# Patient Record
Sex: FEMALE | Race: WHITE | NOT HISPANIC OR LATINO | Employment: OTHER | URBAN - METROPOLITAN AREA
[De-identification: names, ages, dates, MRNs, and addresses within clinical notes are randomized per-mention and may not be internally consistent; named-entity substitution may affect disease eponyms.]

---

## 2017-01-09 ENCOUNTER — LAB VISIT (OUTPATIENT)
Dept: LAB | Facility: HOSPITAL | Age: 37
End: 2017-01-09
Attending: INTERNAL MEDICINE
Payer: COMMERCIAL

## 2017-01-09 DIAGNOSIS — M54.2 NECK PAIN: ICD-10-CM

## 2017-01-09 DIAGNOSIS — G93.5 ARNOLD-CHIARI MALFORMATION, TYPE I: ICD-10-CM

## 2017-01-09 DIAGNOSIS — L40.50 PSORIATIC ARTHRITIS: ICD-10-CM

## 2017-01-09 DIAGNOSIS — M45.9 AS (ANKYLOSING SPONDYLITIS): ICD-10-CM

## 2017-01-09 DIAGNOSIS — R11.2 NON-INTRACTABLE VOMITING WITH NAUSEA: ICD-10-CM

## 2017-01-09 DIAGNOSIS — G43.919 INTRACTABLE MIGRAINE, UNSPECIFIED MIGRAINE TYPE: ICD-10-CM

## 2017-01-09 DIAGNOSIS — F41.9 ANXIETY: ICD-10-CM

## 2017-01-09 LAB
BASOPHILS # BLD AUTO: 0.09 K/UL
BASOPHILS NFR BLD: 0.8 %
DIFFERENTIAL METHOD: ABNORMAL
EOSINOPHIL # BLD AUTO: 0.2 K/UL
EOSINOPHIL NFR BLD: 1.6 %
ERYTHROCYTE [DISTWIDTH] IN BLOOD BY AUTOMATED COUNT: 13.8 %
ERYTHROCYTE [SEDIMENTATION RATE] IN BLOOD BY WESTERGREN METHOD: 10 MM/HR
HCT VFR BLD AUTO: 41.4 %
HGB BLD-MCNC: 13.6 G/DL
LYMPHOCYTES # BLD AUTO: 5 K/UL
LYMPHOCYTES NFR BLD: 44.2 %
MCH RBC QN AUTO: 30.2 PG
MCHC RBC AUTO-ENTMCNC: 32.9 %
MCV RBC AUTO: 92 FL
MONOCYTES # BLD AUTO: 0.9 K/UL
MONOCYTES NFR BLD: 8.3 %
NEUTROPHILS # BLD AUTO: 5.1 K/UL
NEUTROPHILS NFR BLD: 44.8 %
PLATELET # BLD AUTO: 423 K/UL
PMV BLD AUTO: 10.2 FL
RBC # BLD AUTO: 4.5 M/UL
WBC # BLD AUTO: 11.31 K/UL

## 2017-01-09 PROCEDURE — 80053 COMPREHEN METABOLIC PANEL: CPT

## 2017-01-09 PROCEDURE — 86235 NUCLEAR ANTIGEN ANTIBODY: CPT | Mod: 59

## 2017-01-09 PROCEDURE — 84481 FREE ASSAY (FT-3): CPT

## 2017-01-09 PROCEDURE — 85025 COMPLETE CBC W/AUTO DIFF WBC: CPT

## 2017-01-09 PROCEDURE — 86225 DNA ANTIBODY NATIVE: CPT

## 2017-01-09 PROCEDURE — 36415 COLL VENOUS BLD VENIPUNCTURE: CPT | Mod: PO

## 2017-01-09 PROCEDURE — 84439 ASSAY OF FREE THYROXINE: CPT

## 2017-01-09 PROCEDURE — 86038 ANTINUCLEAR ANTIBODIES: CPT

## 2017-01-09 PROCEDURE — 84443 ASSAY THYROID STIM HORMONE: CPT

## 2017-01-09 PROCEDURE — 86235 NUCLEAR ANTIGEN ANTIBODY: CPT

## 2017-01-09 PROCEDURE — 83516 IMMUNOASSAY NONANTIBODY: CPT

## 2017-01-09 PROCEDURE — 85651 RBC SED RATE NONAUTOMATED: CPT | Mod: PO

## 2017-01-10 LAB
ALBUMIN SERPL BCP-MCNC: 3.3 G/DL
ALP SERPL-CCNC: 69 U/L
ALT SERPL W/O P-5'-P-CCNC: 13 U/L
ANA SER QL IF: NORMAL
ANION GAP SERPL CALC-SCNC: 8 MMOL/L
ANTI SM ANTIBODY: 1.02 EU
ANTI SM/RNP ANTIBODY: 1.81 EU
ANTI-SM INTERPRETATION: NEGATIVE
ANTI-SM/RNP INTERPRETATION: NEGATIVE
ANTI-SSA ANTIBODY: 1.43 EU
ANTI-SSA INTERPRETATION: NEGATIVE
AST SERPL-CCNC: 12 U/L
BILIRUB SERPL-MCNC: 0.3 MG/DL
BUN SERPL-MCNC: 15 MG/DL
CALCIUM SERPL-MCNC: 9 MG/DL
CHLORIDE SERPL-SCNC: 104 MMOL/L
CO2 SERPL-SCNC: 25 MMOL/L
CREAT SERPL-MCNC: 0.7 MG/DL
DSDNA AB SER-ACNC: NORMAL [IU]/ML
EST. GFR  (AFRICAN AMERICAN): >60 ML/MIN/1.73 M^2
EST. GFR  (NON AFRICAN AMERICAN): >60 ML/MIN/1.73 M^2
GLUCOSE SERPL-MCNC: 84 MG/DL
POTASSIUM SERPL-SCNC: 4 MMOL/L
PROT SERPL-MCNC: 6.9 G/DL
SODIUM SERPL-SCNC: 137 MMOL/L
T3FREE SERPL-MCNC: 3 PG/ML
T4 FREE SERPL-MCNC: 0.89 NG/DL
TSH SERPL DL<=0.005 MIU/L-ACNC: 1.29 UIU/ML

## 2017-01-11 ENCOUNTER — OFFICE VISIT (OUTPATIENT)
Dept: RHEUMATOLOGY | Facility: CLINIC | Age: 37
End: 2017-01-11
Payer: COMMERCIAL

## 2017-01-11 VITALS
BODY MASS INDEX: 34.45 KG/M2 | DIASTOLIC BLOOD PRESSURE: 73 MMHG | HEART RATE: 73 BPM | SYSTOLIC BLOOD PRESSURE: 111 MMHG | WEIGHT: 182.31 LBS

## 2017-01-11 DIAGNOSIS — M45.9 ANKYLOSING SPONDYLITIS: Primary | ICD-10-CM

## 2017-01-11 DIAGNOSIS — L40.50 PSORIATIC ARTHRITIS: ICD-10-CM

## 2017-01-11 DIAGNOSIS — G43.919 INTRACTABLE MIGRAINE, UNSPECIFIED MIGRAINE TYPE: ICD-10-CM

## 2017-01-11 DIAGNOSIS — G93.5 ARNOLD-CHIARI MALFORMATION, TYPE I: ICD-10-CM

## 2017-01-11 DIAGNOSIS — F41.9 ANXIETY: ICD-10-CM

## 2017-01-11 DIAGNOSIS — M54.2 NECK PAIN: ICD-10-CM

## 2017-01-11 LAB — ENA SCL70 AB SER-ACNC: 2 UNITS

## 2017-01-11 PROCEDURE — 99214 OFFICE O/P EST MOD 30 MIN: CPT | Mod: S$GLB,,, | Performed by: INTERNAL MEDICINE

## 2017-01-11 PROCEDURE — 99999 PR PBB SHADOW E&M-EST. PATIENT-LVL III: CPT | Mod: PBBFAC,,, | Performed by: INTERNAL MEDICINE

## 2017-01-11 PROCEDURE — 1159F MED LIST DOCD IN RCRD: CPT | Mod: S$GLB,,, | Performed by: INTERNAL MEDICINE

## 2017-01-11 RX ORDER — CYCLOBENZAPRINE HCL 10 MG
10 TABLET ORAL 3 TIMES DAILY PRN
Qty: 90 TABLET | Refills: 3 | Status: SHIPPED | OUTPATIENT
Start: 2017-01-11 | End: 2017-02-10

## 2017-01-11 RX ORDER — CYCLOBENZAPRINE HYDROCHLORIDE 15 MG/1
15 CAPSULE, EXTENDED RELEASE ORAL DAILY
COMMUNITY
Start: 2017-01-05 | End: 2018-09-10 | Stop reason: ALTCHOICE

## 2017-01-11 ASSESSMENT — ROUTINE ASSESSMENT OF PATIENT INDEX DATA (RAPID3)
FATIGUE SCORE: 5
TOTAL RAPID3 SCORE: 5.33
PATIENT GLOBAL ASSESSMENT SCORE: 5
AM STIFFNESS SCORE: 1, YES
WHEN YOU AWAKENED IN THE MORNING OVER THE LAST WEEK, PLEASE INDICATE THE AMOUNT OF TIME IT TAKES UNTIL YOU ARE AS LIMBER AS YOU WILL BE FOR THE DAY: ONE HOUR AFTER WAKING
PAIN SCORE: 6
MDHAQ FUNCTION SCORE: 1.5
PSYCHOLOGICAL DISTRESS SCORE: 5.5

## 2017-01-11 NOTE — PROGRESS NOTES
Subjective:       Patient ID: Sparkle Jose is a 36 y.o. female.    Chief Complaint: Follow-up    HPI Comments: DX: ANKYLOSING SPONDYLITIS   On consyntx and her joints are stable, Pt has tried and failed or had an adverse reaction to methotrexate, azulfidine, Humira, simponi, cimzia and remicade. She has been on Cosentyx  For about six months and is doing very well, until it was stopped due to a change in insurance.  likely muscle spasm and simponi not working we will change to consyntx q month. She also has Chiari decompression and am concerned about an epidural injection.  Patient complains of arthralgias and myalgias for which has been present for a few years. Pain is located in multiple joints, both shoulder(s), both elbow(s), both wrist(s), both MCP(s): 1st, 2nd, 3rd, 4th and 5th, both PIP(s): 1st, 2nd, 3rd, 4th and 5th, both DIP(s): 1st and 2nd, both hip(s), both knee(s) and both MTP(s): 1st, 2nd, 3rd, 4th and 5th, is described as aching, pulsating, shooting and throbbing, and is constant, moderate .  Associated symptoms include: crepitation, decreased range of motion, edema, effusion, tenderness and warmth.            Affected locations include the left PIP.         Review of Systems   Constitutional: Positive for activity change, appetite change and unexpected weight change. Negative for chills and diaphoresis.   HENT: Negative for congestion, dental problem, ear discharge, ear pain, facial swelling, mouth sores, nosebleeds, postnasal drip, rhinorrhea, sinus pressure, sneezing, sore throat, tinnitus and voice change.    Eyes: Negative for photophobia, pain, discharge, redness and itching.   Respiratory: Negative for apnea, cough, chest tightness, shortness of breath and wheezing.    Cardiovascular: Positive for leg swelling. Negative for chest pain and palpitations.   Gastrointestinal: Negative for abdominal distention, abdominal pain, constipation, diarrhea, nausea and vomiting.   Endocrine: Negative  for cold intolerance, heat intolerance, polydipsia and polyuria.   Genitourinary: Negative for decreased urine volume, difficulty urinating, flank pain, frequency, hematuria and urgency.   Musculoskeletal: Positive for arthralgias, back pain, gait problem, neck pain and neck stiffness.   Skin: Positive for rash. Negative for pallor and wound.        Psoriasis rash on arms   Allergic/Immunologic: Negative for immunocompromised state.   Neurological: Positive for weakness and numbness. Negative for dizziness and tremors.   Hematological: Negative for adenopathy. Does not bruise/bleed easily.   Psychiatric/Behavioral: Positive for sleep disturbance. The patient is nervous/anxious.         Depressed         Objective:     Visit Vitals    /73 (BP Location: Left arm, Patient Position: Sitting, BP Method: Automatic)    Pulse 73    Wt 82.7 kg (182 lb 5.1 oz)    BMI 34.45 kg/m2        Physical Exam   Vitals reviewed.  Constitutional: She is oriented to person, place, and time. She appears distressed.   HENT:   Head: Normocephalic and atraumatic.   Mouth/Throat: Oropharynx is clear and moist.   Eyes: EOM are normal. Pupils are equal, round, and reactive to light.   Neck: Neck supple. No thyromegaly present.   Cardiovascular: Normal rate, regular rhythm and normal heart sounds.  Exam reveals no gallop and no friction rub.    No murmur heard.  Pulmonary/Chest: Breath sounds normal. She has no wheezes. She has no rales. She exhibits no tenderness.   Abdominal: There is no tenderness. There is no rebound and no guarding.       Right Side Rheumatological Exam     Examination finds the elbow, 1st MCP, 2nd MCP, 3rd MCP, 4th MCP and 5th MCP normal.    The patient is tender to palpation of the shoulder and knee    She has swelling of the knee    The patient has an enlarged wrist, 1st PIP, 2nd PIP, 3rd PIP, 4th PIP and 5th PIP    Shoulder Exam   Tenderness Location: acromion    Range of Motion   Active Abduction: abnormal    Adduction: abnormal  Sensation: normal    Knee Exam   Tenderness Location: medial joint line  Patellofemoral Crepitus: positive  Effusion: positive  Sensation: normal    Hip Exam   Tenderness Location: no tenderness  Sensation: normal    Elbow/Wrist Exam   Tenderness Location: no tenderness  Sensation: normal    Left Side Rheumatological Exam     Examination finds the elbow, knee, 1st MCP, 2nd MCP, 3rd MCP, 4th MCP and 5th MCP normal.    The patient is tender to palpation of the shoulder.    The patient has an enlarged wrist, 1st PIP, 2nd PIP, 3rd PIP, 4th PIP and 5th PIP.    Shoulder Exam   Tenderness Location: acromion    Range of Motion   Active Abduction: abnormal   Sensation: normal    Knee Exam   Tenderness Location: lateral joint line and medial joint line    Patellofemoral Crepitus: positive  Effusion: positive  Sensation: normal    Hip Exam   Tenderness Location: no tenderness  Sensation: normal    Elbow/Wrist Exam   Sensation: normal      Back/Neck Exam   General Inspection   Gait: normal       Tenderness Right paramedian tenderness of the Lower C-Spine, Lower L-Spine, Occ, Lower T-Spine and Upper C-Spine.Left paramedian tenderness of the Lower L-Spine, Lower C-Spine and Lower T-Spine.    Back Range of Motion   Lateral Bend Right: abnormal  Lateral Bend Left: abnormal  Rotation Right: abnormal  Rotation Left: abnormal      Lymphadenopathy:     She has no cervical adenopathy.   Neurological: She is alert and oriented to person, place, and time. She has normal sensation and normal strength. Gait normal.   Reflex Scores:       Patellar reflexes are 3+ on the right side and 3+ on the left side.  Skin: No rash noted. No erythema. No pallor.     Psychiatric: Mood and affect normal.   Musculoskeletal: She exhibits edema, tenderness and deformity.       Results for orders placed or performed in visit on 01/09/17   CAMRYN   Result Value Ref Range    CAMRYN Screen Negative <1:160 Negative <1:160   Anti Sm/RNP Antibody    Result Value Ref Range    Anti Sm/RNP Antibody 1.81 0.00 - 19.99 EU    Anti-Sm/RNP Interpretation Negative Negative   Anti-DNA antibody, double-stranded   Result Value Ref Range    ds DNA Ab Negative 1:10 Negative 1:10   Anti-scleroderma antibody   Result Value Ref Range    Scleroderma SCL- 2 <20 UNITS   Anti-Smith antibody   Result Value Ref Range    Anti Sm Antibody 1.02 0.00 - 19.99 EU    Anti-Sm Interpretation Negative Negative   Sedimentation rate, manual   Result Value Ref Range    Sed Rate 10 0 - 20 mm/Hr   Sjogrens syndrome-A extractable nuclear antibody   Result Value Ref Range    Anti-SSA Antibody 1.43 0.00 - 19.99 EU    Anti-SSA Interpretation Negative Negative   CBC auto differential   Result Value Ref Range    WBC 11.31 3.90 - 12.70 K/uL    RBC 4.50 4.00 - 5.40 M/uL    Hemoglobin 13.6 12.0 - 16.0 g/dL    Hematocrit 41.4 37.0 - 48.5 %    MCV 92 82 - 98 fL    MCH 30.2 27.0 - 31.0 pg    MCHC 32.9 32.0 - 36.0 %    RDW 13.8 11.5 - 14.5 %    Platelets 423 (H) 150 - 350 K/uL    MPV 10.2 9.2 - 12.9 fL    Gran # 5.1 1.8 - 7.7 K/uL    Lymph # 5.0 (H) 1.0 - 4.8 K/uL    Mono # 0.9 0.3 - 1.0 K/uL    Eos # 0.2 0.0 - 0.5 K/uL    Baso # 0.09 0.00 - 0.20 K/uL    Gran% 44.8 38.0 - 73.0 %    Lymph% 44.2 18.0 - 48.0 %    Mono% 8.3 4.0 - 15.0 %    Eosinophil% 1.6 0.0 - 8.0 %    Basophil% 0.8 0.0 - 1.9 %    Differential Method Automated    Comprehensive metabolic panel   Result Value Ref Range    Sodium 137 136 - 145 mmol/L    Potassium 4.0 3.5 - 5.1 mmol/L    Chloride 104 95 - 110 mmol/L    CO2 25 23 - 29 mmol/L    Glucose 84 70 - 110 mg/dL    BUN, Bld 15 6 - 20 mg/dL    Creatinine 0.7 0.5 - 1.4 mg/dL    Calcium 9.0 8.7 - 10.5 mg/dL    Total Protein 6.9 6.0 - 8.4 g/dL    Albumin 3.3 (L) 3.5 - 5.2 g/dL    Total Bilirubin 0.3 0.1 - 1.0 mg/dL    Alkaline Phosphatase 69 55 - 135 U/L    AST 12 10 - 40 U/L    ALT 13 10 - 44 U/L    Anion Gap 8 8 - 16 mmol/L    eGFR if African American >60.0 >60 mL/min/1.73 m^2    eGFR if non  African American >60.0 >60 mL/min/1.73 m^2   TSH   Result Value Ref Range    TSH 1.287 0.400 - 4.000 uIU/mL   T4, free   Result Value Ref Range    Free T4 0.89 0.71 - 1.51 ng/dL   T3, free   Result Value Ref Range    T3, Free 3.0 2.3 - 4.2 pg/mL         Heather  Neg ds dna neg  Assessment:     Encounter Diagnoses   Name Primary?    Ankylosing spondylitis Yes    Arnold-Chiari malformation, type I     Psoriatic arthritis     Anxiety     Neck pain          Plan:     Ankylosing spondylitis  -     Comprehensive metabolic panel; Future; Expected date: 1/17/17  -     CBC auto differential; Future; Expected date: 1/17/17  -     C-reactive protein; Future; Expected date: 1/17/17  -     Sedimentation rate, manual; Future; Expected date: 1/17/17    Arnold-Chiari malformation, type I  -     Discontinue: secukinumab (COSENTYX PEN, 2 PENS,) 150 mg/mL PnIj; INJECT 2 PENS SUBCUTANEOUSLY  Monthly MAINTENANCE DOSE  Dispense: 2 Syringe; Refill: 11  -     secukinumab (COSENTYX PEN, 2 PENS,) 150 mg/mL PnIj; INJECT 2 PENS SUBCUTANEOUSLY  Monthly MAINTENANCE DOSE  Dispense: 2 Syringe; Refill: 11  -     Comprehensive metabolic panel; Future; Expected date: 1/17/17  -     CBC auto differential; Future; Expected date: 1/17/17  -     C-reactive protein; Future; Expected date: 1/17/17  -     Sedimentation rate, manual; Future; Expected date: 1/17/17    Psoriatic arthritis  -     Discontinue: secukinumab (COSENTYX PEN, 2 PENS,) 150 mg/mL PnIj; INJECT 2 PENS SUBCUTANEOUSLY  Monthly MAINTENANCE DOSE  Dispense: 2 Syringe; Refill: 11  -     secukinumab (COSENTYX PEN, 2 PENS,) 150 mg/mL PnIj; INJECT 2 PENS SUBCUTANEOUSLY  Monthly MAINTENANCE DOSE  Dispense: 2 Syringe; Refill: 11  -     Comprehensive metabolic panel; Future; Expected date: 1/17/17  -     CBC auto differential; Future; Expected date: 1/17/17  -     C-reactive protein; Future; Expected date: 1/17/17  -     Sedimentation rate, manual; Future; Expected date: 1/17/17    Anxiety  -      Discontinue: secukinumab (COSENTYX PEN, 2 PENS,) 150 mg/mL PnIj; INJECT 2 PENS SUBCUTANEOUSLY  Monthly MAINTENANCE DOSE  Dispense: 2 Syringe; Refill: 11  -     secukinumab (COSENTYX PEN, 2 PENS,) 150 mg/mL PnIj; INJECT 2 PENS SUBCUTANEOUSLY  Monthly MAINTENANCE DOSE  Dispense: 2 Syringe; Refill: 11  -     Comprehensive metabolic panel; Future; Expected date: 1/17/17  -     CBC auto differential; Future; Expected date: 1/17/17  -     C-reactive protein; Future; Expected date: 1/17/17  -     Sedimentation rate, manual; Future; Expected date: 1/17/17    Neck pain  -     Discontinue: secukinumab (COSENTYX PEN, 2 PENS,) 150 mg/mL PnIj; INJECT 2 PENS SUBCUTANEOUSLY  Monthly MAINTENANCE DOSE  Dispense: 2 Syringe; Refill: 11  -     secukinumab (COSENTYX PEN, 2 PENS,) 150 mg/mL PnIj; INJECT 2 PENS SUBCUTANEOUSLY  Monthly MAINTENANCE DOSE  Dispense: 2 Syringe; Refill: 11  -     Comprehensive metabolic panel; Future; Expected date: 1/17/17  -     CBC auto differential; Future; Expected date: 1/17/17  -     C-reactive protein; Future; Expected date: 1/17/17  -     Sedimentation rate, manual; Future; Expected date: 1/17/17    Intractable migraine, unspecified migraine type  -     Ambulatory consult to Neurology  -     Comprehensive metabolic panel; Future; Expected date: 1/17/17  -     CBC auto differential; Future; Expected date: 1/17/17  -     C-reactive protein; Future; Expected date: 1/17/17  -     Sedimentation rate, manual; Future; Expected date: 1/17/17    Other orders  -     cyclobenzaprine (FLEXERIL) 10 MG tablet; Take 1 tablet (10 mg total) by mouth 3 (three) times daily as needed for Muscle spasms.  Dispense: 90 tablet; Refill: 3       On consyntx and her joints are stable, Pt has tried and failed or had an adverse reaction to methotrexate, azulfidine, Humira, simponi, cimzia and remicade. She has been on Cosentyx  For about six months and is doing very well, until it was stopped due to a change in insurance.

## 2017-01-12 LAB
ANTI-SSB ANTIBODY: 17.22 EU
ANTI-SSB INTERPRETATION: NEGATIVE

## 2017-01-13 LAB — HISTONE IGG SER IA-ACNC: 0.3 UNITS (ref 0–0.9)

## 2017-01-20 ENCOUNTER — PATIENT MESSAGE (OUTPATIENT)
Dept: RHEUMATOLOGY | Facility: CLINIC | Age: 37
End: 2017-01-20

## 2017-01-23 ENCOUNTER — TELEPHONE (OUTPATIENT)
Dept: RHEUMATOLOGY | Facility: CLINIC | Age: 37
End: 2017-01-23

## 2017-01-24 ENCOUNTER — TELEPHONE (OUTPATIENT)
Dept: RHEUMATOLOGY | Facility: CLINIC | Age: 37
End: 2017-01-24

## 2017-01-24 NOTE — TELEPHONE ENCOUNTER
----- Message from Bette Day sent at 1/23/2017  4:11 PM CST -----  Contact:  [phone]    Calling  About  A  Prior [employer]  For  cosentyx 150 mg   Please call   # [phone] // please call

## 2017-01-26 ENCOUNTER — PATIENT MESSAGE (OUTPATIENT)
Dept: RHEUMATOLOGY | Facility: CLINIC | Age: 37
End: 2017-01-26

## 2017-03-10 ENCOUNTER — OFFICE VISIT (OUTPATIENT)
Dept: NEUROLOGY | Facility: CLINIC | Age: 37
End: 2017-03-10
Payer: COMMERCIAL

## 2017-03-10 VITALS
DIASTOLIC BLOOD PRESSURE: 85 MMHG | WEIGHT: 182.31 LBS | HEART RATE: 112 BPM | HEIGHT: 61 IN | RESPIRATION RATE: 20 BRPM | BODY MASS INDEX: 34.42 KG/M2 | SYSTOLIC BLOOD PRESSURE: 135 MMHG

## 2017-03-10 DIAGNOSIS — F11.20 OPIOID DEPENDENCE IN CONTROLLED ENVIRONMENT: ICD-10-CM

## 2017-03-10 DIAGNOSIS — G89.4 CHRONIC PAIN SYNDROME: ICD-10-CM

## 2017-03-10 DIAGNOSIS — F41.9 ANXIETY: ICD-10-CM

## 2017-03-10 DIAGNOSIS — G43.719 CHRONIC MIGRAINE WITHOUT AURA, WITH INTRACTABLE MIGRAINE, SO STATED, WITHOUT MENTION OF STATUS MIGRAINOSUS: Primary | ICD-10-CM

## 2017-03-10 DIAGNOSIS — M45.9 ANKYLOSING SPONDYLITIS: ICD-10-CM

## 2017-03-10 DIAGNOSIS — L40.50 PSORIATIC ARTHRITIS: ICD-10-CM

## 2017-03-10 PROCEDURE — 99215 OFFICE O/P EST HI 40 MIN: CPT | Mod: S$GLB,,, | Performed by: PSYCHIATRY & NEUROLOGY

## 2017-03-10 PROCEDURE — 1160F RVW MEDS BY RX/DR IN RCRD: CPT | Mod: S$GLB,,, | Performed by: PSYCHIATRY & NEUROLOGY

## 2017-03-10 PROCEDURE — 99999 PR PBB SHADOW E&M-EST. PATIENT-LVL III: CPT | Mod: PBBFAC,,, | Performed by: PSYCHIATRY & NEUROLOGY

## 2017-03-10 RX ORDER — ELETRIPTAN HYDROBROMIDE 40 MG/1
40 TABLET, FILM COATED ORAL
Qty: 12 TABLET | Refills: 11 | Status: SHIPPED | OUTPATIENT
Start: 2017-03-10 | End: 2017-05-30

## 2017-03-10 NOTE — MR AVS SNAPSHOT
[facility]  [address] [facility] [address]  [facility] [address]  Phone: [phone]  Fax: [phone]                  Sparkle Jose   3/10/2017 10:00 AM   Office Visit    Description:  Female : 1980   Provider:  Becca Schaefer MD   Department:  [facility]           Reason for Visit     Headache                To Do List           Future Appointments        Provider Department Dept Phone    3/16/2017 8:35 AM Becca Schaefer MD [facility] Neurology [phone]    5/10/2017 8:00 AM Rafiq Shea MD [facility] Rheumatology [phone]    6/15/2017 1:00 PM Becca Schaefer MD [facility] Neurology [phone]      Goals (5 Years of Data)     None       These Medications        Disp Refills Start End    eletriptan (RELPAX) 40 MG tablet 12 tablet 11 3/10/2017 2017    Take 1 tablet (40 mg total) by mouth as needed. may repeat in 2 hours if necessary - Oral    Pharmacy: [facility] [id number] [place] [facility]  [address] Ph #: [phone]         [facility]     [facility] Care [facility] -  Assistance  Registered nurses in the [facility] provide clinical advisement, health education, appointment booking, and other advisory services.  Call for this free service at [phone].             Medications           Message regarding Medications     Verify the changes and/or additions to your medication regime listed below are the same as discussed with your clinician today.  If any of these changes or additions are incorrect, please notify your healthcare provider.        START taking these NEW medications        Refills    eletriptan (RELPAX) 40 MG tablet 11    Sig: Take 1 tablet (40 mg total) by mouth as needed. may repeat in 2 hours if necessary    Class: Normal    Route: Oral           Verify that the below list of medications is an accurate representation of the medications you are currently taking.  If none reported, the  list may be blank. If incorrect, please contact your healthcare provider. Carry this list with you in case of emergency.           Current Medications     alprazolam (XANAX) 0.25 MG tablet Take 1 tablet (0.25 mg total) by mouth 4 (four) times daily as needed for Anxiety.    AMRIX 15 mg 24 hr capsule Take 15 mg by mouth once daily.    buPROPion (WELLBUTRIN XL) 150 MG TB24 tablet Take 1 tablet (150 mg total) by mouth once daily. In am    clobetasol 0.05% (TEMOVATE) 0.05 % Oint daily as needed.     ergocalciferol (ERGOCALCIFEROL) 50,000 unit Cap Take 1 capsule (50,000 Units total) by mouth every 7 days.    fentaNYL (DURAGESIC) 25 mcg/hr Place 1 patch onto the skin every 72 hours.     ibuprofen (ADVIL,MOTRIN) 800 MG tablet Take 1 tablet (800 mg total) by mouth every 6 (six) hours as needed for Pain.    ketoconazole (NIZORAL) 2 % shampoo Wash body with medicated shampoo at least 2x/week - let soak at least 5 minutes prior to rinsing    modafinil (PROVIGIL) 200 MG Tab Take 200 mg by mouth once daily.    oxycodone-acetaminophen (PERCOCET)  mg per tablet Take 1 tablet by mouth every 8 (eight) hours as needed for Pain.    predniSONE (DELTASONE) 5 MG tablet take 1 to 4 tablets by mouth every morning as needed for swelling    promethazine (PHENERGAN) 25 MG tablet Take 1 tablet (25 mg total) by mouth every 6 (six) hours as needed.    ranitidine (ZANTAC) 150 MG capsule Take 1 capsule (150 mg total) by mouth 2 (two) times daily.    secukinumab (COSENTYX PEN, 2 PENS,) 150 mg/mL PnIj INJECT 2 PENS SUBCUTANEOUSLY  Monthly MAINTENANCE DOSE    sumatriptan (IMITREX) 50 MG tablet Take 1 tablet (50 mg total) by mouth once as needed for Migraine.    topiramate (TOPAMAX) 100 MG tablet Take 1 tablet (100 mg total) by mouth 2 (two) times daily.  1 tab PO bid    trazodone (DESYREL) 50 MG tablet Take 1 tablet (50 mg total) by mouth every evening.    eletriptan (RELPAX) 40 MG tablet Take 1 tablet (40 mg total) by mouth as needed. may  "repeat in 2 hours if necessary           Clinical Reference Information           Your Vitals Were     BP Pulse Resp Height Weight Last Period    135/85 112 20 5' 1" (1.549 m) 82.7 kg (182 lb 5.1 oz) 02/24/2017 (Approximate)    BMI                34.45 kg/m2          Blood Pressure          Most Recent Value    BP  135/85      Allergies as of 3/10/2017     No Known Allergies      Immunizations Administered on Date of Encounter - 3/10/2017     None      Smoking Cessation     If you would like to quit smoking:   You may be eligible for free services if you are a [place] resident and started smoking cigarettes before September 1, 1988.  Call the [employer]oking [employer]ess[employer] ([employer]) toll free at (799) 207-1518 or (628) 176-6905.   Call 1-800-QUIT-NOW if you do not meet the above criteria.            Language Assistance Services     ATTENTION: Language assistance services are available, free of charge. Please call [phone].      ATENCIÓN: Si habla español, tiene a celeste disposición servicios gratuitos de asistencia lingüística. Llame al [phone].     CHÚ Ý: N?u b?n nói Ti?ng Vi?t, có các d?ch v? h? tr? ngôn ng? mi?n phí dành cho b?n. G?i s? [phone].         [facility] complies with applicable Federal civil rights laws and does not discriminate on the basis of race, color, national origin, age, disability, or sex.        "

## 2017-03-10 NOTE — LETTER
March 10, 2017      Rafiq Shea MD  [address] [facility] [address] [place] [address]           [facility]  [address] [facility] [address] [place] [address]  Phone: [phone]  Fax: [phone]          Patient: Sparkle Jose   MR Number: [id number]   YOB: 1980   Date of Visit: 3/10/2017       Dear Dr. Rafiq Shea:    Thank you for referring Sparkle Jose to me for evaluation. Attached you will find relevant portions of my assessment and plan of care.    If you have questions, please do not hesitate to call me. I look forward to following Sparkle Jose along with you.    Sincerely,    Becca Schaefer MD    Enclosure  CC:  No Recipients    If you would like to receive this communication electronically, please contact [email or web address] or [phone] to request more information on [employer] access.    For providers and/or their staff who would like to refer a patient to [facility], please contact us through our one-stop-shop provider referral line, [facility], at [phone].    If you feel you have received this communication in error or would no longer like to receive these types of communications, please e-mail [email or web address]

## 2017-03-10 NOTE — PROGRESS NOTES
Headache questionnaire    1. When did your Headaches start?    2003       2. Where are your headaches located?   Starts at back of head then spreads to entire head      3. Your headache's characteristics:   Pressure, Throbbing, Pounding, Like a tight band      4. How long does the headache last?   Days      5. How often does the headache occur?   weekly, More than 15 days per month      6. Are your headaches preceded or accompanied by other symptoms? yes   If yes, please describe.  Nausea, clammy hands sweating      7. Does the headache awaken you at night? no   If so, how often?         8. Please jacinda the word that best describes your headache's intensity:    moderate      9. Using a scale of 1 through 10, with 0 = no pain and 10 = the worst pain:   What score is your headache now? 3   What score is your headache at its worst? 8   What score is your headache at its best? 3        10. Possible associated headache symptoms:  []  Sensitivity to light  [x] Dizziness  [] Nasal or sinus pressure/ pain   [x] Sensitivity to noise  [] Vertigo  [x] Problems with concentration  [] Sensitivity to smells  [] Ringing in ears  [x] Problems with memory    [x] Blurred vision  [x] Irritability  [x] Problems with task completion   [] Double vision  [] Anger  [x]  Problems with relaxation  [x] Loss of appetite  [x] Anxiety  [x] Neck tightness, Neck pain  [x] Nausea   [] Nasal congestion  [] Vomiting         11. Headache improving factors:  [x] Sleep  [] Heat  [] Darkness  [] Ice  [] Local pressure [] Menses (period)  [x] Massage   [x] Medications:        12. Headache worsening factors:   [x] Fatigue [] Sneezing  [] Changes in Weather  [] Light [] Bending Over [x] Stress  [] Noise [] Ovulation  [] Multiple Sclerosis Flare-Up  [] Smells  [x] Menses  [] Food   [] Coughing [] Alcohol      13. Number of caffeinated drinks per day: 1      14. Number of diet drinks per day:  0      15. Have you seen any other [facility]ists within the  last 3 years?  yes

## 2017-03-10 NOTE — PROGRESS NOTES
Subjective:       Patient ID: Sparkle Jose is a 36 y.o. female.    Chief Complaint: Headache    HPI  The patient is a pleasant 37 y/o female who presents with chief complaint of headache. She was previously seen by Dr. Toure who diagnosed her with cervicogenic headache, migraine and pseudodementia. The patient was referred by Dr. Shea as her headaches remain intractable. Her case is complicated in that, she has history of Arnold-Chiari Type 1 status post cervical decompression on 12/6/2005 which is stable (last MRI 5/15/15), she also suffers with Ankylosing Spondilitis and is under Pain Medicine care. She is opioid dependent on Fentanyl 25 mcg every 48 hours and prn use of oxycodone. She is on disability, stopped working on 2013.    She has been on multiple preventives like Topiramate, Gabapentin, associated with side effects, unable to tolerate.  Amitriptyline, Nortriptyline both ineffective. For acute treatment she takes Imitrex with inconsistent results. In the past Maxalt was ineffective.  Please see details of headache characteristics headache below.    Review of Systems      Past Medical History:   Diagnosis Date    Ankylosing spondylitis     Arthritis     Celiac disease     Depression     GERD (gastroesophageal reflux disease)     Migraine     Osteoarthritis     Psoriasis     Psoriatic arthritis mutilans      Past Surgical History:   Procedure Laterality Date    brain decompression   2006    BRAIN SURGERY       Family History   Problem Relation Age of Onset    Hypertension Mother     Depression Sister     Asthma Brother      Social History     Social History    Marital status:      Spouse name: N/A    Number of children: N/A    Years of education: N/A     Occupational History    Not on file.     Social History Main Topics    Smoking status: Current Every Day Smoker     Packs/day: 0.50     Types: Cigarettes    Smokeless tobacco: Never Used    Alcohol use No    Drug use: No     Sexual activity: Not on file     Other Topics Concern    Not on file     Social History Narrative     Review of patient's allergies indicates:  No Known Allergies    Current Outpatient Prescriptions:     alprazolam (XANAX) 0.25 MG tablet, Take 1 tablet (0.25 mg total) by mouth 4 (four) times daily as needed for Anxiety., Disp: 40 tablet, Rfl: 3    AMRIX 15 mg 24 hr capsule, Take 15 mg by mouth once daily., Disp: , Rfl:     buPROPion (WELLBUTRIN XL) 150 MG TB24 tablet, Take 1 tablet (150 mg total) by mouth once daily. In am, Disp: 90 tablet, Rfl: 3    clobetasol 0.05% (TEMOVATE) 0.05 % Oint, daily as needed. , Disp: , Rfl:     ergocalciferol (ERGOCALCIFEROL) 50,000 unit Cap, Take 1 capsule (50,000 Units total) by mouth every 7 days., Disp: 4 capsule, Rfl: 6    fentaNYL (DURAGESIC) 25 mcg/hr, Place 1 patch onto the skin every 72 hours. , Disp: , Rfl:     ibuprofen (ADVIL,MOTRIN) 800 MG tablet, Take 1 tablet (800 mg total) by mouth every 6 (six) hours as needed for Pain., Disp: 90 tablet, Rfl: 4    ketoconazole (NIZORAL) 2 % shampoo, Wash body with medicated shampoo at least 2x/week - let soak at least 5 minutes prior to rinsing, Disp: 120 mL, Rfl: 5    modafinil (PROVIGIL) 200 MG Tab, Take 200 mg by mouth once daily., Disp: , Rfl:     oxycodone-acetaminophen (PERCOCET)  mg per tablet, Take 1 tablet by mouth every 8 (eight) hours as needed for Pain., Disp: 75 tablet, Rfl: 0    predniSONE (DELTASONE) 5 MG tablet, take 1 to 4 tablets by mouth every morning as needed for swelling, Disp: 120 tablet, Rfl: 2    promethazine (PHENERGAN) 25 MG tablet, Take 1 tablet (25 mg total) by mouth every 6 (six) hours as needed., Disp: 30 tablet, Rfl: 3    ranitidine (ZANTAC) 150 MG capsule, Take 1 capsule (150 mg total) by mouth 2 (two) times daily., Disp: 60 capsule, Rfl: 6    secukinumab (COSENTYX PEN, 2 PENS,) 150 mg/mL PnIj, INJECT 2 PENS SUBCUTANEOUSLY  Monthly MAINTENANCE DOSE, Disp: 2 Syringe, Rfl: 11     sumatriptan (IMITREX) 50 MG tablet, Take 1 tablet (50 mg total) by mouth once as needed for Migraine., Disp: 9 tablet, Rfl: 11    topiramate (TOPAMAX) 100 MG tablet, Take 1 tablet (100 mg total) by mouth 2 (two) times daily.  1 tab PO bid, Disp: 180 tablet, Rfl: 3    trazodone (DESYREL) 50 MG tablet, Take 1 tablet (50 mg total) by mouth every evening., Disp: 90 tablet, Rfl: 3      Objective:      Vitals:    03/10/17 1018   BP: 135/85   Pulse: (!) 112   Resp: 20     Body mass index is 34.45 kg/(m^2).    Physical Exam    Constitutional:   She appears well-developed and well-nourished. She is well groomed    HENT:    Head: Atraumatic, oral and nasal mucosa intact  Eyes: Conjunctivae and EOM are normal. Pupils are equal, round, and reactive to light OU  Neck: Neck supple. No thyromegaly present  Cardiovascular: Tachycardic, normal heart sounds  No murmur heard  Pulmonary/Chest: Effort normal and breath sounds normal  Musculoskeletal: Decreased range of motion, cervical and lumbar spine. Spasm of muscle of right paraspinal and trapezius  Skin: Skin is warm and dry, no rash appreciated today  Psychiatric: Normal mood and affect     Neuro exam:    Mental status:  Awake, attentive, Alert, oriented to self, place, year and month  Language function is intact  Naming, repetition and spontaneous meaningful speech expression are intact  Speech fluency is good and speech is clear  Able to spell backwards    Cranial Nerves:  Smell was not formally evaluated  Cranial Nerves II - XII: intact  Pursuits were smooth, normal saccades, no nystagmus OU  Funduscopic exam - disc were flat and pink, no exudates or hemorrhages OU  Motor - facial movement was symmetrical and normal     Palate moved well and was symmetrical with normal palatal and oral sensation  Tongue movements were full    Coordination:     Rapid alternating movements and rapid finger tapping - normal bilaterally  Finger to nose - normal and symmetric bilaterally   Heel to  shin test - normal and symmetric bilaterally   Arm roll - smooth and symmetric   No intentional or positional tremor.     Motor:  Normal muscle bulk and symmetry. No fasciculations were noted    No pronator drift  Strength5/5 bilaterally     Reflexes:  Tendon reflexes were 2 + at biceps, triceps, brachioradialis, patellar, and Achilles bilaterally  On plantar stimulation toes were down going bilaterally  No clonus was noted     Sensory: Intact to light touch, pin prick in all extremities.   Gait: Romberg absent. Normal gait. Normal arm swing and turns. Normal postural reflexes.     Review of Data: MRI of the brain 5/15 No acute abnormalities, Stable posterior fossa decompression. No syrinx. MRI of C spine: No significant abnormality        Assessment and Plan     The patient has chronic intractable migraine ( G43.719) and suffers from headaches more than 15 days a month lasting more than 4 hours a day with no relief of symptoms despite trying multiple medications including but not limited to Topiramate, Gabapentin, Amitriptyline, Nortriptyline. The patient will be an ideal candidate for Botox as she meets criteria and has multiple co morbidities that limit our options. We are planning for 3 treatments 3 months apart and aiming for at least 50% improvement in the symptoms. If we see no improvement after 3 treatments, we will discontinue the injections.  For acute treatment will discontinue Imitrex for lack of efficacy and will switch to Relpax. Maxalt was ineffective on the past  Arnold-Chiari Type I status post successful decompression in 2005. Stable, last MRI in 5/15   Ankylosing Spondylitis under Dr. Shea's care  Chronic neck and back pain on chronic opioids by Pain Medicine (Fentanyl 25 mcg Q48 hours and prn Oxycodone)  Psoriatic arthritis, under Dr. Shea's care  Anxiety    Counseling:  More than 50% of the 45 minute encounter was spent face to face counseling the patient regarding current status and future  plan of care as well as side of the medications. All questions were answered to patient's satisfaction    Linda Schaefer M.D  Medical Director, Headache and Facial Pain  [facility]

## 2017-03-16 ENCOUNTER — PROCEDURE VISIT (OUTPATIENT)
Dept: NEUROLOGY | Facility: CLINIC | Age: 37
End: 2017-03-16
Payer: COMMERCIAL

## 2017-03-16 VITALS
HEIGHT: 61 IN | WEIGHT: 180.75 LBS | BODY MASS INDEX: 34.13 KG/M2 | HEART RATE: 70 BPM | DIASTOLIC BLOOD PRESSURE: 84 MMHG | TEMPERATURE: 98 F | RESPIRATION RATE: 16 BRPM | SYSTOLIC BLOOD PRESSURE: 126 MMHG

## 2017-03-16 DIAGNOSIS — G43.719 CHRONIC MIGRAINE WITHOUT AURA, WITH INTRACTABLE MIGRAINE, SO STATED, WITHOUT MENTION OF STATUS MIGRAINOSUS: Primary | ICD-10-CM

## 2017-03-16 PROCEDURE — 64615 CHEMODENERV MUSC MIGRAINE: CPT | Mod: S$GLB,,, | Performed by: PSYCHIATRY & NEUROLOGY

## 2017-03-16 NOTE — PROCEDURES
Procedures     BOTOX PROCEDURE    PROCEDURE PERFORMED: Botulinum toxin injection (06617)    CLINICAL INDICATION: G43.719 NDC: 5644-0269-52    A time out was conducted just before the start of the procedure to verify the correct patient and procedure, procedure location, and all relevant critical information.     This is the first Botox injections and I am aiming for at least 50%  improvement in the patient's symptoms. Frequency of treatment is every 3 months unless no response to the treatments, at which time we will discontinue the injections.     DESCRIPTION OF PROCEDURE: After obtaining informed consent and under aseptic technique, a total of 155 units of botulinum toxin type A were injected in the following muscles: Procerus 5 units,  5 units bilaterally, frontalis 20 units, temporalis 20 units bilaterally, occipitalis 15 units, upper cervical paraspinals 10 units bilaterally and trapezius 15 units bilaterally. The patient was given a total of 155 units in 31 sites.The patient tolerated the procedure well. There were no complications. The patient was given a prescription for repeat treatment in 3 months.     Unavoidable waste 45 units    Linda Schaefer M.D  Medical Director, Headache and Facial Pain  [facility]

## 2017-03-16 NOTE — MR AVS SNAPSHOT
[facility] Neurology  [address]  [facility] [place] [address]  Phone: [phone]  Fax: [phone]                  Sparkle Jose   3/16/2017 8:35 AM   Procedure visit    Description:  Female : 1980   Provider:  Becca Schaefer MD   Department:  [facility] - Neurology           Reason for Visit     Botulinum Toxin Injection                To Do List           Future Appointments        Provider Department Dept Phone    5/10/2017 8:00 AM Rafiq Shea MD [facility] Rheumatology [phone]    2017 9:15 AM Becca Schaefer MD [facility] Neurology [phone]    6/15/2017 1:00 PM Becca Schaefer MD [facility] Neurology [phone]      Goals (5 Years of Data)     None      [facility]     [facility] -  Assistance  Registered nurses in the [facility] provide clinical advisement, health education, appointment booking, and other advisory services.  Call for this free service at [phone].             Medications           Message regarding Medications     Verify the changes and/or additions to your medication regime listed below are the same as discussed with your clinician today.  If any of these changes or additions are incorrect, please notify your healthcare provider.             Verify that the below list of medications is an accurate representation of the medications you are currently taking.  If none reported, the list may be blank. If incorrect, please contact your healthcare provider. Carry this list with you in case of emergency.           Current Medications     AMRIX 15 mg 24 hr capsule Take 15 mg by mouth once daily.    buPROPion (WELLBUTRIN XL) 150 MG TB24 tablet Take 1 tablet (150 mg total) by mouth once daily. In am    clobetasol 0.05% (TEMOVATE) 0.05 % Oint daily as needed.     eletriptan (RELPAX) 40 MG tablet Take 1 tablet (40 mg total) by mouth as needed. may repeat in 2 hours if necessary    ergocalciferol  "(ERGOCALCIFEROL) 50,000 unit Cap Take 1 capsule (50,000 Units total) by mouth every 7 days.    fentaNYL (DURAGESIC) 25 mcg/hr Place 1 patch onto the skin every 72 hours.     ibuprofen (ADVIL,MOTRIN) 800 MG tablet Take 1 tablet (800 mg total) by mouth every 6 (six) hours as needed for Pain.    ketoconazole (NIZORAL) 2 % shampoo Wash body with medicated shampoo at least 2x/week - let soak at least 5 minutes prior to rinsing    modafinil (PROVIGIL) 200 MG Tab Take 200 mg by mouth once daily.    oxycodone-acetaminophen (PERCOCET)  mg per tablet Take 1 tablet by mouth every 8 (eight) hours as needed for Pain.    predniSONE (DELTASONE) 5 MG tablet take 1 to 4 tablets by mouth every morning as needed for swelling    promethazine (PHENERGAN) 25 MG tablet Take 1 tablet (25 mg total) by mouth every 6 (six) hours as needed.    ranitidine (ZANTAC) 150 MG capsule Take 1 capsule (150 mg total) by mouth 2 (two) times daily.    secukinumab (COSENTYX PEN, 2 PENS,) 150 mg/mL PnIj INJECT 2 PENS SUBCUTANEOUSLY  Monthly MAINTENANCE DOSE    topiramate (TOPAMAX) 100 MG tablet Take 1 tablet (100 mg total) by mouth 2 (two) times daily.  1 tab PO bid    trazodone (DESYREL) 50 MG tablet Take 1 tablet (50 mg total) by mouth every evening.    alprazolam (XANAX) 0.25 MG tablet Take 1 tablet (0.25 mg total) by mouth 4 (four) times daily as needed for Anxiety.    sumatriptan (IMITREX) 50 MG tablet Take 1 tablet (50 mg total) by mouth once as needed for Migraine.           Clinical Reference Information           Your Vitals Were     BP Pulse Temp Resp Height Weight    126/84 (BP Location: Left arm, Patient Position: Sitting, BP Method: Automatic) 70 98 °F (36.7 °C) (Oral) 16 5' 1" (1.549 m) 82 kg (180 lb 12.4 oz)    Last Period BMI             02/24/2017 (Approximate) 34.16 kg/m2         Blood Pressure          Most Recent Value    BP  126/84      Allergies as of 3/16/2017     No Known Allergies      Immunizations Administered on Date of " Encounter - 3/16/2017     None      Smoking Cessation     If you would like to quit smoking:   You may be eligible for free services if you are a [place] resident and started smoking cigarettes before September 1, 1988.  Call the [employer]oking [employer]ess[employer] ([employer]) toll free at (239) [phone] or ([phone] [phone].   Call 2-820-QUIT-NOW if you do not meet the above criteria.            Language Assistance Services     ATTENTION: Language assistance services are available, free of charge. Please call [phone].      ATENCIÓN: Si habla español, tiene a celeste disposición servicios gratuitos de asistencia lingüística. Llame al [phone].     CHÚ Ý: N?u b?n nói Ti?ng Vi?t, có các d?ch v? h? tr? ngôn ng? mi?n phí dành cho b?n. G?i s? [phone].         [facility] complies with applicable Federal civil rights laws and does not discriminate on the basis of race, color, national origin, age, disability, or sex.

## 2017-05-30 ENCOUNTER — OFFICE VISIT (OUTPATIENT)
Dept: RHEUMATOLOGY | Facility: CLINIC | Age: 37
End: 2017-05-30
Payer: COMMERCIAL

## 2017-05-30 ENCOUNTER — LAB VISIT (OUTPATIENT)
Dept: LAB | Facility: HOSPITAL | Age: 37
End: 2017-05-30
Attending: INTERNAL MEDICINE
Payer: COMMERCIAL

## 2017-05-30 ENCOUNTER — TELEPHONE (OUTPATIENT)
Dept: RHEUMATOLOGY | Facility: CLINIC | Age: 37
End: 2017-05-30

## 2017-05-30 VITALS
SYSTOLIC BLOOD PRESSURE: 118 MMHG | HEART RATE: 75 BPM | WEIGHT: 172.81 LBS | BODY MASS INDEX: 32.66 KG/M2 | DIASTOLIC BLOOD PRESSURE: 79 MMHG

## 2017-05-30 DIAGNOSIS — R60.0 EDEMA OF LOWER EXTREMITY, UNSPECIFIED LATERALITY: ICD-10-CM

## 2017-05-30 DIAGNOSIS — G43.919 INTRACTABLE MIGRAINE, UNSPECIFIED MIGRAINE TYPE: ICD-10-CM

## 2017-05-30 DIAGNOSIS — L40.50 PSORIATIC ARTHRITIS: ICD-10-CM

## 2017-05-30 DIAGNOSIS — M45.9 ANKYLOSING SPONDYLITIS: ICD-10-CM

## 2017-05-30 DIAGNOSIS — F41.9 ANXIETY: ICD-10-CM

## 2017-05-30 DIAGNOSIS — M54.2 NECK PAIN: ICD-10-CM

## 2017-05-30 DIAGNOSIS — M45.9 ANKYLOSING SPONDYLITIS, UNSPECIFIED SITE OF SPINE: ICD-10-CM

## 2017-05-30 DIAGNOSIS — I73.00 RAYNAUD'S DISEASE WITHOUT GANGRENE: ICD-10-CM

## 2017-05-30 DIAGNOSIS — L40.50 PSORIATIC ARTHRITIS: Primary | ICD-10-CM

## 2017-05-30 DIAGNOSIS — G93.5 ARNOLD-CHIARI MALFORMATION, TYPE I: ICD-10-CM

## 2017-05-30 LAB
ALBUMIN SERPL BCP-MCNC: 3.7 G/DL
ALP SERPL-CCNC: 77 U/L
ALT SERPL W/O P-5'-P-CCNC: 12 U/L
ANION GAP SERPL CALC-SCNC: 8 MMOL/L
AST SERPL-CCNC: 15 U/L
BILIRUB SERPL-MCNC: 0.2 MG/DL
BUN SERPL-MCNC: 9 MG/DL
CALCIUM SERPL-MCNC: 9.6 MG/DL
CHLORIDE SERPL-SCNC: 105 MMOL/L
CO2 SERPL-SCNC: 25 MMOL/L
CREAT SERPL-MCNC: 0.8 MG/DL
CRP SERPL-MCNC: 5.9 MG/L
ERYTHROCYTE [SEDIMENTATION RATE] IN BLOOD BY WESTERGREN METHOD: 4 MM/HR
EST. GFR  (AFRICAN AMERICAN): >60 ML/MIN/1.73 M^2
EST. GFR  (NON AFRICAN AMERICAN): >60 ML/MIN/1.73 M^2
GLUCOSE SERPL-MCNC: 97 MG/DL
POTASSIUM SERPL-SCNC: 4.3 MMOL/L
PROT SERPL-MCNC: 7.4 G/DL
SODIUM SERPL-SCNC: 138 MMOL/L

## 2017-05-30 PROCEDURE — 86140 C-REACTIVE PROTEIN: CPT

## 2017-05-30 PROCEDURE — 99214 OFFICE O/P EST MOD 30 MIN: CPT | Mod: S$GLB,,, | Performed by: INTERNAL MEDICINE

## 2017-05-30 PROCEDURE — 99999 PR PBB SHADOW E&M-EST. PATIENT-LVL III: CPT | Mod: PBBFAC,,, | Performed by: INTERNAL MEDICINE

## 2017-05-30 PROCEDURE — 85007 BL SMEAR W/DIFF WBC COUNT: CPT

## 2017-05-30 PROCEDURE — 80053 COMPREHEN METABOLIC PANEL: CPT

## 2017-05-30 PROCEDURE — 36415 COLL VENOUS BLD VENIPUNCTURE: CPT | Mod: PO

## 2017-05-30 PROCEDURE — 85027 COMPLETE CBC AUTOMATED: CPT

## 2017-05-30 PROCEDURE — 85651 RBC SED RATE NONAUTOMATED: CPT | Mod: PO

## 2017-05-30 RX ORDER — POTASSIUM CHLORIDE 750 MG/1
10 TABLET, EXTENDED RELEASE ORAL DAILY
Qty: 30 TABLET | Refills: 4 | Status: SHIPPED | OUTPATIENT
Start: 2017-05-30 | End: 2020-11-04

## 2017-05-30 RX ORDER — FUROSEMIDE 20 MG/1
20 TABLET ORAL DAILY
Qty: 30 TABLET | Refills: 4 | Status: SHIPPED | OUTPATIENT
Start: 2017-05-30 | End: 2022-06-15

## 2017-05-30 ASSESSMENT — ROUTINE ASSESSMENT OF PATIENT INDEX DATA (RAPID3)
TOTAL RAPID3 SCORE: 4.66
AM STIFFNESS SCORE: 1, YES
MDHAQ FUNCTION SCORE: 1.5
FATIGUE SCORE: 5
PSYCHOLOGICAL DISTRESS SCORE: 3.3
PAIN SCORE: 6
WHEN YOU AWAKENED IN THE MORNING OVER THE LAST WEEK, PLEASE INDICATE THE AMOUNT OF TIME IT TAKES UNTIL YOU ARE AS LIMBER AS YOU WILL BE FOR THE DAY: ONE HOUR AFTER WAKING
PATIENT GLOBAL ASSESSMENT SCORE: 3

## 2017-05-30 NOTE — PROGRESS NOTES
Subjective:       Patient ID: Sparkle Jose is a 36 y.o. female.    Chief Complaint: Follow-up    DX: ANKYLOSING SPONDYLITIS   On consyntx and her joints are stable, botox helped headaches now has non pitting and  Pitting edema. She also has Chiari decompression and am concerned about an epidural injection.  Patient complains of arthralgias and myalgias for which has been present for a few years. Pain is located in multiple joints, both shoulder(s), both elbow(s), both wrist(s), both MCP(s): 1st, 2nd, 3rd, 4th and 5th, both PIP(s): 1st, 2nd, 3rd, 4th and 5th, both DIP(s): 1st and 2nd, both hip(s), both knee(s) and both MTP(s): 1st, 2nd, 3rd, 4th and 5th, is described as aching, pulsating, shooting and throbbing, and is constant, moderate to severe pain .               Affected locations include the left PIP.         Review of Systems   Constitutional: Positive for activity change, appetite change and unexpected weight change. Negative for chills and diaphoresis.   HENT: Negative for congestion, dental problem, ear discharge, ear pain, facial swelling, mouth sores, nosebleeds, postnasal drip, rhinorrhea, sinus pressure, sneezing, sore throat, tinnitus and voice change.    Eyes: Negative for photophobia, pain, discharge, redness and itching.   Respiratory: Negative for apnea, cough, chest tightness, shortness of breath and wheezing.    Cardiovascular: Positive for leg swelling. Negative for chest pain and palpitations.   Gastrointestinal: Negative for abdominal distention, abdominal pain, constipation, diarrhea, nausea and vomiting.   Endocrine: Negative for cold intolerance, heat intolerance, polydipsia and polyuria.   Genitourinary: Negative for decreased urine volume, difficulty urinating, flank pain, frequency, hematuria and urgency.   Musculoskeletal: Positive for arthralgias, back pain, gait problem, neck pain and neck stiffness.   Skin: Positive for rash. Negative for pallor and wound.        Psoriasis  rash on arms   Allergic/Immunologic: Negative for immunocompromised state.   Neurological: Positive for weakness and numbness. Negative for dizziness and tremors.   Hematological: Negative for adenopathy. Does not bruise/bleed easily.   Psychiatric/Behavioral: Positive for sleep disturbance. The patient is nervous/anxious.         Depressed         Objective:     /79 (BP Location: Right arm, Patient Position: Sitting, BP Method: Automatic)   Pulse 75   Wt 78.4 kg (172 lb 13.5 oz)   BMI 32.66 kg/m²      Physical Exam   Vitals reviewed.  Constitutional: She is oriented to person, place, and time. She appears distressed.   HENT:   Head: Normocephalic and atraumatic.   Mouth/Throat: Oropharynx is clear and moist.   Eyes: EOM are normal. Pupils are equal, round, and reactive to light.   Neck: Neck supple. No thyromegaly present.   Cardiovascular: Normal rate, regular rhythm and normal heart sounds.  Exam reveals no gallop and no friction rub.    No murmur heard.  Pulmonary/Chest: Breath sounds normal. She has no wheezes. She has no rales. She exhibits no tenderness.   Abdominal: There is no tenderness. There is no rebound and no guarding.       Right Side Rheumatological Exam     Examination finds the elbow, 1st MCP, 2nd MCP, 3rd MCP, 4th MCP and 5th MCP normal.    The patient is tender to palpation of the shoulder and knee    She has swelling of the knee    The patient has an enlarged wrist, 1st PIP, 2nd PIP, 3rd PIP, 4th PIP and 5th PIP    Shoulder Exam   Tenderness Location: acromion    Range of Motion   Active Abduction: abnormal   Adduction: abnormal  Sensation: normal    Knee Exam   Tenderness Location: medial joint line  Patellofemoral Crepitus: positive  Effusion: positive  Sensation: normal    Hip Exam   Tenderness Location: no tenderness  Sensation: normal    Elbow/Wrist Exam   Tenderness Location: no tenderness  Sensation: normal    Left Side Rheumatological Exam     Examination finds the elbow, knee,  1st MCP, 2nd MCP, 3rd MCP, 4th MCP and 5th MCP normal.    The patient is tender to palpation of the shoulder.    The patient has an enlarged wrist, 1st PIP, 2nd PIP, 3rd PIP, 4th PIP and 5th PIP.    Shoulder Exam   Tenderness Location: acromion    Range of Motion   Active Abduction: abnormal   Sensation: normal    Knee Exam   Tenderness Location: lateral joint line and medial joint line    Patellofemoral Crepitus: positive  Effusion: positive  Sensation: normal    Hip Exam   Tenderness Location: no tenderness  Sensation: normal    Elbow/Wrist Exam   Sensation: normal      Back/Neck Exam   General Inspection   Gait: normal       Tenderness Right paramedian tenderness of the Lower C-Spine, Lower L-Spine, Occ, Lower T-Spine and Upper C-Spine.Left paramedian tenderness of the Lower L-Spine, Lower C-Spine and Lower T-Spine.    Back Range of Motion   Lateral Bend Right: abnormal  Lateral Bend Left: abnormal  Rotation Right: abnormal  Rotation Left: abnormal      Lymphadenopathy:     She has no cervical adenopathy.   Neurological: She is alert and oriented to person, place, and time. She has normal sensation and normal strength.   Reflex Scores:       Patellar reflexes are 3+ on the right side and 3+ on the left side.  Skin: Rash noted. No erythema. No pallor.     Psychiatric: Mood and affect normal.   Musculoskeletal: She exhibits edema, tenderness and deformity.       Results for orders placed or performed in visit on 01/09/17   CAMRYN   Result Value Ref Range    CAMRYN Screen Negative <1:160 Negative <1:160   Anti Sm/RNP Antibody   Result Value Ref Range    Anti Sm/RNP Antibody 1.81 0.00 - 19.99 EU    Anti-Sm/RNP Interpretation Negative Negative   Anti-DNA antibody, double-stranded   Result Value Ref Range    ds DNA Ab Negative 1:10 Negative 1:10   Anti-Histone Antibody   Result Value Ref Range    Anti-Histone Antibody 0.3 0.0 - 0.9 Units   Anti-scleroderma antibody   Result Value Ref Range    Scleroderma SCL- 2 <20 UNITS    Anti-Smith antibody   Result Value Ref Range    Anti Sm Antibody 1.02 0.00 - 19.99 EU    Anti-Sm Interpretation Negative Negative   Sedimentation rate, manual   Result Value Ref Range    Sed Rate 10 0 - 20 mm/Hr   Sjogrens syndrome-A extractable nuclear antibody   Result Value Ref Range    Anti-SSA Antibody 1.43 0.00 - 19.99 EU    Anti-SSA Interpretation Negative Negative   Sjogrens syndrome-B extractable nuclear antibody   Result Value Ref Range    Anti-SSB Antibody 17.22 0.00 - 19.99 EU    Anti-SSB Interpretation Negative Negative   CBC auto differential   Result Value Ref Range    WBC 11.31 3.90 - 12.70 K/uL    RBC 4.50 4.00 - 5.40 M/uL    Hemoglobin 13.6 12.0 - 16.0 g/dL    Hematocrit 41.4 37.0 - 48.5 %    MCV 92 82 - 98 fL    MCH 30.2 27.0 - 31.0 pg    MCHC 32.9 32.0 - 36.0 %    RDW 13.8 11.5 - 14.5 %    Platelets 423 (H) 150 - 350 K/uL    MPV 10.2 9.2 - 12.9 fL    Gran # 5.1 1.8 - 7.7 K/uL    Lymph # 5.0 (H) 1.0 - 4.8 K/uL    Mono # 0.9 0.3 - 1.0 K/uL    Eos # 0.2 0.0 - 0.5 K/uL    Baso # 0.09 0.00 - 0.20 K/uL    Gran% 44.8 38.0 - 73.0 %    Lymph% 44.2 18.0 - 48.0 %    Mono% 8.3 4.0 - 15.0 %    Eosinophil% 1.6 0.0 - 8.0 %    Basophil% 0.8 0.0 - 1.9 %    Differential Method Automated    Comprehensive metabolic panel   Result Value Ref Range    Sodium 137 136 - 145 mmol/L    Potassium 4.0 3.5 - 5.1 mmol/L    Chloride 104 95 - 110 mmol/L    CO2 25 23 - 29 mmol/L    Glucose 84 70 - 110 mg/dL    BUN, Bld 15 6 - 20 mg/dL    Creatinine 0.7 0.5 - 1.4 mg/dL    Calcium 9.0 8.7 - 10.5 mg/dL    Total Protein 6.9 6.0 - 8.4 g/dL    Albumin 3.3 (L) 3.5 - 5.2 g/dL    Total Bilirubin 0.3 0.1 - 1.0 mg/dL    Alkaline Phosphatase 69 55 - 135 U/L    AST 12 10 - 40 U/L    ALT 13 10 - 44 U/L    Anion Gap 8 8 - 16 mmol/L    eGFR if African American >60.0 >60 mL/min/1.73 m^2    eGFR if non African American >60.0 >60 mL/min/1.73 m^2   TSH   Result Value Ref Range    TSH 1.287 0.400 - 4.000 uIU/mL   T4, free   Result Value Ref Range     Free T4 0.89 0.71 - 1.51 ng/dL   T3, free   Result Value Ref Range    T3, Free 3.0 2.3 - 4.2 pg/mL       Assessment:     Encounter Diagnoses   Name Primary?    Psoriatic arthritis Yes    Ankylosing spondylitis, unspecified site of spine     Edema of lower extremity, unspecified laterality     Raynaud's disease without gangrene          Plan:     Psoriatic arthritis  -     Comprehensive metabolic panel; Future; Expected date: 05/30/2017  -     C-reactive protein; Future; Expected date: 05/30/2017  -     CBC auto differential; Future; Expected date: 05/30/2017  -     TSH; Future; Expected date: 05/30/2017  -     T4, free; Future; Expected date: 05/30/2017  -     T3, free; Future; Expected date: 05/30/2017  -     PTH, intact; Future; Expected date: 05/30/2017  -     Anti-Histone Antibody; Future; Expected date: 05/30/2017  -     CAMRYN; Future; Expected date: 05/30/2017    Ankylosing spondylitis, unspecified site of spine  -     Comprehensive metabolic panel; Future; Expected date: 05/30/2017  -     C-reactive protein; Future; Expected date: 05/30/2017  -     CBC auto differential; Future; Expected date: 05/30/2017  -     TSH; Future; Expected date: 05/30/2017  -     T4, free; Future; Expected date: 05/30/2017  -     T3, free; Future; Expected date: 05/30/2017  -     PTH, intact; Future; Expected date: 05/30/2017  -     Anti-Histone Antibody; Future; Expected date: 05/30/2017  -     CAMRYN; Future; Expected date: 05/30/2017    Edema of lower extremity, unspecified laterality  -     Ambulatory consult to Cardiovascular Surgery  -     Vascular Lab ()  Venous Legs Bilateral; Future  -     furosemide (LASIX) 20 MG tablet; Take 1 tablet (20 mg total) by mouth once daily.  Dispense: 30 tablet; Refill: 4  -     potassium chloride (KLOR-CON) 10 MEQ TbSR; Take 1 tablet (10 mEq total) by mouth once daily.  Dispense: 30 tablet; Refill: 4  -     Comprehensive metabolic panel; Future; Expected date: 05/30/2017  -      C-reactive protein; Future; Expected date: 05/30/2017  -     CBC auto differential; Future; Expected date: 05/30/2017  -     TSH; Future; Expected date: 05/30/2017  -     T4, free; Future; Expected date: 05/30/2017  -     T3, free; Future; Expected date: 05/30/2017  -     PTH, intact; Future; Expected date: 05/30/2017  -     Anti-Histone Antibody; Future; Expected date: 05/30/2017  -     CAMRYN; Future; Expected date: 05/30/2017    Raynaud's disease without gangrene  -     Ambulatory consult to Cardiovascular Surgery  -     Vascular Lab ()  Venous Legs Bilateral; Future  -     furosemide (LASIX) 20 MG tablet; Take 1 tablet (20 mg total) by mouth once daily.  Dispense: 30 tablet; Refill: 4  -     potassium chloride (KLOR-CON) 10 MEQ TbSR; Take 1 tablet (10 mEq total) by mouth once daily.  Dispense: 30 tablet; Refill: 4  -     Comprehensive metabolic panel; Future; Expected date: 05/30/2017  -     C-reactive protein; Future; Expected date: 05/30/2017  -     CBC auto differential; Future; Expected date: 05/30/2017  -     TSH; Future; Expected date: 05/30/2017  -     T4, free; Future; Expected date: 05/30/2017  -     T3, free; Future; Expected date: 05/30/2017  -     PTH, intact; Future; Expected date: 05/30/2017  -     Anti-Histone Antibody; Future; Expected date: 05/30/2017  -     CAMRYN; Future; Expected date: 05/30/2017       On consyntx and her joints are stable,

## 2017-05-31 LAB
ANISOCYTOSIS BLD QL SMEAR: SLIGHT
BASOPHILS # BLD AUTO: ABNORMAL K/UL
BASOPHILS NFR BLD: 1 %
BURR CELLS BLD QL SMEAR: ABNORMAL
DIFFERENTIAL METHOD: ABNORMAL
EOSINOPHIL # BLD AUTO: ABNORMAL K/UL
EOSINOPHIL NFR BLD: 1 %
ERYTHROCYTE [DISTWIDTH] IN BLOOD BY AUTOMATED COUNT: 14.5 %
HCT VFR BLD AUTO: 44.6 %
HGB BLD-MCNC: 14.7 G/DL
LYMPHOCYTES # BLD AUTO: ABNORMAL K/UL
LYMPHOCYTES NFR BLD: 32 %
MCH RBC QN AUTO: 30.4 PG
MCHC RBC AUTO-ENTMCNC: 33 %
MCV RBC AUTO: 92 FL
MONOCYTES # BLD AUTO: ABNORMAL K/UL
MONOCYTES NFR BLD: 7 %
NEUTROPHILS NFR BLD: 57 %
NEUTS BAND NFR BLD MANUAL: 2 %
PLATELET # BLD AUTO: 446 K/UL
PLATELET BLD QL SMEAR: ABNORMAL
PMV BLD AUTO: 9.8 FL
POIKILOCYTOSIS BLD QL SMEAR: ABNORMAL
RBC # BLD AUTO: 4.83 M/UL
WBC # BLD AUTO: 14.66 K/UL

## 2017-06-05 ENCOUNTER — PATIENT MESSAGE (OUTPATIENT)
Dept: RHEUMATOLOGY | Facility: CLINIC | Age: 37
End: 2017-06-05

## 2017-06-05 DIAGNOSIS — D72.829 LEUKOCYTOSIS, UNSPECIFIED TYPE: Primary | ICD-10-CM

## 2017-06-07 ENCOUNTER — TELEPHONE (OUTPATIENT)
Dept: RHEUMATOLOGY | Facility: CLINIC | Age: 37
End: 2017-06-07

## 2017-06-07 NOTE — TELEPHONE ENCOUNTER
Spoke with pt regarding lab results. Informed that Dr Shea advises a repeat of labs in two weeks. If labs remain elevated will refer to hematology. Pt verbalized understanding.

## 2017-06-07 NOTE — TELEPHONE ENCOUNTER
----- Message from Rafiq Shea MD sent at 6/7/2017 12:41 AM CDT -----  Wbc will go up for infection, and steroids, it does not appear that you have been on steroids, we will repeat these labs in two weeks if still elevated i will refer you to the hematology

## 2017-06-09 ENCOUNTER — PROCEDURE VISIT (OUTPATIENT)
Dept: NEUROLOGY | Facility: CLINIC | Age: 37
End: 2017-06-09
Payer: COMMERCIAL

## 2017-06-09 VITALS
DIASTOLIC BLOOD PRESSURE: 69 MMHG | SYSTOLIC BLOOD PRESSURE: 108 MMHG | BODY MASS INDEX: 33.33 KG/M2 | RESPIRATION RATE: 18 BRPM | HEART RATE: 65 BPM | HEIGHT: 60 IN | WEIGHT: 169.75 LBS

## 2017-06-09 DIAGNOSIS — G43.719 CHRONIC MIGRAINE WITHOUT AURA, WITH INTRACTABLE MIGRAINE, SO STATED, WITHOUT MENTION OF STATUS MIGRAINOSUS: Primary | ICD-10-CM

## 2017-06-09 PROCEDURE — 64615 CHEMODENERV MUSC MIGRAINE: CPT | Mod: S$GLB,,, | Performed by: PSYCHIATRY & NEUROLOGY

## 2017-06-09 NOTE — PROCEDURES
Procedures       BOTOX PROCEDURE    PROCEDURE PERFORMED: Botulinum toxin injection (72940)    CLINICAL INDICATION: G43.719 NDC: 1107-6427-27    A time out was conducted just before the start of the procedure to verify the correct patient and procedure, procedure location, and all relevant critical information.     This is the second Botox injections and I am aiming for at least 50%  improvement in the patient's symptoms. Frequency of treatment is every 3 months unless no response to the treatments, at which time we will discontinue the injections.     DESCRIPTION OF PROCEDURE: After obtaining informed consent and under aseptic technique, a total of 155 units of botulinum toxin type A were injected in the following muscles: Procerus 5 units,  5 units bilaterally, frontalis 20 units, temporalis 20 units bilaterally, occipitalis 15 units, upper cervical paraspinals 10 units bilaterally and trapezius 15 units bilaterally. The patient was given a total of 155 units in 31 sites.The patient tolerated the procedure well. There were no complications. The patient was given a prescription for repeat treatment in 3 months.     Unavoidable waste 45 units    Linda Schaefer M.D  Medical Director, Headache and Facial Pain  [facility]

## 2017-06-15 ENCOUNTER — OFFICE VISIT (OUTPATIENT)
Dept: NEUROLOGY | Facility: CLINIC | Age: 37
End: 2017-06-15
Payer: COMMERCIAL

## 2017-06-15 ENCOUNTER — LAB VISIT (OUTPATIENT)
Dept: LAB | Facility: HOSPITAL | Age: 37
End: 2017-06-15
Attending: INTERNAL MEDICINE
Payer: COMMERCIAL

## 2017-06-15 VITALS
HEART RATE: 72 BPM | BODY MASS INDEX: 33.84 KG/M2 | WEIGHT: 172.38 LBS | RESPIRATION RATE: 19 BRPM | DIASTOLIC BLOOD PRESSURE: 84 MMHG | HEIGHT: 60 IN | SYSTOLIC BLOOD PRESSURE: 134 MMHG

## 2017-06-15 DIAGNOSIS — G43.719 CHRONIC MIGRAINE WITHOUT AURA, WITH INTRACTABLE MIGRAINE, SO STATED, WITHOUT MENTION OF STATUS MIGRAINOSUS: Primary | ICD-10-CM

## 2017-06-15 DIAGNOSIS — R41.3 MEMORY LOSS: ICD-10-CM

## 2017-06-15 DIAGNOSIS — F11.20 OPIOID DEPENDENCE IN CONTROLLED ENVIRONMENT: ICD-10-CM

## 2017-06-15 DIAGNOSIS — G89.4 CHRONIC PAIN SYNDROME: ICD-10-CM

## 2017-06-15 DIAGNOSIS — L40.50 PSORIATIC ARTHRITIS: ICD-10-CM

## 2017-06-15 DIAGNOSIS — D72.829 LEUKOCYTOSIS, UNSPECIFIED TYPE: ICD-10-CM

## 2017-06-15 DIAGNOSIS — G47.33 OSA (OBSTRUCTIVE SLEEP APNEA): ICD-10-CM

## 2017-06-15 DIAGNOSIS — F41.9 ANXIETY: ICD-10-CM

## 2017-06-15 LAB
BASOPHILS # BLD AUTO: 0.08 K/UL
BASOPHILS NFR BLD: 0.6 %
DIFFERENTIAL METHOD: ABNORMAL
EOSINOPHIL # BLD AUTO: 0.2 K/UL
EOSINOPHIL NFR BLD: 1.1 %
ERYTHROCYTE [DISTWIDTH] IN BLOOD BY AUTOMATED COUNT: 14.7 %
HCT VFR BLD AUTO: 43.8 %
HGB BLD-MCNC: 14.6 G/DL
LYMPHOCYTES # BLD AUTO: 4.5 K/UL
LYMPHOCYTES NFR BLD: 31 %
MCH RBC QN AUTO: 30.8 PG
MCHC RBC AUTO-ENTMCNC: 33.3 %
MCV RBC AUTO: 92 FL
MONOCYTES # BLD AUTO: 1 K/UL
MONOCYTES NFR BLD: 7 %
NEUTROPHILS # BLD AUTO: 8.7 K/UL
NEUTROPHILS NFR BLD: 60 %
PLATELET # BLD AUTO: 430 K/UL
PMV BLD AUTO: 10.5 FL
RBC # BLD AUTO: 4.74 M/UL
WBC # BLD AUTO: 14.47 K/UL

## 2017-06-15 PROCEDURE — 99999 PR PBB SHADOW E&M-EST. PATIENT-LVL III: CPT | Mod: PBBFAC,,, | Performed by: PSYCHIATRY & NEUROLOGY

## 2017-06-15 PROCEDURE — 84165 PROTEIN E-PHORESIS SERUM: CPT | Mod: 26,,, | Performed by: PATHOLOGY

## 2017-06-15 PROCEDURE — 84165 PROTEIN E-PHORESIS SERUM: CPT

## 2017-06-15 PROCEDURE — 85025 COMPLETE CBC W/AUTO DIFF WBC: CPT

## 2017-06-15 PROCEDURE — 36415 COLL VENOUS BLD VENIPUNCTURE: CPT | Mod: PO

## 2017-06-15 NOTE — PROGRESS NOTES
Subjective:       Patient ID: Sparkle Jose is a 36 y.o. female.    Chief Complaint: Headache  INTERVAL HISTORY  The patient comes for follow up. Botox is making a big difference not only in the frequency of headache attacks but mostly in the duration, Intensity and response to medications. She finds that most of the time, ibuprofen is effective and when insufficient, Relpax resolves the attack. She is quite please with her progress  HPI  The patient is a pleasant 35 y/o female who presents with chief complaint of headache. She was previously seen by Dr. Toure who diagnosed her with cervicogenic headache, migraine and pseudodementia. The patient was referred by Dr. Shea as her headaches remain intractable. Her case is complicated in that, she has history of Arnold-Chiari Type 1 status post cervical decompression on 12/6/2005 which is stable (last MRI 5/15/15), she also suffers with Ankylosing Spondilitis and is under Pain Medicine care. She is opioid dependent on Fentanyl 25 mcg every 48 hours and prn use of oxycodone. She is on disability, stopped working on 2013.    She has been on multiple preventives like Topiramate, Gabapentin, associated with side effects, unable to tolerate.  Amitriptyline, Nortriptyline both ineffective. For acute treatment she takes Imitrex with inconsistent results. In the past Maxalt was ineffective.  Please see details of headache characteristics headache below.    Review of Systems      Past Medical History:   Diagnosis Date    Ankylosing spondylitis     Arthritis     Celiac disease     Depression     GERD (gastroesophageal reflux disease)     Migraine     Osteoarthritis     Psoriasis     Psoriatic arthritis mutilans      Past Surgical History:   Procedure Laterality Date    brain decompression   2006    BRAIN SURGERY       Family History   Problem Relation Age of Onset    Hypertension Mother     Depression Sister     Asthma Brother      Social History     Social  History    Marital status:      Spouse name: N/A    Number of children: N/A    Years of education: N/A     Occupational History    Not on file.     Social History Main Topics    Smoking status: Current Every Day Smoker     Packs/day: 0.50     Types: Cigarettes    Smokeless tobacco: Never Used    Alcohol use No    Drug use: No    Sexual activity: Not on file     Other Topics Concern    Not on file     Social History Narrative     Review of patient's allergies indicates:  No Known Allergies    Current Outpatient Prescriptions:     alprazolam (XANAX) 0.25 MG tablet, Take 1 tablet (0.25 mg total) by mouth 4 (four) times daily as needed for Anxiety., Disp: 40 tablet, Rfl: 3    AMRIX 15 mg 24 hr capsule, Take 15 mg by mouth once daily., Disp: , Rfl:     buPROPion (WELLBUTRIN XL) 150 MG TB24 tablet, Take 1 tablet (150 mg total) by mouth once daily. In am, Disp: 90 tablet, Rfl: 3    clobetasol 0.05% (TEMOVATE) 0.05 % Oint, daily as needed. , Disp: , Rfl:     ergocalciferol (ERGOCALCIFEROL) 50,000 unit Cap, Take 1 capsule (50,000 Units total) by mouth every 7 days., Disp: 4 capsule, Rfl: 6    fentaNYL (DURAGESIC) 25 mcg/hr, Place 1 patch onto the skin every 72 hours. , Disp: , Rfl:     ibuprofen (ADVIL,MOTRIN) 800 MG tablet, Take 1 tablet (800 mg total) by mouth every 6 (six) hours as needed for Pain., Disp: 90 tablet, Rfl: 4    ketoconazole (NIZORAL) 2 % shampoo, Wash body with medicated shampoo at least 2x/week - let soak at least 5 minutes prior to rinsing, Disp: 120 mL, Rfl: 5    modafinil (PROVIGIL) 200 MG Tab, Take 200 mg by mouth once daily., Disp: , Rfl:     oxycodone-acetaminophen (PERCOCET)  mg per tablet, Take 1 tablet by mouth every 8 (eight) hours as needed for Pain., Disp: 75 tablet, Rfl: 0    predniSONE (DELTASONE) 5 MG tablet, take 1 to 4 tablets by mouth every morning as needed for swelling, Disp: 120 tablet, Rfl: 2    promethazine (PHENERGAN) 25 MG tablet, Take 1 tablet  (25 mg total) by mouth every 6 (six) hours as needed., Disp: 30 tablet, Rfl: 3    ranitidine (ZANTAC) 150 MG capsule, Take 1 capsule (150 mg total) by mouth 2 (two) times daily., Disp: 60 capsule, Rfl: 6    secukinumab (COSENTYX PEN, 2 PENS,) 150 mg/mL PnIj, INJECT 2 PENS SUBCUTANEOUSLY  Monthly MAINTENANCE DOSE, Disp: 2 Syringe, Rfl: 11    sumatriptan (IMITREX) 50 MG tablet, Take 1 tablet (50 mg total) by mouth once as needed for Migraine., Disp: 9 tablet, Rfl: 11    topiramate (TOPAMAX) 100 MG tablet, Take 1 tablet (100 mg total) by mouth 2 (two) times daily.  1 tab PO bid, Disp: 180 tablet, Rfl: 3    trazodone (DESYREL) 50 MG tablet, Take 1 tablet (50 mg total) by mouth every evening., Disp: 90 tablet, Rfl: 3      Objective:      Vitals:    06/15/17 1304   BP: 134/84   Pulse: 72   Resp: 19     Body mass index is 33.67 kg/m².      Physical Exam    Constitutional:   She appears well-developed and well-nourished. She is well groomed    HENT:    Head: Atraumatic, oral and nasal mucosa intact  Eyes: Conjunctivae and EOM are normal. Pupils are equal, round, and reactive to light OU  Neck: Neck supple. No thyromegaly present  Cardiovascular: Tachycardic, normal heart sounds  No murmur heard  Pulmonary/Chest: Effort normal and breath sounds normal  Musculoskeletal: Decreased range of motion, cervical and lumbar spine. Spasm of muscle of right paraspinal and trapezius  Skin: Skin is warm and dry, no rash appreciated today  Psychiatric: Normal mood and affect     Neuro exam:    Mental status:  Awake, attentive, Alert, oriented to self, place, year and month  Language function is intact  Naming, repetition and spontaneous meaningful speech expression are intact  Speech fluency is good and speech is clear  Able to spell backwards    Cranial Nerves:  Smell was not formally evaluated  Cranial Nerves II - XII: intact  Pursuits were smooth, normal saccades, no nystagmus OU  Funduscopic exam - disc were flat and pink, no  exudates or hemorrhages OU  Motor - facial movement was symmetrical and normal     Palate moved well and was symmetrical with normal palatal and oral sensation  Tongue movements were full    Coordination:     Rapid alternating movements and rapid finger tapping - normal bilaterally  Finger to nose - normal and symmetric bilaterally   Heel to shin test - normal and symmetric bilaterally   Arm roll - smooth and symmetric   No intentional or positional tremor.     Motor:  Normal muscle bulk and symmetry. No fasciculations were noted    No pronator drift  Strength5/5 bilaterally     Reflexes:  Tendon reflexes were 2 + at biceps, triceps, brachioradialis, patellar, and Achilles bilaterally  On plantar stimulation toes were down going bilaterally  No clonus was noted     Sensory: Intact to light touch, pin prick in all extremities.   Gait: Romberg absent. Normal gait. Normal arm swing and turns. Normal postural reflexes.     Review of Data: MRI of the brain 5/15 No acute abnormalities, Stable posterior fossa decompression. No syrinx. MRI of C spine: No significant abnormality        Assessment and Plan     The patient has chronic intractable migraine ( G43.719) and suffers from headaches more than 15 days a month lasting more than 4 hours a day with no relief of symptoms despite trying multiple medications including but not limited to Topiramate, Gabapentin, Amitriptyline, Nortriptyline. The patient has received 1 Botox treatment successfully treated with Ibuprofen. For acute treatment will discontinue Imitrex for lack of efficacy and will switch to Relpax. Continue Relpax Maxalt was ineffective on the past  Arnold-Chiari Type I status post successful decompression in 2005. Stable, last MRI in 5/15   Ankylosing Spondylitis under Dr. Shea's care  Chronic neck and back pain on chronic opioids by Pain Medicine (Fentanyl 25 mcg Q48 hours and prn Oxycodone)  Psoriatic arthritis, under Dr. Shea's  care  Anxiety        Linda Schaefer M.D  Medical Director, Headache and Facial Pain  [facility]

## 2017-06-16 LAB
ALBUMIN SERPL ELPH-MCNC: 3.82 G/DL
ALPHA1 GLOB SERPL ELPH-MCNC: 0.3 G/DL
ALPHA2 GLOB SERPL ELPH-MCNC: 0.83 G/DL
B-GLOBULIN SERPL ELPH-MCNC: 0.78 G/DL
GAMMA GLOB SERPL ELPH-MCNC: 1.16 G/DL
PATHOLOGIST INTERPRETATION SPE: NORMAL
PROT SERPL-MCNC: 6.9 G/DL

## 2017-08-29 DIAGNOSIS — M45.9 ANKYLOSING SPONDYLITIS: ICD-10-CM

## 2017-08-29 DIAGNOSIS — R00.0 TACHYCARDIA: ICD-10-CM

## 2017-08-29 DIAGNOSIS — T50.905A WEIGHT GAIN DUE TO MEDICATION: ICD-10-CM

## 2017-08-29 DIAGNOSIS — G47.00 INSOMNIA: ICD-10-CM

## 2017-08-29 DIAGNOSIS — I73.00 RAYNAUD'S PHENOMENON WITHOUT GANGRENE: ICD-10-CM

## 2017-08-29 DIAGNOSIS — G43.909 MIGRAINE WITHOUT STATUS MIGRAINOSUS, NOT INTRACTABLE, UNSPECIFIED MIGRAINE TYPE: ICD-10-CM

## 2017-08-29 DIAGNOSIS — M47.817 LUMBAR AND SACRAL SPONDYLOARTHRITIS: ICD-10-CM

## 2017-08-29 DIAGNOSIS — G93.32 CHRONIC FATIGUE DISORDER: ICD-10-CM

## 2017-08-29 DIAGNOSIS — R63.5 WEIGHT GAIN DUE TO MEDICATION: ICD-10-CM

## 2017-08-29 RX ORDER — TRAZODONE HYDROCHLORIDE 50 MG/1
50 TABLET ORAL NIGHTLY
Qty: 90 TABLET | Refills: 3 | Status: SHIPPED | OUTPATIENT
Start: 2017-08-29 | End: 2019-12-27

## 2017-09-05 ENCOUNTER — LAB VISIT (OUTPATIENT)
Dept: LAB | Facility: HOSPITAL | Age: 37
End: 2017-09-05
Attending: INTERNAL MEDICINE
Payer: COMMERCIAL

## 2017-09-05 ENCOUNTER — PROCEDURE VISIT (OUTPATIENT)
Dept: NEUROLOGY | Facility: CLINIC | Age: 37
End: 2017-09-05
Payer: COMMERCIAL

## 2017-09-05 VITALS
SYSTOLIC BLOOD PRESSURE: 106 MMHG | HEART RATE: 79 BPM | BODY MASS INDEX: 34.79 KG/M2 | WEIGHT: 177.19 LBS | RESPIRATION RATE: 16 BRPM | HEIGHT: 60 IN | DIASTOLIC BLOOD PRESSURE: 73 MMHG | TEMPERATURE: 98 F

## 2017-09-05 DIAGNOSIS — M45.9 ANKYLOSING SPONDYLITIS, UNSPECIFIED SITE OF SPINE: ICD-10-CM

## 2017-09-05 DIAGNOSIS — G43.719 CHRONIC MIGRAINE WITHOUT AURA, WITH INTRACTABLE MIGRAINE, SO STATED, WITHOUT MENTION OF STATUS MIGRAINOSUS: Primary | ICD-10-CM

## 2017-09-05 DIAGNOSIS — R60.0 EDEMA OF LOWER EXTREMITY: ICD-10-CM

## 2017-09-05 DIAGNOSIS — L40.50 PSORIATIC ARTHRITIS: ICD-10-CM

## 2017-09-05 DIAGNOSIS — I73.00 RAYNAUD'S DISEASE WITHOUT GANGRENE: ICD-10-CM

## 2017-09-05 LAB
ALBUMIN SERPL BCP-MCNC: 3.3 G/DL
ALP SERPL-CCNC: 72 U/L
ALT SERPL W/O P-5'-P-CCNC: 12 U/L
ANION GAP SERPL CALC-SCNC: 7 MMOL/L
ANISOCYTOSIS BLD QL SMEAR: SLIGHT
AST SERPL-CCNC: 14 U/L
BASOPHILS NFR BLD: 1 %
BILIRUB SERPL-MCNC: 0.2 MG/DL
BUN SERPL-MCNC: 10 MG/DL
CALCIUM SERPL-MCNC: 8.9 MG/DL
CHLORIDE SERPL-SCNC: 106 MMOL/L
CO2 SERPL-SCNC: 27 MMOL/L
CREAT SERPL-MCNC: 0.7 MG/DL
CRP SERPL-MCNC: 11.9 MG/L
DIFFERENTIAL METHOD: ABNORMAL
EOSINOPHIL NFR BLD: 1 %
ERYTHROCYTE [DISTWIDTH] IN BLOOD BY AUTOMATED COUNT: 14.9 %
EST. GFR  (AFRICAN AMERICAN): >60 ML/MIN/1.73 M^2
EST. GFR  (NON AFRICAN AMERICAN): >60 ML/MIN/1.73 M^2
GLUCOSE SERPL-MCNC: 90 MG/DL
HCT VFR BLD AUTO: 41.5 %
HGB BLD-MCNC: 13.6 G/DL
LYMPHOCYTES NFR BLD: 41 %
MCH RBC QN AUTO: 30.5 PG
MCHC RBC AUTO-ENTMCNC: 32.8 G/DL
MCV RBC AUTO: 93 FL
MONOCYTES NFR BLD: 4 %
NEUTROPHILS NFR BLD: 53 %
PLATELET # BLD AUTO: 423 K/UL
PLATELET BLD QL SMEAR: ABNORMAL
PMV BLD AUTO: 9.9 FL
POTASSIUM SERPL-SCNC: 3.8 MMOL/L
PROT SERPL-MCNC: 6.9 G/DL
PTH-INTACT SERPL-MCNC: 63 PG/ML
RBC # BLD AUTO: 4.46 M/UL
SODIUM SERPL-SCNC: 140 MMOL/L
T3FREE SERPL-MCNC: 2.9 PG/ML
T4 FREE SERPL-MCNC: 1.02 NG/DL
TSH SERPL DL<=0.005 MIU/L-ACNC: 2.43 UIU/ML
WBC # BLD AUTO: 13.04 K/UL

## 2017-09-05 PROCEDURE — 84439 ASSAY OF FREE THYROXINE: CPT

## 2017-09-05 PROCEDURE — 83970 ASSAY OF PARATHORMONE: CPT

## 2017-09-05 PROCEDURE — 86039 ANTINUCLEAR ANTIBODIES (ANA): CPT

## 2017-09-05 PROCEDURE — 83516 IMMUNOASSAY NONANTIBODY: CPT

## 2017-09-05 PROCEDURE — 84443 ASSAY THYROID STIM HORMONE: CPT

## 2017-09-05 PROCEDURE — 85027 COMPLETE CBC AUTOMATED: CPT

## 2017-09-05 PROCEDURE — 86038 ANTINUCLEAR ANTIBODIES: CPT

## 2017-09-05 PROCEDURE — 86140 C-REACTIVE PROTEIN: CPT

## 2017-09-05 PROCEDURE — 64615 CHEMODENERV MUSC MIGRAINE: CPT | Mod: S$GLB,,, | Performed by: PSYCHIATRY & NEUROLOGY

## 2017-09-05 PROCEDURE — 86235 NUCLEAR ANTIGEN ANTIBODY: CPT | Mod: 59

## 2017-09-05 PROCEDURE — 80053 COMPREHEN METABOLIC PANEL: CPT

## 2017-09-05 PROCEDURE — 84481 FREE ASSAY (FT-3): CPT

## 2017-09-05 PROCEDURE — 85007 BL SMEAR W/DIFF WBC COUNT: CPT

## 2017-09-05 NOTE — PROCEDURES
Procedures  and follow up visit     The Botox injections have achieved well over 50%  improvement in the patient's symptoms. Migraines have been reduced at least 7 days per month and the number of cumulative hours suffering with headaches has been reduced at least 100 total hours per month. Today she does have a headache indicating that the Botox has worn off. Frequency of treatment is every 3 months unless no response to the treatments, at which time we will discontinue the injections.       ROS: Positive for photophobia, phonophobia, nausea, insomnia.     Past Medical History:   Diagnosis Date    Ankylosing spondylitis     Arthritis     Celiac disease     Depression     GERD (gastroesophageal reflux disease)     Migraine     Osteoarthritis     Psoriasis     Psoriatic arthritis mutilans        Current Outpatient Prescriptions   Medication Sig    alprazolam (XANAX) 0.25 MG tablet Take 1 tablet (0.25 mg total) by mouth 4 (four) times daily as needed for Anxiety.    AMRIX 15 mg 24 hr capsule Take 15 mg by mouth once daily.    buPROPion (WELLBUTRIN XL) 150 MG TB24 tablet Take 1 tablet (150 mg total) by mouth once daily. In am    clobetasol 0.05% (TEMOVATE) 0.05 % Oint daily as needed.     eletriptan (RELPAX) 40 MG tablet Take 1 tablet (40 mg total) by mouth as needed. may repeat in 2 hours if necessary    ergocalciferol (ERGOCALCIFEROL) 50,000 unit Cap Take 1 capsule (50,000 Units total) by mouth every 7 days.    fentaNYL (DURAGESIC) 25 mcg/hr Place 1 patch onto the skin every 72 hours.     furosemide (LASIX) 20 MG tablet Take 1 tablet (20 mg total) by mouth once daily.    ibuprofen (ADVIL,MOTRIN) 800 MG tablet Take 1 tablet (800 mg total) by mouth every 6 (six) hours as needed for Pain.    ketoconazole (NIZORAL) 2 % shampoo Wash body with medicated shampoo at least 2x/week - let soak at least 5 minutes prior to rinsing    modafinil (PROVIGIL) 200 MG Tab Take 200 mg by mouth once daily.     oxycodone-acetaminophen (PERCOCET)  mg per tablet Take 1 tablet by mouth every 8 (eight) hours as needed for Pain.    potassium chloride (KLOR-CON) 10 MEQ TbSR Take 1 tablet (10 mEq total) by mouth once daily.    predniSONE (DELTASONE) 5 MG tablet take 1 to 4 tablets by mouth every morning as needed for swelling    promethazine (PHENERGAN) 25 MG tablet Take 1 tablet (25 mg total) by mouth every 6 (six) hours as needed.    ranitidine (ZANTAC) 150 MG capsule Take 1 capsule (150 mg total) by mouth 2 (two) times daily.    secukinumab (COSENTYX PEN, 2 PENS,) 150 mg/mL PnIj INJECT 2 PENS SUBCUTANEOUSLY  Monthly MAINTENANCE DOSE    sumatriptan (IMITREX) 50 MG tablet Take 1 tablet (50 mg total) by mouth once as needed for Migraine.    trazodone (DESYREL) 50 MG tablet TAKE 1 TABLET (50 MG TOTAL) BY MOUTH EVERY EVENING.     Current Facility-Administered Medications   Medication    [COMPLETED] onabotulinumtoxina injection 200 Units     Review of patient's allergies indicates:  No Known Allergies      Vitals:    09/05/17 1051   BP: 106/73   Pulse: 79   Resp: 16   Temp: 98.1 °F (36.7 °C)   Body mass index is 34.61 kg/m².      Alert and fully oriented   Lungs CTA  Heart RRR  CN II-XII intact  Motor normal bulk and tone, symmetric strength  Coordination intact FTN  Sensory in tact to LT  Gait: normal, pace and base     Data review:    Lab Results   Component Value Date     05/30/2017    K 4.3 05/30/2017     05/30/2017    CO2 25 05/30/2017    BUN 9 05/30/2017    CREATININE 0.8 05/30/2017    GLU 97 05/30/2017    AST 15 05/30/2017    ALT 12 05/30/2017    ALBUMIN 3.7 05/30/2017    PROT 7.4 05/30/2017    BILITOT 0.2 05/30/2017       Lab Results   Component Value Date    WBC 14.47 (H) 06/15/2017    HGB 14.6 06/15/2017    HCT 43.8 06/15/2017    MCV 92 06/15/2017     (H) 06/15/2017       Lab Results   Component Value Date    TSH 1.287 01/09/2017         A/P:     Chronic Migraine responding to Botox as  expected. Continue treatment until patient in true remission, meaning that the patient stays headache free when Botox wears off in 10 to 12 weeks. That will be the time to stop.  Encouraged the patient to comply with with early acute intervention for escalations.  Leucocytosis addressed by PCP      BOTOX PROCEDURE    PROCEDURE PERFORMED: Botulinum toxin injection (07644)     CLINICAL INDICATION: G43.719 NDC: 3253-1433-84    A time out was conducted just before the start of the procedure to verify the correct patient and procedure, procedure location, and all relevant critical information.       DESCRIPTION OF PROCEDURE: After obtaining informed consent and under aseptic technique, a total of 155 units of botulinum toxin type A were injected in the following muscles: Procerus 5 units,  5 units bilaterally, frontalis 20 units, temporalis 20 units bilaterally, occipitalis 15 units, upper cervical paraspinals 10 units bilaterally and trapezius 15 units bilaterally. The patient was given a total of 155 units in 31 sites.The patient tolerated the procedure well. There were no complications. The patient was given a prescription for repeat treatment in 3 months.     Unavoidable waste 45 units          Linda Schaefer M.D  Medical Director, Headache and Facial Pain  [facility] [place]

## 2017-09-06 LAB
ANA SER QL IF: POSITIVE
ANA TITR SER IF: NORMAL {TITER}

## 2017-09-08 LAB — HISTONE IGG SER IA-ACNC: 0.2 UNITS (ref 0–0.9)

## 2017-09-09 LAB
ANTI SM ANTIBODY: 2.58 EU
ANTI SM/RNP ANTIBODY: 1.6 EU
ANTI-SM INTERPRETATION: NEGATIVE
ANTI-SM/RNP INTERPRETATION: NEGATIVE
ANTI-SSA ANTIBODY: 0.69 EU
ANTI-SSA INTERPRETATION: NEGATIVE
ANTI-SSB ANTIBODY: 6.83 EU
ANTI-SSB INTERPRETATION: NEGATIVE
DSDNA AB SER-ACNC: NORMAL [IU]/ML

## 2017-09-12 ENCOUNTER — PATIENT MESSAGE (OUTPATIENT)
Dept: RHEUMATOLOGY | Facility: CLINIC | Age: 37
End: 2017-09-12

## 2017-09-15 ENCOUNTER — LAB VISIT (OUTPATIENT)
Dept: LAB | Facility: HOSPITAL | Age: 37
End: 2017-09-15
Attending: OBSTETRICS & GYNECOLOGY
Payer: COMMERCIAL

## 2017-09-15 ENCOUNTER — OFFICE VISIT (OUTPATIENT)
Dept: OBSTETRICS AND GYNECOLOGY | Facility: CLINIC | Age: 37
End: 2017-09-15
Payer: COMMERCIAL

## 2017-09-15 VITALS
WEIGHT: 174.38 LBS | DIASTOLIC BLOOD PRESSURE: 78 MMHG | HEIGHT: 61 IN | SYSTOLIC BLOOD PRESSURE: 112 MMHG | BODY MASS INDEX: 32.92 KG/M2

## 2017-09-15 DIAGNOSIS — M45.9 ANKYLOSING SPONDYLITIS, UNSPECIFIED SITE OF SPINE: ICD-10-CM

## 2017-09-15 DIAGNOSIS — L40.50 PSORIATIC ARTHRITIS: ICD-10-CM

## 2017-09-15 DIAGNOSIS — Z83.2 FAMILY HISTORY OF FACTOR V LEIDEN MUTATION: ICD-10-CM

## 2017-09-15 DIAGNOSIS — N64.4 BREAST PAIN: ICD-10-CM

## 2017-09-15 DIAGNOSIS — R63.5 WEIGHT GAIN: ICD-10-CM

## 2017-09-15 DIAGNOSIS — Z12.4 ENCOUNTER FOR PAP SMEAR OF CERVIX WITH HPV DNA COTESTING: Primary | ICD-10-CM

## 2017-09-15 DIAGNOSIS — Z30.430 ENCOUNTER FOR INSERTION OF INTRAUTERINE CONTRACEPTIVE DEVICE (IUD): ICD-10-CM

## 2017-09-15 LAB
ESTRADIOL SERPL-MCNC: 81 PG/ML
FSH SERPL-ACNC: 4.7 MIU/ML

## 2017-09-15 PROCEDURE — 82670 ASSAY OF TOTAL ESTRADIOL: CPT

## 2017-09-15 PROCEDURE — 99385 PREV VISIT NEW AGE 18-39: CPT | Mod: S$GLB,,, | Performed by: OBSTETRICS & GYNECOLOGY

## 2017-09-15 PROCEDURE — 88175 CYTOPATH C/V AUTO FLUID REDO: CPT

## 2017-09-15 PROCEDURE — 87624 HPV HI-RISK TYP POOLED RSLT: CPT

## 2017-09-15 PROCEDURE — 83001 ASSAY OF GONADOTROPIN (FSH): CPT

## 2017-09-15 PROCEDURE — 85220 BLOOC CLOT FACTOR V TEST: CPT

## 2017-09-15 PROCEDURE — 99999 PR PBB SHADOW E&M-EST. PATIENT-LVL III: CPT | Mod: PBBFAC,,, | Performed by: OBSTETRICS & GYNECOLOGY

## 2017-09-15 PROCEDURE — 36415 COLL VENOUS BLD VENIPUNCTURE: CPT | Mod: PO

## 2017-09-15 RX ORDER — ELETRIPTAN HYDROBROMIDE 20 MG/1
20 TABLET, FILM COATED ORAL
COMMUNITY
End: 2017-12-01 | Stop reason: SDUPTHER

## 2017-09-15 NOTE — PROGRESS NOTES
Chief Complaint   Patient presents with    Well Woman    Breast Pain       History of Present Illness: Sparkle Jose is a 36 y.o. female that presents today 9/15/2017 for well gyn visit. She complains of breast pain. She reports that she has been on OCPS in the past but stopped because she still smokes. She reports that she has had some cycle irregularity. She reports     Past Medical History:   Diagnosis Date    Ankylosing spondylitis     Arthritis     Celiac disease     Depression     Family history of factor V Leiden mutation     mother and sister    GERD (gastroesophageal reflux disease)     Migraine     Osteoarthritis     Psoriasis     Psoriatic arthritis mutilans        Past Surgical History:   Procedure Laterality Date    brain decompression   2006    BRAIN SURGERY         Current Outpatient Prescriptions   Medication Sig Dispense Refill    AMRIX 15 mg 24 hr capsule Take 15 mg by mouth once daily.      clobetasol 0.05% (TEMOVATE) 0.05 % Oint daily as needed.       eletriptan (RELPAX) 20 MG tablet Take 20 mg by mouth as needed for Migraine. may repeat in 2 hours if necessary      ergocalciferol (ERGOCALCIFEROL) 50,000 unit Cap Take 1 capsule (50,000 Units total) by mouth every 7 days. 4 capsule 6    fentaNYL (DURAGESIC) 25 mcg/hr Place 1 patch onto the skin every 72 hours.       furosemide (LASIX) 20 MG tablet Take 1 tablet (20 mg total) by mouth once daily. 30 tablet 4    ibuprofen (ADVIL,MOTRIN) 800 MG tablet Take 1 tablet (800 mg total) by mouth every 6 (six) hours as needed for Pain. 90 tablet 4    ketoconazole (NIZORAL) 2 % shampoo Wash body with medicated shampoo at least 2x/week - let soak at least 5 minutes prior to rinsing 120 mL 5    modafinil (PROVIGIL) 200 MG Tab Take 200 mg by mouth once daily.      oxycodone-acetaminophen (PERCOCET)  mg per tablet Take 1 tablet by mouth every 8 (eight) hours as needed for Pain. 75 tablet 0    ranitidine (ZANTAC) 150 MG  capsule Take 1 capsule (150 mg total) by mouth 2 (two) times daily. 60 capsule 6    secukinumab (COSENTYX PEN, 2 PENS,) 150 mg/mL PnIj INJECT 2 PENS SUBCUTANEOUSLY  Monthly MAINTENANCE DOSE 2 Syringe 11    trazodone (DESYREL) 50 MG tablet TAKE 1 TABLET (50 MG TOTAL) BY MOUTH EVERY EVENING. 90 tablet 3    alprazolam (XANAX) 0.25 MG tablet Take 1 tablet (0.25 mg total) by mouth 4 (four) times daily as needed for Anxiety. 40 tablet 3    buPROPion (WELLBUTRIN XL) 150 MG TB24 tablet Take 1 tablet (150 mg total) by mouth once daily. In am 90 tablet 3    potassium chloride (KLOR-CON) 10 MEQ TbSR Take 1 tablet (10 mEq total) by mouth once daily. 30 tablet 4    predniSONE (DELTASONE) 5 MG tablet take 1 to 4 tablets by mouth every morning as needed for swelling 120 tablet 2    promethazine (PHENERGAN) 25 MG tablet Take 1 tablet (25 mg total) by mouth every 6 (six) hours as needed. 30 tablet 3    sumatriptan (IMITREX) 50 MG tablet Take 1 tablet (50 mg total) by mouth once as needed for Migraine. 9 tablet 11     No current facility-administered medications for this visit.        Review of patient's allergies indicates:  No Known Allergies    Family History   Problem Relation Age of Onset    Hypertension Mother     Depression Sister     Asthma Brother     Breast cancer Neg Hx     Ovarian cancer Neg Hx        Social History     Social History    Marital status:      Spouse name: N/A    Number of children: N/A    Years of education: N/A     Social History Main Topics    Smoking status: Current Every Day Smoker     Packs/day: 0.50     Types: Cigarettes    Smokeless tobacco: Never Used    Alcohol use No    Drug use: No    Sexual activity: Yes     Birth control/ protection: None     Other Topics Concern    None     Social History Narrative    None       OB History    Para Term  AB Living   1 1       1   SAB TAB Ectopic Multiple Live Births           1      # Outcome Date GA Lbr Isauro/2nd  "Weight Sex Delivery Anes PTL Lv   1 Para     F Vag-Spont   DAKSHA          Review of Symptoms:  GENERAL: Denies weight gain or weight loss. Feeling well overall.   SKIN: Denies rash or lesions.   HEAD: Denies head injury or headache.   NODES: Denies enlarged lymph nodes.   CHEST: Denies chest pain or shortness of breath.   CARDIOVASCULAR: Denies palpitations or left sided chest pain.   ABDOMEN: No abdominal pain, constipation, diarrhea, nausea, vomiting or rectal bleeding.   URINARY: No frequency, dysuria, hematuria, or burning on urination.  HEMATOLOGIC: No easy bruisability or excessive bleeding.   MUSCULOSKELETAL: Denies joint pain or swelling.     /78   Ht 5' 1" (1.549 m)   Wt 79.1 kg (174 lb 6.1 oz)   LMP 09/01/2017 (Approximate)   Physical Exam:  APPEARANCE: Well nourished, well developed, in no acute distress.  SKIN: Normal skin turgor, no lesions.  NECK: Neck symmetric without masses   RESPIRATORY: Normal respiratory effort with no retractions or use of accessory muscles  CARDIOVASCULAR: Peripheral vascular system with no swelling no varicosities and palpation of pulses normal  LYMPHATIC: No enlargements of the lymph nodes noted in the neck, axillae, or groin  ABDOMEN: Soft. No tenderness or masses. No hepatosplenomegaly. No hernias.  BREASTS: Symmetrical, no skin changes or visible lesions. No palpable masses, nipple discharge or adenopathy bilaterally.  PELVIC: Normal external female genitalia without lesions. Normal hair distribution. Adequate perineal body, normal urethral meatus. Urethra with no masses.  Bladder nontender. Vagina moist and well rugated without lesions or discharge. Cervix pink and without lesions. No significant cystocele or rectocele. Bimanual exam showed uterus normal size, shape, position, mobile and nontender. Adnexa without masses or tenderness. Urethra and bladder normal. PAP DONE  EXTREMITIES: No clubbing cyanosis or edema.    ASSESSMENT/PLAN:  Encounter for Pap smear of " cervix with HPV DNA cotesting  -     Liquid-based pap smear, screening  -     HPV High Risk Genotypes, PCR    Breast pain    Psoriatic arthritis  -     Follicle stimulating hormone; Future; Expected date: 09/15/2017  -     Estradiol; Future; Expected date: 09/15/2017    Ankylosing spondylitis, unspecified site of spine    Weight gain    Encounter for insertion of intrauterine contraceptive device (IUD)    Family history of factor V Leiden mutation  -     FACTOR 5 ASSAY; Future; Expected date: 09/15/2017      We will priorauth Mirena and discussed BTL also    Patient was counseled today on Pap guidelines, recommendation for pelvic exams, mammograms every other year after the age of 40 and annually after the age of 50, Colonoscopy after the age of 50, Dexa Bone Scan and calcium and vitamin D supplementation in menopause and to see her PCP for other health maintenance.   FOLLOW-UP:prn

## 2017-09-18 LAB — FACT V ACT/NOR PPP: 108 %

## 2017-09-20 LAB
HPV HR 12 DNA CVX QL NAA+PROBE: NEGATIVE
HPV16 DNA SPEC QL NAA+PROBE: NEGATIVE
HPV18 DNA SPEC QL NAA+PROBE: NEGATIVE

## 2017-09-25 ENCOUNTER — TELEPHONE (OUTPATIENT)
Dept: OBSTETRICS AND GYNECOLOGY | Facility: CLINIC | Age: 37
End: 2017-09-25

## 2017-09-25 NOTE — TELEPHONE ENCOUNTER
Called pt regarding mirena IUD, she is covered 100% left message asking her to return call to schedule placement.

## 2017-09-28 ENCOUNTER — PATIENT MESSAGE (OUTPATIENT)
Dept: OBSTETRICS AND GYNECOLOGY | Facility: CLINIC | Age: 37
End: 2017-09-28

## 2017-09-29 ENCOUNTER — TELEPHONE (OUTPATIENT)
Dept: RHEUMATOLOGY | Facility: CLINIC | Age: 37
End: 2017-09-29

## 2017-09-29 NOTE — TELEPHONE ENCOUNTER
Spoke to pt, she is going to be having laparoscopic tubal ligation and wanted to know if she will need to stop cosentyx and for how long and if she needs to stop any other medications. She is due to take next injection next week. Spoke to Dr. Shea advises medications stays in your system for 1 month. Patient will need to hold next injection and restart when cleared after surgery. Spoke to pt, gave her Dr. Shea's advisement. No further questions.

## 2017-10-09 ENCOUNTER — OFFICE VISIT (OUTPATIENT)
Dept: RHEUMATOLOGY | Facility: CLINIC | Age: 37
End: 2017-10-09
Payer: COMMERCIAL

## 2017-10-09 VITALS
HEART RATE: 78 BPM | WEIGHT: 174.19 LBS | SYSTOLIC BLOOD PRESSURE: 120 MMHG | DIASTOLIC BLOOD PRESSURE: 72 MMHG | BODY MASS INDEX: 32.91 KG/M2

## 2017-10-09 DIAGNOSIS — R76.8 POSITIVE ANA (ANTINUCLEAR ANTIBODY): Primary | ICD-10-CM

## 2017-10-09 DIAGNOSIS — M45.9 ANKYLOSING SPONDYLITIS, UNSPECIFIED SITE OF SPINE: ICD-10-CM

## 2017-10-09 DIAGNOSIS — D72.829 LEUKOCYTOSIS, UNSPECIFIED TYPE: ICD-10-CM

## 2017-10-09 DIAGNOSIS — R79.89 ELEVATED PLATELET COUNT: ICD-10-CM

## 2017-10-09 DIAGNOSIS — L40.50 PSORIATIC ARTHRITIS: ICD-10-CM

## 2017-10-09 PROCEDURE — 99214 OFFICE O/P EST MOD 30 MIN: CPT | Mod: S$GLB,,, | Performed by: INTERNAL MEDICINE

## 2017-10-09 PROCEDURE — 99999 PR PBB SHADOW E&M-EST. PATIENT-LVL II: CPT | Mod: PBBFAC,,, | Performed by: INTERNAL MEDICINE

## 2017-10-09 ASSESSMENT — ROUTINE ASSESSMENT OF PATIENT INDEX DATA (RAPID3)
PATIENT GLOBAL ASSESSMENT SCORE: 3
TOTAL RAPID3 SCORE: 4.33
MDHAQ FUNCTION SCORE: 1.5
PSYCHOLOGICAL DISTRESS SCORE: 3.3
FATIGUE SCORE: 4
PAIN SCORE: 5
AM STIFFNESS SCORE: 1, YES
WHEN YOU AWAKENED IN THE MORNING OVER THE LAST WEEK, PLEASE INDICATE THE AMOUNT OF TIME IT TAKES UNTIL YOU ARE AS LIMBER AS YOU WILL BE FOR THE DAY: ONE HOUR AFTER WAKING

## 2017-10-09 NOTE — PROGRESS NOTES
Subjective:       Patient ID: Sparkle Jose is a 36 y.o. female.    Chief Complaint: Follow-up    DX: ANKYLOSING SPONDYLITIS   On consyntx and  L elbow pain, and pos maggie 1:320.. She also has Chiari decompression and am concerned about an epidural injection.  Patient complains of arthralgias and myalgias for which has been present for a few years. Pain is located in multiple joints, both shoulder(s), both elbow(s), both wrist(s), both MCP(s): 1st, 2nd, 3rd, 4th and 5th, both PIP(s): 1st, 2nd, 3rd, 4th and 5th, both DIP(s): 1st and 2nd, both hip(s), both knee(s) and both MTP(s): 1st, 2nd, 3rd, 4th and 5th, is described as aching, pulsating, shooting and throbbing, and is constant, moderate to severe pain .       Review of Systems   Constitutional: Positive for activity change, appetite change and unexpected weight change. Negative for chills and diaphoresis.   HENT: Negative for congestion, dental problem, ear discharge, ear pain, facial swelling, mouth sores, nosebleeds, postnasal drip, rhinorrhea, sinus pressure, sneezing, sore throat, tinnitus and voice change.    Eyes: Negative for photophobia, pain, discharge, redness and itching.   Respiratory: Negative for apnea, cough, chest tightness, shortness of breath and wheezing.    Cardiovascular: Positive for leg swelling. Negative for chest pain and palpitations.   Gastrointestinal: Negative for abdominal distention, abdominal pain, constipation, diarrhea, nausea and vomiting.   Endocrine: Negative for cold intolerance, heat intolerance, polydipsia and polyuria.   Genitourinary: Negative for decreased urine volume, difficulty urinating, flank pain, frequency, hematuria and urgency.   Musculoskeletal: Positive for arthralgias, back pain, gait problem, neck pain and neck stiffness.   Skin: Positive for rash. Negative for pallor and wound.        Psoriasis rash on arms   Allergic/Immunologic: Negative for immunocompromised state.   Neurological: Positive for  weakness and numbness. Negative for dizziness and tremors.   Hematological: Negative for adenopathy. Does not bruise/bleed easily.   Psychiatric/Behavioral: Positive for sleep disturbance. The patient is nervous/anxious.         Depressed         Objective:     /72 (BP Location: Left arm, Patient Position: Sitting, BP Method: Medium (Automatic))   Pulse 78   Wt 79 kg (174 lb 2.6 oz)   LMP 09/01/2017 (Approximate)   BMI 32.91 kg/m²      Physical Exam   Vitals reviewed.  Constitutional: She is oriented to person, place, and time. She appears distressed.   HENT:   Head: Normocephalic and atraumatic.   Mouth/Throat: Oropharynx is clear and moist.   Eyes: EOM are normal. Pupils are equal, round, and reactive to light.   Neck: Neck supple. No thyromegaly present.   Cardiovascular: Normal rate, regular rhythm and normal heart sounds.  Exam reveals no gallop and no friction rub.    No murmur heard.  Pulmonary/Chest: Breath sounds normal. She has no wheezes. She has no rales. She exhibits no tenderness.   Abdominal: There is no tenderness. There is no rebound and no guarding.       Right Side Rheumatological Exam     Examination finds the elbow, 1st MCP, 2nd MCP, 3rd MCP, 4th MCP and 5th MCP normal.    The patient is tender to palpation of the shoulder and knee    She has swelling of the knee    The patient has an enlarged wrist, 1st PIP, 2nd PIP, 3rd PIP, 4th PIP and 5th PIP    Shoulder Exam   Tenderness Location: acromion    Range of Motion   Active Abduction: abnormal   Adduction: abnormal  Sensation: normal    Knee Exam   Tenderness Location: medial joint line  Patellofemoral Crepitus: positive  Effusion: positive  Sensation: normal    Hip Exam   Tenderness Location: no tenderness  Sensation: normal    Elbow/Wrist Exam   Tenderness Location: no tenderness  Sensation: normal    Left Side Rheumatological Exam     Examination finds the elbow, knee, 1st MCP, 2nd MCP, 3rd MCP, 4th MCP and 5th MCP normal.    The  patient is tender to palpation of the shoulder.    The patient has an enlarged wrist, 1st PIP, 2nd PIP, 3rd PIP, 4th PIP and 5th PIP.    Shoulder Exam   Tenderness Location: acromion    Range of Motion   Active Abduction: abnormal   Sensation: normal    Knee Exam   Tenderness Location: lateral joint line and medial joint line    Patellofemoral Crepitus: positive  Effusion: positive  Sensation: normal    Hip Exam   Tenderness Location: no tenderness  Sensation: normal    Elbow/Wrist Exam   Sensation: normal      Back/Neck Exam   General Inspection   Gait: normal       Tenderness Right paramedian tenderness of the Lower C-Spine, Lower L-Spine, Occ, Lower T-Spine and Upper C-Spine.Left paramedian tenderness of the Lower L-Spine, Lower C-Spine and Lower T-Spine.    Back Range of Motion   Lateral Bend Right: abnormal  Lateral Bend Left: abnormal  Rotation Right: abnormal  Rotation Left: abnormal      Lymphadenopathy:     She has no cervical adenopathy.   Neurological: She is alert and oriented to person, place, and time. She has normal sensation and normal strength.   Reflex Scores:       Patellar reflexes are 3+ on the right side and 3+ on the left side.  Skin: Rash noted. No erythema. No pallor.     Psychiatric: Mood and affect normal.   Musculoskeletal: She exhibits edema, tenderness and deformity.       Results for orders placed or performed in visit on 09/15/17   Follicle stimulating hormone   Result Value Ref Range    FSH 4.70 See Text mIU/mL   Estradiol   Result Value Ref Range    Estradiol 81 See Text pg/mL   FACTOR 5 ASSAY   Result Value Ref Range    Factor V Activity 108 60 - 145 %       Assessment:     Encounter Diagnoses   Name Primary?    Positive CAMRYN (antinuclear antibody) Yes    Leukocytosis, unspecified type     Elevated platelet count          Plan:     Positive CAMRYN (antinuclear antibody)  -     Protein electrophoresis, serum; Future; Expected date: 10/09/2017  -     Protein C activity; Future;  Expected date: 10/09/2017  -     Protein S activity; Future; Expected date: 10/09/2017  -     Factor 5 leiden; Future; Expected date: 10/09/2017  -     DRVVT; Future; Expected date: 10/09/2017  -     Antithrombin III; Future; Expected date: 10/09/2017  -     Beta-2 glycoprotein antibodies; Future; Expected date: 10/09/2017  -     Cancel: Cardiolipin antibody; Future; Expected date: 10/09/2017  -     Homocysteine, serum; Future; Expected date: 10/09/2017  -     Cardiolipin antibody; Future; Expected date: 10/09/2017  -     CAMRYN; Future; Expected date: 01/09/2018  -     Complement, total; Future; Expected date: 01/09/2018  -     C3 complement; Future; Expected date: 01/09/2018  -     C4 complement; Future; Expected date: 01/09/2018    Leukocytosis, unspecified type  -     Protein electrophoresis, serum; Future; Expected date: 10/09/2017  -     Protein C activity; Future; Expected date: 10/09/2017  -     Protein S activity; Future; Expected date: 10/09/2017  -     Factor 5 leiden; Future; Expected date: 10/09/2017  -     DRVVT; Future; Expected date: 10/09/2017  -     Antithrombin III; Future; Expected date: 10/09/2017  -     Beta-2 glycoprotein antibodies; Future; Expected date: 10/09/2017  -     Cancel: Cardiolipin antibody; Future; Expected date: 10/09/2017  -     Homocysteine, serum; Future; Expected date: 10/09/2017  -     Cardiolipin antibody; Future; Expected date: 10/09/2017  -     CAMRYN; Future; Expected date: 01/09/2018  -     Complement, total; Future; Expected date: 01/09/2018  -     C3 complement; Future; Expected date: 01/09/2018  -     C4 complement; Future; Expected date: 01/09/2018    Elevated platelet count  -     Protein electrophoresis, serum; Future; Expected date: 10/09/2017  -     Protein C activity; Future; Expected date: 10/09/2017  -     Protein S activity; Future; Expected date: 10/09/2017  -     Factor 5 leiden; Future; Expected date: 10/09/2017  -     DRVVT; Future; Expected date:  10/09/2017  -     Antithrombin III; Future; Expected date: 10/09/2017  -     Beta-2 glycoprotein antibodies; Future; Expected date: 10/09/2017  -     Cancel: Cardiolipin antibody; Future; Expected date: 10/09/2017  -     Homocysteine, serum; Future; Expected date: 10/09/2017  -     Cardiolipin antibody; Future; Expected date: 10/09/2017  -     CAMRYN; Future; Expected date: 01/09/2018  -     Complement, total; Future; Expected date: 01/09/2018  -     C3 complement; Future; Expected date: 01/09/2018  -     C4 complement; Future; Expected date: 01/09/2018    Psoriatic arthritis  -     Cardiolipin antibody; Future; Expected date: 10/09/2017  -     CAMRYN; Future; Expected date: 01/09/2018  -     Complement, total; Future; Expected date: 01/09/2018  -     C3 complement; Future; Expected date: 01/09/2018  -     C4 complement; Future; Expected date: 01/09/2018    Ankylosing spondylitis, unspecified site of spine  -     Cardiolipin antibody; Future; Expected date: 10/09/2017  -     CAMRYN; Future; Expected date: 01/09/2018  -     Complement, total; Future; Expected date: 01/09/2018  -     C3 complement; Future; Expected date: 01/09/2018  -     C4 complement; Future; Expected date: 01/09/2018       On consyntx and her joints are stable,

## 2017-10-12 ENCOUNTER — LAB VISIT (OUTPATIENT)
Dept: LAB | Facility: HOSPITAL | Age: 37
End: 2017-10-12
Attending: INTERNAL MEDICINE
Payer: COMMERCIAL

## 2017-10-12 ENCOUNTER — OFFICE VISIT (OUTPATIENT)
Dept: OBSTETRICS AND GYNECOLOGY | Facility: CLINIC | Age: 37
End: 2017-10-12
Payer: COMMERCIAL

## 2017-10-12 VITALS
DIASTOLIC BLOOD PRESSURE: 74 MMHG | BODY MASS INDEX: 31.72 KG/M2 | HEIGHT: 62 IN | SYSTOLIC BLOOD PRESSURE: 112 MMHG | WEIGHT: 172.38 LBS

## 2017-10-12 DIAGNOSIS — R76.8 POSITIVE ANA (ANTINUCLEAR ANTIBODY): ICD-10-CM

## 2017-10-12 DIAGNOSIS — M45.9 ANKYLOSING SPONDYLITIS, UNSPECIFIED SITE OF SPINE: ICD-10-CM

## 2017-10-12 DIAGNOSIS — R79.89 ELEVATED PLATELET COUNT: ICD-10-CM

## 2017-10-12 DIAGNOSIS — L40.50 PSORIATIC ARTHRITIS: ICD-10-CM

## 2017-10-12 DIAGNOSIS — Z30.2 ENCOUNTER FOR STERILIZATION: Primary | ICD-10-CM

## 2017-10-12 DIAGNOSIS — D72.829 LEUKOCYTOSIS, UNSPECIFIED TYPE: ICD-10-CM

## 2017-10-12 LAB — HCYS SERPL-SCNC: 6.2 UMOL/L

## 2017-10-12 PROCEDURE — 85300 ANTITHROMBIN III ACTIVITY: CPT

## 2017-10-12 PROCEDURE — 83090 ASSAY OF HOMOCYSTEINE: CPT

## 2017-10-12 PROCEDURE — 84165 PROTEIN E-PHORESIS SERUM: CPT

## 2017-10-12 PROCEDURE — 86146 BETA-2 GLYCOPROTEIN ANTIBODY: CPT | Mod: 59

## 2017-10-12 PROCEDURE — 81241 F5 GENE: CPT

## 2017-10-12 PROCEDURE — 85303 CLOT INHIBIT PROT C ACTIVITY: CPT

## 2017-10-12 PROCEDURE — 36415 COLL VENOUS BLD VENIPUNCTURE: CPT | Mod: PO

## 2017-10-12 PROCEDURE — 85305 CLOT INHIBIT PROT S TOTAL: CPT

## 2017-10-12 PROCEDURE — 85613 RUSSELL VIPER VENOM DILUTED: CPT

## 2017-10-12 PROCEDURE — 99024 POSTOP FOLLOW-UP VISIT: CPT | Mod: S$GLB,,, | Performed by: OBSTETRICS & GYNECOLOGY

## 2017-10-12 PROCEDURE — 99999 PR PBB SHADOW E&M-EST. PATIENT-LVL III: CPT | Mod: PBBFAC,,, | Performed by: OBSTETRICS & GYNECOLOGY

## 2017-10-12 PROCEDURE — 86147 CARDIOLIPIN ANTIBODY EA IG: CPT

## 2017-10-12 PROCEDURE — 84165 PROTEIN E-PHORESIS SERUM: CPT | Mod: 26,,, | Performed by: PATHOLOGY

## 2017-10-12 RX ORDER — SODIUM CHLORIDE, SODIUM LACTATE, POTASSIUM CHLORIDE, CALCIUM CHLORIDE 600; 310; 30; 20 MG/100ML; MG/100ML; MG/100ML; MG/100ML
INJECTION, SOLUTION INTRAVENOUS CONTINUOUS
Status: CANCELLED | OUTPATIENT
Start: 2017-10-12

## 2017-10-12 RX ORDER — MUPIROCIN 20 MG/G
OINTMENT TOPICAL
Status: CANCELLED | OUTPATIENT
Start: 2017-10-12

## 2017-10-12 NOTE — PROGRESS NOTES
Chief Complaint   Patient presents with    Pre-op Exam       History of Present Illness: Sparkle Jose is a 36 y.o. female that presents today 10/12/2017 for   Chief Complaint   Patient presents with    Pre-op Exam         Past Medical History:   Diagnosis Date    Ankylosing spondylitis     Arthritis     Celiac disease     Depression     Family history of DVT     MGM DVT, mother spleenic    Family history of factor V Leiden mutation     mother and sister    GERD (gastroesophageal reflux disease)     Migraine     Osteoarthritis     Psoriasis     Psoriatic arthritis mutilans        Past Surgical History:   Procedure Laterality Date    brain decompression   2006    BRAIN SURGERY         Current Outpatient Prescriptions   Medication Sig Dispense Refill    AMRIX 15 mg 24 hr capsule Take 15 mg by mouth once daily.      clobetasol 0.05% (TEMOVATE) 0.05 % Oint daily as needed.       eletriptan (RELPAX) 20 MG tablet Take 20 mg by mouth as needed for Migraine. may repeat in 2 hours if necessary      ergocalciferol (ERGOCALCIFEROL) 50,000 unit Cap Take 1 capsule (50,000 Units total) by mouth every 7 days. 4 capsule 6    fentaNYL (DURAGESIC) 25 mcg/hr Place 1 patch onto the skin every 72 hours.       furosemide (LASIX) 20 MG tablet Take 1 tablet (20 mg total) by mouth once daily. 30 tablet 4    ibuprofen (ADVIL,MOTRIN) 800 MG tablet Take 1 tablet (800 mg total) by mouth every 6 (six) hours as needed for Pain. 90 tablet 4    ketoconazole (NIZORAL) 2 % shampoo Wash body with medicated shampoo at least 2x/week - let soak at least 5 minutes prior to rinsing 120 mL 5    modafinil (PROVIGIL) 200 MG Tab Take 200 mg by mouth once daily.      oxycodone-acetaminophen (PERCOCET)  mg per tablet Take 1 tablet by mouth every 8 (eight) hours as needed for Pain. 75 tablet 0    potassium chloride (KLOR-CON) 10 MEQ TbSR Take 1 tablet (10 mEq total) by mouth once daily. 30 tablet 4    promethazine  (PHENERGAN) 25 MG tablet Take 1 tablet (25 mg total) by mouth every 6 (six) hours as needed. 30 tablet 3    ranitidine (ZANTAC) 150 MG capsule Take 1 capsule (150 mg total) by mouth 2 (two) times daily. 60 capsule 6    secukinumab (COSENTYX PEN, 2 PENS,) 150 mg/mL PnIj INJECT 2 PENS SUBCUTANEOUSLY  Monthly MAINTENANCE DOSE 2 Syringe 11    trazodone (DESYREL) 50 MG tablet TAKE 1 TABLET (50 MG TOTAL) BY MOUTH EVERY EVENING. 90 tablet 3    alprazolam (XANAX) 0.25 MG tablet Take 1 tablet (0.25 mg total) by mouth 4 (four) times daily as needed for Anxiety. 40 tablet 3     No current facility-administered medications for this visit.        Review of patient's allergies indicates:  No Known Allergies    Family History   Problem Relation Age of Onset    Hypertension Mother     Clotting disorder Mother     Depression Sister     Asthma Brother     Clotting disorder Sister     Breast cancer Neg Hx     Ovarian cancer Neg Hx        Social History   Substance Use Topics    Smoking status: Current Every Day Smoker     Packs/day: 0.50     Types: Cigarettes    Smokeless tobacco: Never Used    Alcohol use No       OB History    Para Term  AB Living   1 1       1   SAB TAB Ectopic Multiple Live Births           1      # Outcome Date GA Lbr Isauro/2nd Weight Sex Delivery Anes PTL Lv   1 Para     F Vag-Spont   DAKSHA          Review of Symptoms:  GENERAL: Denies weight gain or weight loss. Feeling well overall.   SKIN: Denies rash or lesions.   HEAD: Denies head injury or headache.   NODES: Denies enlarged lymph nodes.   CHEST: Denies chest pain or shortness of breath.   CARDIOVASCULAR: Denies palpitations or left sided chest pain.   ABDOMEN: No abdominal pain, constipation, diarrhea, nausea, vomiting or rectal bleeding.   URINARY: No frequency, dysuria, hematuria, or burning on urination.  HEMATOLOGIC: No easy bruisability or excessive bleeding.   MUSCULOSKELETAL: Denies joint pain or swelling.     /74   Ht  "5' 1.5" (1.562 m)   Wt 78.2 kg (172 lb 6.4 oz)   LMP 09/01/2017   Physical Exam:  APPEARANCE: Well nourished, well developed, in no acute distress.  SKIN: Normal skin turgor, no lesions.  NECK: Neck symmetric without masses   RESPIRATORY: Normal respiratory effort with no retractions or use of accessory muscles  CARDIOVASCULAR: Peripheral vascular system with no swelling no varicosities and palpation of pulses normal  LYMPHATIC: No enlargements of the lymph nodes noted in the neck, axillae, or groin  ABDOMEN: Soft. No tenderness or masses. No hepatosplenomegaly. No hernias.    ASSESSMENT/PLAN:  Encounter for sterilization  -     Place in Outpatient; Standing  -     Up ad robson; Standing  -     Insert peripheral IV; Standing  -     Torres to Gravity; Standing  -     Chlorhexidine (CHG) 2% Wipes; Standing  -     Notify Physician/Vital Signs Parameters Urine output less than 0.5mL/kg/hr (with indwelling catheter) or 30 mL/hr (without indwelling catheter) or blood glucose greater than 200 mg/dL; Standing  -     Notify physician; Standing  -     Notify Physician - Potential Need of Opioid Reversal; Standing  -     Place CHRISTIANO hose; Standing  -     Place sequential compression device; Standing  -     Chlorohexidine Gluconate Bath; Standing  -     hCG, quantitative; Standing  -     Type And Screen Preop; Future; Expected date: 10/12/2017  -     Diet NPO; Standing  -     CBC auto differential; Standing    Ankylosing spondylitis, unspecified site of spine    Other orders  -     lactated ringers infusion; Inject into the vein continuous.  -     Medium Risk of VTE; Standing  -     mupirocin 2 % ointment; by Nasal route On call Procedure (surgery).      Risks benefits discussed and she desires to proceed    "

## 2017-10-12 NOTE — PROGRESS NOTES
Chief Complaint   Patient presents with    Pre-op Exam       History of Present Illness: Sparkle Jose is a 36 y.o. female that presents today 10/12/2017 for   Chief Complaint   Patient presents with    Pre-op Exam         Past Medical History:   Diagnosis Date    Ankylosing spondylitis     Arthritis     Celiac disease     Depression     Family history of DVT     MGM DVT, mother spleenic    Family history of factor V Leiden mutation     mother and sister    GERD (gastroesophageal reflux disease)     Migraine     Osteoarthritis     Psoriasis     Psoriatic arthritis mutilans        Past Surgical History:   Procedure Laterality Date    brain decompression   2006    BRAIN SURGERY         Current Outpatient Prescriptions   Medication Sig Dispense Refill    AMRIX 15 mg 24 hr capsule Take 15 mg by mouth once daily.      clobetasol 0.05% (TEMOVATE) 0.05 % Oint daily as needed.       eletriptan (RELPAX) 20 MG tablet Take 20 mg by mouth as needed for Migraine. may repeat in 2 hours if necessary      ergocalciferol (ERGOCALCIFEROL) 50,000 unit Cap Take 1 capsule (50,000 Units total) by mouth every 7 days. 4 capsule 6    fentaNYL (DURAGESIC) 25 mcg/hr Place 1 patch onto the skin every 72 hours.       furosemide (LASIX) 20 MG tablet Take 1 tablet (20 mg total) by mouth once daily. 30 tablet 4    ibuprofen (ADVIL,MOTRIN) 800 MG tablet Take 1 tablet (800 mg total) by mouth every 6 (six) hours as needed for Pain. 90 tablet 4    ketoconazole (NIZORAL) 2 % shampoo Wash body with medicated shampoo at least 2x/week - let soak at least 5 minutes prior to rinsing 120 mL 5    modafinil (PROVIGIL) 200 MG Tab Take 200 mg by mouth once daily.      oxycodone-acetaminophen (PERCOCET)  mg per tablet Take 1 tablet by mouth every 8 (eight) hours as needed for Pain. 75 tablet 0    potassium chloride (KLOR-CON) 10 MEQ TbSR Take 1 tablet (10 mEq total) by mouth once daily. 30 tablet 4    promethazine  (PHENERGAN) 25 MG tablet Take 1 tablet (25 mg total) by mouth every 6 (six) hours as needed. 30 tablet 3    ranitidine (ZANTAC) 150 MG capsule Take 1 capsule (150 mg total) by mouth 2 (two) times daily. 60 capsule 6    secukinumab (COSENTYX PEN, 2 PENS,) 150 mg/mL PnIj INJECT 2 PENS SUBCUTANEOUSLY  Monthly MAINTENANCE DOSE 2 Syringe 11    trazodone (DESYREL) 50 MG tablet TAKE 1 TABLET (50 MG TOTAL) BY MOUTH EVERY EVENING. 90 tablet 3    alprazolam (XANAX) 0.25 MG tablet Take 1 tablet (0.25 mg total) by mouth 4 (four) times daily as needed for Anxiety. 40 tablet 3     No current facility-administered medications for this visit.        Review of patient's allergies indicates:  No Known Allergies    Family History   Problem Relation Age of Onset    Hypertension Mother     Clotting disorder Mother     Depression Sister     Asthma Brother     Clotting disorder Sister     Breast cancer Neg Hx     Ovarian cancer Neg Hx        Social History   Substance Use Topics    Smoking status: Current Every Day Smoker     Packs/day: 0.50     Types: Cigarettes    Smokeless tobacco: Never Used    Alcohol use No       OB History    Para Term  AB Living   1 1       1   SAB TAB Ectopic Multiple Live Births           1      # Outcome Date GA Lbr Isauro/2nd Weight Sex Delivery Anes PTL Lv   1 Para     F Vag-Spont   DAKSHA          Review of Symptoms:  GENERAL: Denies weight gain or weight loss. Feeling well overall.   SKIN: Denies rash or lesions.   HEAD: Denies head injury or headache.   NODES: Denies enlarged lymph nodes.   CHEST: Denies chest pain or shortness of breath.   CARDIOVASCULAR: Denies palpitations or left sided chest pain.   ABDOMEN: No abdominal pain, constipation, diarrhea, nausea, vomiting or rectal bleeding.   URINARY: No frequency, dysuria, hematuria, or burning on urination.  HEMATOLOGIC: No easy bruisability or excessive bleeding.   MUSCULOSKELETAL: Denies joint pain or swelling.     /74   Ht  "5' 1.5" (1.562 m)   Wt 78.2 kg (172 lb 6.4 oz)   LMP 09/01/2017   Physical Exam:  APPEARANCE: Well nourished, well developed, in no acute distress.  SKIN: Normal skin turgor, no lesions.  NECK: Neck symmetric without masses   RESPIRATORY: Normal respiratory effort with no retractions or use of accessory muscles  CARDIOVASCULAR: Peripheral vascular system with no swelling no varicosities and palpation of pulses normal  LYMPHATIC: No enlargements of the lymph nodes noted in the neck, axillae, or groin  ABDOMEN: Soft. No tenderness or masses. No hepatosplenomegaly. No hernias.  BREASTS: Symmetrical, no skin changes or visible lesions. No palpable masses, nipple discharge or adenopathy bilaterally.  PELVIC: Normal external female genitalia without lesions. Normal hair distribution. Adequate perineal body, normal urethral meatus. Urethra with no masses.  Bladder nontender. Vagina moist and well rugated without lesions or discharge. Cervix pink and without lesions. No significant cystocele or rectocele. Bimanual exam showed uterus normal size, shape, position, mobile and nontender. Adnexa without masses or tenderness. Urethra and bladder normal.  EXTREMITIES: No clubbing cyanosis or edema.    ASSESSMENT/PLAN:  There are no diagnoses linked to this encounter.      "

## 2017-10-13 LAB
ALBUMIN SERPL ELPH-MCNC: 3.96 G/DL
ALPHA1 GLOB SERPL ELPH-MCNC: 0.36 G/DL
ALPHA2 GLOB SERPL ELPH-MCNC: 0.97 G/DL
B-GLOBULIN SERPL ELPH-MCNC: 0.82 G/DL
CARDIOLIPIN IGG SER IA-ACNC: <9.4 GPL
CARDIOLIPIN IGM SER IA-ACNC: <9.4 MPL
GAMMA GLOB SERPL ELPH-MCNC: 1.18 G/DL
LA PPP-IMP: NEGATIVE
PATHOLOGIST INTERPRETATION SPE: NORMAL
PROT SERPL-MCNC: 7.3 G/DL

## 2017-10-16 LAB
AT III ACT/NOR PPP CHRO: 104 %
F5 P.R506Q BLD/T QL: ABNORMAL

## 2017-10-18 ENCOUNTER — TELEPHONE (OUTPATIENT)
Dept: OBSTETRICS AND GYNECOLOGY | Facility: CLINIC | Age: 37
End: 2017-10-18

## 2017-10-18 LAB
B2 GLYCOPROT1 IGA SER QL: <9 SAU
B2 GLYCOPROT1 IGG SER QL: <9 SGU
B2 GLYCOPROT1 IGM SER QL: <9 SMU

## 2017-10-18 RX ORDER — OXYCODONE AND ACETAMINOPHEN 5; 325 MG/1; MG/1
1 TABLET ORAL EVERY 4 HOURS PRN
Qty: 20 TABLET | Refills: 0 | Status: SHIPPED | OUTPATIENT
Start: 2017-10-18 | End: 2018-03-09

## 2017-10-19 ENCOUNTER — PATIENT MESSAGE (OUTPATIENT)
Dept: OBSTETRICS AND GYNECOLOGY | Facility: CLINIC | Age: 37
End: 2017-10-19

## 2017-10-19 LAB
APTT PROTEIN C ACTIVATOR+FV DP/APTT PPP: 74 %
PROT S ACT/NOR PPP: 63 %

## 2017-10-26 ENCOUNTER — PATIENT MESSAGE (OUTPATIENT)
Dept: RHEUMATOLOGY | Facility: CLINIC | Age: 37
End: 2017-10-26

## 2017-11-02 ENCOUNTER — OFFICE VISIT (OUTPATIENT)
Dept: OBSTETRICS AND GYNECOLOGY | Facility: CLINIC | Age: 37
End: 2017-11-02
Payer: COMMERCIAL

## 2017-11-02 VITALS
BODY MASS INDEX: 32.84 KG/M2 | DIASTOLIC BLOOD PRESSURE: 78 MMHG | HEIGHT: 61 IN | SYSTOLIC BLOOD PRESSURE: 116 MMHG | WEIGHT: 173.94 LBS

## 2017-11-02 DIAGNOSIS — Z09 POSTOP CHECK: Primary | ICD-10-CM

## 2017-11-02 PROCEDURE — 99999 PR PBB SHADOW E&M-EST. PATIENT-LVL III: CPT | Mod: PBBFAC,,, | Performed by: OBSTETRICS & GYNECOLOGY

## 2017-11-02 PROCEDURE — 99024 POSTOP FOLLOW-UP VISIT: CPT | Mod: S$GLB,,, | Performed by: OBSTETRICS & GYNECOLOGY

## 2017-11-02 NOTE — PROGRESS NOTES
POST-OP NOTE    11/2/2017    HPI: no complaints    Past Medical History:   Diagnosis Date    Ankylosing spondylitis     Arthritis     Celiac disease     Depression     Family history of DVT     MGM DVT, mother spleenic    Family history of factor V Leiden mutation     mother and sister    GERD (gastroesophageal reflux disease)     Migraine     Osteoarthritis     Psoriasis     Psoriatic arthritis mutilans      Past Surgical History:   Procedure Laterality Date    brain decompression   2006    BRAIN SURGERY      TUBAL LIGATION       Current Outpatient Prescriptions   Medication Sig Dispense Refill    AMRIX 15 mg 24 hr capsule Take 15 mg by mouth once daily.      eletriptan (RELPAX) 20 MG tablet Take 20 mg by mouth as needed for Migraine. may repeat in 2 hours if necessary      ergocalciferol (ERGOCALCIFEROL) 50,000 unit Cap Take 1 capsule (50,000 Units total) by mouth every 7 days. 4 capsule 6    fentaNYL (DURAGESIC) 25 mcg/hr Place 1 patch onto the skin every 72 hours.       furosemide (LASIX) 20 MG tablet Take 1 tablet (20 mg total) by mouth once daily. 30 tablet 4    ibuprofen (ADVIL,MOTRIN) 800 MG tablet Take 1 tablet (800 mg total) by mouth every 6 (six) hours as needed for Pain. 90 tablet 4    ketoconazole (NIZORAL) 2 % shampoo Wash body with medicated shampoo at least 2x/week - let soak at least 5 minutes prior to rinsing 120 mL 5    modafinil (PROVIGIL) 200 MG Tab Take 200 mg by mouth once daily.      oxycodone-acetaminophen (PERCOCET)  mg per tablet Take 1 tablet by mouth every 8 (eight) hours as needed for Pain. 75 tablet 0    potassium chloride (KLOR-CON) 10 MEQ TbSR Take 1 tablet (10 mEq total) by mouth once daily. 30 tablet 4    promethazine (PHENERGAN) 25 MG tablet Take 1 tablet (25 mg total) by mouth every 6 (six) hours as needed. 30 tablet 3    ranitidine (ZANTAC) 150 MG capsule Take 1 capsule (150 mg total) by mouth 2 (two) times daily. (Patient taking differently: Take  "150 mg by mouth 2 (two) times daily as needed. ) 60 capsule 6    secukinumab (COSENTYX PEN, 2 PENS,) 150 mg/mL PnIj INJECT 2 PENS SUBCUTANEOUSLY  Monthly MAINTENANCE DOSE 2 Syringe 11    trazodone (DESYREL) 50 MG tablet TAKE 1 TABLET (50 MG TOTAL) BY MOUTH EVERY EVENING. (Patient taking differently: Take 50 mg by mouth nightly as needed. ) 90 tablet 3    alprazolam (XANAX) 0.25 MG tablet Take 1 tablet (0.25 mg total) by mouth 4 (four) times daily as needed for Anxiety. 40 tablet 3    clobetasol 0.05% (TEMOVATE) 0.05 % Oint 1 application daily as needed.       oxycodone-acetaminophen (ROXICET) 5-325 mg per tablet Take 1 tablet by mouth every 4 (four) hours as needed for Pain. 20 tablet 0     No current facility-administered medications for this visit.      Review of patient's allergies indicates:  No Known Allergies  Social History   Substance Use Topics    Smoking status: Current Every Day Smoker     Packs/day: 0.50     Types: Cigarettes    Smokeless tobacco: Never Used    Alcohol use No     /78   Ht 5' 1" (1.549 m)   Wt 78.9 kg (173 lb 15.1 oz)   LMP 10/24/2017 (Approximate)     ROS:  GENERAL: Denies weight gain, weight loss, fatigue, and malaise.   SKIN: Denies rash or lesions.   HEAD: Denies head injury or headache.   NODES: Denies enlarged lymph nodes.   CHEST: Denies chest pain or shortness of breath.   CARDIOVASCULAR: Denies palpitations or left sided chest pain.   ABDOMEN: No abdominal pain, constipation, diarrhea, nausea, vomiting or rectal bleeding.   URINARY: No frequency, urgency, dysuria, or hematuria  MUSCULOSKELETAL: Denies joint pain or swelling.   NEUROLOGIC: Denies seizures.    PE:   APPEARANCE: Well nourished, well developed, in no acute distress.  SKIN: Normal skin turgor, no lesions.  ABDOMEN: Incision healing fine. Soft. No tenderness or masses. No hepatosplenomegaly. No hernias.  EXTREMITIES: NO clubbing cyanosis or edema    ASSESSMENT  No diagnosis found.    PLAN  1. RTC:  "

## 2017-11-06 ENCOUNTER — PATIENT MESSAGE (OUTPATIENT)
Dept: RHEUMATOLOGY | Facility: CLINIC | Age: 37
End: 2017-11-06

## 2017-11-08 DIAGNOSIS — M54.2 NECK PAIN: ICD-10-CM

## 2017-11-08 DIAGNOSIS — L40.50 PSORIATIC ARTHRITIS: ICD-10-CM

## 2017-11-08 DIAGNOSIS — F41.9 ANXIETY: ICD-10-CM

## 2017-11-08 DIAGNOSIS — G93.5 ARNOLD-CHIARI MALFORMATION, TYPE I: ICD-10-CM

## 2017-11-08 RX ORDER — SECUKINUMAB 150 MG/ML
INJECTION SUBCUTANEOUS
Qty: 2 SYRINGE | Refills: 10 | Status: SHIPPED | OUTPATIENT
Start: 2017-11-08 | End: 2018-01-09 | Stop reason: SDUPTHER

## 2017-11-08 RX ORDER — AZITHROMYCIN 250 MG/1
TABLET, FILM COATED ORAL
Qty: 6 TABLET | Refills: 0 | Status: SHIPPED | OUTPATIENT
Start: 2017-11-08 | End: 2017-11-12

## 2017-11-15 DIAGNOSIS — G93.5 ARNOLD-CHIARI MALFORMATION, TYPE I: ICD-10-CM

## 2017-11-15 DIAGNOSIS — G43.919 INTRACTABLE MIGRAINE, UNSPECIFIED MIGRAINE TYPE: ICD-10-CM

## 2017-11-15 DIAGNOSIS — F41.9 ANXIETY: ICD-10-CM

## 2017-11-15 DIAGNOSIS — L40.50 PSORIATIC ARTHRITIS: ICD-10-CM

## 2017-11-15 DIAGNOSIS — M54.2 NECK PAIN: ICD-10-CM

## 2017-11-15 DIAGNOSIS — M45.9 AS (ANKYLOSING SPONDYLITIS): ICD-10-CM

## 2017-11-15 DIAGNOSIS — R11.2 NON-INTRACTABLE VOMITING WITH NAUSEA: ICD-10-CM

## 2017-11-15 RX ORDER — PROMETHAZINE HYDROCHLORIDE 25 MG/1
TABLET ORAL
Qty: 30 TABLET | Refills: 3 | Status: SHIPPED | OUTPATIENT
Start: 2017-11-15 | End: 2018-03-09 | Stop reason: SDUPTHER

## 2017-12-01 ENCOUNTER — PROCEDURE VISIT (OUTPATIENT)
Dept: NEUROLOGY | Facility: CLINIC | Age: 37
End: 2017-12-01
Payer: COMMERCIAL

## 2017-12-01 VITALS
DIASTOLIC BLOOD PRESSURE: 65 MMHG | HEIGHT: 61 IN | HEART RATE: 91 BPM | SYSTOLIC BLOOD PRESSURE: 110 MMHG | RESPIRATION RATE: 16 BRPM | WEIGHT: 175.38 LBS | BODY MASS INDEX: 33.11 KG/M2

## 2017-12-01 DIAGNOSIS — G43.719 INTRACTABLE CHRONIC MIGRAINE WITHOUT AURA AND WITHOUT STATUS MIGRAINOSUS: Primary | ICD-10-CM

## 2017-12-01 PROCEDURE — 64615 CHEMODENERV MUSC MIGRAINE: CPT | Mod: S$GLB,,, | Performed by: PSYCHIATRY & NEUROLOGY

## 2017-12-01 RX ORDER — ELETRIPTAN HYDROBROMIDE 20 MG/1
20 TABLET, FILM COATED ORAL
Qty: 12 TABLET | Refills: 3 | Status: SHIPPED | OUTPATIENT
Start: 2017-12-01 | End: 2018-03-02 | Stop reason: SDUPTHER

## 2017-12-01 NOTE — PROCEDURES
Procedures  and follow up visit     The Botox injections have achieved well over 50%  improvement in the patient's symptoms. Migraines have been reduced at least 7 days per month and the number of cumulative hours suffering with headaches has been reduced at least 100 total hours per month. Today she does have a headache indicating that the Botox has worn off. Frequency of treatment is every 3 months unless no response to the treatments, at which time we will discontinue the injections.       ROS: Positive for photophobia, phonophobia, nausea, insomnia with attacks.     Past Medical History:   Diagnosis Date    Ankylosing spondylitis     Arthritis     Celiac disease     Depression     Family history of DVT     MGM DVT, mother spleenic    Family history of factor V Leiden mutation     mother and sister    GERD (gastroesophageal reflux disease)     Migraine     Osteoarthritis     Psoriasis     Psoriatic arthritis mutilans        Current Outpatient Prescriptions   Medication Sig    AMRIX 15 mg 24 hr capsule Take 15 mg by mouth once daily.    clobetasol 0.05% (TEMOVATE) 0.05 % Oint 1 application daily as needed.     COSENTYX PEN, 2 PENS, 150 mg/mL PnIj INJECT 2 PENS SUBCUTANEOUSLY ONCE MONTHLY    eletriptan (RELPAX) 20 MG tablet Take 20 mg by mouth as needed for Migraine. may repeat in 2 hours if necessary    ergocalciferol (ERGOCALCIFEROL) 50,000 unit Cap Take 1 capsule (50,000 Units total) by mouth every 7 days.    fentaNYL (DURAGESIC) 25 mcg/hr Place 1 patch onto the skin every 72 hours.     furosemide (LASIX) 20 MG tablet Take 1 tablet (20 mg total) by mouth once daily.    ibuprofen (ADVIL,MOTRIN) 800 MG tablet Take 1 tablet (800 mg total) by mouth every 6 (six) hours as needed for Pain.    ketoconazole (NIZORAL) 2 % shampoo Wash body with medicated shampoo at least 2x/week - let soak at least 5 minutes prior to rinsing    modafinil (PROVIGIL) 200 MG Tab Take 200 mg by mouth once daily.     oxycodone-acetaminophen (PERCOCET)  mg per tablet Take 1 tablet by mouth every 8 (eight) hours as needed for Pain.    oxycodone-acetaminophen (ROXICET) 5-325 mg per tablet Take 1 tablet by mouth every 4 (four) hours as needed for Pain.    potassium chloride (KLOR-CON) 10 MEQ TbSR Take 1 tablet (10 mEq total) by mouth once daily.    promethazine (PHENERGAN) 25 MG tablet TAKE ONE TABLET BY MOUTH EVERY SIX HOURS AS NEEDED    ranitidine (ZANTAC) 150 MG capsule Take 1 capsule (150 mg total) by mouth 2 (two) times daily. (Patient taking differently: Take 150 mg by mouth 2 (two) times daily as needed. )    trazodone (DESYREL) 50 MG tablet TAKE 1 TABLET (50 MG TOTAL) BY MOUTH EVERY EVENING. (Patient taking differently: Take 50 mg by mouth nightly as needed. )    alprazolam (XANAX) 0.25 MG tablet Take 1 tablet (0.25 mg total) by mouth 4 (four) times daily as needed for Anxiety.     Current Facility-Administered Medications   Medication    onabotulinumtoxina injection 200 Units     Review of patient's allergies indicates:  No Known Allergies      Vitals:    12/01/17 1305   BP: 110/65   Pulse: 91   Resp: 16   Body mass index is 33.14 kg/m².      Alert and fully oriented   Lungs CTA  Heart RRR  CN II-XII intact  Motor normal bulk and tone, symmetric strength  Coordination intact FTN  Sensory in tact to LT  Gait: normal, pace and base     Data review:    Lab Results   Component Value Date     09/05/2017    K 3.8 09/05/2017     09/05/2017    CO2 27 09/05/2017    BUN 10 09/05/2017    CREATININE 0.7 09/05/2017    GLU 90 09/05/2017    AST 14 09/05/2017    ALT 12 09/05/2017    ALBUMIN 3.3 (L) 09/05/2017    PROT 6.9 09/05/2017    BILITOT 0.2 09/05/2017       Lab Results   Component Value Date    WBC 13.83 (H) 10/12/2017    HGB 13.7 10/12/2017    HCT 40.5 10/12/2017    MCV 93 10/12/2017     (H) 10/12/2017       Lab Results   Component Value Date    TSH 2.426 09/05/2017         A/P:     Chronic Migraine  responding to Botox as expected. Continue treatment until patient in true remission, meaning that the patient stays headache free when Botox wears off in 10 to 12 weeks. That will be the time to stop.  Encouraged the patient to comply with with early acute intervention for escalations.  Leucocytosis addressed by PCP      BOTOX PROCEDURE    PROCEDURE PERFORMED: Botulinum toxin injection (63971)     CLINICAL INDICATION: G43.719 NDC: 1612-0831-44    A time out was conducted just before the start of the procedure to verify the correct patient and procedure, procedure location, and all relevant critical information.       DESCRIPTION OF PROCEDURE: After obtaining informed consent and under aseptic technique, a total of 155 units of botulinum toxin type A were injected in the following muscles: Procerus 5 units,  5 units bilaterally, frontalis 20 units, temporalis 20 units bilaterally, occipitalis 15 units, upper cervical paraspinals 10 units bilaterally and trapezius 15 units bilaterally. The patient was given a total of 155 units in 31 sites.The patient tolerated the procedure well. There were no complications. The patient was given a prescription for repeat treatment in 3 months.     Unavoidable waste 45 units          Linda Schaefer M.D  Medical Director, Headache and Facial Pain  [facility]

## 2017-12-04 DIAGNOSIS — D69.1 ABNORMAL PLATELETS: Primary | ICD-10-CM

## 2018-01-03 ENCOUNTER — OFFICE VISIT (OUTPATIENT)
Dept: HEMATOLOGY/ONCOLOGY | Facility: CLINIC | Age: 38
End: 2018-01-03
Payer: COMMERCIAL

## 2018-01-03 ENCOUNTER — LAB VISIT (OUTPATIENT)
Dept: LAB | Facility: HOSPITAL | Age: 38
End: 2018-01-03
Attending: INTERNAL MEDICINE
Payer: COMMERCIAL

## 2018-01-03 VITALS
SYSTOLIC BLOOD PRESSURE: 116 MMHG | HEART RATE: 88 BPM | HEIGHT: 61 IN | DIASTOLIC BLOOD PRESSURE: 76 MMHG | TEMPERATURE: 98 F | WEIGHT: 182.13 LBS | BODY MASS INDEX: 34.39 KG/M2 | OXYGEN SATURATION: 98 %

## 2018-01-03 DIAGNOSIS — D75.839 THROMBOCYTOSIS: Primary | ICD-10-CM

## 2018-01-03 DIAGNOSIS — D75.839 THROMBOCYTOSIS: ICD-10-CM

## 2018-01-03 DIAGNOSIS — D72.829 LEUKOCYTOSIS, UNSPECIFIED TYPE: ICD-10-CM

## 2018-01-03 LAB
BASOPHILS # BLD AUTO: 0.14 K/UL
BASOPHILS NFR BLD: 1.5 %
CRP SERPL-MCNC: 6.5 MG/L
DIFFERENTIAL METHOD: ABNORMAL
EOSINOPHIL # BLD AUTO: 0.3 K/UL
EOSINOPHIL NFR BLD: 2.8 %
ERYTHROCYTE [DISTWIDTH] IN BLOOD BY AUTOMATED COUNT: 14.4 %
FERRITIN SERPL-MCNC: 102 NG/ML
FOLATE SERPL-MCNC: 9.5 NG/ML
HCT VFR BLD AUTO: 42.9 %
HGB BLD-MCNC: 14.1 G/DL
IRON SERPL-MCNC: 100 UG/DL
LYMPHOCYTES # BLD AUTO: 3.9 K/UL
LYMPHOCYTES NFR BLD: 40.6 %
MCH RBC QN AUTO: 30.5 PG
MCHC RBC AUTO-ENTMCNC: 32.9 G/DL
MCV RBC AUTO: 93 FL
MONOCYTES # BLD AUTO: 1 K/UL
MONOCYTES NFR BLD: 10.1 %
NEUTROPHILS # BLD AUTO: 4.3 K/UL
NEUTROPHILS NFR BLD: 45 %
PLATELET # BLD AUTO: 402 K/UL
PMV BLD AUTO: 9.2 FL
RBC # BLD AUTO: 4.62 M/UL
SATURATED IRON: 27 %
TOTAL IRON BINDING CAPACITY: 364 UG/DL
TRANSFERRIN SERPL-MCNC: 246 MG/DL
VIT B12 SERPL-MCNC: 387 PG/ML
WBC # BLD AUTO: 9.59 K/UL

## 2018-01-03 PROCEDURE — 85025 COMPLETE CBC W/AUTO DIFF WBC: CPT | Mod: PO

## 2018-01-03 PROCEDURE — 82728 ASSAY OF FERRITIN: CPT

## 2018-01-03 PROCEDURE — 81270 JAK2 GENE: CPT

## 2018-01-03 PROCEDURE — 81403 MOPATH PROCEDURE LEVEL 4: CPT | Mod: 91

## 2018-01-03 PROCEDURE — 88271 CYTOGENETICS DNA PROBE: CPT

## 2018-01-03 PROCEDURE — 88275 CYTOGENETICS 100-300: CPT

## 2018-01-03 PROCEDURE — 82746 ASSAY OF FOLIC ACID SERUM: CPT

## 2018-01-03 PROCEDURE — 99999 PR PBB SHADOW E&M-EST. PATIENT-LVL III: CPT | Mod: PBBFAC,,, | Performed by: INTERNAL MEDICINE

## 2018-01-03 PROCEDURE — 83540 ASSAY OF IRON: CPT

## 2018-01-03 PROCEDURE — 81219 CALR GENE COM VARIANTS: CPT

## 2018-01-03 PROCEDURE — 99244 OFF/OP CNSLTJ NEW/EST MOD 40: CPT | Mod: S$GLB,,, | Performed by: INTERNAL MEDICINE

## 2018-01-03 PROCEDURE — 86140 C-REACTIVE PROTEIN: CPT

## 2018-01-03 PROCEDURE — 82607 VITAMIN B-12: CPT

## 2018-01-03 PROCEDURE — 36415 COLL VENOUS BLD VENIPUNCTURE: CPT | Mod: PO

## 2018-01-03 PROCEDURE — 81403 MOPATH PROCEDURE LEVEL 4: CPT

## 2018-01-03 NOTE — LETTER
January 5, 2018      Rafiq Shea MD  [address] [facility] [address] [place] [address]           [facility] Hematology Oncology  [address] [facility] [address]  Phone: [phone]          Patient: Sparkle Jose   MR Number: [id number]   YOB: 1980   Date of Visit: 1/3/2018       Dear Dr. Rafiq Shea:    Thank you for referring Sparkle Jose to me for evaluation. Attached you will find relevant portions of my assessment and plan of care.    If you have questions, please do not hesitate to call me. I look forward to following Sparkle Jose along with you.    Sincerely,    Regan Brandt MD    Enclosure  CC:  No Recipients    If you would like to receive this communication electronically, please contact [email or web address] or [phone] to request more information on [employer] Link access.    For providers and/or their staff who would like to refer a patient to [facility], please contact us through our one-stop-shop provider referral line, Alfredo Rey, at [phone].    If you feel you have received this communication in error or would no longer like to receive these types of communications, please e-mail [email or web address]

## 2018-01-04 ENCOUNTER — PATIENT MESSAGE (OUTPATIENT)
Dept: RHEUMATOLOGY | Facility: CLINIC | Age: 38
End: 2018-01-04

## 2018-01-08 ENCOUNTER — PATIENT MESSAGE (OUTPATIENT)
Dept: RHEUMATOLOGY | Facility: CLINIC | Age: 38
End: 2018-01-08

## 2018-01-09 DIAGNOSIS — L40.50 PSORIATIC ARTHRITIS: ICD-10-CM

## 2018-01-09 DIAGNOSIS — M54.2 NECK PAIN: ICD-10-CM

## 2018-01-09 DIAGNOSIS — G93.5 ARNOLD-CHIARI MALFORMATION, TYPE I: ICD-10-CM

## 2018-01-09 DIAGNOSIS — F41.9 ANXIETY: ICD-10-CM

## 2018-01-09 LAB
JAK2 EXON 12 MUTATION DETECTION BLOOD: NORMAL
PATH REPORT.FINAL DX SPEC: NORMAL

## 2018-01-10 ENCOUNTER — TELEPHONE (OUTPATIENT)
Dept: PHARMACY | Facility: HOSPITAL | Age: 38
End: 2018-01-10

## 2018-01-10 DIAGNOSIS — L40.50 PSORIATIC ARTHRITIS: ICD-10-CM

## 2018-01-10 DIAGNOSIS — F41.9 ANXIETY: ICD-10-CM

## 2018-01-10 DIAGNOSIS — G93.5 ARNOLD-CHIARI MALFORMATION, TYPE I: ICD-10-CM

## 2018-01-10 DIAGNOSIS — Z79.60 LONG-TERM USE OF IMMUNOSUPPRESSANT MEDICATION: Primary | ICD-10-CM

## 2018-01-10 DIAGNOSIS — M54.2 NECK PAIN: ICD-10-CM

## 2018-01-10 LAB
CLINICAL CYTOGENETICIST REVIEW: NORMAL
MBCR DISCLAIMER: NORMAL
MBCR REASON FOR REFERRAL: NORMAL
MBCR RELEASED BY: NORMAL
MBCR RESULT SUMMARY: NORMAL
MBCR RESULT TABLE: NORMAL
MBCR SPECIMEN: NORMAL
REF LAB TEST METHOD: NORMAL
SERVICE CMNT-IMP: NORMAL
SPECIMEN SOURCE: NORMAL
T(9;22)(ABL1,BCR) BLD/T FISH: NORMAL

## 2018-01-10 NOTE — TELEPHONE ENCOUNTER
Attempted to contact patient in regards to needing TB Gold test done. Left a message to contact office to schedule.

## 2018-01-10 NOTE — TELEPHONE ENCOUNTER
Vanessa Shea and staff,     To confirm medication safety and coverage approval, a negative TB test is needed for Mrs. Jose in the last 12 months. Please schedule test accordingly if possible. Thank you for your time.      Carmita Craven, PharmD  [facility]- Clinical Pharmacist  [phone]

## 2018-01-10 NOTE — TELEPHONE ENCOUNTER
LVM [facility] received a prescription for Cosentyx and it will require a prior authorization with their insurance company. We will update patient of status as more information is received.

## 2018-01-11 LAB
MPNR  FINAL DIAGNOSIS: NORMAL
MPNR  SPECIMEN TYPE: NORMAL
MPNR RESULT: NORMAL

## 2018-01-15 NOTE — TELEPHONE ENCOUNTER
FOR DOCUMENTATION ONLY:  Cosentyx prior authorization approved x 24 months.   Dates: 1/15/18 through 1/15/20  Case ID: [id number]    Patient must use [facility]  [phone] (Phone)  [phone] (Fax)

## 2018-01-16 ENCOUNTER — LAB VISIT (OUTPATIENT)
Dept: LAB | Facility: HOSPITAL | Age: 38
End: 2018-01-16
Attending: INTERNAL MEDICINE
Payer: COMMERCIAL

## 2018-01-16 DIAGNOSIS — Z79.60 LONG-TERM USE OF IMMUNOSUPPRESSANT MEDICATION: ICD-10-CM

## 2018-01-16 PROCEDURE — 36415 COLL VENOUS BLD VENIPUNCTURE: CPT | Mod: PO

## 2018-01-16 PROCEDURE — 86480 TB TEST CELL IMMUN MEASURE: CPT

## 2018-01-17 LAB
MITOGEN NIL: >10 IU/ML
NIL: 0.02 IU/ML
TB ANTIGEN NIL: 0.02 IU/ML
TB ANTIGEN: 0.04 IU/ML
TB GOLD: NEGATIVE

## 2018-01-17 NOTE — PROGRESS NOTES
Provider requesting consultation: Dr. Rafiq Shea with rheumatology  Reason for Consult: Elevated platelets    HPI:   The patient is a 37-year-old female who presents to the hematology on-call to clinic today in consultation to discuss further evaluation and management recommendations for her elevated platelets.  The patient has been referred to me by Dr. Rafiq Shea with rheumatology.  I have reviewed all of the patient's relevant clinical history available in the medical record and have utilized this may evaluation and management recommendations today.  The patient is under the care of Dr. Shea for and ankylosing spondylitis and psoriatic arthritis.    PAST MEDICAL HISTORY:   1.  Ankylosing spondylitis  2.  Psoriatic arthritis  3.  Anxiety/depression  4.  Celiac disease  5.  GERD  6.  Migraine  7.  Osteoarthritis    SURGICAL HISTORY:   1.  Brain surgery  2.  Bilateral tubal ligation    FAMILY HISTORY: Reviewed on patient's chart.    SOCIAL HISTORY: She smokes about half a pack of cigarettes every day.  She denies any alcohol use or recreational drug use.    ALLERGIES: [NKDA]    MEDICATIONS: [Medcard has been reviewed and/or reconciled.]    REVIEW OF SYSTEMS:   GENERAL: [No fevers, chills or sweats. Reports chronic fatigue. Denies weight loss. Reports loss of appetite.]  HEENT: [No blurred vision, tinnitus, nasal discharge, sorethroat or dysphagia.]  HEART: [No chest pain, palpitations or shortness of breath.]    LUNGS: [No cough, hemoptysis or breathing problems.]  ABDOMEN: [No abdominal pain, nausea, vomiting, diarrhea, constipation or melena.]  GENITOURINARY: [No dysuria, bleeding or malodorous discharge.]  NEURO: [No headache, dizziness or vertigo.]  HEMATOLOGY: [No easy bruising, spontaneous bleeding or blood clots in the past].  MUSCULOSKELETAL: [Reports arthralgias, myalgias. Denies bone pains.]  SKIN: [No rashes or skin lesions.]  PSYCHIATRY: [ depression or anxiety.]    PHYSICAL EXAMINATION:   VS:  Reviewed on nurse's notes.  APPEARANCE: The patient is a well-developed, well-nourished and well-groomed female who appears in no acute distress.  HEENT: No scleral icterus. Both external auditory canals clear. No oral ulcers, lesions. Throat clear  HEAD: No sinus tenderness.  NECK: Supple. No palpable lymphadenopathy. Thyroid non-tender, no palpable masses  CHEST: Breath sounds clear bilaterally. No rales. No rhonchi. Unlabored respirations.  CARDIOVASCULAR: Normal S1, S2. Normal rate. Regular rhythm.  ABDOMEN: Bowel sounds normal. No tenderness. No abdominal distention. No hepatomegaly. No splenomegaly.  LYMPHATIC: No palpable supraclavicular, axillary nodes  EXTREMITIES: No clubbing, cyanosis, edema  SKIN: No lesions. No petechiae. No ecchymoses. No induration or nodules  NEUROLOGIC: No focal findings. Alert & Oriented x 3. Mood appropriate to affect    LABS:   Reviewed    IMAGING:  Reviewed    IMPRESSION:  1.  Chronic thrombocytosis  2.  Chronic leukocytosis    PLAN:  1.  I had a detailed discussion with the patient today with regard to the various possible etiologies for her chronic thrombocytosis and chronic leukocytosis.  We discussed about primary versus secondary causes.  2.  We will review her peripheral blood smear for any significant abnormalities.  I'll check lab work today for workup of her chronic thrombocytosis and chronic leukocytosis.  This will include studies for iron deficiency and for myeloproliferative neoplasm.    Return to clinic will be determined after review of above results.  She knows to call for any additional questions or new problems.    Regan Brandt MD

## 2018-02-09 ENCOUNTER — LAB VISIT (OUTPATIENT)
Dept: LAB | Facility: HOSPITAL | Age: 38
End: 2018-02-09
Attending: INTERNAL MEDICINE
Payer: COMMERCIAL

## 2018-02-09 DIAGNOSIS — R79.89 ELEVATED PLATELET COUNT: ICD-10-CM

## 2018-02-09 DIAGNOSIS — L40.50 PSORIATIC ARTHRITIS: ICD-10-CM

## 2018-02-09 DIAGNOSIS — M45.9 ANKYLOSING SPONDYLITIS, UNSPECIFIED SITE OF SPINE: ICD-10-CM

## 2018-02-09 DIAGNOSIS — D72.829 LEUKOCYTOSIS, UNSPECIFIED TYPE: ICD-10-CM

## 2018-02-09 DIAGNOSIS — R76.8 POSITIVE ANA (ANTINUCLEAR ANTIBODY): ICD-10-CM

## 2018-02-09 LAB
C3 SERPL-MCNC: 129 MG/DL
C4 SERPL-MCNC: 15 MG/DL

## 2018-02-09 PROCEDURE — 86162 COMPLEMENT TOTAL (CH50): CPT

## 2018-02-09 PROCEDURE — 86160 COMPLEMENT ANTIGEN: CPT

## 2018-02-09 PROCEDURE — 86160 COMPLEMENT ANTIGEN: CPT | Mod: 59

## 2018-02-09 PROCEDURE — 86038 ANTINUCLEAR ANTIBODIES: CPT

## 2018-02-09 PROCEDURE — 36415 COLL VENOUS BLD VENIPUNCTURE: CPT | Mod: PO

## 2018-02-12 ENCOUNTER — PATIENT MESSAGE (OUTPATIENT)
Dept: HEMATOLOGY/ONCOLOGY | Facility: CLINIC | Age: 38
End: 2018-02-12

## 2018-02-12 LAB
ANA SER QL IF: NORMAL
CH50 SERPL-ACNC: 59 U/ML

## 2018-02-14 ENCOUNTER — OFFICE VISIT (OUTPATIENT)
Dept: HEMATOLOGY/ONCOLOGY | Facility: CLINIC | Age: 38
End: 2018-02-14
Payer: COMMERCIAL

## 2018-02-14 VITALS
WEIGHT: 182.38 LBS | BODY MASS INDEX: 34.43 KG/M2 | HEART RATE: 119 BPM | DIASTOLIC BLOOD PRESSURE: 76 MMHG | HEIGHT: 61 IN | SYSTOLIC BLOOD PRESSURE: 122 MMHG | RESPIRATION RATE: 18 BRPM | OXYGEN SATURATION: 99 %

## 2018-02-14 DIAGNOSIS — D75.839 THROMBOCYTOSIS: Primary | ICD-10-CM

## 2018-02-14 PROCEDURE — 99214 OFFICE O/P EST MOD 30 MIN: CPT | Mod: S$GLB,,, | Performed by: INTERNAL MEDICINE

## 2018-02-14 PROCEDURE — 3008F BODY MASS INDEX DOCD: CPT | Mod: S$GLB,,, | Performed by: INTERNAL MEDICINE

## 2018-02-14 PROCEDURE — 99999 PR PBB SHADOW E&M-EST. PATIENT-LVL III: CPT | Mod: PBBFAC,,, | Performed by: INTERNAL MEDICINE

## 2018-02-14 NOTE — PROGRESS NOTES
Reason for visit: Chronic thrombocytosis    HPI:   The patient is a 37-year-old female who presents to the hematology oncology clinic today to discuss further evaluation and management recommendations for chronic thrombocytosis. The patient has been referred to me by Dr. Rafiq Shea with rheumatology.  I have reviewed all of the patient's relevant clinical history available in the medical record and have utilized this may evaluation and management recommendations today.  The patient is under the care of Dr. Shea for and ankylosing spondylitis and psoriatic arthritis.    PAST MEDICAL HISTORY:   1.  Ankylosing spondylitis  2.  Psoriatic arthritis  3.  Anxiety/depression  4.  Celiac disease  5.  GERD  6.  Migraine  7.  Osteoarthritis    SURGICAL HISTORY:   1.  Brain surgery  2.  Bilateral tubal ligation    FAMILY HISTORY: Reviewed on patient's chart.    SOCIAL HISTORY: She smokes about half a pack of cigarettes every day.  She denies any alcohol use or recreational drug use.    ALLERGIES: [NKDA]    MEDICATIONS: [Medcard has been reviewed and/or reconciled.]    REVIEW OF SYSTEMS:   GENERAL: [No fevers, chills or sweats. Reports chronic fatigue. Denies weight loss. Reports loss of appetite.]  HEENT: [No blurred vision, tinnitus, nasal discharge, sorethroat or dysphagia.]  HEART: [No chest pain, palpitations or shortness of breath.]    LUNGS: [No cough, hemoptysis or breathing problems.]  ABDOMEN: [No abdominal pain, nausea, vomiting, diarrhea, constipation or melena.]  GENITOURINARY: [No dysuria, bleeding or malodorous discharge.]  NEURO: [No headache, dizziness or vertigo.]  HEMATOLOGY: [No easy bruising, spontaneous bleeding or blood clots in the past].  MUSCULOSKELETAL: [Reports arthralgias, myalgias. Denies bone pains.]  SKIN: [No rashes or skin lesions.]  PSYCHIATRY: [ depression or anxiety.]    PHYSICAL EXAMINATION:   VS: Reviewed on nurse's notes.  APPEARANCE: The patient is a well-developed, well-nourished and  well-groomed female who appears in no acute distress.  HEENT: No scleral icterus. Both external auditory canals clear. No oral ulcers, lesions. Throat clear  HEAD: No sinus tenderness.  NECK: Supple. No palpable lymphadenopathy. Thyroid non-tender, no palpable masses  CHEST: Breath sounds clear bilaterally. No rales. No rhonchi. Unlabored respirations.  CARDIOVASCULAR: Normal S1, S2. Normal rate. Regular rhythm.  ABDOMEN: Bowel sounds normal. No tenderness. No abdominal distention. No hepatomegaly. No splenomegaly.  LYMPHATIC: No palpable supraclavicular, axillary nodes  EXTREMITIES: No clubbing, cyanosis, edema  SKIN: No lesions. No petechiae. No ecchymoses. No induration or nodules  NEUROLOGIC: No focal findings. Alert & Oriented x 3. Mood appropriate to affect    LABS:   Reviewed    IMAGING:  Reviewed    IMPRESSION:  1.  Chronic thrombocytosis  2.  Chronic leukocytosis    PLAN:  1.  I had a detailed discussion with the patient today with regard to the various possible etiologies for her chronic thrombocytosis and chronic leukocytosis.  We discussed about primary versus secondary causes.  2.  Workup for iron deficiency and for myeloproliferative neoplasm was negative. At this time I would recommend following her platelet count conservatively as this has been chronically present and the patient is asymptomatic from this.    Return to clinic in 6 months with CBC. She knows to call sooner for any additional questions or new problems.    Regan Brandt MD

## 2018-03-02 ENCOUNTER — OFFICE VISIT (OUTPATIENT)
Dept: NEUROLOGY | Facility: CLINIC | Age: 38
End: 2018-03-02
Payer: COMMERCIAL

## 2018-03-02 ENCOUNTER — PROCEDURE VISIT (OUTPATIENT)
Dept: NEUROLOGY | Facility: CLINIC | Age: 38
End: 2018-03-02
Payer: COMMERCIAL

## 2018-03-02 VITALS
WEIGHT: 181.44 LBS | BODY MASS INDEX: 34.26 KG/M2 | RESPIRATION RATE: 16 BRPM | HEIGHT: 61 IN | DIASTOLIC BLOOD PRESSURE: 65 MMHG | SYSTOLIC BLOOD PRESSURE: 115 MMHG | HEART RATE: 89 BPM

## 2018-03-02 VITALS
RESPIRATION RATE: 16 BRPM | DIASTOLIC BLOOD PRESSURE: 65 MMHG | HEART RATE: 89 BPM | WEIGHT: 181.44 LBS | BODY MASS INDEX: 34.26 KG/M2 | SYSTOLIC BLOOD PRESSURE: 115 MMHG | HEIGHT: 61 IN

## 2018-03-02 DIAGNOSIS — L40.50 PSORIATIC ARTHRITIS: ICD-10-CM

## 2018-03-02 DIAGNOSIS — G89.4 CHRONIC PAIN SYNDROME: Primary | ICD-10-CM

## 2018-03-02 DIAGNOSIS — F11.20 OPIOID DEPENDENCE IN CONTROLLED ENVIRONMENT: ICD-10-CM

## 2018-03-02 DIAGNOSIS — G89.4 CHRONIC PAIN SYNDROME: ICD-10-CM

## 2018-03-02 DIAGNOSIS — F41.9 ANXIETY: ICD-10-CM

## 2018-03-02 DIAGNOSIS — R41.3 MEMORY LOSS: ICD-10-CM

## 2018-03-02 DIAGNOSIS — G47.33 OSA (OBSTRUCTIVE SLEEP APNEA): ICD-10-CM

## 2018-03-02 DIAGNOSIS — F11.20 OPIOID DEPENDENCE IN CONTROLLED ENVIRONMENT: Primary | ICD-10-CM

## 2018-03-02 PROCEDURE — 99499 UNLISTED E&M SERVICE: CPT | Mod: S$GLB,,, | Performed by: PSYCHIATRY & NEUROLOGY

## 2018-03-02 PROCEDURE — 99999 PR PBB SHADOW E&M-EST. PATIENT-LVL III: CPT | Mod: PBBFAC,,, | Performed by: PSYCHIATRY & NEUROLOGY

## 2018-03-02 PROCEDURE — 99214 OFFICE O/P EST MOD 30 MIN: CPT | Mod: S$GLB,,, | Performed by: PSYCHIATRY & NEUROLOGY

## 2018-03-02 RX ORDER — ELETRIPTAN HYDROBROMIDE 40 MG/1
40 TABLET, FILM COATED ORAL
Qty: 9 TABLET | Refills: 2 | Status: SHIPPED | OUTPATIENT
Start: 2018-03-02 | End: 2019-09-25 | Stop reason: SDUPTHER

## 2018-03-09 ENCOUNTER — LAB VISIT (OUTPATIENT)
Dept: LAB | Facility: HOSPITAL | Age: 38
End: 2018-03-09
Attending: INTERNAL MEDICINE
Payer: COMMERCIAL

## 2018-03-09 ENCOUNTER — OFFICE VISIT (OUTPATIENT)
Dept: RHEUMATOLOGY | Facility: CLINIC | Age: 38
End: 2018-03-09
Payer: COMMERCIAL

## 2018-03-09 VITALS
HEART RATE: 85 BPM | BODY MASS INDEX: 34.74 KG/M2 | WEIGHT: 183.88 LBS | DIASTOLIC BLOOD PRESSURE: 80 MMHG | SYSTOLIC BLOOD PRESSURE: 121 MMHG

## 2018-03-09 DIAGNOSIS — M54.2 NECK PAIN: ICD-10-CM

## 2018-03-09 DIAGNOSIS — F41.9 ANXIETY: ICD-10-CM

## 2018-03-09 DIAGNOSIS — L40.50 PSORIATIC ARTHRITIS: ICD-10-CM

## 2018-03-09 DIAGNOSIS — G93.5 ARNOLD-CHIARI MALFORMATION, TYPE I: ICD-10-CM

## 2018-03-09 DIAGNOSIS — M45.9 ANKYLOSING SPONDYLITIS, UNSPECIFIED SITE OF SPINE: ICD-10-CM

## 2018-03-09 DIAGNOSIS — G43.919 INTRACTABLE MIGRAINE, UNSPECIFIED MIGRAINE TYPE: ICD-10-CM

## 2018-03-09 DIAGNOSIS — R11.2 NON-INTRACTABLE VOMITING WITH NAUSEA, UNSPECIFIED VOMITING TYPE: ICD-10-CM

## 2018-03-09 LAB
CORTIS SERPL-MCNC: 10.3 UG/DL
T3FREE SERPL-MCNC: 2.9 PG/ML
T4 FREE SERPL-MCNC: 0.9 NG/DL
TSH SERPL DL<=0.005 MIU/L-ACNC: 0.95 UIU/ML

## 2018-03-09 PROCEDURE — 82533 TOTAL CORTISOL: CPT

## 2018-03-09 PROCEDURE — 86664 EPSTEIN-BARR NUCLEAR ANTIGEN: CPT

## 2018-03-09 PROCEDURE — 86038 ANTINUCLEAR ANTIBODIES: CPT

## 2018-03-09 PROCEDURE — 82024 ASSAY OF ACTH: CPT

## 2018-03-09 PROCEDURE — 84481 FREE ASSAY (FT-3): CPT

## 2018-03-09 PROCEDURE — 84439 ASSAY OF FREE THYROXINE: CPT

## 2018-03-09 PROCEDURE — 84443 ASSAY THYROID STIM HORMONE: CPT

## 2018-03-09 PROCEDURE — 99999 PR PBB SHADOW E&M-EST. PATIENT-LVL III: CPT | Mod: PBBFAC,,, | Performed by: INTERNAL MEDICINE

## 2018-03-09 PROCEDURE — 99214 OFFICE O/P EST MOD 30 MIN: CPT | Mod: S$GLB,,, | Performed by: INTERNAL MEDICINE

## 2018-03-09 PROCEDURE — 36415 COLL VENOUS BLD VENIPUNCTURE: CPT | Mod: PO

## 2018-03-09 RX ORDER — PROMETHAZINE HYDROCHLORIDE 25 MG/1
25 TABLET ORAL EVERY 6 HOURS PRN
Qty: 30 TABLET | Refills: 3 | Status: SHIPPED | OUTPATIENT
Start: 2018-03-09 | End: 2021-11-02 | Stop reason: SDUPTHER

## 2018-03-09 RX ORDER — MODAFINIL 200 MG/1
200 TABLET ORAL DAILY
Qty: 30 TABLET | Refills: 4 | Status: SHIPPED | OUTPATIENT
Start: 2018-03-09 | End: 2018-04-08

## 2018-03-09 ASSESSMENT — ROUTINE ASSESSMENT OF PATIENT INDEX DATA (RAPID3)
AM STIFFNESS SCORE: 1, YES
PAIN SCORE: 8
WHEN YOU AWAKENED IN THE MORNING OVER THE LAST WEEK, PLEASE INDICATE THE AMOUNT OF TIME IT TAKES UNTIL YOU ARE AS LIMBER AS YOU WILL BE FOR THE DAY: ONE HOUR AFTER WAKING
MDHAQ FUNCTION SCORE: 1.5
PSYCHOLOGICAL DISTRESS SCORE: 3.3
FATIGUE SCORE: 5
PATIENT GLOBAL ASSESSMENT SCORE: 3
TOTAL RAPID3 SCORE: 5.33

## 2018-03-09 NOTE — PROGRESS NOTES
Subjective:       Patient ID: Sparkle Jose is a 37 y.o. female.    Chief Complaint: Follow-up    DX: ANKYLOSING SPONDYLITIS/ PSA  On consyntx, held meds now fatigue, and falling asleep, hip pain severe.  Patient complains of arthralgias and myalgias for which has been present for a few years. Pain is located in multiple joints, both shoulder(s), both elbow(s), both wrist(s), both MCP(s): 1st, 2nd, 3rd, 4th and 5th, both PIP(s): 1st, 2nd, 3rd, 4th and 5th, both DIP(s): 1st and 2nd, both hip(s), both knee(s) and both MTP(s): 1st, 2nd, 3rd, 4th and 5th, is described as aching, pulsating, shooting and throbbing, and is constant, moderate to severe pain .  Fatigue is paramount      Review of Systems   Constitutional: Positive for activity change, appetite change, fatigue and unexpected weight change. Negative for chills and diaphoresis.   HENT: Negative for congestion, dental problem, ear discharge, ear pain, facial swelling, mouth sores, nosebleeds, postnasal drip, rhinorrhea, sinus pressure, sneezing, sore throat, tinnitus and voice change.    Eyes: Negative for photophobia, pain, discharge, redness and itching.   Respiratory: Negative for apnea, cough, chest tightness, shortness of breath and wheezing.    Cardiovascular: Positive for leg swelling. Negative for chest pain and palpitations.   Gastrointestinal: Negative for abdominal distention, abdominal pain, constipation, diarrhea, nausea and vomiting.   Endocrine: Negative for cold intolerance, heat intolerance, polydipsia and polyuria.   Genitourinary: Negative for decreased urine volume, difficulty urinating, flank pain, frequency, hematuria and urgency.   Musculoskeletal: Positive for arthralgias, gait problem, joint swelling, neck pain and neck stiffness. Negative for back pain.   Skin: Positive for rash. Negative for pallor and wound.        Psoriasis rash on arms   Allergic/Immunologic: Negative for immunocompromised state.   Neurological: Positive for  weakness and numbness. Negative for dizziness and tremors.   Hematological: Negative for adenopathy. Does not bruise/bleed easily.   Psychiatric/Behavioral: Positive for sleep disturbance. The patient is nervous/anxious.         Depressed         Objective:     There were no vitals taken for this visit.     Physical Exam   Vitals reviewed.  Constitutional: She is oriented to person, place, and time. No distress.   HENT:   Head: Normocephalic and atraumatic.   Mouth/Throat: Oropharynx is clear and moist.   Eyes: EOM are normal. Pupils are equal, round, and reactive to light.   Neck: Neck supple. No thyromegaly present.   Cardiovascular: Normal rate, regular rhythm and normal heart sounds.  Exam reveals no gallop and no friction rub.    No murmur heard.  Pulmonary/Chest: Breath sounds normal. She has no wheezes. She has no rales. She exhibits no tenderness.   Abdominal: There is no tenderness. There is no rebound and no guarding.       Right Side Rheumatological Exam     Examination finds the elbow, 1st MCP, 2nd MCP, 3rd MCP, 4th MCP and 5th MCP normal.    The patient is tender to palpation of the shoulder and knee    She has swelling of the knee    The patient has an enlarged wrist, 1st PIP, 2nd PIP, 3rd PIP, 4th PIP and 5th PIP    Shoulder Exam   Tenderness Location: acromion    Range of Motion   Active Abduction: abnormal   Adduction: abnormal  Sensation: normal    Knee Exam   Tenderness Location: medial joint line  Patellofemoral Crepitus: positive  Effusion: positive  Sensation: normal    Hip Exam   Tenderness Location: no tenderness  Sensation: normal    Elbow/Wrist Exam   Tenderness Location: no tenderness  Sensation: normal    Left Side Rheumatological Exam     Examination finds the elbow, knee, 1st MCP, 2nd MCP, 3rd MCP, 4th MCP and 5th MCP normal.    The patient is tender to palpation of the shoulder.    The patient has an enlarged wrist, 1st PIP, 2nd PIP, 3rd PIP, 4th PIP and 5th PIP.    Shoulder Exam    Tenderness Location: acromion    Range of Motion   Active Abduction: abnormal   Sensation: normal    Knee Exam   Tenderness Location: lateral joint line and medial joint line    Patellofemoral Crepitus: positive  Effusion: positive  Sensation: normal    Hip Exam   Tenderness Location: no tenderness  Sensation: normal    Elbow/Wrist Exam   Sensation: normal      Back/Neck Exam   General Inspection   Gait: normal       Tenderness Right paramedian tenderness of the Lower C-Spine, Lower L-Spine, Occ, Lower T-Spine and Upper C-Spine.Left paramedian tenderness of the Lower L-Spine, Lower C-Spine and Lower T-Spine.    Back Range of Motion   Lateral Bend Right: abnormal  Lateral Bend Left: abnormal  Rotation Right: abnormal  Rotation Left: abnormal      Lymphadenopathy:     She has no cervical adenopathy.   Neurological: She is alert and oriented to person, place, and time. She has normal sensation and normal strength.   Reflex Scores:       Patellar reflexes are 3+ on the right side and 3+ on the left side.  Skin: Rash noted. No erythema. No pallor.     Psychiatric: Mood and affect normal.   Musculoskeletal: She exhibits tenderness and deformity.       Results for orders placed or performed in visit on 02/09/18   CAMRYN   Result Value Ref Range    CAMRYN Screen Negative <1:160 Negative <1:160   C3 COMPLEMENT   Result Value Ref Range    Complement (C-3) 129 50 - 180 mg/dL   C4 COMPLEMENT   Result Value Ref Range    Complement (C-4) 15 11 - 44 mg/dL   CH50 COMPLEMENT (TOTAL)   Result Value Ref Range    Complement,Total, Serum 59 42 - 95 U/mL       Assessment:     Encounter Diagnoses   Name Primary?    Non-intractable vomiting with nausea, unspecified vomiting type     Psoriatic arthritis     Neck pain     Ankylosing spondylitis, unspecified site of spine     Intractable migraine, unspecified migraine type     Arnold-Chiari malformation, type I     Anxiety          Plan:     Non-intractable vomiting with nausea,  unspecified vomiting type  -     promethazine (PHENERGAN) 25 MG tablet; Take 1 tablet (25 mg total) by mouth every 6 (six) hours as needed.  Dispense: 30 tablet; Refill: 3  -     ACTH; Future; Expected date: 03/09/2018  -     CORTISOL, RANDOM; Future; Expected date: 03/09/2018  -     TSH; Future; Expected date: 03/09/2018  -     T4, free; Future; Expected date: 03/09/2018  -     T3, free; Future; Expected date: 03/09/2018  -     CAMRYN; Future; Expected date: 03/09/2018  -     LACEY-BARR VIRUS ANTIBODY PANEL; Future; Expected date: 03/09/2018    Psoriatic arthritis  -     promethazine (PHENERGAN) 25 MG tablet; Take 1 tablet (25 mg total) by mouth every 6 (six) hours as needed.  Dispense: 30 tablet; Refill: 3  -     ACTH; Future; Expected date: 03/09/2018  -     CORTISOL, RANDOM; Future; Expected date: 03/09/2018  -     TSH; Future; Expected date: 03/09/2018  -     T4, free; Future; Expected date: 03/09/2018  -     T3, free; Future; Expected date: 03/09/2018  -     CAMRYN; Future; Expected date: 03/09/2018  -     LACEY-BARR VIRUS ANTIBODY PANEL; Future; Expected date: 03/09/2018    Neck pain  -     promethazine (PHENERGAN) 25 MG tablet; Take 1 tablet (25 mg total) by mouth every 6 (six) hours as needed.  Dispense: 30 tablet; Refill: 3  -     ACTH; Future; Expected date: 03/09/2018  -     CORTISOL, RANDOM; Future; Expected date: 03/09/2018  -     TSH; Future; Expected date: 03/09/2018  -     T4, free; Future; Expected date: 03/09/2018  -     T3, free; Future; Expected date: 03/09/2018  -     CAMRYN; Future; Expected date: 03/09/2018  -     LACEY-BARR VIRUS ANTIBODY PANEL; Future; Expected date: 03/09/2018    Ankylosing spondylitis, unspecified site of spine  -     promethazine (PHENERGAN) 25 MG tablet; Take 1 tablet (25 mg total) by mouth every 6 (six) hours as needed.  Dispense: 30 tablet; Refill: 3  -     ACTH; Future; Expected date: 03/09/2018  -     CORTISOL, RANDOM; Future; Expected date: 03/09/2018  -     TSH;  Future; Expected date: 03/09/2018  -     T4, free; Future; Expected date: 03/09/2018  -     T3, free; Future; Expected date: 03/09/2018  -     CAMRYN; Future; Expected date: 03/09/2018  -     LACEY-BARR VIRUS ANTIBODY PANEL; Future; Expected date: 03/09/2018    Intractable migraine, unspecified migraine type  -     promethazine (PHENERGAN) 25 MG tablet; Take 1 tablet (25 mg total) by mouth every 6 (six) hours as needed.  Dispense: 30 tablet; Refill: 3  -     ACTH; Future; Expected date: 03/09/2018  -     CORTISOL, RANDOM; Future; Expected date: 03/09/2018  -     TSH; Future; Expected date: 03/09/2018  -     T4, free; Future; Expected date: 03/09/2018  -     T3, free; Future; Expected date: 03/09/2018  -     CAMRYN; Future; Expected date: 03/09/2018  -     LACEY-BARR VIRUS ANTIBODY PANEL; Future; Expected date: 03/09/2018    Arnold-Chiari malformation, type I  -     promethazine (PHENERGAN) 25 MG tablet; Take 1 tablet (25 mg total) by mouth every 6 (six) hours as needed.  Dispense: 30 tablet; Refill: 3  -     ACTH; Future; Expected date: 03/09/2018  -     CORTISOL, RANDOM; Future; Expected date: 03/09/2018  -     TSH; Future; Expected date: 03/09/2018  -     T4, free; Future; Expected date: 03/09/2018  -     T3, free; Future; Expected date: 03/09/2018  -     CAMRYN; Future; Expected date: 03/09/2018  -     LACEY-BARR VIRUS ANTIBODY PANEL; Future; Expected date: 03/09/2018    Anxiety  -     promethazine (PHENERGAN) 25 MG tablet; Take 1 tablet (25 mg total) by mouth every 6 (six) hours as needed.  Dispense: 30 tablet; Refill: 3  -     ACTH; Future; Expected date: 03/09/2018  -     CORTISOL, RANDOM; Future; Expected date: 03/09/2018  -     TSH; Future; Expected date: 03/09/2018  -     T4, free; Future; Expected date: 03/09/2018  -     T3, free; Future; Expected date: 03/09/2018  -     CAMRYN; Future; Expected date: 03/09/2018  -     LACEY-BARR VIRUS ANTIBODY PANEL; Future; Expected date: 03/09/2018       On liv and her  joints are stable,

## 2018-03-12 LAB — ANA SER QL IF: NORMAL

## 2018-03-13 ENCOUNTER — TELEPHONE (OUTPATIENT)
Dept: RHEUMATOLOGY | Facility: CLINIC | Age: 38
End: 2018-03-13

## 2018-03-13 LAB
ACTH PLAS-MCNC: 31 PG/ML
EBV EA AB TITR SER: 12.6 U/ML
EBV NA IGG SER QL: 329 U/ML
EBV VCA IGG SER QL: >750 U/ML
EBV VCA IGM SER-ACNC: <10 U/ML

## 2018-03-13 RX ORDER — MODAFINIL 200 MG/1
200 TABLET ORAL DAILY
Qty: 30 TABLET | Refills: 4 | Status: CANCELLED | OUTPATIENT
Start: 2018-03-13 | End: 2018-04-12

## 2018-03-14 ENCOUNTER — PATIENT MESSAGE (OUTPATIENT)
Dept: RHEUMATOLOGY | Facility: CLINIC | Age: 38
End: 2018-03-14

## 2018-03-20 ENCOUNTER — PATIENT MESSAGE (OUTPATIENT)
Dept: RHEUMATOLOGY | Facility: CLINIC | Age: 38
End: 2018-03-20

## 2018-03-21 ENCOUNTER — PATIENT MESSAGE (OUTPATIENT)
Dept: OBSTETRICS AND GYNECOLOGY | Facility: CLINIC | Age: 38
End: 2018-03-21

## 2018-03-22 ENCOUNTER — TELEPHONE (OUTPATIENT)
Dept: OBSTETRICS AND GYNECOLOGY | Facility: CLINIC | Age: 38
End: 2018-03-22

## 2018-03-22 NOTE — TELEPHONE ENCOUNTER
----- Message from Scarlett Mills sent at 3/22/2018 12:30 PM CDT -----  Please call pt if Dr Laws can see her sooner than 04/17 th [phone] (home)

## 2018-04-10 ENCOUNTER — OFFICE VISIT (OUTPATIENT)
Dept: OBSTETRICS AND GYNECOLOGY | Facility: CLINIC | Age: 38
End: 2018-04-10
Payer: COMMERCIAL

## 2018-04-10 VITALS
WEIGHT: 182.31 LBS | SYSTOLIC BLOOD PRESSURE: 132 MMHG | DIASTOLIC BLOOD PRESSURE: 79 MMHG | BODY MASS INDEX: 34.42 KG/M2 | HEIGHT: 61 IN

## 2018-04-10 DIAGNOSIS — N94.0 OVULATION PAIN: ICD-10-CM

## 2018-04-10 DIAGNOSIS — M45.9 ANKYLOSING SPONDYLITIS, UNSPECIFIED SITE OF SPINE: ICD-10-CM

## 2018-04-10 DIAGNOSIS — N92.1 MENORRHAGIA WITH IRREGULAR CYCLE: Primary | ICD-10-CM

## 2018-04-10 DIAGNOSIS — N94.6 DYSMENORRHEA: ICD-10-CM

## 2018-04-10 DIAGNOSIS — M54.50 ACUTE MIDLINE LOW BACK PAIN WITHOUT SCIATICA: ICD-10-CM

## 2018-04-10 DIAGNOSIS — L40.50 PSORIATIC ARTHRITIS: ICD-10-CM

## 2018-04-10 PROCEDURE — 99999 PR PBB SHADOW E&M-EST. PATIENT-LVL III: CPT | Mod: PBBFAC,,, | Performed by: OBSTETRICS & GYNECOLOGY

## 2018-04-10 PROCEDURE — 99213 OFFICE O/P EST LOW 20 MIN: CPT | Mod: S$GLB,,, | Performed by: OBSTETRICS & GYNECOLOGY

## 2018-04-10 RX ORDER — BACLOFEN 10 MG/1
TABLET ORAL
COMMUNITY
Start: 2018-03-21 | End: 2018-09-10 | Stop reason: ALTCHOICE

## 2018-04-10 RX ORDER — BUSPIRONE HYDROCHLORIDE 15 MG/1
TABLET ORAL
COMMUNITY
Start: 2018-03-12 | End: 2019-11-13

## 2018-04-10 NOTE — PROGRESS NOTES
Chief Complaint   Patient presents with    Menometrorrhagia       History of Present Illness: Sparkle Jose is a 37 y.o. female that presents today 4/10/2018 for   Chief Complaint   Patient presents with    Menometrorrhagia     She reports periods are irregular every 21 to 45 days now up to 7-9 days with heavy bleeding with severe cramping to hips. She reports tampons changes every 3 hours. She reports ovulation pain causes her severe back pain.     Past Medical History:   Diagnosis Date    Ankylosing spondylitis     Arthritis     Celiac disease     Depression     Family history of DVT     MGM DVT, mother spleenic    Family history of factor V Leiden mutation     mother and sister    GERD (gastroesophageal reflux disease)     Migraine     Osteoarthritis     Psoriasis     Psoriatic arthritis mutilans        Past Surgical History:   Procedure Laterality Date    brain decompression   2006    BRAIN SURGERY      TUBAL LIGATION         Current Outpatient Prescriptions   Medication Sig Dispense Refill    baclofen (LIORESAL) 10 MG tablet       busPIRone (BUSPAR) 15 MG tablet       eletriptan (RELPAX) 40 MG tablet Take 1 tablet (40 mg total) by mouth as needed for Migraine. may repeat in 2 hours if necessary 9 tablet 2    fentaNYL (DURAGESIC) 25 mcg/hr Place 1 patch onto the skin every 72 hours.       furosemide (LASIX) 20 MG tablet Take 1 tablet (20 mg total) by mouth once daily. 30 tablet 4    ibuprofen (ADVIL,MOTRIN) 800 MG tablet Take 1 tablet (800 mg total) by mouth every 6 (six) hours as needed for Pain. 90 tablet 4    oxycodone-acetaminophen (PERCOCET)  mg per tablet Take 1 tablet by mouth every 8 (eight) hours as needed for Pain. 75 tablet 0    potassium chloride (KLOR-CON) 10 MEQ TbSR Take 1 tablet (10 mEq total) by mouth once daily. 30 tablet 4    promethazine (PHENERGAN) 25 MG tablet Take 1 tablet (25 mg total) by mouth every 6 (six) hours as needed. 30 tablet 3    ranitidine  (ZANTAC) 150 MG capsule Take 1 capsule (150 mg total) by mouth 2 (two) times daily. (Patient taking differently: Take 150 mg by mouth 2 (two) times daily as needed. ) 60 capsule 6    secukinumab (COSENTYX PEN, 2 PENS,) 150 mg/mL PnIj INJECT 2 PENS SUBCUTANEOUSLY ONCE MONTHLY 2 Syringe 10    trazodone (DESYREL) 50 MG tablet TAKE 1 TABLET (50 MG TOTAL) BY MOUTH EVERY EVENING. (Patient taking differently: Take 50 mg by mouth nightly as needed. ) 90 tablet 3    alprazolam (XANAX) 0.25 MG tablet Take 1 tablet (0.25 mg total) by mouth 4 (four) times daily as needed for Anxiety. 40 tablet 3    AMRIX 15 mg 24 hr capsule Take 15 mg by mouth once daily.      clobetasol 0.05% (TEMOVATE) 0.05 % Oint 1 application daily as needed.       ergocalciferol (ERGOCALCIFEROL) 50,000 unit Cap Take 1 capsule (50,000 Units total) by mouth every 7 days. 4 capsule 6    ketoconazole (NIZORAL) 2 % shampoo Wash body with medicated shampoo at least 2x/week - let soak at least 5 minutes prior to rinsing 120 mL 5     No current facility-administered medications for this visit.        Review of patient's allergies indicates:  No Known Allergies    Family History   Problem Relation Age of Onset    Hypertension Mother     Clotting disorder Mother     Depression Sister     Asthma Brother     Clotting disorder Sister     Breast cancer Neg Hx     Ovarian cancer Neg Hx        Social History   Substance Use Topics    Smoking status: Current Every Day Smoker     Packs/day: 0.50     Types: Cigarettes    Smokeless tobacco: Never Used    Alcohol use No       OB History    Para Term  AB Living   1 1       1   SAB TAB Ectopic Multiple Live Births           1      # Outcome Date GA Lbr Isauro/2nd Weight Sex Delivery Anes PTL Lv   1 Para     F Vag-Spont   DAKSHA          Review of Symptoms:  GENERAL: Denies weight gain or weight loss. Feeling well overall.   SKIN: Denies rash or lesions.   HEAD: Denies head injury or headache.   NODES:  "Denies enlarged lymph nodes.   CHEST: Denies chest pain or shortness of breath.   CARDIOVASCULAR: Denies palpitations or left sided chest pain.   ABDOMEN: No abdominal pain, constipation, diarrhea, nausea, vomiting or rectal bleeding.   URINARY: No frequency, dysuria, hematuria, or burning on urination.  HEMATOLOGIC: No easy bruisability or excessive bleeding.   MUSCULOSKELETAL: Denies joint pain or swelling.     /79   Ht 5' 1" (1.549 m)   Wt 82.7 kg (182 lb 5.1 oz)   LMP 03/17/2018 (Approximate)   Physical Exam:  APPEARANCE: Well nourished, well developed, in no acute distress.  SKIN: Normal skin turgor, no lesions.  NECK: Neck symmetric without masses   RESPIRATORY: Normal respiratory effort with no retractions or use of accessory muscles  CARDIOVASCULAR: Peripheral vascular system with no swelling no varicosities and palpation of pulses normal  LYMPHATIC: No enlargements of the lymph nodes noted in the neck, axillae, or groin  ABDOMEN: Soft. No tenderness or masses. No hepatosplenomegaly. No hernias.EXTREMITIES: No clubbing cyanosis or edema.    ASSESSMENT/PLAN:  Menorrhagia with irregular cycle  -     US Transvaginal Non OB; Future; Expected date: 04/10/2018    Ankylosing spondylitis, unspecified site of spine    Psoriatic arthritis    Dysmenorrhea  -     US Transvaginal Non OB; Future; Expected date: 04/10/2018    Ovulation pain  -     US Transvaginal Non OB; Future; Expected date: 04/10/2018    Acute midline low back pain without sciatica  -     US Transvaginal Non OB; Future; Expected date: 04/10/2018        We discussed mirena for reduction in bleeding and suppression of ovulation.   We discuss also discussed ablation.   "

## 2018-04-12 ENCOUNTER — TELEPHONE (OUTPATIENT)
Dept: RADIOLOGY | Facility: HOSPITAL | Age: 38
End: 2018-04-12

## 2018-04-13 ENCOUNTER — HOSPITAL ENCOUNTER (OUTPATIENT)
Dept: RADIOLOGY | Facility: HOSPITAL | Age: 38
Discharge: HOME | End: 2018-04-13
Attending: OBSTETRICS & GYNECOLOGY
Payer: COMMERCIAL

## 2018-04-13 DIAGNOSIS — N94.6 DYSMENORRHEA: ICD-10-CM

## 2018-04-13 DIAGNOSIS — N94.0 OVULATION PAIN: ICD-10-CM

## 2018-04-13 DIAGNOSIS — N92.1 MENORRHAGIA WITH IRREGULAR CYCLE: ICD-10-CM

## 2018-04-13 DIAGNOSIS — M54.50 ACUTE MIDLINE LOW BACK PAIN WITHOUT SCIATICA: ICD-10-CM

## 2018-04-13 PROCEDURE — 76856 US EXAM PELVIC COMPLETE: CPT | Mod: 26,,, | Performed by: RADIOLOGY

## 2018-04-13 PROCEDURE — 76830 TRANSVAGINAL US NON-OB: CPT | Mod: TC,PO

## 2018-04-13 PROCEDURE — 76830 TRANSVAGINAL US NON-OB: CPT | Mod: 26,,, | Performed by: RADIOLOGY

## 2018-04-16 ENCOUNTER — PATIENT MESSAGE (OUTPATIENT)
Dept: OBSTETRICS AND GYNECOLOGY | Facility: CLINIC | Age: 38
End: 2018-04-16

## 2018-04-20 ENCOUNTER — PATIENT MESSAGE (OUTPATIENT)
Dept: RHEUMATOLOGY | Facility: CLINIC | Age: 38
End: 2018-04-20

## 2018-05-14 ENCOUNTER — TELEPHONE (OUTPATIENT)
Dept: RHEUMATOLOGY | Facility: CLINIC | Age: 38
End: 2018-05-14

## 2018-05-14 NOTE — TELEPHONE ENCOUNTER
Pt has been on abx for sinus infection and has 4 days left. She wants to know if she needs to reload or start back on regular dosing. Advised nurse would confirm with Dr. Shea and call her back.

## 2018-05-14 NOTE — TELEPHONE ENCOUNTER
----- Message from Maria Elena Gurrola sent at 5/14/2018 11:25 AM CDT -----  Type: Needs Medical Advice    Who Called:  patient  Symptoms (please be specific):  Na  How long has patient had these symptoms:  [facility]  Pharmacy name and phone #:  [facility]  Best Call Back Number: [phone] (home)     Additional Information: currently on biologic, still taking antibiotics, stating if she need to reload, it's been 2 months or wait until antibiotics are done

## 2018-05-17 ENCOUNTER — PATIENT MESSAGE (OUTPATIENT)
Dept: RHEUMATOLOGY | Facility: CLINIC | Age: 38
End: 2018-05-17

## 2018-06-04 NOTE — TELEPHONE ENCOUNTER
Spoke to pt, advised she has restarted her cosentyx. Advised per Dr. Shea do not have to reload, just restart maintenance dose. Pt verbalized understanding.

## 2018-06-07 ENCOUNTER — OFFICE VISIT (OUTPATIENT)
Dept: NEUROLOGY | Facility: CLINIC | Age: 38
End: 2018-06-07
Payer: COMMERCIAL

## 2018-06-07 VITALS
HEIGHT: 61 IN | RESPIRATION RATE: 16 BRPM | BODY MASS INDEX: 34.36 KG/M2 | DIASTOLIC BLOOD PRESSURE: 77 MMHG | HEART RATE: 79 BPM | SYSTOLIC BLOOD PRESSURE: 107 MMHG | WEIGHT: 182 LBS

## 2018-06-07 DIAGNOSIS — F11.20 OPIOID DEPENDENCE IN CONTROLLED ENVIRONMENT: ICD-10-CM

## 2018-06-07 DIAGNOSIS — L40.50 PSORIATIC ARTHRITIS: ICD-10-CM

## 2018-06-07 DIAGNOSIS — F41.9 ANXIETY: ICD-10-CM

## 2018-06-07 DIAGNOSIS — G89.4 CHRONIC PAIN SYNDROME: ICD-10-CM

## 2018-06-07 DIAGNOSIS — G43.719 INTRACTABLE CHRONIC MIGRAINE WITHOUT AURA AND WITHOUT STATUS MIGRAINOSUS: Primary | ICD-10-CM

## 2018-06-07 PROCEDURE — 3008F BODY MASS INDEX DOCD: CPT | Mod: CPTII,S$GLB,, | Performed by: PSYCHIATRY & NEUROLOGY

## 2018-06-07 PROCEDURE — 99214 OFFICE O/P EST MOD 30 MIN: CPT | Mod: S$GLB,,, | Performed by: PSYCHIATRY & NEUROLOGY

## 2018-06-07 PROCEDURE — 99999 PR PBB SHADOW E&M-EST. PATIENT-LVL III: CPT | Mod: PBBFAC,,, | Performed by: PSYCHIATRY & NEUROLOGY

## 2018-06-07 RX ORDER — PROTRIPTYLINE HYDROCHLORIDE 5 MG/1
TABLET, FILM COATED ORAL
Qty: 60 TABLET | Refills: 11 | Status: SHIPPED | OUTPATIENT
Start: 2018-06-07 | End: 2018-09-10 | Stop reason: ALTCHOICE

## 2018-06-07 NOTE — PROGRESS NOTES
Subjective:       Patient ID: Sparkle Jose is a 36 y.o. female.    Chief Complaint: Headache  INTERVAL HISTORY  The patient comes for follow up. Botox has put her in remission. She finds that most of the time, ibuprofen is effective and when insufficient, Relpax resolves the attack. She is quite please with her progress. She had a week of headaches coinciding with poor sleep. She has fatigue. She is interested in a medication that helps the fatigue without weight gain.  HPI  The patient is a pleasant 37 y/o female who presents with chief complaint of headache. She was previously seen by Dr. Toure who diagnosed her with cervicogenic headache, migraine and pseudodementia. The patient was referred by Dr. Shea as her headaches remain intractable. Her case is complicated in that, she has history of Arnold-Chiari Type 1 status post cervical decompression on 12/6/2005 which is stable (last MRI 5/15/15), she also suffers with Ankylosing Spondilitis and is under Pain Medicine care. She is opioid dependent on Fentanyl 25 mcg every 48 hours and prn use of oxycodone. She is on disability, stopped working on 2013.    She has been on multiple preventives like Topiramate, Gabapentin, associated with side effects, unable to tolerate.  Amitriptyline, Nortriptyline both ineffective. For acute treatment she takes Imitrex with inconsistent results. In the past Maxalt was ineffective.  Please see details of headache characteristics headache below.    Review of Systems      Past Medical History:   Diagnosis Date    Ankylosing spondylitis     Arthritis     Celiac disease     Depression     GERD (gastroesophageal reflux disease)     Migraine     Osteoarthritis     Psoriasis     Psoriatic arthritis mutilans      Past Surgical History:   Procedure Laterality Date    brain decompression   2006    BRAIN SURGERY       Family History   Problem Relation Age of Onset    Hypertension Mother     Depression Sister     Asthma  Brother      Social History     Social History    Marital status:      Spouse name: N/A    Number of children: N/A    Years of education: N/A     Occupational History    Not on file.     Social History Main Topics    Smoking status: Current Every Day Smoker     Packs/day: 0.50     Types: Cigarettes    Smokeless tobacco: Never Used    Alcohol use No    Drug use: No    Sexual activity: Not on file     Other Topics Concern    Not on file     Social History Narrative     Review of patient's allergies indicates:  No Known Allergies  Current Outpatient Prescriptions   Medication Sig    alprazolam (XANAX) 0.25 MG tablet Take 1 tablet (0.25 mg total) by mouth 4 (four) times daily as needed for Anxiety.    AMRIX 15 mg 24 hr capsule Take 15 mg by mouth once daily.    clobetasol 0.05% (TEMOVATE) 0.05 % Oint 1 application daily as needed.     eletriptan (RELPAX) 40 MG tablet Take 1 tablet (40 mg total) by mouth as needed for Migraine. may repeat in 2 hours if necessary    ergocalciferol (ERGOCALCIFEROL) 50,000 unit Cap Take 1 capsule (50,000 Units total) by mouth every 7 days.    fentaNYL (DURAGESIC) 25 mcg/hr Place 1 patch onto the skin every 72 hours.     furosemide (LASIX) 20 MG tablet Take 1 tablet (20 mg total) by mouth once daily.    ibuprofen (ADVIL,MOTRIN) 800 MG tablet Take 1 tablet (800 mg total) by mouth every 6 (six) hours as needed for Pain.    ketoconazole (NIZORAL) 2 % shampoo Wash body with medicated shampoo at least 2x/week - let soak at least 5 minutes prior to rinsing    modafinil (PROVIGIL) 200 MG Tab Take 200 mg by mouth once daily.    oxycodone-acetaminophen (PERCOCET)  mg per tablet Take 1 tablet by mouth every 8 (eight) hours as needed for Pain.    oxycodone-acetaminophen (ROXICET) 5-325 mg per tablet Take 1 tablet by mouth every 4 (four) hours as needed for Pain.    potassium chloride (KLOR-CON) 10 MEQ TbSR Take 1 tablet (10 mEq total) by mouth once daily.     promethazine (PHENERGAN) 25 MG tablet TAKE ONE TABLET BY MOUTH EVERY SIX HOURS AS NEEDED    ranitidine (ZANTAC) 150 MG capsule Take 1 capsule (150 mg total) by mouth 2 (two) times daily. (Patient taking differently: Take 150 mg by mouth 2 (two) times daily as needed. )    secukinumab (COSENTYX PEN, 2 PENS,) 150 mg/mL PnIj INJECT 2 PENS SUBCUTANEOUSLY ONCE MONTHLY    trazodone (DESYREL) 50 MG tablet TAKE 1 TABLET (50 MG TOTAL) BY MOUTH EVERY EVENING. (Patient taking differently: Take 50 mg by mouth nightly as needed. )     No current facility-administered medications for this visit.            Objective:      Vitals:    06/07/18 1337   BP: 107/77   Pulse: 79   Resp: 16   Body mass index is 34.39 kg/m².      Physical Exam    Constitutional:   She appears well-developed and well-nourished. She is well groomed    HENT:    Head: Atraumatic, oral and nasal mucosa intact  Eyes: Conjunctivae and EOM are normal. Pupils are equal, round, and reactive to light OU  Neck: Neck supple. No thyromegaly present  Cardiovascular: Tachycardic, normal heart sounds  No murmur heard  Pulmonary/Chest: Effort normal and breath sounds normal  Musculoskeletal: Decreased range of motion, cervical and lumbar spine. Spasm of muscle of right paraspinal and trapezius  Skin: Skin is warm and dry, no rash appreciated today  Psychiatric: Normal mood and affect     Neuro exam:    Mental status:  Awake, attentive, Alert, oriented to self, place, year and month  Language function is intact  Naming, repetition and spontaneous meaningful speech expression are intact  Speech fluency is good and speech is clear  Able to spell backwards    Cranial Nerves:  Smell was not formally evaluated  Cranial Nerves II - XII: intact  Pursuits were smooth, normal saccades, no nystagmus OU  Funduscopic exam - disc were flat and pink, no exudates or hemorrhages OU  Motor - facial movement was symmetrical and normal     Palate moved well and was symmetrical with normal  palatal and oral sensation  Tongue movements were full    Coordination:     Rapid alternating movements and rapid finger tapping - normal bilaterally  Finger to nose - normal and symmetric bilaterally   Heel to shin test - normal and symmetric bilaterally   Arm roll - smooth and symmetric   No intentional or positional tremor.     Motor:  Normal muscle bulk and symmetry. No fasciculations were noted    No pronator drift  Strength5/5 bilaterally     Reflexes:  Tendon reflexes were 2 + at biceps, triceps, brachioradialis, patellar, and Achilles bilaterally  On plantar stimulation toes were down going bilaterally  No clonus was noted     Sensory: Intact to light touch, pin prick in all extremities.   Gait: Romberg absent. Normal gait. Normal arm swing and turns. Normal postural reflexes.     Review of Data: MRI of the brain 5/15 No acute abnormalities, Stable posterior fossa decompression. No syrinx. MRI of C spine: No significant abnormality  Results for orders placed or performed during the hospital encounter of 05/15/15   MRI Brain W WO Contrast    Narrative    Exam: MRI of the brain without and with contrast -- 5/15/2015    Comparison: None.    Technique: Multiplanar MR imaging of the brain was performed both before and following the intravenous administration of 14 cc of Multihance contrast material.    Findings:     The brain is normally formed. No evidence of acute/recent major vascular distribution cerebral infarction, intraparenchymal hemorrhage, or intra-axial space occupying lesion. No significant vasogenic or cytotoxic edema.     The ventricular system is normal in appearance for age. No extra-axial fluid collections or blood products. No abnormal dural enhancement or enhancing masses. No enhancing vascular malformations. The skull base flow-voids are unremarkable. The sella and   parasellar regions show no significant abnormality. The visualized orbits are unremarkable.    There are postoperative changes  of occipital/suboccipital decompression for treatment of a Chiari type I malformation.  No cervical cord syrinx appreciated.    The mastoid air cells are unremarkable. There is mucoperiosteal thickening present within the ethmoid air cells.    Impression         1.  No acute intracranial abnormalities are appreciated.    2.  Postoperative change of occipital/suboccipital decompression for treatment of a Chiari malformation.  No cervical cord syrinx or postoperative fluid collection.    3.  Ethmoid sinus disease      Electronically signed by: Becki Doyle MD  Date:     05/15/15  Time:    15:21            Assessment and Plan     The patient has chronic intractable migraine status post failure to Topiramate, Gabapentin, Amitriptyline, Nortriptyline, who went into remission after Botox treatment.  Continue Relpax prn but use the dose indicated for adults, 40 mg.  Start Protriptyline 5 mg po with BF and Lunch  Arnold-Chiari Type I status post successful decompression in 2005. Stable, last MRI in 5/15   Ankylosing Spondylitis under Dr. Shea's care  Chronic neck and back pain on chronic opioids by Pain Medicine (Fentanyl 25 mcg Q48 hours and prn Oxycodone)  Psoriatic arthritis, under Dr. Shea's care  Anxiety          Linda Schaefer M.D  Medical Director, Headache and Facial Pain  [facility]

## 2018-08-03 ENCOUNTER — PATIENT MESSAGE (OUTPATIENT)
Dept: RHEUMATOLOGY | Facility: CLINIC | Age: 38
End: 2018-08-03

## 2018-08-21 ENCOUNTER — LAB VISIT (OUTPATIENT)
Dept: LAB | Facility: HOSPITAL | Age: 38
End: 2018-08-21
Attending: INTERNAL MEDICINE
Payer: COMMERCIAL

## 2018-08-21 ENCOUNTER — TELEPHONE (OUTPATIENT)
Dept: FAMILY MEDICINE | Facility: CLINIC | Age: 38
End: 2018-08-21

## 2018-08-21 DIAGNOSIS — D75.839 THROMBOCYTOSIS: ICD-10-CM

## 2018-08-21 LAB
BASOPHILS # BLD AUTO: 0.07 K/UL
BASOPHILS NFR BLD: 0.5 %
DIFFERENTIAL METHOD: ABNORMAL
EOSINOPHIL # BLD AUTO: 0.2 K/UL
EOSINOPHIL NFR BLD: 1.5 %
ERYTHROCYTE [DISTWIDTH] IN BLOOD BY AUTOMATED COUNT: 14.1 %
HCT VFR BLD AUTO: 42.8 %
HGB BLD-MCNC: 14.2 G/DL
LYMPHOCYTES # BLD AUTO: 6.5 K/UL
LYMPHOCYTES NFR BLD: 47.2 %
MCH RBC QN AUTO: 30.3 PG
MCHC RBC AUTO-ENTMCNC: 33.2 G/DL
MCV RBC AUTO: 92 FL
MONOCYTES # BLD AUTO: 1 K/UL
MONOCYTES NFR BLD: 6.9 %
NEUTROPHILS # BLD AUTO: 6 K/UL
NEUTROPHILS NFR BLD: 43.9 %
PLATELET # BLD AUTO: 385 K/UL
PMV BLD AUTO: 9.9 FL
RBC # BLD AUTO: 4.68 M/UL
WBC # BLD AUTO: 13.74 K/UL

## 2018-08-21 PROCEDURE — 85025 COMPLETE CBC W/AUTO DIFF WBC: CPT | Mod: PO

## 2018-08-21 PROCEDURE — 36415 COLL VENOUS BLD VENIPUNCTURE: CPT | Mod: PO

## 2018-08-21 NOTE — TELEPHONE ENCOUNTER
----- Message from Ephraim Gaxiola sent at 8/21/2018 10:58 AM CDT -----  Contact: pt  She's calling in regards  to being worked into the schedule for her physical & labs, pt was last seen on 12/3/15 within the 3 yr period, she can be reached at [phone] (home)

## 2018-08-22 ENCOUNTER — OFFICE VISIT (OUTPATIENT)
Dept: HEMATOLOGY/ONCOLOGY | Facility: CLINIC | Age: 38
End: 2018-08-22
Payer: COMMERCIAL

## 2018-08-22 VITALS
DIASTOLIC BLOOD PRESSURE: 72 MMHG | HEART RATE: 96 BPM | BODY MASS INDEX: 34.13 KG/M2 | HEIGHT: 61 IN | SYSTOLIC BLOOD PRESSURE: 120 MMHG | WEIGHT: 180.75 LBS | TEMPERATURE: 99 F | OXYGEN SATURATION: 99 %

## 2018-08-22 DIAGNOSIS — D68.51 FACTOR 5 LEIDEN MUTATION, HETEROZYGOUS: ICD-10-CM

## 2018-08-22 DIAGNOSIS — D75.839 THROMBOCYTOSIS: Primary | ICD-10-CM

## 2018-08-22 DIAGNOSIS — D72.829 LEUKOCYTOSIS, UNSPECIFIED TYPE: ICD-10-CM

## 2018-08-22 PROCEDURE — 3008F BODY MASS INDEX DOCD: CPT | Mod: CPTII,S$GLB,, | Performed by: INTERNAL MEDICINE

## 2018-08-22 PROCEDURE — 99214 OFFICE O/P EST MOD 30 MIN: CPT | Mod: S$GLB,,, | Performed by: INTERNAL MEDICINE

## 2018-08-22 PROCEDURE — 99999 PR PBB SHADOW E&M-EST. PATIENT-LVL IV: CPT | Mod: PBBFAC,,, | Performed by: INTERNAL MEDICINE

## 2018-08-22 RX ORDER — MODAFINIL 200 MG/1
200 TABLET ORAL
COMMUNITY
Start: 2015-11-05 | End: 2018-09-10 | Stop reason: ALTCHOICE

## 2018-08-22 NOTE — PROGRESS NOTES
Reason for visit: Chronic thrombocytosis    HPI:   The patient is a 37-year-old female who presents to the hematology oncology clinic today to discuss further evaluation and management recommendations for chronic thrombocytosis. The patient has been referred to me by Dr. Rafiq Shea with rheumatology.  I have reviewed all of the patient's relevant clinical history available in the medical record and have utilized this may evaluation and management recommendations today.  The patient is under the care of Dr. Shea for and ankylosing spondylitis and psoriatic arthritis.    PAST MEDICAL HISTORY:   1.  Ankylosing spondylitis  2.  Psoriatic arthritis  3.  Anxiety/depression  4.  Celiac disease  5.  GERD  6.  Migraine  7.  Osteoarthritis    SURGICAL HISTORY:   1.  Brain surgery  2.  Bilateral tubal ligation    FAMILY HISTORY: Reviewed on patient's chart.    SOCIAL HISTORY: She smokes about half a pack of cigarettes every day.  She denies any alcohol use or recreational drug use.    ALLERGIES: [NKDA]    MEDICATIONS: [Medcard has been reviewed and/or reconciled.]    REVIEW OF SYSTEMS:   GENERAL: [No fevers, chills or sweats. Reports chronic fatigue. Denies weight loss. Reports loss of appetite.]  HEENT: [No blurred vision, tinnitus, nasal discharge, sorethroat or dysphagia.]  HEART: [No chest pain, palpitations or shortness of breath.]    LUNGS: [No cough, hemoptysis or breathing problems.]  ABDOMEN: [No abdominal pain, nausea, vomiting, diarrhea, constipation or melena.]  GENITOURINARY: [No dysuria, bleeding or malodorous discharge.]  NEURO: [No headache, dizziness or vertigo.]  HEMATOLOGY: [No easy bruising, spontaneous bleeding or blood clots in the past].  MUSCULOSKELETAL: [Reports arthralgias, myalgias. Denies bone pains.]  SKIN: [No rashes or skin lesions.]  PSYCHIATRY: [ depression or anxiety.]    PHYSICAL EXAMINATION:   VS: Reviewed on nurse's notes.  APPEARANCE: The patient is a well-developed, well-nourished and  well-groomed female who appears in no acute distress.  HEENT: No scleral icterus. Both external auditory canals clear. No oral ulcers, lesions. Throat clear  HEAD: No sinus tenderness.  NECK: Supple. No palpable lymphadenopathy. Thyroid non-tender, no palpable masses  CHEST: Breath sounds clear bilaterally. No rales. No rhonchi. Unlabored respirations.  CARDIOVASCULAR: Normal S1, S2. Normal rate. Regular rhythm.  ABDOMEN: Bowel sounds normal. No tenderness. No abdominal distention. No hepatomegaly. No splenomegaly.  LYMPHATIC: No palpable supraclavicular, axillary nodes  EXTREMITIES: No clubbing, cyanosis, edema  SKIN: No lesions. No petechiae. No ecchymoses. No induration or nodules  NEUROLOGIC: No focal findings. Alert & Oriented x 3. Mood appropriate to affect    LABS:   Reviewed    IMAGING:  Reviewed    IMPRESSION:  1.  Chronic thrombocytosis  2.  Chronic leukocytosis  3.  Heterozygosity for factor 5 Leiden mutation    PLAN:  1.  I had a detailed discussion with the patient today with regard to the various possible etiologies for her chronic thrombocytosis and chronic leukocytosis.  We discussed about primary versus secondary causes.  2.  Workup for iron deficiency and for myeloproliferative neoplasm in the past was negative. At this time I would recommend following her platelet count conservatively as this has been chronically present and the patient is asymptomatic from this.  3.  We had a detailed discussion with regard to her heterozygosity for factor 5 Leiden mutation.  The patient has no personal history of VTE though there is a family history of this.  She knows to contact me before elective surgical procedure so that we can discuss appropriate VTE prophylaxis.    Return to clinic in 6 months with labs. She knows to call sooner for any additional questions or new problems.    Regan Brandt MD

## 2018-08-27 DIAGNOSIS — L40.50 PSORIATIC ARTHRITIS: ICD-10-CM

## 2018-08-27 DIAGNOSIS — M54.2 NECK PAIN: ICD-10-CM

## 2018-08-27 DIAGNOSIS — F41.9 ANXIETY: ICD-10-CM

## 2018-08-27 DIAGNOSIS — G93.5 ARNOLD-CHIARI MALFORMATION, TYPE I: ICD-10-CM

## 2018-08-27 RX ORDER — SECUKINUMAB 150 MG/ML
INJECTION SUBCUTANEOUS
Qty: 2 ML | Refills: 9 | Status: SHIPPED | OUTPATIENT
Start: 2018-08-27 | End: 2019-05-24 | Stop reason: SDUPTHER

## 2018-09-10 ENCOUNTER — OFFICE VISIT (OUTPATIENT)
Dept: NEUROLOGY | Facility: CLINIC | Age: 38
End: 2018-09-10
Payer: COMMERCIAL

## 2018-09-10 VITALS
DIASTOLIC BLOOD PRESSURE: 75 MMHG | BODY MASS INDEX: 33.84 KG/M2 | HEART RATE: 93 BPM | WEIGHT: 179.25 LBS | HEIGHT: 61 IN | SYSTOLIC BLOOD PRESSURE: 106 MMHG | RESPIRATION RATE: 20 BRPM

## 2018-09-10 DIAGNOSIS — F41.9 ANXIETY: ICD-10-CM

## 2018-09-10 DIAGNOSIS — G47.33 OSA (OBSTRUCTIVE SLEEP APNEA): ICD-10-CM

## 2018-09-10 DIAGNOSIS — G43.719 INTRACTABLE CHRONIC MIGRAINE WITHOUT AURA AND WITHOUT STATUS MIGRAINOSUS: Primary | ICD-10-CM

## 2018-09-10 DIAGNOSIS — F11.20 OPIOID DEPENDENCE IN CONTROLLED ENVIRONMENT: ICD-10-CM

## 2018-09-10 DIAGNOSIS — G89.4 CHRONIC PAIN SYNDROME: ICD-10-CM

## 2018-09-10 DIAGNOSIS — L40.50 PSORIATIC ARTHRITIS: ICD-10-CM

## 2018-09-10 PROCEDURE — 3008F BODY MASS INDEX DOCD: CPT | Mod: CPTII,S$GLB,, | Performed by: PSYCHIATRY & NEUROLOGY

## 2018-09-10 PROCEDURE — 99999 PR PBB SHADOW E&M-EST. PATIENT-LVL III: CPT | Mod: PBBFAC,,, | Performed by: PSYCHIATRY & NEUROLOGY

## 2018-09-10 PROCEDURE — 99214 OFFICE O/P EST MOD 30 MIN: CPT | Mod: S$GLB,,, | Performed by: PSYCHIATRY & NEUROLOGY

## 2018-09-10 RX ORDER — AMITRIPTYLINE HYDROCHLORIDE 25 MG/1
50 TABLET, FILM COATED ORAL NIGHTLY
COMMUNITY
End: 2019-07-09

## 2018-09-10 RX ORDER — VIT C/E/ZN/COPPR/LUTEIN/ZEAXAN 250MG-90MG
3000 CAPSULE ORAL DAILY
COMMUNITY
End: 2020-11-04

## 2018-09-10 NOTE — PROGRESS NOTES
Subjective:       Patient ID: Sparkle Jose is a 36 y.o. female.    Chief Complaint: Headache  INTERVAL HISTORY  The patient comes for follow up. Botox has put her in remission. She finds that most of the time, ibuprofen is effective and when insufficient, Relpax resolves the attack. She is quite please with her progress. She had a week of headaches coinciding with poor sleep. She has fatigue. She complaints of spasm and tightness of the cervical region which causes a lot of tension and can trigger her headache attacks. She was recently started on Flexeril 10 mg QHS, she seems to be doing better.    HPI  The patient is a pleasant 35 y/o female who presents with chief complaint of headache. She was previously seen by Dr. Toure who diagnosed her with cervicogenic headache, migraine and pseudodementia. The patient was referred by Dr. Shea as her headaches remain intractable. Her case is complicated in that, she has history of Arnold-Chiari Type 1 status post cervical decompression on 12/6/2005 which is stable (last MRI 5/15/15), she also suffers with Ankylosing Spondilitis and is under Pain Medicine care. She is opioid dependent on Fentanyl 25 mcg every 48 hours and prn use of oxycodone. She is on disability, stopped working on 2013.    She has been on multiple preventives like Topiramate, Gabapentin, associated with side effects, unable to tolerate.  Amitriptyline, Nortriptyline both ineffective. For acute treatment she takes Imitrex with inconsistent results. In the past Maxalt was ineffective.  Please see details of headache characteristics headache below.    Review of Systems      Past Medical History:   Diagnosis Date    Ankylosing spondylitis     Arthritis     Celiac disease     Depression     GERD (gastroesophageal reflux disease)     Migraine     Osteoarthritis     Psoriasis     Psoriatic arthritis mutilans      Past Surgical History:   Procedure Laterality Date    brain decompression   2006     BRAIN SURGERY       Family History   Problem Relation Age of Onset    Hypertension Mother     Depression Sister     Asthma Brother      Social History     Social History    Marital status:      Spouse name: N/A    Number of children: N/A    Years of education: N/A     Occupational History    Not on file.     Social History Main Topics    Smoking status: Current Every Day Smoker     Packs/day: 0.50     Types: Cigarettes    Smokeless tobacco: Never Used    Alcohol use No    Drug use: No    Sexual activity: Not on file     Other Topics Concern    Not on file     Social History Narrative     Review of patient's allergies indicates:  No Known Allergies  Current Outpatient Prescriptions   Medication Sig    alprazolam (XANAX) 0.25 MG tablet Take 1 tablet (0.25 mg total) by mouth 4 (four) times daily as needed for Anxiety.    AMRIX 15 mg 24 hr capsule Take 15 mg by mouth once daily.    clobetasol 0.05% (TEMOVATE) 0.05 % Oint 1 application daily as needed.     eletriptan (RELPAX) 40 MG tablet Take 1 tablet (40 mg total) by mouth as needed for Migraine. may repeat in 2 hours if necessary    ergocalciferol (ERGOCALCIFEROL) 50,000 unit Cap Take 1 capsule (50,000 Units total) by mouth every 7 days.    fentaNYL (DURAGESIC) 25 mcg/hr Place 1 patch onto the skin every 72 hours.     furosemide (LASIX) 20 MG tablet Take 1 tablet (20 mg total) by mouth once daily.    ibuprofen (ADVIL,MOTRIN) 800 MG tablet Take 1 tablet (800 mg total) by mouth every 6 (six) hours as needed for Pain.    ketoconazole (NIZORAL) 2 % shampoo Wash body with medicated shampoo at least 2x/week - let soak at least 5 minutes prior to rinsing    modafinil (PROVIGIL) 200 MG Tab Take 200 mg by mouth once daily.    oxycodone-acetaminophen (PERCOCET)  mg per tablet Take 1 tablet by mouth every 8 (eight) hours as needed for Pain.    oxycodone-acetaminophen (ROXICET) 5-325 mg per tablet Take 1 tablet by mouth every 4 (four)  hours as needed for Pain.    potassium chloride (KLOR-CON) 10 MEQ TbSR Take 1 tablet (10 mEq total) by mouth once daily.    promethazine (PHENERGAN) 25 MG tablet TAKE ONE TABLET BY MOUTH EVERY SIX HOURS AS NEEDED    ranitidine (ZANTAC) 150 MG capsule Take 1 capsule (150 mg total) by mouth 2 (two) times daily. (Patient taking differently: Take 150 mg by mouth 2 (two) times daily as needed. )    secukinumab (COSENTYX PEN, 2 PENS,) 150 mg/mL PnIj INJECT 2 PENS SUBCUTANEOUSLY ONCE MONTHLY    trazodone (DESYREL) 50 MG tablet TAKE 1 TABLET (50 MG TOTAL) BY MOUTH EVERY EVENING. (Patient taking differently: Take 50 mg by mouth nightly as needed. )     No current facility-administered medications for this visit.            Objective:      Vitals:    09/10/18 1407   BP: 106/75   Pulse: 93   Resp: 20   Body mass index is 33.87 kg/m².      Physical Exam    Constitutional:   She appears well-developed and well-nourished. She is well groomed    HENT:    Head: Atraumatic, oral and nasal mucosa intact  Eyes: Conjunctivae and EOM are normal. Pupils are equal, round, and reactive to light OU  Neck: Neck supple. No thyromegaly present  Cardiovascular: Tachycardic, normal heart sounds  No murmur heard  Pulmonary/Chest: Effort normal and breath sounds normal  Musculoskeletal: Decreased range of motion, cervical and lumbar spine. Spasm of muscle of right paraspinal and trapezius  Skin: Skin is warm and dry, no rash appreciated today  Psychiatric: Normal mood and affect     Neuro exam:    Mental status:  Awake, attentive, Alert, oriented to self, place, year and month  Language function is intact  Naming, repetition and spontaneous meaningful speech expression are intact  Speech fluency is good and speech is clear  Able to spell backwards    Cranial Nerves:  Smell was not formally evaluated  Cranial Nerves II - XII: intact  Pursuits were smooth, normal saccades, no nystagmus OU  Funduscopic exam - disc were flat and pink, no exudates  or hemorrhages OU  Motor - facial movement was symmetrical and normal     Palate moved well and was symmetrical with normal palatal and oral sensation  Tongue movements were full    Coordination:     Rapid alternating movements and rapid finger tapping - normal bilaterally  Finger to nose - normal and symmetric bilaterally   Heel to shin test - normal and symmetric bilaterally   Arm roll - smooth and symmetric   No intentional or positional tremor.     Motor:  Normal muscle bulk and symmetry. No fasciculations were noted    No pronator drift  Strength5/5 bilaterally     Reflexes:  Tendon reflexes were 2 + at biceps, triceps, brachioradialis, patellar, and Achilles bilaterally  On plantar stimulation toes were down going bilaterally  No clonus was noted     Sensory: Intact to light touch, pin prick in all extremities.   Gait: Romberg absent. Normal gait. Normal arm swing and turns. Normal postural reflexes.     Review of Data: MRI of the brain 5/15 No acute abnormalities, Stable posterior fossa decompression. No syrinx. MRI of C spine: No significant abnormality  Results for orders placed or performed during the hospital encounter of 05/15/15   MRI Brain W WO Contrast    Narrative    Exam: MRI of the brain without and with contrast -- 5/15/2015    Comparison: None.    Technique: Multiplanar MR imaging of the brain was performed both before and following the intravenous administration of 14 cc of Multihance contrast material.    Findings:     The brain is normally formed. No evidence of acute/recent major vascular distribution cerebral infarction, intraparenchymal hemorrhage, or intra-axial space occupying lesion. No significant vasogenic or cytotoxic edema.     The ventricular system is normal in appearance for age. No extra-axial fluid collections or blood products. No abnormal dural enhancement or enhancing masses. No enhancing vascular malformations. The skull base flow-voids are unremarkable. The sella and    parasellar regions show no significant abnormality. The visualized orbits are unremarkable.    There are postoperative changes of occipital/suboccipital decompression for treatment of a Chiari type I malformation.  No cervical cord syrinx appreciated.    The mastoid air cells are unremarkable. There is mucoperiosteal thickening present within the ethmoid air cells.    Impression         1.  No acute intracranial abnormalities are appreciated.    2.  Postoperative change of occipital/suboccipital decompression for treatment of a Chiari malformation.  No cervical cord syrinx or postoperative fluid collection.    3.  Ethmoid sinus disease      Electronically signed by: Becki Doyle MD  Date:     05/15/15  Time:    15:21            Assessment and Plan     The patient has chronic intractable migraine status post failure to Topiramate, Gabapentin, Amitriptyline, Nortriptyline, who went into remission after Botox treatment.  Continue Relpax prn but use the dose indicated for adults, 40 mg.  Start Protriptyline 5 mg po with BF and Lunch  Arnold-Chiari Type I status post successful decompression in 2005. Stable, last MRI in 5/15   Ankylosing Spondylitis under Dr. Shea's care  Chronic neck and back pain on chronic opioids by Pain Medicine (Fentanyl 25 mcg Q48 hours and prn Oxycodone)  Psoriatic arthritis, under Dr. Shea's care  Anxiety  RTC in 6 months        Linda Schaefer M.D  Medical Director, Headache and Facial Pain  [facility]

## 2018-09-11 ENCOUNTER — HOSPITAL ENCOUNTER (OUTPATIENT)
Dept: RADIOLOGY | Facility: HOSPITAL | Age: 38
Discharge: HOME | End: 2018-09-11
Attending: INTERNAL MEDICINE
Payer: COMMERCIAL

## 2018-09-11 ENCOUNTER — OFFICE VISIT (OUTPATIENT)
Dept: RHEUMATOLOGY | Facility: CLINIC | Age: 38
End: 2018-09-11
Payer: COMMERCIAL

## 2018-09-11 VITALS
DIASTOLIC BLOOD PRESSURE: 89 MMHG | SYSTOLIC BLOOD PRESSURE: 134 MMHG | HEART RATE: 98 BPM | WEIGHT: 180.13 LBS | BODY MASS INDEX: 34.03 KG/M2

## 2018-09-11 DIAGNOSIS — G89.29 HIP PAIN, CHRONIC, LEFT: ICD-10-CM

## 2018-09-11 DIAGNOSIS — M45.9 ANKYLOSING SPONDYLITIS, UNSPECIFIED SITE OF SPINE: ICD-10-CM

## 2018-09-11 DIAGNOSIS — M70.61 TROCHANTERIC BURSITIS, RIGHT HIP: ICD-10-CM

## 2018-09-11 DIAGNOSIS — S73.192A TEAR OF LEFT ACETABULAR LABRUM, INITIAL ENCOUNTER: Primary | ICD-10-CM

## 2018-09-11 DIAGNOSIS — L40.50 PSORIATIC ARTHRITIS: ICD-10-CM

## 2018-09-11 DIAGNOSIS — D72.829 LEUKOCYTOSIS, UNSPECIFIED TYPE: ICD-10-CM

## 2018-09-11 DIAGNOSIS — M25.552 HIP PAIN, CHRONIC, LEFT: ICD-10-CM

## 2018-09-11 DIAGNOSIS — D69.1 ABNORMAL PLATELETS: ICD-10-CM

## 2018-09-11 PROCEDURE — 73521 X-RAY EXAM HIPS BI 2 VIEWS: CPT | Mod: 26,,, | Performed by: RADIOLOGY

## 2018-09-11 PROCEDURE — 73521 X-RAY EXAM HIPS BI 2 VIEWS: CPT | Mod: TC,FY,PO

## 2018-09-11 PROCEDURE — 99214 OFFICE O/P EST MOD 30 MIN: CPT | Mod: S$GLB,,, | Performed by: INTERNAL MEDICINE

## 2018-09-11 PROCEDURE — 3008F BODY MASS INDEX DOCD: CPT | Mod: CPTII,S$GLB,, | Performed by: INTERNAL MEDICINE

## 2018-09-11 PROCEDURE — 99999 PR PBB SHADOW E&M-EST. PATIENT-LVL IV: CPT | Mod: PBBFAC,,, | Performed by: INTERNAL MEDICINE

## 2018-09-11 RX ORDER — ALPRAZOLAM 0.25 MG/1
0.25 TABLET ORAL 4 TIMES DAILY PRN
Qty: 40 TABLET | Refills: 3 | Status: SHIPPED | OUTPATIENT
Start: 2018-09-11 | End: 2019-11-13

## 2018-09-11 ASSESSMENT — ROUTINE ASSESSMENT OF PATIENT INDEX DATA (RAPID3)
PSYCHOLOGICAL DISTRESS SCORE: 4.4
PATIENT GLOBAL ASSESSMENT SCORE: 7
MDHAQ FUNCTION SCORE: 1.5
PAIN SCORE: 6
TOTAL RAPID3 SCORE: 6

## 2018-09-11 NOTE — PROGRESS NOTES
Subjective:       Patient ID: Sparkle Jose is a 37 y.o. female.    Chief Complaint: Follow-up    DX: ANKYLOSING SPONDYLITIS/ PSA  On consyntx, L hip pain groin pain feels like dilocates severe.  Patient complains of arthralgias and myalgias for which has been present for a few years. Pain is located in multiple joints, both shoulder(s), both elbow(s), both wrist(s), both MCP(s): 1st, 2nd, 3rd, 4th and 5th, both PIP(s): 1st, 2nd, 3rd, 4th and 5th, both DIP(s): 1st and 2nd, both hip(s), both knee(s) and both MTP(s): 1st, 2nd, 3rd, 4th and 5th, is described as aching, pulsating, shooting and throbbing, and is constant, moderate to severe pain .  Fatigue is paramount      Review of Systems   Constitutional: Positive for activity change, appetite change, fatigue and unexpected weight change. Negative for chills and diaphoresis.   HENT: Negative for congestion, dental problem, ear discharge, ear pain, facial swelling, mouth sores, nosebleeds, postnasal drip, rhinorrhea, sinus pressure, sneezing, sore throat, tinnitus and voice change.    Eyes: Negative for photophobia, pain, discharge, redness and itching.   Respiratory: Negative for apnea, cough, chest tightness, shortness of breath and wheezing.    Cardiovascular: Positive for leg swelling. Negative for chest pain and palpitations.   Gastrointestinal: Negative for abdominal distention, abdominal pain, constipation, diarrhea, nausea and vomiting.   Endocrine: Negative for cold intolerance, heat intolerance, polydipsia and polyuria.   Genitourinary: Negative for decreased urine volume, difficulty urinating, flank pain, frequency, hematuria and urgency.   Musculoskeletal: Positive for arthralgias, gait problem, joint swelling, neck pain and neck stiffness. Negative for back pain.   Skin: Positive for rash. Negative for pallor and wound.        Psoriasis rash on arms   Allergic/Immunologic: Negative for immunocompromised state.   Neurological: Positive for weakness  and numbness. Negative for dizziness and tremors.   Hematological: Negative for adenopathy. Does not bruise/bleed easily.   Psychiatric/Behavioral: Positive for sleep disturbance. The patient is nervous/anxious.         Depressed         Objective:     /89 (BP Location: Left arm, Patient Position: Sitting, BP Method: Medium (Automatic))   Pulse 98   Wt 81.7 kg (180 lb 1.9 oz)   LMP 09/07/2018   BMI 34.03 kg/m²      Physical Exam   Vitals reviewed.  Constitutional: She is oriented to person, place, and time. No distress.   HENT:   Head: Normocephalic and atraumatic.   Mouth/Throat: Oropharynx is clear and moist.   Eyes: EOM are normal. Pupils are equal, round, and reactive to light.   Neck: Neck supple. No thyromegaly present.   Cardiovascular: Normal rate, regular rhythm and normal heart sounds.  Exam reveals no gallop and no friction rub.    No murmur heard.  Pulmonary/Chest: Breath sounds normal. She has no wheezes. She has no rales. She exhibits no tenderness.   Abdominal: There is no tenderness. There is no rebound and no guarding.       Right Side Rheumatological Exam     Examination finds the elbow, 1st MCP, 2nd MCP, 3rd MCP, 4th MCP and 5th MCP normal.    The patient is tender to palpation of the shoulder and knee    She has swelling of the knee    The patient has an enlarged wrist, 1st PIP, 2nd PIP, 3rd PIP, 4th PIP and 5th PIP    Shoulder Exam   Tenderness Location: acromion    Range of Motion   Active abduction: abnormal   Adduction: abnormal  Sensation: normal    Knee Exam   Tenderness Location: medial joint line  Patellofemoral Crepitus: positive  Effusion: positive  Sensation: normal    Hip Exam   Tenderness Location: no tenderness  Sensation: normal    Elbow/Wrist Exam   Tenderness Location: no tenderness  Sensation: normal    Left Side Rheumatological Exam     Examination finds the elbow, knee, 1st MCP, 2nd MCP, 3rd MCP, 4th MCP and 5th MCP normal.    The patient is tender to palpation of  the shoulder.    The patient has an enlarged wrist, 1st PIP, 2nd PIP, 3rd PIP, 4th PIP and 5th PIP.    Shoulder Exam   Tenderness Location: acromion    Range of Motion   Active abduction: abnormal   Sensation: normal    Knee Exam   Tenderness Location: lateral joint line and medial joint line    Patellofemoral Crepitus: positive  Effusion: positive  Sensation: normal    Hip Exam   Tenderness Location: no tenderness  Sensation: normal    Elbow/Wrist Exam   Sensation: normal      Back/Neck Exam   General Inspection   Gait: normal       Tenderness Right paramedian tenderness of the Lower C-Spine, Lower L-Spine, Occ, Lower T-Spine and Upper C-Spine.Left paramedian tenderness of the Lower L-Spine, Lower C-Spine and Lower T-Spine.    Back Range of Motion   Lateral Bend Right: abnormal  Lateral Bend Left: abnormal  Rotation Right: abnormal  Rotation Left: abnormal      Lymphadenopathy:     She has no cervical adenopathy.   Neurological: She is alert and oriented to person, place, and time. She has normal sensation and normal strength.   Reflex Scores:       Patellar reflexes are 3+ on the right side and 3+ on the left side.  Skin: Rash noted. No erythema. No pallor.     Psychiatric: Mood and affect normal.   Musculoskeletal: She exhibits tenderness and deformity.       Results for orders placed or performed in visit on 08/21/18   CBC auto differential   Result Value Ref Range    WBC 13.74 (H) 3.90 - 12.70 K/uL    RBC 4.68 4.00 - 5.40 M/uL    Hemoglobin 14.2 12.0 - 16.0 g/dL    Hematocrit 42.8 37.0 - 48.5 %    MCV 92 82 - 98 fL    MCH 30.3 27.0 - 31.0 pg    MCHC 33.2 32.0 - 36.0 g/dL    RDW 14.1 11.5 - 14.5 %    Platelets 385 (H) 150 - 350 K/uL    MPV 9.9 9.2 - 12.9 fL    Gran # (ANC) 6.0 1.8 - 7.7 K/uL    Lymph # 6.5 (H) 1.0 - 4.8 K/uL    Mono # 1.0 0.3 - 1.0 K/uL    Eos # 0.2 0.0 - 0.5 K/uL    Baso # 0.07 0.00 - 0.20 K/uL    Gran% 43.9 38.0 - 73.0 %    Lymph% 47.2 18.0 - 48.0 %    Mono% 6.9 4.0 - 15.0 %    Eosinophil% 1.5  0.0 - 8.0 %    Basophil% 0.5 0.0 - 1.9 %    Differential Method Automated        Assessment:     Encounter Diagnoses   Name Primary?    Tear of left acetabular labrum, initial encounter Yes    Comment: clinical    Psoriatic arthritis     Ankylosing spondylitis, unspecified site of spine     Abnormal platelets     Leukocytosis, unspecified type     Hip pain, chronic, left     Trochanteric bursitis, right hip          Plan:     Tear of left acetabular labrum, initial encounter  Comments:  clinical  Orders:  -     ALPRAZolam (XANAX) 0.25 MG tablet; Take 1 tablet (0.25 mg total) by mouth 4 (four) times daily as needed for Anxiety.  Dispense: 40 tablet; Refill: 3  -     Cancel: X-Ray Hip 2 View Left; Future; Expected date: 09/11/2018  -     MRI Hip Without Contrast Left; Future; Expected date: 09/11/2018  -     Comprehensive metabolic panel; Future; Expected date: 09/11/2018  -     CBC auto differential; Future; Expected date: 09/11/2018    Psoriatic arthritis  -     ALPRAZolam (XANAX) 0.25 MG tablet; Take 1 tablet (0.25 mg total) by mouth 4 (four) times daily as needed for Anxiety.  Dispense: 40 tablet; Refill: 3  -     Cancel: X-Ray Hip 2 View Left; Future; Expected date: 09/11/2018  -     Comprehensive metabolic panel; Future; Expected date: 09/11/2018  -     CBC auto differential; Future; Expected date: 09/11/2018    Ankylosing spondylitis, unspecified site of spine  -     ALPRAZolam (XANAX) 0.25 MG tablet; Take 1 tablet (0.25 mg total) by mouth 4 (four) times daily as needed for Anxiety.  Dispense: 40 tablet; Refill: 3  -     Cancel: X-Ray Hip 2 View Left; Future; Expected date: 09/11/2018  -     Comprehensive metabolic panel; Future; Expected date: 09/11/2018    Abnormal platelets  -     Comprehensive metabolic panel; Future; Expected date: 09/11/2018    Leukocytosis, unspecified type  -     ALPRAZolam (XANAX) 0.25 MG tablet; Take 1 tablet (0.25 mg total) by mouth 4 (four) times daily as needed for Anxiety.   Dispense: 40 tablet; Refill: 3  -     Cancel: X-Ray Hip 2 View Left; Future; Expected date: 09/11/2018  -     Comprehensive metabolic panel; Future; Expected date: 09/11/2018    Hip pain, chronic, left  -     ALPRAZolam (XANAX) 0.25 MG tablet; Take 1 tablet (0.25 mg total) by mouth 4 (four) times daily as needed for Anxiety.  Dispense: 40 tablet; Refill: 3  -     Cancel: X-Ray Hip 2 View Left; Future; Expected date: 09/11/2018  -     X-Ray Hips Bilateral 2 View Inc AP Pelvis; Future; Expected date: 09/11/2018  -     MRI Hip Without Contrast Left; Future; Expected date: 09/11/2018  -     Comprehensive metabolic panel; Future; Expected date: 09/11/2018  -     CBC auto differential; Future; Expected date: 09/11/2018    Trochanteric bursitis, right hip  -     X-Ray Hips Bilateral 2 View Inc AP Pelvis; Future; Expected date: 09/11/2018  -     Comprehensive metabolic panel; Future; Expected date: 09/11/2018  -     CBC auto differential; Future; Expected date: 09/11/2018       On consyntx and her joints are stable,hip will be evaluated

## 2018-09-13 ENCOUNTER — PATIENT MESSAGE (OUTPATIENT)
Dept: RHEUMATOLOGY | Facility: CLINIC | Age: 38
End: 2018-09-13

## 2018-10-01 ENCOUNTER — HOSPITAL ENCOUNTER (OUTPATIENT)
Dept: RADIOLOGY | Facility: HOSPITAL | Age: 38
Discharge: HOME | End: 2018-10-01
Attending: INTERNAL MEDICINE
Payer: COMMERCIAL

## 2018-10-01 DIAGNOSIS — S73.192A TEAR OF LEFT ACETABULAR LABRUM, INITIAL ENCOUNTER: ICD-10-CM

## 2018-10-01 DIAGNOSIS — G89.29 HIP PAIN, CHRONIC, LEFT: ICD-10-CM

## 2018-10-01 DIAGNOSIS — M25.552 HIP PAIN, CHRONIC, LEFT: ICD-10-CM

## 2018-10-01 PROCEDURE — 73721 MRI JNT OF LWR EXTRE W/O DYE: CPT | Mod: TC,PO,LT

## 2018-10-01 PROCEDURE — 73721 MRI JNT OF LWR EXTRE W/O DYE: CPT | Mod: 26,LT,, | Performed by: RADIOLOGY

## 2018-12-19 ENCOUNTER — LAB VISIT (OUTPATIENT)
Dept: LAB | Facility: HOSPITAL | Age: 38
End: 2018-12-19
Attending: PHYSICAL MEDICINE & REHABILITATION
Payer: COMMERCIAL

## 2018-12-19 DIAGNOSIS — N94.6 PAINFUL MENSTRUATION: ICD-10-CM

## 2018-12-19 DIAGNOSIS — E03.9 PRIMARY HYPOTHYROIDISM: Primary | ICD-10-CM

## 2018-12-19 LAB
PROGEST SERPL-MCNC: 0.1 NG/ML
T3FREE SERPL-MCNC: 2.6 PG/ML
T4 FREE SERPL-MCNC: 1.12 NG/DL
TESTOST SERPL-MCNC: 30 NG/DL
TSH SERPL DL<=0.005 MIU/L-ACNC: 1.27 UIU/ML

## 2018-12-19 PROCEDURE — 84144 ASSAY OF PROGESTERONE: CPT

## 2018-12-19 PROCEDURE — 84481 FREE ASSAY (FT-3): CPT

## 2018-12-19 PROCEDURE — 82672 ASSAY OF ESTROGEN: CPT

## 2018-12-19 PROCEDURE — 84443 ASSAY THYROID STIM HORMONE: CPT

## 2018-12-19 PROCEDURE — 84439 ASSAY OF FREE THYROXINE: CPT

## 2018-12-19 PROCEDURE — 36415 COLL VENOUS BLD VENIPUNCTURE: CPT | Mod: PO

## 2018-12-19 PROCEDURE — 84403 ASSAY OF TOTAL TESTOSTERONE: CPT

## 2018-12-24 LAB — ESTROGEN SERPL-MCNC: 106 PG/ML

## 2019-01-18 ENCOUNTER — HOSPITAL ENCOUNTER (OUTPATIENT)
Dept: RADIOLOGY | Facility: HOSPITAL | Age: 39
Discharge: HOME | End: 2019-01-18
Attending: OBSTETRICS & GYNECOLOGY
Payer: COMMERCIAL

## 2019-01-18 ENCOUNTER — OFFICE VISIT (OUTPATIENT)
Dept: OBSTETRICS AND GYNECOLOGY | Facility: CLINIC | Age: 39
End: 2019-01-18
Payer: COMMERCIAL

## 2019-01-18 VITALS — HEIGHT: 61 IN | WEIGHT: 158 LBS | BODY MASS INDEX: 29.83 KG/M2

## 2019-01-18 VITALS — WEIGHT: 158.06 LBS | BODY MASS INDEX: 29.87 KG/M2

## 2019-01-18 DIAGNOSIS — N91.2 ABSENT MENSES: ICD-10-CM

## 2019-01-18 DIAGNOSIS — Z12.4 ENCOUNTER FOR PAP SMEAR OF CERVIX WITH HPV DNA COTESTING: ICD-10-CM

## 2019-01-18 DIAGNOSIS — Z12.31 VISIT FOR SCREENING MAMMOGRAM: ICD-10-CM

## 2019-01-18 DIAGNOSIS — D68.51 FACTOR V LEIDEN: ICD-10-CM

## 2019-01-18 DIAGNOSIS — R23.2 HOT FLASHES: ICD-10-CM

## 2019-01-18 DIAGNOSIS — G47.00 INSOMNIA, UNSPECIFIED TYPE: ICD-10-CM

## 2019-01-18 DIAGNOSIS — R31.9 HEMATURIA, UNSPECIFIED TYPE: ICD-10-CM

## 2019-01-18 DIAGNOSIS — L65.9 HAIR LOSS: ICD-10-CM

## 2019-01-18 DIAGNOSIS — Z01.411 ENCOUNTER FOR GYNECOLOGICAL EXAMINATION WITH ABNORMAL FINDING: Primary | ICD-10-CM

## 2019-01-18 PROCEDURE — 88175 CYTOPATH C/V AUTO FLUID REDO: CPT

## 2019-01-18 PROCEDURE — 77067 SCR MAMMO BI INCL CAD: CPT | Mod: TC,PN

## 2019-01-18 PROCEDURE — 77063 MAMMO DIGITAL SCREENING BILAT WITH TOMOSYNTHESIS_CAD: ICD-10-PCS | Mod: 26,,, | Performed by: RADIOLOGY

## 2019-01-18 PROCEDURE — 99999 PR PBB SHADOW E&M-EST. PATIENT-LVL III: CPT | Mod: PBBFAC,,, | Performed by: OBSTETRICS & GYNECOLOGY

## 2019-01-18 PROCEDURE — 99396 PR PREVENTIVE VISIT,EST,40-64: ICD-10-PCS | Mod: S$GLB,,, | Performed by: OBSTETRICS & GYNECOLOGY

## 2019-01-18 PROCEDURE — 77067 SCR MAMMO BI INCL CAD: CPT | Mod: 26,,, | Performed by: RADIOLOGY

## 2019-01-18 PROCEDURE — 77063 BREAST TOMOSYNTHESIS BI: CPT | Mod: 26,,, | Performed by: RADIOLOGY

## 2019-01-18 PROCEDURE — 99999 PR PBB SHADOW E&M-EST. PATIENT-LVL III: ICD-10-PCS | Mod: PBBFAC,,, | Performed by: OBSTETRICS & GYNECOLOGY

## 2019-01-18 PROCEDURE — 77067 MAMMO DIGITAL SCREENING BILAT WITH TOMOSYNTHESIS_CAD: ICD-10-PCS | Mod: 26,,, | Performed by: RADIOLOGY

## 2019-01-18 PROCEDURE — 99396 PREV VISIT EST AGE 40-64: CPT | Mod: S$GLB,,, | Performed by: OBSTETRICS & GYNECOLOGY

## 2019-01-18 PROCEDURE — 87624 HPV HI-RISK TYP POOLED RSLT: CPT

## 2019-01-18 RX ORDER — PROGESTERONE 100 MG/1
100 CAPSULE ORAL NIGHTLY
Qty: 30 CAPSULE | Refills: 11 | Status: SHIPPED | OUTPATIENT
Start: 2019-01-18 | End: 2019-01-21 | Stop reason: SDUPTHER

## 2019-01-18 NOTE — PROGRESS NOTES
Chief Complaint   Patient presents with    Well Woman    Mammogram    Discuss Hormones       History of Present Illness: Sparkle Jose is a 38 y.o. female that presents today 1/18/2019 for well gyn visit.    Past Medical History:   Diagnosis Date    Ankylosing spondylitis     Arthritis     Celiac disease     Depression     Family history of DVT     MGM DVT, mother spleenic    Family history of factor V Leiden mutation     mother and sister    GERD (gastroesophageal reflux disease)     Migraine     Osteoarthritis     Psoriasis     Psoriatic arthritis mutilans        Past Surgical History:   Procedure Laterality Date    brain decompression   2006    BRAIN SURGERY      [id number] Bilateral 10/18/2017    Performed by [facility] at [facility]    TUBAL LIGATION         Current Outpatient Medications   Medication Sig Dispense Refill    ALPRAZolam (XANAX) 0.25 MG tablet Take 1 tablet (0.25 mg total) by mouth 4 (four) times daily as needed for Anxiety. 40 tablet 3    amitriptyline (ELAVIL) 25 MG tablet Take 50 mg by mouth every evening.      busPIRone (BUSPAR) 15 MG tablet       cholecalciferol, vitamin D3, (VITAMIN D3) 1,000 unit capsule Take 3,000 Units by mouth once daily.      clobetasol 0.05% (TEMOVATE) 0.05 % Oint 1 application daily as needed.       COSENTYX PEN, 2 PENS, 150 mg/mL PnIj INJECT TWO PENS (300 MG) SUBCUTANEOUSLY EVERY MONTH. REFRIGERATE. ALLOW 15 TO 30 MINUTES AT ROOM TEMP PRIOR TO ADMINISTRATION. DO NOT FREEZE 2 mL 9    eletriptan (RELPAX) 40 MG tablet Take 1 tablet (40 mg total) by mouth as needed for Migraine. may repeat in 2 hours if necessary 9 tablet 2    fentaNYL (DURAGESIC) 25 mcg/hr Place 1 patch onto the skin every 72 hours.       furosemide (LASIX) 20 MG tablet Take 1 tablet (20 mg total) by mouth once daily. (Patient taking differently: Take 20 mg by mouth daily as needed. ) 30 tablet 4    ibuprofen (ADVIL,MOTRIN) 800 MG tablet  Take 1 tablet (800 mg total) by mouth every 6 (six) hours as needed for Pain. 90 tablet 4    ketoconazole (NIZORAL) 2 % shampoo Wash body with medicated shampoo at least 2x/week - let soak at least 5 minutes prior to rinsing 120 mL 5    oxycodone-acetaminophen (PERCOCET)  mg per tablet Take 1 tablet by mouth every 8 (eight) hours as needed for Pain. 75 tablet 0    potassium chloride (KLOR-CON) 10 MEQ TbSR Take 1 tablet (10 mEq total) by mouth once daily. 30 tablet 4    progesterone (PROMETRIUM) 100 MG capsule Take 1 capsule (100 mg total) by mouth nightly. 30 capsule 11    promethazine (PHENERGAN) 25 MG tablet Take 1 tablet (25 mg total) by mouth every 6 (six) hours as needed. 30 tablet 3    ranitidine (ZANTAC) 150 MG capsule Take 1 capsule (150 mg total) by mouth 2 (two) times daily. (Patient taking differently: Take 150 mg by mouth 2 (two) times daily as needed. ) 60 capsule 6    trazodone (DESYREL) 50 MG tablet TAKE 1 TABLET (50 MG TOTAL) BY MOUTH EVERY EVENING. (Patient taking differently: Take 50 mg by mouth nightly as needed. ) 90 tablet 3     No current facility-administered medications for this visit.        Review of patient's allergies indicates:  No Known Allergies    Family History   Problem Relation Age of Onset    Hypertension Mother     Clotting disorder Mother     Depression Sister     Asthma Brother     Clotting disorder Sister     Breast cancer Neg Hx     Ovarian cancer Neg Hx        Social History     Socioeconomic History    Marital status:      Spouse name: None    Number of children: None    Years of education: None    Highest education level: None   Social Needs    Financial resource strain: None    Food insecurity - worry: None    Food insecurity - inability: None    Transportation needs - medical: None    Transportation needs - non-medical: None   Occupational History    None   Tobacco Use    Smoking status: Current Every Day Smoker     Packs/day: 0.50      Types: Cigarettes    Smokeless tobacco: Never Used   Substance and Sexual Activity    Alcohol use: No     Alcohol/week: 0.0 oz    Drug use: No    Sexual activity: Yes     Birth control/protection: See Surgical Hx   Other Topics Concern    None   Social History Narrative    None       OB History    Para Term  AB Living   1 1       1   SAB TAB Ectopic Multiple Live Births           1      # Outcome Date GA Lbr Isauro/2nd Weight Sex Delivery Anes PTL Lv   1 Para     F Vag-Spont   DAKSHA          Review of Symptoms:  GENERAL: Denies weight gain or weight loss. Feeling well overall.   SKIN: Denies rash or lesions.   HEAD: Denies head injury or headache.   NODES: Denies enlarged lymph nodes.   CHEST: Denies chest pain or shortness of breath.   CARDIOVASCULAR: Denies palpitations or left sided chest pain.   ABDOMEN: No abdominal pain, constipation, diarrhea, nausea, vomiting or rectal bleeding.   URINARY: No frequency, dysuria, hematuria, or burning on urination.  HEMATOLOGIC: No easy bruisability or excessive bleeding.   MUSCULOSKELETAL: Denies joint pain or swelling.     Wt 71.7 kg (158 lb 1.1 oz)   Physical Exam:  APPEARANCE: Well nourished, well developed, in no acute distress.  SKIN: Normal skin turgor, no lesions.  NECK: Neck symmetric without masses   RESPIRATORY: Normal respiratory effort with no retractions or use of accessory muscles  CARDIOVASCULAR: Peripheral vascular system with no swelling no varicosities and palpation of pulses normal  LYMPHATIC: No enlargements of the lymph nodes noted in the neck, axillae, or groin  ABDOMEN: Soft. No tenderness or masses. No hepatosplenomegaly. No hernias.  BREASTS: Symmetrical, no skin changes or visible lesions. No palpable masses, nipple discharge or adenopathy bilaterally.  PELVIC: Normal external female genitalia without lesions. Normal hair distribution. Adequate perineal body, normal urethral meatus. Urethra with no masses.  Bladder nontender.  Vagina moist and well rugated without lesions or discharge. Cervix pink and without lesions. No significant cystocele or rectocele. Bimanual exam showed uterus normal size, shape, position, mobile and nontender. Adnexa without masses or tenderness. Urethra and bladder normal.   EXTREMITIES: No clubbing cyanosis or edema.    ASSESSMENT/PLAN:  Encounter for gynecological examination with abnormal finding    Visit for screening mammogram  -     Cancel: Mammo Digital Screening Bilat With CAD; Future; Expected date: 01/18/2019    Hair loss  -     progesterone (PROMETRIUM) 100 MG capsule; Take 1 capsule (100 mg total) by mouth nightly.  Dispense: 30 capsule; Refill: 11    Hot flashes  -     Follicle stimulating hormone; Future; Expected date: 01/18/2019  -     progesterone (PROMETRIUM) 100 MG capsule; Take 1 capsule (100 mg total) by mouth nightly.  Dispense: 30 capsule; Refill: 11    Insomnia, unspecified type  -     progesterone (PROMETRIUM) 100 MG capsule; Take 1 capsule (100 mg total) by mouth nightly.  Dispense: 30 capsule; Refill: 11    Factor V Leiden    Absent menses  -     Follicle stimulating hormone; Future; Expected date: 01/18/2019          Patient was counseled today on Pelvic exams and Pap Smear guidelines.   We discussed STD screening if at high risk for a STD.  We discussed recommendation for breast cancer screening with mammogram every other year after the age of 40 and annually after the age of 50.    We discussed colon cancer screening when indicated.   Osteoporosis screening discussed when indicated.   She was advised to see her primary care physician for all other health maintenance.     FOLLOW-UP with me for next routine visit.

## 2019-01-21 DIAGNOSIS — L65.9 HAIR LOSS: ICD-10-CM

## 2019-01-21 DIAGNOSIS — G47.00 INSOMNIA, UNSPECIFIED TYPE: ICD-10-CM

## 2019-01-21 DIAGNOSIS — R23.2 HOT FLASHES: ICD-10-CM

## 2019-01-21 RX ORDER — PROGESTERONE 100 MG/1
100 CAPSULE ORAL NIGHTLY
Qty: 30 CAPSULE | Refills: 11 | Status: SHIPPED | OUTPATIENT
Start: 2019-01-21 | End: 2019-03-19

## 2019-01-21 NOTE — TELEPHONE ENCOUNTER
----- Message from Nilesh Eastman sent at 1/21/2019  8:22 AM CST -----  Contact: Patient  Advised needs a refill on: progesterone (PROMETRIUM) 100 MG capsule  Previous request not received by pharmacy due to power outage.  Please call [phone] (c)       [id number] [place] - [address] [place] [address]  [address]  [place] [address]  Phone: [phone] Fax: [phone]

## 2019-01-22 ENCOUNTER — TELEPHONE (OUTPATIENT)
Dept: RADIOLOGY | Facility: HOSPITAL | Age: 39
End: 2019-01-22

## 2019-01-23 ENCOUNTER — HOSPITAL ENCOUNTER (OUTPATIENT)
Dept: RADIOLOGY | Facility: HOSPITAL | Age: 39
Discharge: HOME | End: 2019-01-23
Attending: OBSTETRICS & GYNECOLOGY
Payer: COMMERCIAL

## 2019-01-23 DIAGNOSIS — R92.8 ABNORMAL MAMMOGRAM OF BOTH BREASTS: ICD-10-CM

## 2019-01-23 LAB
HPV HR 12 DNA CVX QL NAA+PROBE: NEGATIVE
HPV16 AG SPEC QL: NEGATIVE
HPV18 DNA SPEC QL NAA+PROBE: NEGATIVE

## 2019-01-23 PROCEDURE — 77066 MAMMO DIGITAL DIAGNOSTIC BILAT WITH TOMOSYNTHESIS_CAD: ICD-10-PCS | Mod: 26,,, | Performed by: RADIOLOGY

## 2019-01-23 PROCEDURE — 77066 DX MAMMO INCL CAD BI: CPT | Mod: TC,PO

## 2019-01-23 PROCEDURE — 77062 BREAST TOMOSYNTHESIS BI: CPT | Mod: 26,,, | Performed by: RADIOLOGY

## 2019-01-23 PROCEDURE — 76642 US BREAST BILATERAL LIMITED: ICD-10-PCS | Mod: 26,50,, | Performed by: RADIOLOGY

## 2019-01-23 PROCEDURE — 76642 ULTRASOUND BREAST LIMITED: CPT | Mod: TC,50,PO

## 2019-01-23 PROCEDURE — 77062 MAMMO DIGITAL DIAGNOSTIC BILAT WITH TOMOSYNTHESIS_CAD: ICD-10-PCS | Mod: 26,,, | Performed by: RADIOLOGY

## 2019-01-23 PROCEDURE — 77066 DX MAMMO INCL CAD BI: CPT | Mod: 26,,, | Performed by: RADIOLOGY

## 2019-01-23 PROCEDURE — 76642 ULTRASOUND BREAST LIMITED: CPT | Mod: 26,50,, | Performed by: RADIOLOGY

## 2019-02-11 ENCOUNTER — LAB VISIT (OUTPATIENT)
Dept: LAB | Facility: HOSPITAL | Age: 39
End: 2019-02-11
Attending: INTERNAL MEDICINE
Payer: COMMERCIAL

## 2019-02-11 DIAGNOSIS — M25.552 HIP PAIN, CHRONIC, LEFT: ICD-10-CM

## 2019-02-11 DIAGNOSIS — S73.192A TEAR OF LEFT ACETABULAR LABRUM, INITIAL ENCOUNTER: ICD-10-CM

## 2019-02-11 DIAGNOSIS — D72.829 LEUKOCYTOSIS, UNSPECIFIED TYPE: ICD-10-CM

## 2019-02-11 DIAGNOSIS — D69.1 ABNORMAL PLATELETS: ICD-10-CM

## 2019-02-11 DIAGNOSIS — M45.9 ANKYLOSING SPONDYLITIS, UNSPECIFIED SITE OF SPINE: ICD-10-CM

## 2019-02-11 DIAGNOSIS — M70.61 TROCHANTERIC BURSITIS, RIGHT HIP: ICD-10-CM

## 2019-02-11 DIAGNOSIS — L40.50 PSORIATIC ARTHRITIS: ICD-10-CM

## 2019-02-11 DIAGNOSIS — G89.29 HIP PAIN, CHRONIC, LEFT: ICD-10-CM

## 2019-02-11 LAB
ALBUMIN SERPL BCP-MCNC: 3.3 G/DL
ALP SERPL-CCNC: 65 U/L
ALT SERPL W/O P-5'-P-CCNC: 9 U/L
ANION GAP SERPL CALC-SCNC: 8 MMOL/L
AST SERPL-CCNC: 14 U/L
BASOPHILS # BLD AUTO: 0.12 K/UL
BASOPHILS NFR BLD: 0.9 %
BILIRUB SERPL-MCNC: 0.1 MG/DL
BUN SERPL-MCNC: 8 MG/DL
CALCIUM SERPL-MCNC: 9.4 MG/DL
CHLORIDE SERPL-SCNC: 107 MMOL/L
CO2 SERPL-SCNC: 24 MMOL/L
CREAT SERPL-MCNC: 0.7 MG/DL
DIFFERENTIAL METHOD: ABNORMAL
EOSINOPHIL # BLD AUTO: 0.2 K/UL
EOSINOPHIL NFR BLD: 1.3 %
ERYTHROCYTE [DISTWIDTH] IN BLOOD BY AUTOMATED COUNT: 14.4 %
EST. GFR  (AFRICAN AMERICAN): >60 ML/MIN/1.73 M^2
EST. GFR  (NON AFRICAN AMERICAN): >60 ML/MIN/1.73 M^2
GLUCOSE SERPL-MCNC: 96 MG/DL
HCT VFR BLD AUTO: 44.5 %
HGB BLD-MCNC: 14.4 G/DL
IMM GRANULOCYTES # BLD AUTO: 0.04 K/UL
IMM GRANULOCYTES NFR BLD AUTO: 0.3 %
LYMPHOCYTES # BLD AUTO: 5.1 K/UL
LYMPHOCYTES NFR BLD: 37.8 %
MCH RBC QN AUTO: 31.2 PG
MCHC RBC AUTO-ENTMCNC: 32.4 G/DL
MCV RBC AUTO: 96 FL
MONOCYTES # BLD AUTO: 1.2 K/UL
MONOCYTES NFR BLD: 9 %
NEUTROPHILS # BLD AUTO: 6.8 K/UL
NEUTROPHILS NFR BLD: 50.7 %
NRBC BLD-RTO: 0 /100 WBC
PLATELET # BLD AUTO: 420 K/UL
PMV BLD AUTO: 10.4 FL
POTASSIUM SERPL-SCNC: 4 MMOL/L
PROT SERPL-MCNC: 6.7 G/DL
RBC # BLD AUTO: 4.62 M/UL
SODIUM SERPL-SCNC: 139 MMOL/L
WBC # BLD AUTO: 13.4 K/UL

## 2019-02-11 PROCEDURE — 80053 COMPREHEN METABOLIC PANEL: CPT

## 2019-02-11 PROCEDURE — 36415 COLL VENOUS BLD VENIPUNCTURE: CPT | Mod: PO

## 2019-02-11 PROCEDURE — 85025 COMPLETE CBC W/AUTO DIFF WBC: CPT

## 2019-02-15 ENCOUNTER — PATIENT MESSAGE (OUTPATIENT)
Dept: HEMATOLOGY/ONCOLOGY | Facility: CLINIC | Age: 39
End: 2019-02-15

## 2019-02-18 NOTE — TELEPHONE ENCOUNTER
Dr. Shea will review labs in detail at her f/u visit on Thursday. WBC and platelet count remain elevated, which appears to be stable and unchanged from the last year.

## 2019-02-21 ENCOUNTER — OFFICE VISIT (OUTPATIENT)
Dept: RHEUMATOLOGY | Facility: CLINIC | Age: 39
End: 2019-02-21
Payer: COMMERCIAL

## 2019-02-21 VITALS
HEART RATE: 113 BPM | WEIGHT: 158 LBS | SYSTOLIC BLOOD PRESSURE: 123 MMHG | BODY MASS INDEX: 29.83 KG/M2 | DIASTOLIC BLOOD PRESSURE: 77 MMHG | HEIGHT: 61 IN

## 2019-02-21 DIAGNOSIS — M54.2 NECK PAIN: ICD-10-CM

## 2019-02-21 DIAGNOSIS — M45.9 ANKYLOSING SPONDYLITIS, UNSPECIFIED SITE OF SPINE: ICD-10-CM

## 2019-02-21 DIAGNOSIS — L40.50 PSORIATIC ARTHRITIS: Primary | ICD-10-CM

## 2019-02-21 PROCEDURE — 3008F BODY MASS INDEX DOCD: CPT | Mod: CPTII,S$GLB,, | Performed by: INTERNAL MEDICINE

## 2019-02-21 PROCEDURE — 99214 PR OFFICE/OUTPT VISIT, EST, LEVL IV, 30-39 MIN: ICD-10-PCS | Mod: S$GLB,,, | Performed by: INTERNAL MEDICINE

## 2019-02-21 PROCEDURE — 99999 PR PBB SHADOW E&M-EST. PATIENT-LVL III: ICD-10-PCS | Mod: PBBFAC,,, | Performed by: INTERNAL MEDICINE

## 2019-02-21 PROCEDURE — 99999 PR PBB SHADOW E&M-EST. PATIENT-LVL III: CPT | Mod: PBBFAC,,, | Performed by: INTERNAL MEDICINE

## 2019-02-21 PROCEDURE — 3008F PR BODY MASS INDEX (BMI) DOCUMENTED: ICD-10-PCS | Mod: CPTII,S$GLB,, | Performed by: INTERNAL MEDICINE

## 2019-02-21 PROCEDURE — 99214 OFFICE O/P EST MOD 30 MIN: CPT | Mod: S$GLB,,, | Performed by: INTERNAL MEDICINE

## 2019-02-21 RX ORDER — MODAFINIL 200 MG/1
200 TABLET ORAL
COMMUNITY
Start: 2015-11-05 | End: 2019-07-09

## 2019-02-21 RX ORDER — LIOTHYRONINE SODIUM 5 UG/1
TABLET ORAL
Refills: 1 | COMMUNITY
Start: 2019-02-14 | End: 2021-11-02

## 2019-02-21 RX ORDER — CODEINE PHOSPHATE AND GUAIFENESIN 10; 100 MG/5ML; MG/5ML
SOLUTION ORAL
COMMUNITY
Start: 2018-11-02 | End: 2019-02-21

## 2019-02-21 RX ORDER — CYCLOBENZAPRINE HCL 5 MG
TABLET ORAL
Refills: 2 | COMMUNITY
Start: 2019-01-28 | End: 2019-11-13

## 2019-02-21 RX ORDER — DIETHYLPROPION HYDROCHLORIDE 75 MG/1
1 TABLET, EXTENDED RELEASE ORAL DAILY
Refills: 0 | COMMUNITY
Start: 2018-12-21 | End: 2019-02-21

## 2019-02-21 RX ORDER — CYCLOBENZAPRINE HYDROCHLORIDE 15 MG/1
15 CAPSULE, EXTENDED RELEASE ORAL
COMMUNITY
Start: 2017-01-05 | End: 2019-02-21

## 2019-02-21 RX ORDER — BACLOFEN 10 MG/1
TABLET ORAL
COMMUNITY
Start: 2018-03-21 | End: 2019-02-21

## 2019-02-21 RX ORDER — PHENTERMINE HYDROCHLORIDE 37.5 MG/1
37.5 TABLET ORAL
COMMUNITY
Start: 2018-11-21 | End: 2019-02-21 | Stop reason: SDUPTHER

## 2019-02-21 RX ORDER — PHENTERMINE HYDROCHLORIDE 37.5 MG/1
37.5 CAPSULE ORAL
COMMUNITY
Start: 2018-09-27 | End: 2019-02-21 | Stop reason: SDUPTHER

## 2019-02-21 RX ORDER — DIETHYLPROPION HYDROCHLORIDE 75 MG/1
1 TABLET, EXTENDED RELEASE ORAL
COMMUNITY
Start: 2018-12-21 | End: 2019-02-21

## 2019-02-21 RX ORDER — PROTRIPTYLINE HYDROCHLORIDE 5 MG/1
5 TABLET, FILM COATED ORAL DAILY
COMMUNITY
Start: 2019-02-10 | End: 2020-11-04

## 2019-02-21 RX ORDER — CEFUROXIME AXETIL 500 MG/1
500 TABLET ORAL
COMMUNITY
Start: 2018-11-02 | End: 2019-02-21 | Stop reason: ALTCHOICE

## 2019-02-21 RX ORDER — PREDNISONE 5 MG/1
5 TABLET ORAL DAILY
COMMUNITY
End: 2020-07-10 | Stop reason: SDUPTHER

## 2019-02-21 RX ORDER — PROTRIPTYLINE HYDROCHLORIDE 5 MG/1
TABLET, FILM COATED ORAL
COMMUNITY
Start: 2018-11-15 | End: 2019-02-21 | Stop reason: SDUPTHER

## 2019-02-21 RX ORDER — PHENTERMINE HYDROCHLORIDE 37.5 MG/1
37.5 TABLET ORAL DAILY
Refills: 0 | COMMUNITY
Start: 2019-01-18 | End: 2020-11-04

## 2019-02-21 ASSESSMENT — ROUTINE ASSESSMENT OF PATIENT INDEX DATA (RAPID3)
MDHAQ FUNCTION SCORE: 1.2
PAIN SCORE: 6
TOTAL RAPID3 SCORE: 6
PATIENT GLOBAL ASSESSMENT SCORE: 8
PSYCHOLOGICAL DISTRESS SCORE: 3.3

## 2019-02-21 NOTE — PROGRESS NOTES
Subjective:       Patient ID: Sparkle Jose is a 38 y.o. female.    Chief Complaint: Pain; Swelling; and Disease Management    Follow up:  Diagnosis is psoriatic arthritis and ankylosing spondylitis  On consyntx, lost  34 lbs on keto diet.  Patient complains of arthralgias and myalgias for which has been present for a few years. Pain is located in multiple joints, both shoulder(s), both elbow(s), both wrist(s), both MCP(s): 1st, 2nd, 3rd, 4th and 5th, both PIP(s): 1st, 2nd, 3rd, 4th and 5th, both DIP(s): 1st and 2nd, both hip(s), both knee(s) and both MTP(s): 1st, 2nd, 3rd, 4th and 5th, is described as aching, pulsating, shooting and throbbing, and is constant, moderate to severe pain . Pt still has hip pain, and hormones      Review of Systems   Constitutional: Positive for activity change, appetite change, fatigue and unexpected weight change. Negative for chills and diaphoresis.   HENT: Negative for congestion, dental problem, ear discharge, ear pain, facial swelling, mouth sores, nosebleeds, postnasal drip, rhinorrhea, sinus pressure, sneezing, sore throat, tinnitus and voice change.    Eyes: Negative for photophobia, pain, discharge, redness and itching.   Respiratory: Negative for apnea, cough, chest tightness, shortness of breath and wheezing.    Cardiovascular: Positive for leg swelling. Negative for chest pain and palpitations.   Gastrointestinal: Negative for abdominal distention, abdominal pain, constipation, diarrhea, nausea and vomiting.   Endocrine: Negative for cold intolerance, heat intolerance, polydipsia and polyuria.   Genitourinary: Negative for decreased urine volume, difficulty urinating, flank pain, frequency, hematuria and urgency.   Musculoskeletal: Positive for arthralgias, gait problem, joint swelling, neck pain and neck stiffness. Negative for back pain.   Skin: Positive for rash. Negative for pallor and wound.        Psoriasis rash on arms   Allergic/Immunologic: Negative for  "immunocompromised state.   Neurological: Positive for weakness and numbness. Negative for dizziness and tremors.   Hematological: Negative for adenopathy. Does not bruise/bleed easily.   Psychiatric/Behavioral: Positive for sleep disturbance. The patient is nervous/anxious.         Depressed         Objective:     /77 (BP Location: Left arm, Patient Position: Sitting)   Pulse (!) 113   Ht 5' 1" (1.549 m)   Wt 71.7 kg (158 lb)   LMP 02/01/2019 (Exact Date)   BMI 29.85 kg/m²      Physical Exam   Vitals reviewed.  Constitutional: She is oriented to person, place, and time.   HENT:   Head: Normocephalic and atraumatic.   Mouth/Throat: Oropharynx is clear and moist.   Eyes: EOM are normal. Pupils are equal, round, and reactive to light.   Neck: Neck supple. No thyromegaly present.   Cardiovascular: Normal rate, regular rhythm and normal heart sounds.  Exam reveals no gallop and no friction rub.    No murmur heard.  Pulmonary/Chest: Breath sounds normal. She has no wheezes. She has no rales. She exhibits no tenderness.   Abdominal: There is no tenderness. There is no rebound and no guarding.       Right Side Rheumatological Exam     Examination finds the elbow, 1st MCP, 2nd MCP, 3rd MCP, 4th MCP and 5th MCP normal.    The patient is tender to palpation of the shoulder and knee    She has swelling of the knee    The patient has an enlarged wrist, 1st PIP, 2nd PIP, 3rd PIP, 4th PIP and 5th PIP    Shoulder Exam   Tenderness Location: acromion    Range of Motion   Active abduction: abnormal   Adduction: abnormal  Sensation: normal    Knee Exam   Tenderness Location: medial joint line  Patellofemoral Crepitus: positive  Effusion: positive  Sensation: normal    Hip Exam   Tenderness Location: no tenderness  Sensation: normal    Elbow/Wrist Exam   Tenderness Location: no tenderness  Sensation: normal    Left Side Rheumatological Exam     Examination finds the elbow, knee, 1st MCP, 2nd MCP, 3rd MCP, 4th MCP and 5th MCP " normal.    The patient is tender to palpation of the shoulder.    The patient has an enlarged wrist, 1st PIP, 2nd PIP, 3rd PIP, 4th PIP and 5th PIP.    Shoulder Exam   Tenderness Location: acromion    Range of Motion   Active abduction: abnormal   Sensation: normal    Knee Exam   Tenderness Location: lateral joint line and medial joint line    Patellofemoral Crepitus: positive  Effusion: positive  Sensation: normal    Hip Exam   Tenderness Location: no tenderness  Sensation: normal    Elbow/Wrist Exam   Sensation: normal      Back/Neck Exam   General Inspection   Gait: normal       Tenderness Right paramedian tenderness of the Lower C-Spine, Lower L-Spine, Occ, Lower T-Spine and Upper C-Spine.Left paramedian tenderness of the Lower L-Spine, Lower C-Spine and Lower T-Spine.    Back Range of Motion   Lateral Bend Right: abnormal  Lateral Bend Left: abnormal  Rotation Right: abnormal  Rotation Left: abnormal      Lymphadenopathy:     She has no cervical adenopathy.   Neurological: She is alert and oriented to person, place, and time. She has normal sensation and normal strength.   Reflex Scores:       Patellar reflexes are 3+ on the right side and 3+ on the left side.  Skin: Rash noted. No erythema. No pallor.     Psychiatric: Mood and affect normal.   Musculoskeletal: She exhibits tenderness and deformity.       Results for orders placed or performed in visit on 02/11/19   Comprehensive metabolic panel   Result Value Ref Range    Sodium 139 136 - 145 mmol/L    Potassium 4.0 3.5 - 5.1 mmol/L    Chloride 107 95 - 110 mmol/L    CO2 24 23 - 29 mmol/L    Glucose 96 70 - 110 mg/dL    BUN, Bld 8 6 - 20 mg/dL    Creatinine 0.7 0.5 - 1.4 mg/dL    Calcium 9.4 8.7 - 10.5 mg/dL    Total Protein 6.7 6.0 - 8.4 g/dL    Albumin 3.3 (L) 3.5 - 5.2 g/dL    Total Bilirubin 0.1 0.1 - 1.0 mg/dL    Alkaline Phosphatase 65 55 - 135 U/L    AST 14 10 - 40 U/L    ALT 9 (L) 10 - 44 U/L    Anion Gap 8 8 - 16 mmol/L    eGFR if African American  >60.0 >60 mL/min/1.73 m^2    eGFR if non African American >60.0 >60 mL/min/1.73 m^2   CBC auto differential   Result Value Ref Range    WBC 13.40 (H) 3.90 - 12.70 K/uL    RBC 4.62 4.00 - 5.40 M/uL    Hemoglobin 14.4 12.0 - 16.0 g/dL    Hematocrit 44.5 37.0 - 48.5 %    MCV 96 82 - 98 fL    MCH 31.2 (H) 27.0 - 31.0 pg    MCHC 32.4 32.0 - 36.0 g/dL    RDW 14.4 11.5 - 14.5 %    Platelets 420 (H) 150 - 350 K/uL    MPV 10.4 9.2 - 12.9 fL    Immature Granulocytes 0.3 0.0 - 0.5 %    Gran # (ANC) 6.8 1.8 - 7.7 K/uL    Immature Grans (Abs) 0.04 0.00 - 0.04 K/uL    Lymph # 5.1 (H) 1.0 - 4.8 K/uL    Mono # 1.2 (H) 0.3 - 1.0 K/uL    Eos # 0.2 0.0 - 0.5 K/uL    Baso # 0.12 0.00 - 0.20 K/uL    nRBC 0 0 /100 WBC    Gran% 50.7 38.0 - 73.0 %    Lymph% 37.8 18.0 - 48.0 %    Mono% 9.0 4.0 - 15.0 %    Eosinophil% 1.3 0.0 - 8.0 %    Basophil% 0.9 0.0 - 1.9 %    Differential Method Automated        Assessment:     Encounter Diagnoses   Name Primary?    Psoriatic arthritis Yes    Ankylosing spondylitis, unspecified site of spine     Neck pain          Plan:     Psoriatic arthritis  -     Quantiferon Gold TB; Future; Expected date: 02/21/2019  -     Hepatitis B core antibody, IgM; Future; Expected date: 02/21/2019  -     Hepatitis C antibody; Future; Expected date: 02/21/2019  -     Comprehensive metabolic panel; Future; Expected date: 02/21/2019  -     CBC auto differential; Future; Expected date: 02/21/2019  -     Protein electrophoresis, serum; Future; Expected date: 02/21/2019  -     Vitamin D; Future; Expected date: 02/21/2019    Ankylosing spondylitis, unspecified site of spine  -     Quantiferon Gold TB; Future; Expected date: 02/21/2019  -     Hepatitis B core antibody, IgM; Future; Expected date: 02/21/2019  -     Hepatitis C antibody; Future; Expected date: 02/21/2019  -     Comprehensive metabolic panel; Future; Expected date: 02/21/2019  -     CBC auto differential; Future; Expected date: 02/21/2019  -     Protein  electrophoresis, serum; Future; Expected date: 02/21/2019  -     Vitamin D; Future; Expected date: 02/21/2019    Neck pain  -     Quantiferon Gold TB; Future; Expected date: 02/21/2019  -     Hepatitis B core antibody, IgM; Future; Expected date: 02/21/2019  -     Hepatitis C antibody; Future; Expected date: 02/21/2019  -     Comprehensive metabolic panel; Future; Expected date: 02/21/2019  -     CBC auto differential; Future; Expected date: 02/21/2019  -     Protein electrophoresis, serum; Future; Expected date: 02/21/2019  -     Vitamin D; Future; Expected date: 02/21/2019       On consyntx and her joints are stable,hip will be evaluated

## 2019-03-10 ENCOUNTER — PATIENT MESSAGE (OUTPATIENT)
Dept: OBSTETRICS AND GYNECOLOGY | Facility: CLINIC | Age: 39
End: 2019-03-10

## 2019-03-19 ENCOUNTER — OFFICE VISIT (OUTPATIENT)
Dept: OBSTETRICS AND GYNECOLOGY | Facility: CLINIC | Age: 39
End: 2019-03-19
Payer: COMMERCIAL

## 2019-03-19 VITALS
SYSTOLIC BLOOD PRESSURE: 120 MMHG | BODY MASS INDEX: 28.47 KG/M2 | DIASTOLIC BLOOD PRESSURE: 64 MMHG | HEIGHT: 61 IN | WEIGHT: 150.81 LBS

## 2019-03-19 DIAGNOSIS — Z82.49 FAMILY HISTORY OF DVT: ICD-10-CM

## 2019-03-19 DIAGNOSIS — D68.59 PROTEIN S DEFICIENCY: ICD-10-CM

## 2019-03-19 DIAGNOSIS — Z83.2 FAMILY HISTORY OF FACTOR V LEIDEN MUTATION: ICD-10-CM

## 2019-03-19 DIAGNOSIS — N92.6 IRREGULAR PERIODS: Primary | ICD-10-CM

## 2019-03-19 DIAGNOSIS — D68.51 FACTOR V LEIDEN: ICD-10-CM

## 2019-03-19 DIAGNOSIS — N93.8 DYSFUNCTIONAL UTERINE BLEEDING: ICD-10-CM

## 2019-03-19 DIAGNOSIS — N95.1 PERIMENOPAUSE: ICD-10-CM

## 2019-03-19 PROCEDURE — 99213 OFFICE O/P EST LOW 20 MIN: CPT | Mod: S$GLB,,, | Performed by: OBSTETRICS & GYNECOLOGY

## 2019-03-19 PROCEDURE — 99999 PR PBB SHADOW E&M-EST. PATIENT-LVL III: ICD-10-PCS | Mod: PBBFAC,,, | Performed by: OBSTETRICS & GYNECOLOGY

## 2019-03-19 PROCEDURE — 99999 PR PBB SHADOW E&M-EST. PATIENT-LVL III: CPT | Mod: PBBFAC,,, | Performed by: OBSTETRICS & GYNECOLOGY

## 2019-03-19 PROCEDURE — 3008F BODY MASS INDEX DOCD: CPT | Mod: CPTII,S$GLB,, | Performed by: OBSTETRICS & GYNECOLOGY

## 2019-03-19 PROCEDURE — 99213 PR OFFICE/OUTPT VISIT, EST, LEVL III, 20-29 MIN: ICD-10-PCS | Mod: S$GLB,,, | Performed by: OBSTETRICS & GYNECOLOGY

## 2019-03-19 PROCEDURE — 3008F PR BODY MASS INDEX (BMI) DOCUMENTED: ICD-10-PCS | Mod: CPTII,S$GLB,, | Performed by: OBSTETRICS & GYNECOLOGY

## 2019-03-19 RX ORDER — ACETAMINOPHEN AND CODEINE PHOSPHATE 120; 12 MG/5ML; MG/5ML
1 SOLUTION ORAL DAILY
Qty: 30 TABLET | Refills: 11 | Status: SHIPPED | OUTPATIENT
Start: 2019-03-19 | End: 2020-11-04

## 2019-03-19 NOTE — PROGRESS NOTES
Chief Complaint   Patient presents with    irregular bleeding on progesterone    stopped progesterone in early march per your suggestion    she was having a 7 day cycle every 9 days       History of Present Illness: Sparkle Jose is a 38 y.o. female that presents today 3/19/2019 for   Chief Complaint   Patient presents with    irregular bleeding on progesterone    stopped progesterone in early march per your suggestion    she was having a 7 day cycle every 9 days     She reports no period for 3 months then she did prometrium for 3 weeks then she had a period for 7 days then 9 days later she bled for another 7 days. She reports that she felt better on the prometrium but stopped due to bleeding.     Past Medical History:   Diagnosis Date    Ankylosing spondylitis     Arthritis     Celiac disease     Depression     Family history of DVT     MGM DVT, mother spleenic    Family history of factor V Leiden mutation     mother and sister    GERD (gastroesophageal reflux disease)     Migraine     Osteoarthritis     Psoriasis     Psoriatic arthritis mutilans        Past Surgical History:   Procedure Laterality Date    brain decompression   2006    BRAIN SURGERY      [id number] Bilateral 10/18/2017    [facility]    TUBAL LIGATION         Current Outpatient Medications   Medication Sig Dispense Refill    ALPRAZolam (XANAX) 0.25 MG tablet Take 1 tablet (0.25 mg total) by mouth 4 (four) times daily as needed for Anxiety. 40 tablet 3    amitriptyline (ELAVIL) 25 MG tablet Take 50 mg by mouth every evening.      busPIRone (BUSPAR) 15 MG tablet       cholecalciferol, vitamin D3, (VITAMIN D3) 1,000 unit capsule Take 3,000 Units by mouth once daily.      COSENTYX PEN, 2 PENS, 150 mg/mL PnIj INJECT TWO PENS (300 MG) SUBCUTANEOUSLY EVERY MONTH. REFRIGERATE. ALLOW 15 TO 30 MINUTES AT ROOM TEMP PRIOR TO ADMINISTRATION. DO NOT FREEZE 2 mL 9     cyclobenzaprine (FLEXERIL) 5 MG tablet TAKE 1 TO 2 TABLETS BY MOUTH AT BEDTIME AS NEEDED FOR MUSCLE PAIN  2    fentaNYL (DURAGESIC) 25 mcg/hr Place 1 patch onto the skin every 72 hours.       ibuprofen (ADVIL,MOTRIN) 800 MG tablet Take 1 tablet (800 mg total) by mouth every 6 (six) hours as needed for Pain. 90 tablet 4    ketoconazole (NIZORAL) 2 % shampoo Wash body with medicated shampoo at least 2x/week - let soak at least 5 minutes prior to rinsing 120 mL 5    liothyronine (CYTOMEL) 5 MCG Tab TAKE ONE TABLET BY MOUTH ONCE DAILY ON AN EMPTY STOMACH.  1    phentermine (ADIPEX-P) 37.5 mg tablet Take 37.5 mg by mouth once daily.  0    promethazine (PHENERGAN) 25 MG tablet Take 1 tablet (25 mg total) by mouth every 6 (six) hours as needed. 30 tablet 3    protriptyline (VIVACTIL) 5 MG Tab       ranitidine (ZANTAC) 150 MG capsule Take 1 capsule (150 mg total) by mouth 2 (two) times daily. (Patient taking differently: Take 150 mg by mouth 2 (two) times daily as needed. ) 60 capsule 6    clobetasol 0.05% (TEMOVATE) 0.05 % Oint 1 application daily as needed.       eletriptan (RELPAX) 40 MG tablet Take 1 tablet (40 mg total) by mouth as needed for Migraine. may repeat in 2 hours if necessary 9 tablet 2    furosemide (LASIX) 20 MG tablet Take 1 tablet (20 mg total) by mouth once daily. (Patient taking differently: Take 20 mg by mouth daily as needed. ) 30 tablet 4    modafinil (PROVIGIL) 200 MG Tab Take 200 mg by mouth.      norethindrone (MICRONOR) 0.35 mg tablet Take 1 tablet (0.35 mg total) by mouth once daily. 30 tablet 11    oxycodone-acetaminophen (PERCOCET)  mg per tablet Take 1 tablet by mouth every 8 (eight) hours as needed for Pain. 75 tablet 0    potassium chloride (KLOR-CON) 10 MEQ TbSR Take 1 tablet (10 mEq total) by mouth once daily. 30 tablet 4    predniSONE (DELTASONE) 5 MG tablet Take 5 mg by mouth once daily.      trazodone (DESYREL) 50 MG tablet TAKE 1 TABLET (50 MG TOTAL) BY MOUTH  "EVERY EVENING. (Patient taking differently: Take 50 mg by mouth nightly as needed. ) 90 tablet 3     No current facility-administered medications for this visit.        Review of patient's allergies indicates:  No Known Allergies    Family History   Problem Relation Age of Onset    Hypertension Mother     Clotting disorder Mother     Depression Sister     Asthma Brother     Clotting disorder Sister     Breast cancer Neg Hx     Ovarian cancer Neg Hx        Social History     Tobacco Use    Smoking status: Current Every Day Smoker     Packs/day: 0.50     Types: Cigarettes    Smokeless tobacco: Never Used   Substance Use Topics    Alcohol use: No     Alcohol/week: 0.0 oz    Drug use: No       OB History    Para Term  AB Living   2 2 1     1   SAB TAB Ectopic Multiple Live Births           1      # Outcome Date GA Lbr Isauro/2nd Weight Sex Delivery Anes PTL Lv   2 Term            1 Para     F Vag-Spont   DAKSHA          Review of Symptoms:  GENERAL: Denies weight gain or weight loss. Feeling well overall.   SKIN: Denies rash or lesions.   HEAD: Denies head injury or headache.   NODES: Denies enlarged lymph nodes.   CHEST: Denies chest pain or shortness of breath.   CARDIOVASCULAR: Denies palpitations or left sided chest pain.   ABDOMEN: No abdominal pain, constipation, diarrhea, nausea, vomiting or rectal bleeding.   URINARY: No frequency, dysuria, hematuria, or burning on urination.  HEMATOLOGIC: No easy bruisability or excessive bleeding.   MUSCULOSKELETAL: Denies joint pain or swelling.     /64   Ht 5' 1" (1.549 m)   Wt 68.4 kg (150 lb 12.7 oz)   LMP 2019     ASSESSMENT/PLAN:  Irregular periods  -     norethindrone (MICRONOR) 0.35 mg tablet; Take 1 tablet (0.35 mg total) by mouth once daily.  Dispense: 30 tablet; Refill: 11    Dysfunctional uterine bleeding  -     norethindrone (MICRONOR) 0.35 mg tablet; Take 1 tablet (0.35 mg total) by mouth once daily.  Dispense: 30 tablet; Refill: " 11    Perimenopause  -     norethindrone (MICRONOR) 0.35 mg tablet; Take 1 tablet (0.35 mg total) by mouth once daily.  Dispense: 30 tablet; Refill: 11    Factor V Leiden  -     norethindrone (MICRONOR) 0.35 mg tablet; Take 1 tablet (0.35 mg total) by mouth once daily.  Dispense: 30 tablet; Refill: 11    Protein S deficiency  -     norethindrone (MICRONOR) 0.35 mg tablet; Take 1 tablet (0.35 mg total) by mouth once daily.  Dispense: 30 tablet; Refill: 11    Family history of factor V Leiden mutation  -     norethindrone (MICRONOR) 0.35 mg tablet; Take 1 tablet (0.35 mg total) by mouth once daily.  Dispense: 30 tablet; Refill: 11    Family history of DVT  -     norethindrone (MICRONOR) 0.35 mg tablet; Take 1 tablet (0.35 mg total) by mouth once daily.  Dispense: 30 tablet; Refill: 11      We discussed minipill, IUD, ablation to control the irregular periods    15 minutes spent today with this patient. Greater than half spent in counseling today.

## 2019-04-09 ENCOUNTER — PATIENT MESSAGE (OUTPATIENT)
Dept: RHEUMATOLOGY | Facility: CLINIC | Age: 39
End: 2019-04-09

## 2019-05-24 DIAGNOSIS — F41.9 ANXIETY: ICD-10-CM

## 2019-05-24 DIAGNOSIS — G93.5 ARNOLD-CHIARI MALFORMATION, TYPE I: ICD-10-CM

## 2019-05-24 DIAGNOSIS — M54.2 NECK PAIN: ICD-10-CM

## 2019-05-24 DIAGNOSIS — L40.50 PSORIATIC ARTHRITIS: ICD-10-CM

## 2019-05-27 RX ORDER — SECUKINUMAB 150 MG/ML
INJECTION SUBCUTANEOUS
Qty: 2 SYRINGE | Refills: 8 | Status: SHIPPED | OUTPATIENT
Start: 2019-05-27 | End: 2020-01-28

## 2019-06-03 ENCOUNTER — PATIENT MESSAGE (OUTPATIENT)
Dept: NEUROLOGY | Facility: CLINIC | Age: 39
End: 2019-06-03

## 2019-07-08 ENCOUNTER — PATIENT MESSAGE (OUTPATIENT)
Dept: RHEUMATOLOGY | Facility: CLINIC | Age: 39
End: 2019-07-08

## 2019-07-09 ENCOUNTER — LAB VISIT (OUTPATIENT)
Dept: LAB | Facility: HOSPITAL | Age: 39
End: 2019-07-09
Attending: INTERNAL MEDICINE
Payer: COMMERCIAL

## 2019-07-09 ENCOUNTER — OFFICE VISIT (OUTPATIENT)
Dept: RHEUMATOLOGY | Facility: CLINIC | Age: 39
End: 2019-07-09
Payer: COMMERCIAL

## 2019-07-09 VITALS
HEIGHT: 61 IN | DIASTOLIC BLOOD PRESSURE: 84 MMHG | WEIGHT: 143 LBS | HEART RATE: 91 BPM | SYSTOLIC BLOOD PRESSURE: 124 MMHG | BODY MASS INDEX: 27 KG/M2

## 2019-07-09 DIAGNOSIS — M45.9 ANKYLOSING SPONDYLITIS, UNSPECIFIED SITE OF SPINE: ICD-10-CM

## 2019-07-09 DIAGNOSIS — D72.829 LEUKOCYTOSIS, UNSPECIFIED TYPE: ICD-10-CM

## 2019-07-09 DIAGNOSIS — L40.50 PSORIATIC ARTHRITIS: Primary | ICD-10-CM

## 2019-07-09 DIAGNOSIS — M54.2 NECK PAIN: ICD-10-CM

## 2019-07-09 DIAGNOSIS — D69.1 ABNORMAL PLATELETS: ICD-10-CM

## 2019-07-09 DIAGNOSIS — L40.50 PSORIATIC ARTHRITIS: ICD-10-CM

## 2019-07-09 PROCEDURE — 96372 PR INJECTION,THERAP/PROPH/DIAG2ST, IM OR SUBCUT: ICD-10-PCS | Mod: S$GLB,,, | Performed by: INTERNAL MEDICINE

## 2019-07-09 PROCEDURE — 3008F BODY MASS INDEX DOCD: CPT | Mod: CPTII,S$GLB,, | Performed by: INTERNAL MEDICINE

## 2019-07-09 PROCEDURE — 99214 PR OFFICE/OUTPT VISIT, EST, LEVL IV, 30-39 MIN: ICD-10-PCS | Mod: 25,S$GLB,, | Performed by: INTERNAL MEDICINE

## 2019-07-09 PROCEDURE — 99214 OFFICE O/P EST MOD 30 MIN: CPT | Mod: 25,S$GLB,, | Performed by: INTERNAL MEDICINE

## 2019-07-09 PROCEDURE — 96372 THER/PROPH/DIAG INJ SC/IM: CPT | Mod: S$GLB,,, | Performed by: INTERNAL MEDICINE

## 2019-07-09 PROCEDURE — 99999 PR PBB SHADOW E&M-EST. PATIENT-LVL III: ICD-10-PCS | Mod: PBBFAC,,, | Performed by: INTERNAL MEDICINE

## 2019-07-09 PROCEDURE — 99999 PR PBB SHADOW E&M-EST. PATIENT-LVL III: CPT | Mod: PBBFAC,,, | Performed by: INTERNAL MEDICINE

## 2019-07-09 PROCEDURE — 3008F PR BODY MASS INDEX (BMI) DOCUMENTED: ICD-10-PCS | Mod: CPTII,S$GLB,, | Performed by: INTERNAL MEDICINE

## 2019-07-09 PROCEDURE — 86480 TB TEST CELL IMMUN MEASURE: CPT

## 2019-07-09 PROCEDURE — 36415 COLL VENOUS BLD VENIPUNCTURE: CPT | Mod: PO

## 2019-07-09 RX ORDER — KETOROLAC TROMETHAMINE 30 MG/ML
60 INJECTION, SOLUTION INTRAMUSCULAR; INTRAVENOUS
Status: COMPLETED | OUTPATIENT
Start: 2019-07-09 | End: 2019-07-09

## 2019-07-09 RX ORDER — METHYLPREDNISOLONE ACETATE 80 MG/ML
160 INJECTION, SUSPENSION INTRA-ARTICULAR; INTRALESIONAL; INTRAMUSCULAR; SOFT TISSUE
Status: COMPLETED | OUTPATIENT
Start: 2019-07-09 | End: 2019-07-09

## 2019-07-09 RX ADMIN — METHYLPREDNISOLONE ACETATE 160 MG: 80 INJECTION, SUSPENSION INTRA-ARTICULAR; INTRALESIONAL; INTRAMUSCULAR; SOFT TISSUE at 01:07

## 2019-07-09 RX ADMIN — KETOROLAC TROMETHAMINE 60 MG: 30 INJECTION, SOLUTION INTRAMUSCULAR; INTRAVENOUS at 01:07

## 2019-07-09 ASSESSMENT — ROUTINE ASSESSMENT OF PATIENT INDEX DATA (RAPID3)
MDHAQ FUNCTION SCORE: 3
PSYCHOLOGICAL DISTRESS SCORE: 3.3
PATIENT GLOBAL ASSESSMENT SCORE: 6
PAIN SCORE: 6
TOTAL RAPID3 SCORE: 7.33

## 2019-07-09 NOTE — PROGRESS NOTES
Subjective:       Patient ID: Sparkle Jose is a 38 y.o. female.    Chief Complaint: Psoriatic Arthritis and Disease Management (Ankylosing spondylitis)    Follow up:  Diagnosis is psoriatic arthritis and ankylosing spondylitis  On consyntx and stable she has  lost weight and doing well.  Patient complains of arthralgias and myalgias for which has been present for a few years. Pain is located in multiple joints, both shoulder(s), both elbow(s), both wrist(s), both MCP(s): 1st, 2nd, 3rd, 4th and 5th, both PIP(s): 1st, 2nd, 3rd, 4th and 5th, both DIP(s): 1st and 2nd, both hip(s), both knee(s) and both MTP(s): 1st, 2nd, 3rd, 4th and 5th, is described as aching, pulsating, shooting and throbbing, and is constant, moderate to severe pain .thyroid level are improved.            She complains of joint swelling. Associated symptoms include fatigue.         Review of Systems   Constitutional: Positive for activity change, appetite change, fatigue and unexpected weight change. Negative for chills and diaphoresis.   HENT: Negative for congestion, dental problem, ear discharge, ear pain, facial swelling, mouth sores, nosebleeds, postnasal drip, rhinorrhea, sinus pressure, sneezing, sore throat, tinnitus and voice change.    Eyes: Negative for photophobia, pain, discharge, redness and itching.   Respiratory: Negative for apnea, cough, chest tightness, shortness of breath and wheezing.    Cardiovascular: Positive for leg swelling. Negative for chest pain and palpitations.   Gastrointestinal: Negative for abdominal distention, abdominal pain, constipation, diarrhea, nausea and vomiting.   Endocrine: Negative for cold intolerance, heat intolerance, polydipsia and polyuria.   Genitourinary: Negative for decreased urine volume, difficulty urinating, flank pain, frequency, hematuria and urgency.   Musculoskeletal: Positive for arthralgias, gait problem, joint swelling, neck pain and neck stiffness. Negative for back pain.  "  Skin: Positive for rash. Negative for pallor and wound.        Psoriasis rash on arms   Allergic/Immunologic: Negative for immunocompromised state.   Neurological: Positive for weakness and numbness. Negative for dizziness and tremors.   Hematological: Negative for adenopathy. Does not bruise/bleed easily.   Psychiatric/Behavioral: Positive for sleep disturbance. The patient is nervous/anxious.         Depressed         Objective:     /84 (BP Location: Left arm, Patient Position: Sitting, BP Method: Small (Automatic))   Pulse 91   Ht 5' 1" (1.549 m)   Wt 64.9 kg (143 lb)   LMP 07/03/2019   BMI 27.02 kg/m²      Physical Exam   Vitals reviewed.  Constitutional: She is oriented to person, place, and time.   HENT:   Head: Normocephalic and atraumatic.   Mouth/Throat: Oropharynx is clear and moist.   Eyes: EOM are normal. Pupils are equal, round, and reactive to light.   Neck: Neck supple. No thyromegaly present.   Cardiovascular: Normal rate, regular rhythm and normal heart sounds.  Exam reveals no gallop and no friction rub.    No murmur heard.  Pulmonary/Chest: Breath sounds normal. She has no wheezes. She has no rales. She exhibits no tenderness.   Abdominal: There is no tenderness. There is no rebound and no guarding.       Right Side Rheumatological Exam     Examination finds the elbow, 1st MCP, 2nd MCP, 3rd MCP, 4th MCP and 5th MCP normal.    The patient is tender to palpation of the shoulder and knee    She has swelling of the knee    The patient has an enlarged wrist, 1st PIP, 2nd PIP, 3rd PIP, 4th PIP and 5th PIP    Shoulder Exam   Tenderness Location: acromion    Range of Motion   Active abduction: abnormal   Adduction: abnormal  Sensation: normal    Knee Exam   Tenderness Location: medial joint line  Patellofemoral Crepitus: positive  Effusion: positive  Sensation: normal    Hip Exam   Tenderness Location: no tenderness  Sensation: normal    Elbow/Wrist Exam   Tenderness Location: no " tenderness  Sensation: normal    Left Side Rheumatological Exam     Examination finds the elbow, knee, 1st MCP, 2nd MCP, 3rd MCP, 4th MCP and 5th MCP normal.    The patient is tender to palpation of the shoulder.    The patient has an enlarged wrist, 1st PIP, 2nd PIP, 3rd PIP, 4th PIP and 5th PIP.    Shoulder Exam   Tenderness Location: acromion    Range of Motion   Active abduction: abnormal   Sensation: normal    Knee Exam   Tenderness Location: lateral joint line and medial joint line    Patellofemoral Crepitus: positive  Effusion: positive  Sensation: normal    Hip Exam   Tenderness Location: no tenderness  Sensation: normal    Elbow/Wrist Exam   Sensation: normal      Back/Neck Exam   General Inspection   Gait: normal       Tenderness Right paramedian tenderness of the Lower C-Spine, Lower L-Spine, Occ, Lower T-Spine and Upper C-Spine.Left paramedian tenderness of the Lower L-Spine, Lower C-Spine and Lower T-Spine.    Back Range of Motion   Lateral Bend Right: abnormal  Lateral Bend Left: abnormal  Rotation Right: abnormal  Rotation Left: abnormal      Lymphadenopathy:     She has no cervical adenopathy.   Neurological: She is alert and oriented to person, place, and time. She has normal sensation and normal strength.   Reflex Scores:       Patellar reflexes are 3+ on the right side and 3+ on the left side.  Skin: Rash noted. No erythema. No pallor.     Psychiatric: Mood and affect normal.   Musculoskeletal: She exhibits tenderness and deformity.       Results for orders placed or performed in visit on 02/11/19   Comprehensive metabolic panel   Result Value Ref Range    Sodium 139 136 - 145 mmol/L    Potassium 4.0 3.5 - 5.1 mmol/L    Chloride 107 95 - 110 mmol/L    CO2 24 23 - 29 mmol/L    Glucose 96 70 - 110 mg/dL    BUN, Bld 8 6 - 20 mg/dL    Creatinine 0.7 0.5 - 1.4 mg/dL    Calcium 9.4 8.7 - 10.5 mg/dL    Total Protein 6.7 6.0 - 8.4 g/dL    Albumin 3.3 (L) 3.5 - 5.2 g/dL    Total Bilirubin 0.1 0.1 - 1.0  mg/dL    Alkaline Phosphatase 65 55 - 135 U/L    AST 14 10 - 40 U/L    ALT 9 (L) 10 - 44 U/L    Anion Gap 8 8 - 16 mmol/L    eGFR if African American >60.0 >60 mL/min/1.73 m^2    eGFR if non African American >60.0 >60 mL/min/1.73 m^2   CBC auto differential   Result Value Ref Range    WBC 13.40 (H) 3.90 - 12.70 K/uL    RBC 4.62 4.00 - 5.40 M/uL    Hemoglobin 14.4 12.0 - 16.0 g/dL    Hematocrit 44.5 37.0 - 48.5 %    Mean Corpuscular Volume 96 82 - 98 fL    Mean Corpuscular Hemoglobin 31.2 (H) 27.0 - 31.0 pg    Mean Corpuscular Hemoglobin Conc 32.4 32.0 - 36.0 g/dL    RDW 14.4 11.5 - 14.5 %    Platelets 420 (H) 150 - 350 K/uL    MPV 10.4 9.2 - 12.9 fL    Immature Granulocytes 0.3 0.0 - 0.5 %    Gran # (ANC) 6.8 1.8 - 7.7 K/uL    Immature Grans (Abs) 0.04 0.00 - 0.04 K/uL    Lymph # 5.1 (H) 1.0 - 4.8 K/uL    Mono # 1.2 (H) 0.3 - 1.0 K/uL    Eos # 0.2 0.0 - 0.5 K/uL    Baso # 0.12 0.00 - 0.20 K/uL    nRBC 0 0 /100 WBC    Gran% 50.7 38.0 - 73.0 %    Lymph% 37.8 18.0 - 48.0 %    Mono% 9.0 4.0 - 15.0 %    Eosinophil% 1.3 0.0 - 8.0 %    Basophil% 0.9 0.0 - 1.9 %    Differential Method Automated        Assessment:     Encounter Diagnoses   Name Primary?    Psoriatic arthritis Yes    Ankylosing spondylitis, unspecified site of spine     Abnormal platelets     Leukocytosis, unspecified type          Plan:     Psoriatic arthritis  -     CBC auto differential; Future; Expected date: 01/09/2020  -     Comprehensive metabolic panel; Future; Expected date: 01/09/2020  -     Sedimentation rate; Future; Expected date: 01/09/2020  -     C-reactive protein; Future; Expected date: 01/09/2020  -     methylPREDNISolone acetate injection 160 mg  -     ketorolac injection 60 mg    Ankylosing spondylitis, unspecified site of spine  -     CBC auto differential; Future; Expected date: 01/09/2020  -     Comprehensive metabolic panel; Future; Expected date: 01/09/2020  -     Sedimentation rate; Future; Expected date: 01/09/2020  -      C-reactive protein; Future; Expected date: 01/09/2020  -     methylPREDNISolone acetate injection 160 mg  -     ketorolac injection 60 mg    Abnormal platelets  -     CBC auto differential; Future; Expected date: 01/09/2020  -     Comprehensive metabolic panel; Future; Expected date: 01/09/2020  -     Sedimentation rate; Future; Expected date: 01/09/2020  -     C-reactive protein; Future; Expected date: 01/09/2020  -     methylPREDNISolone acetate injection 160 mg  -     ketorolac injection 60 mg    Leukocytosis, unspecified type  -     CBC auto differential; Future; Expected date: 01/09/2020  -     Comprehensive metabolic panel; Future; Expected date: 01/09/2020  -     Sedimentation rate; Future; Expected date: 01/09/2020  -     C-reactive protein; Future; Expected date: 01/09/2020  -     methylPREDNISolone acetate injection 160 mg  -     ketorolac injection 60 mg       On consyntx and her joints are stable,hip will be evaluated

## 2019-07-09 NOTE — PROGRESS NOTES
Administered 2 cc ( 80 mg/ml ) of depomedrol to the right upper outer gluteal. Informed of s/s to report verbalized understanding. No adverse reactions noted.    Lot # [id number]  Expiration 10/20    Administered 2 cc ( 30 mg/ml ) of toradol to the left upper outer gluteal. Informed of s/s to report verbalized understanding. No adverse reactions noted.    Lot # [id number]  Expiration 1 jul 2020

## 2019-07-11 LAB
M TB IFN-G CD4+ BCKGRND COR BLD-ACNC: 0 IU/ML
MITOGEN IGNF BCKGRD COR BLD-ACNC: >10 IU/ML
MITOGEN IGNF BCKGRD COR BLD-ACNC: NEGATIVE [IU]/ML
NIL: 0.02 IU/ML
TB2 - NIL: 0 IU/ML

## 2019-07-30 ENCOUNTER — PATIENT MESSAGE (OUTPATIENT)
Dept: RHEUMATOLOGY | Facility: CLINIC | Age: 39
End: 2019-07-30

## 2019-07-30 DIAGNOSIS — R93.89 ABNORMAL FINDINGS ON DIAGNOSTIC IMAGING OF OTHER SPECIFIED BODY STRUCTURES: ICD-10-CM

## 2019-08-01 ENCOUNTER — HOSPITAL ENCOUNTER (OUTPATIENT)
Dept: RADIOLOGY | Facility: HOSPITAL | Age: 39
Discharge: HOME | End: 2019-08-01
Attending: INTERNAL MEDICINE
Payer: COMMERCIAL

## 2019-08-01 ENCOUNTER — PATIENT MESSAGE (OUTPATIENT)
Dept: RHEUMATOLOGY | Facility: CLINIC | Age: 39
End: 2019-08-01

## 2019-08-01 DIAGNOSIS — R93.89 ABNORMAL FINDINGS ON DIAGNOSTIC IMAGING OF OTHER SPECIFIED BODY STRUCTURES: ICD-10-CM

## 2019-08-01 PROCEDURE — 71260 CT CHEST ABDOMEN PELVIS WITH CONTRAST (XPD): ICD-10-PCS | Mod: 26,,, | Performed by: RADIOLOGY

## 2019-08-01 PROCEDURE — 74177 CT ABD & PELVIS W/CONTRAST: CPT | Mod: TC,PO

## 2019-08-01 PROCEDURE — 74177 CT ABD & PELVIS W/CONTRAST: CPT | Mod: 26,,, | Performed by: RADIOLOGY

## 2019-08-01 PROCEDURE — 74177 CT CHEST ABDOMEN PELVIS WITH CONTRAST (XPD): ICD-10-PCS | Mod: 26,,, | Performed by: RADIOLOGY

## 2019-08-01 PROCEDURE — 25500020 PHARM REV CODE 255: Mod: PO | Performed by: INTERNAL MEDICINE

## 2019-08-01 PROCEDURE — 71260 CT THORAX DX C+: CPT | Mod: 26,,, | Performed by: RADIOLOGY

## 2019-08-01 RX ADMIN — IOHEXOL 30 ML: 350 INJECTION, SOLUTION INTRAVENOUS at 11:08

## 2019-08-01 RX ADMIN — IOHEXOL 75 ML: 350 INJECTION, SOLUTION INTRAVENOUS at 11:08

## 2019-08-02 ENCOUNTER — PATIENT MESSAGE (OUTPATIENT)
Dept: RHEUMATOLOGY | Facility: CLINIC | Age: 39
End: 2019-08-02

## 2019-08-06 ENCOUNTER — TELEPHONE (OUTPATIENT)
Dept: RHEUMATOLOGY | Facility: CLINIC | Age: 39
End: 2019-08-06

## 2019-08-06 NOTE — TELEPHONE ENCOUNTER
----- Message from Jeremy Eric sent at 8/6/2019  9:14 AM CDT -----  Type: Needs Medical Advice    Who Called:  Self   Symptoms (please be specific):    How long has patient had these symptoms:   Pharmacy name and phone #:    Best Call Back Number: [phone]  Additional Information: Patient requesting to speak with the nurse regarding an earlier appointment with pulmonologist Dr Roldan

## 2019-08-06 NOTE — TELEPHONE ENCOUNTER
----- Message from Maria Isabel Preciado sent at 8/6/2019  9:18 AM CDT -----  Contact: Patient  Type:  Patient Returning Call    Who Called:  Patient  Who Left Message for Patient: Nurse  Does the patient know what this is regarding?:  yes  Best Call Back Number:   Additional Information:  Call to pod. No answer.

## 2019-08-06 NOTE — TELEPHONE ENCOUNTER
Spoke with patient and she said pulmonary in [place] could not see her until September. She is going to call Yahir Florez to schedule an appointment.

## 2019-08-07 ENCOUNTER — OFFICE VISIT (OUTPATIENT)
Dept: PULMONOLOGY | Facility: CLINIC | Age: 39
End: 2019-08-07
Payer: COMMERCIAL

## 2019-08-07 VITALS
OXYGEN SATURATION: 98 % | HEART RATE: 93 BPM | DIASTOLIC BLOOD PRESSURE: 75 MMHG | BODY MASS INDEX: 27.09 KG/M2 | SYSTOLIC BLOOD PRESSURE: 104 MMHG | HEIGHT: 61 IN | RESPIRATION RATE: 20 BRPM | WEIGHT: 143.5 LBS

## 2019-08-07 DIAGNOSIS — R91.1 PULMONARY NODULE: Primary | ICD-10-CM

## 2019-08-07 DIAGNOSIS — F17.200 SMOKER: ICD-10-CM

## 2019-08-07 DIAGNOSIS — D49.1 NEOPLASM OF LUNG: ICD-10-CM

## 2019-08-07 PROCEDURE — 99999 PR PBB SHADOW E&M-EST. PATIENT-LVL V: CPT | Mod: PBBFAC,,, | Performed by: INTERNAL MEDICINE

## 2019-08-07 PROCEDURE — 3008F PR BODY MASS INDEX (BMI) DOCUMENTED: ICD-10-PCS | Mod: CPTII,S$GLB,, | Performed by: INTERNAL MEDICINE

## 2019-08-07 PROCEDURE — 99205 PR OFFICE/OUTPT VISIT, NEW, LEVL V, 60-74 MIN: ICD-10-PCS | Mod: S$GLB,,, | Performed by: INTERNAL MEDICINE

## 2019-08-07 PROCEDURE — 99205 OFFICE O/P NEW HI 60 MIN: CPT | Mod: S$GLB,,, | Performed by: INTERNAL MEDICINE

## 2019-08-07 PROCEDURE — 3008F BODY MASS INDEX DOCD: CPT | Mod: CPTII,S$GLB,, | Performed by: INTERNAL MEDICINE

## 2019-08-07 PROCEDURE — 99999 PR PBB SHADOW E&M-EST. PATIENT-LVL V: ICD-10-PCS | Mod: PBBFAC,,, | Performed by: INTERNAL MEDICINE

## 2019-08-07 NOTE — PROGRESS NOTES
Subjective:       Patient ID: Sparkle Jose is a 38 y.o. female.    Chief Complaint: She       Pulmonary Nodules    HPI     Lung Nodule  She presents for evaluation and treatment of a lung nodule. The patient reports that the imaging was performed to evaluate symptoms of back pain which have been present for 3 weeks and are gradually worsening. Symptoms are exacerbated by supine position and relieved by rest and heat. The patient denies other associated symptoms. She currently smokes 3 packs per week. The patient has no known exposure to tuberculosis and a negative PPD. The patient does not have a history of cancer.    On biologic for ankylosing spondlylitis    Hx of RUQ pain for 3-4 weeks    Past Medical History:   Diagnosis Date    Ankylosing spondylitis     Arthritis     Celiac disease     Depression     Family history of DVT     MGM DVT, mother spleenic    Family history of factor V Leiden mutation     mother and sister    GERD (gastroesophageal reflux disease)     Migraine     Osteoarthritis     Psoriasis     Psoriatic arthritis mutilans      Past Surgical History:   Procedure Laterality Date    brain decompression   2006    BRAIN SURGERY      [id number] Bilateral 10/18/2017    Performed by Carine Laws [facility] at [facility]    TUBAL LIGATION       Social History     Socioeconomic History    Marital status:      Spouse name: Not on file    Number of children: Not on file    Years of education: Not on file    Highest education level: Not on file   Occupational History    Not on file   Social Needs    Financial resource strain: Not on file    Food insecurity:     Worry: Not on file     Inability: Not on file    Transportation needs:     Medical: Not on file     Non-medical: Not on file   Tobacco Use    Smoking status: Current Every Day Smoker     Packs/day: 0.50     Years: 16.00     Pack years: 8.00     Types: Cigarettes     Start date: 1/1/1993     "Smokeless tobacco: Never Used   Substance and Sexual Activity    Alcohol use: No     Alcohol/week: 0.0 oz    Drug use: No    Sexual activity: Yes     Birth control/protection: See Surgical Hx   Lifestyle    Physical activity:     Days per week: Not on file     Minutes per session: Not on file    Stress: Not on file   Relationships    Social connections:     Talks on phone: Not on file     Gets together: Not on file     Attends [employer] service: Not on file     Active member of club or organization: Not on file     Attends meetings of clubs or organizations: Not on file     Relationship status: Not on file   Other Topics Concern    Not on file   Social History Narrative    Not on file     Review of Systems   Constitutional: Positive for fatigue. Negative for fever.   HENT: Negative for postnasal drip and rhinorrhea.    Eyes: Negative for redness and itching.   Respiratory: Positive for cough and dyspnea on extertion. Negative for shortness of breath, wheezing and Paroxysmal Nocturnal Dyspnea.    Cardiovascular: Negative for chest pain.   Genitourinary: Negative for difficulty urinating and hematuria.   Endocrine: Negative for polyphagia, cold intolerance and heat intolerance.    Musculoskeletal: Positive for arthralgias.   Skin: Negative for rash.   Gastrointestinal: Negative for nausea, vomiting, abdominal pain and abdominal distention.   Neurological: Negative for dizziness and headaches.   Hematological: Negative for adenopathy. Does not bruise/bleed easily and no excessive bruising.   Psychiatric/Behavioral: The patient is not nervous/anxious.        Objective:      /75   Pulse 93   Resp 20   Ht 5' 1" (1.549 m)   Wt 65.1 kg (143 lb 8.3 oz)   SpO2 98%   BMI 27.12 kg/m²   Physical Exam   Constitutional: She is oriented to person, place, and time. She appears well-developed and well-nourished.   HENT:   Head: Normocephalic and atraumatic.   Eyes: Pupils are equal, round, and reactive to light. " Conjunctivae are normal.   Neck: Neck supple. No JVD present. No tracheal deviation present. No thyromegaly present.   Cardiovascular: Normal rate, regular rhythm and normal heart sounds.   Pulmonary/Chest: Effort normal. No respiratory distress. She has decreased breath sounds. She has no wheezes. She has no rales. She exhibits no tenderness.   Abdominal: Soft. Bowel sounds are normal.   Musculoskeletal: Normal range of motion. She exhibits no edema.   Lymphadenopathy:     She has no cervical adenopathy.   Neurological: She is alert and oriented to person, place, and time.   Skin: Skin is warm and dry.   Nursing note and vitals reviewed.    Personal Diagnostic Review    CT Chest Abdoment Pelvis With Contrast  Narrative: EXAMINATION:  CT CHEST ABDOMEN PELVIS WITH CONTRAST (XPD)    CLINICAL HISTORY:  Abn findings on diagnostic imaging of body structures; Abnormal findings on diagnostic imaging of other specified body structures    TECHNIQUE:  Low dose axial images, sagittal and coronal reformations were obtained from the thoracic inlet to the pubic symphysis following the IV administration of 75 mL of Omnipaque 350 and the oral administration of 30 ml of Omnipaque 350.    COMPARISON:  None    FINDINGS:  Chest:    The soft tissue structures at the base of the neck show no significant abnormalities.  The thoracic aorta is not aneurysmal.  There is a common origin of the innominate artery and left common carotid artery (bovine arch) as an anatomic variant.  No thoracic aortic aneurysm or thoracic aortic dissection on the provided images.  The heart is unremarkable.  No significant volume of pericardial fluid.  The trachea and mainstem bronchi are unremarkable.  No pathologically enlarged lymph nodes in the chest or axilla.    There are imaging findings of chronic obstructive airways disease with multiple subpleural blebs and cystic lucencies observed in the upper lobes bilaterally.  There is an 8 mm triangular shaped  nodule observed in the right lower lobe abutting the interlobar fissure on series 4, image 225.  There is a 7 mm subpleural solid pulmonary nodule observed laterally in the right middle lobe on series 4, image 288, and there is an adjacent 5 mm pleural based solid nodule observed anterolaterally in the right middle lobe on series 4, image 294.  There is a 4 mm solid noncalcified pleural based nodule within the left upper lobe abutting the interlobar fissure on series 4, image 218.  There are scattered areas of linear atelectasis/fibrosis in the left lingula and left lower lobe.  No airspace consolidation.  No bronchiectasis.    Abdomen and pelvis:    The liver, gallbladder, spleen, stomach, duodenal C-loop, pancreas, and adrenal glands are unremarkable.  The abdominal aorta is not aneurysmal.  No pathologically enlarged periaortic or retroperitoneal lymph nodes.  The kidneys show no significant abnormalities.    The appendix is well visualized and shows no inflammatory change. The visualized loops of bowel show no evidence of inflammation or obstruction. There is a large volume of stool in the colon and rectum. Please correlate for symptoms of constipation. No ventral hernia, umbilical hernia, or inguinal hernia. No inguinal lymphadenopathy or pelvic sidewall adenopathy.    There is a 19 mm right ovarian hypodensity and there is a 16 mm left ovarian hypodensity, most likely physiologic follicles in this young premenopausal patient.  There are bilateral tubal closure devices present.  The urinary bladder is unremarkable.    Osseous structures of the chest, abdomen, and pelvis:    The bones are unremarkable for age.  Impression: 1. Imaging findings which suggest chronic obstructive airways disease with multiple scattered noncalcified solid pulmonary nodules observed scattered throughout the chest, the largest of which measures 8 mm in the right lower lobe abutting the interlobar fissure..  For multiple solid nodules  with any 6 mm or greater, Fleischner Society guidelines recommend follow up with non-contrast chest CT at 3-6 months and 18-24 months after discovery.  No prior imaging studies are available for comparison to assess temporal stability.  2. Other findings as detailed above.  This report was flagged in Epic as abnormal.    Electronically signed by: Mohamud Doyle MD  Date:    08/01/2019  Time:    13:59      CT Abdomen Pelvis WO Contrast Stone1/2/2019  [facility]  Result Narrative   REASON FOR EXAM: kidney stone     TECHNICAL FACTORS: 2.5 mm images are obtained from the superior aspect of the bladder without intravenous contrast.    This protocol is designed to evaluate for renal calculus disease. Although other pathologic processes may be identified, alterative protocols should be performed to maximize sensitivity and specificity when other pathologic processes are clinically   suspected.  Automated exposure control was utilized for radiation dose reduction.     COMPARISON: None    FINDINGS:     The lung bases are clear.    The kidneys are of normal size and contour without evidence of hydronephrosis or perinephric fat stranding. No calcifications are identified within the kidneys, ureters, or bladder. Mild urinary bladder wall thickening with subtle perivesicular   stranding.    Within the limitations of a noncontrasted scan, the visualized portions of the liver, spleen, pancreas, stomach, intestines, and vessels are normal. The appendix is well-visualized and appears morphologically normal. There is no intraperitoneal free air   or free fluid.    Status post bilateral fallopian tube occlusion. There is a 1.4 cm left ovarian cyst. Skeletal structures are unremarkable.       IMPRESSION:   1.  No evidence of genitourinary calculus or of obstructive uropathy.   2.  Urinary bladder wall thickening with subtle perivesicular stranding suggestive of cystitis.       Electronically signed by Raj Bustos MD on  1/2/2019 6:10 PM   Other Result Information   Interface, Rad Results In - 01/02/2019  6:13 PM CST  REASON FOR EXAM: kidney stone     TECHNICAL FACTORS: 2.5 mm images are obtained from the superior aspect of the bladder without intravenous contrast.    This protocol is designed to evaluate for renal calculus disease. Although other pathologic processes may be identified, alterative protocols should be performed to maximize sensitivity and specificity when other pathologic processes are clinically suspected.  Automated exposure control was utilized for radiation dose reduction.     COMPARISON: None    FINDINGS:     The lung bases are clear.    The kidneys are of normal size and contour without evidence of hydronephrosis or perinephric fat stranding. No calcifications are identified within the kidneys, ureters, or bladder. Mild urinary bladder wall thickening with subtle perivesicular stranding.    Within the limitations of a noncontrasted scan, the visualized portions of the liver, spleen, pancreas, stomach, intestines, and vessels are normal. The appendix is well-visualized and appears morphologically normal. There is no intraperitoneal free air or free fluid.    Status post bilateral fallopian tube occlusion. There is a 1.4 cm left ovarian cyst. Skeletal structures are unremarkable.       IMPRESSION:   1.  No evidence of genitourinary calculus or of obstructive uropathy.   2.  Urinary bladder wall thickening with subtle perivesicular stranding suggestive of cystitis.       Electronically signed by Raj Bustos MD on 1/2/2019 6:10 PM           Office Spirometry Results:     No flowsheet data found.  Pulmonary Studies Review 8/7/2019   SpO2 98   Height 61.000   Weight 2296.31   BMI (Calculated) 27.2   Predicted Distance 475.07   Predicted Distance Meters (Calculated) 613.56         Assessment:       Pulmonary nodule    Smoker  -     Ambulatory referral to Smoking Cessation Program    Neoplasm of lung - suspected  -      NM PET CT Routine Skull to Mid Thigh; Future; Expected date: 08/07/2019          Outpatient Encounter Medications as of 8/7/2019   Medication Sig Dispense Refill    ALPRAZolam (XANAX) 0.25 MG tablet Take 1 tablet (0.25 mg total) by mouth 4 (four) times daily as needed for Anxiety. 40 tablet 3    busPIRone (BUSPAR) 15 MG tablet       cholecalciferol, vitamin D3, (VITAMIN D3) 1,000 unit capsule Take 3,000 Units by mouth once daily.      clobetasol 0.05% (TEMOVATE) 0.05 % Oint 1 application daily as needed.       COSENTYX PEN, 2 PENS, 150 mg/mL PnIj INJECT TWO PENS SUBCUTANEOUSLY EVERY MONTH. REFRIGERATE. ALLOW 15 TO 30 MINUTES AT ROOM TEMP PRIOR TO ADMINISTRATION. DO NOT FREEZE 2 Syringe 8    cyclobenzaprine (FLEXERIL) 5 MG tablet TAKE 1 TO 2 TABLETS BY MOUTH AT BEDTIME AS NEEDED FOR MUSCLE PAIN  2    eletriptan (RELPAX) 40 MG tablet Take 1 tablet (40 mg total) by mouth as needed for Migraine. may repeat in 2 hours if necessary 9 tablet 2    ibuprofen (ADVIL,MOTRIN) 800 MG tablet Take 1 tablet (800 mg total) by mouth every 6 (six) hours as needed for Pain. 90 tablet 4    ketoconazole (NIZORAL) 2 % shampoo Wash body with medicated shampoo at least 2x/week - let soak at least 5 minutes prior to rinsing 120 mL 5    liothyronine (CYTOMEL) 5 MCG Tab TAKE ONE TABLET BY MOUTH ONCE DAILY ON AN EMPTY STOMACH.  1    norethindrone (MICRONOR) 0.35 mg tablet Take 1 tablet (0.35 mg total) by mouth once daily. 30 tablet 11    oxycodone-acetaminophen (PERCOCET)  mg per tablet Take 1 tablet by mouth every 8 (eight) hours as needed for Pain. 75 tablet 0    phentermine (ADIPEX-P) 37.5 mg tablet Take 37.5 mg by mouth once daily.  0    potassium chloride (KLOR-CON) 10 MEQ TbSR Take 1 tablet (10 mEq total) by mouth once daily. 30 tablet 4    predniSONE (DELTASONE) 5 MG tablet Take 5 mg by mouth once daily.      promethazine (PHENERGAN) 25 MG tablet Take 1 tablet (25 mg total) by mouth every 6 (six) hours as needed.  30 tablet 3    protriptyline (VIVACTIL) 5 MG Tab Take 5 mg by mouth once daily.       ranitidine (ZANTAC) 150 MG capsule Take 1 capsule (150 mg total) by mouth 2 (two) times daily. (Patient taking differently: Take 150 mg by mouth 2 (two) times daily as needed. ) 60 capsule 6    furosemide (LASIX) 20 MG tablet Take 1 tablet (20 mg total) by mouth once daily. (Patient taking differently: Take 20 mg by mouth daily as needed. ) 30 tablet 4    trazodone (DESYREL) 50 MG tablet TAKE 1 TABLET (50 MG TOTAL) BY MOUTH EVERY EVENING. (Patient taking differently: Take 50 mg by mouth nightly as needed. ) 90 tablet 3     No facility-administered encounter medications on file as of 8/7/2019.      Plan:       Requested Prescriptions      No prescriptions requested or ordered in this encounter     Problem List Items Addressed This Visit     Pulmonary nodule - Primary    Smoker    Relevant Orders    Ambulatory referral to Smoking Cessation Program      Other Visit Diagnoses     Neoplasm of lung - suspected        Relevant Orders    NM PET CT Routine Skull to Mid Thigh             Follow up in about 9 days (around 8/16/2019) for Review CT/PET day of visit.    MEDICAL DECISION MAKING: Moderate to high complexity.  Overall, the multiple problems listed are of moderate to high severity that may impact quality of life and activities of daily living. Side effects of medications, treatment plan as well as options and alternatives reviewed and discussed with patient. There was counseling of patient concerning these issues.    Total time spent in face to face counseling and coordination of care - 60  minutes over 50% of time was used in discussion of prognosis, risks, benefits of treatment, instructions and compliance with regimen . Discussion with other physicians or health care providers (DME, NP, pharmacy, respiratory therapy) occurred.

## 2019-08-07 NOTE — PATIENT INSTRUCTIONS
[facility]  [address] [place] [address]  (off [address])    Lung Biopsy Appointment    The radiologist will review your CT scan to determine when the lung biopsy will be scheduled. If you do not hear from the radiology department in 48 hours then call [phone].     This test is done in the Radiology Department on the 1st floor of [facility]    Lung Biopsy is performed to diagnose or determine the level of lung disease that cant be determined by regular lab testing.    Prior to Lung Biopsy: (VERY IMPORTANT-PLEASE READ)   You must be fasting: nothing to eat or drink after midnight the night before the test.   You must have a responsible adult to drive you home after procedure and stay with you the rest of the day.   Plan to stay approximately 4 hours after procedure is completed.   If you are on Coumadin, Heparin, Plavix, Aspirin or NSAIDS must be stopped 7 days prior to the procedure. (YOU MUST LET YOUR PHYSICIAN KNOW)   The ordering physician should order lab work to be done within 7 days of the procedure, to determine your blood clotting factor.   Diabetic patients: If you take oral glucose pills, dont take it the morning of the biopsy.    If you take insulin, take your normal dose as usual.   Blood pressure medications are to be taken the morning of procedure with a small sip of water at least 2 hours before biopsy.   Based on your medical condition, your physician may request other specific preparation.     During the test you will lie on your back with your right hand behind your head. You will need to lie completely still while the best spot to insert the biopsy needle is determine. Ultrasound, MRI, or CT may also be used to locate a specific spot in the lung. Then, your skin will be cleaned and a local anesthetic (numbing) will be applied to the skin. You will also be given a light sedation. A biopsy needle will be inserted and advanced  to the surface of the lung, where a tissue sample is obtained. After the biopsy you will remain lying on your right side for 2 hours. You may experience some pain around the site or in the shoulder. This isnt uncommon. Then, you will change position to lying on your back for 2-3 hours. You will be monitored closely during this time in recovery. You will be able to drink 2 hours and eat 4 hours after the procedure. The biopsy site will be dressed. A small amount of blood on dressing is normal.    What to expect when home for Lung Biopsy:   Bed rest for the rest of the day and evening.   Blood thinning medications can be resumed when instructed by prescribing physician.   Avoid heavy lifting and strenuous exercise for the next 2-3 days following the procedure.   You can resume your normal diet.   You wont be able to travel by airplane for up to 5 days after biopsy.    Contact doctors office or go to ER if you experience:   Excessive bleeding saturated dressing or bleeding that will not stop by biopsy site   Fever or chills   Severe pain in the abdomen   Difficulty breathing    If your biopsy needs to be rescheduled, please contact the Radiology Department at [phone].

## 2019-08-08 ENCOUNTER — TELEPHONE (OUTPATIENT)
Dept: PULMONOLOGY | Facility: CLINIC | Age: 39
End: 2019-08-08

## 2019-08-10 ENCOUNTER — PATIENT MESSAGE (OUTPATIENT)
Dept: PULMONOLOGY | Facility: CLINIC | Age: 39
End: 2019-08-10

## 2019-08-11 PROBLEM — R91.1 PULMONARY NODULE: Status: ACTIVE | Noted: 2019-08-11

## 2019-08-11 PROBLEM — F17.200 SMOKER: Status: ACTIVE | Noted: 2019-08-11

## 2019-08-13 ENCOUNTER — PATIENT MESSAGE (OUTPATIENT)
Dept: PULMONOLOGY | Facility: CLINIC | Age: 39
End: 2019-08-13

## 2019-08-14 ENCOUNTER — TELEPHONE (OUTPATIENT)
Dept: PULMONOLOGY | Facility: CLINIC | Age: 39
End: 2019-08-14

## 2019-08-14 NOTE — TELEPHONE ENCOUNTER
Reviewed CT scan of abdomen from January 2019 from [place].  No evidence of pulmonary nodule.  Scan sent to Radiology for inclusion in medical record.

## 2019-08-15 ENCOUNTER — TELEPHONE (OUTPATIENT)
Dept: RADIOLOGY | Facility: HOSPITAL | Age: 39
End: 2019-08-15

## 2019-08-19 ENCOUNTER — OFFICE VISIT (OUTPATIENT)
Dept: PULMONOLOGY | Facility: CLINIC | Age: 39
End: 2019-08-19
Payer: COMMERCIAL

## 2019-08-19 ENCOUNTER — HOSPITAL ENCOUNTER (OUTPATIENT)
Dept: RADIOLOGY | Facility: HOSPITAL | Age: 39
Discharge: HOME | End: 2019-08-19
Attending: INTERNAL MEDICINE
Payer: COMMERCIAL

## 2019-08-19 VITALS
BODY MASS INDEX: 27.45 KG/M2 | DIASTOLIC BLOOD PRESSURE: 72 MMHG | HEART RATE: 68 BPM | WEIGHT: 145.38 LBS | SYSTOLIC BLOOD PRESSURE: 102 MMHG | HEIGHT: 61 IN | RESPIRATION RATE: 18 BRPM | OXYGEN SATURATION: 98 %

## 2019-08-19 DIAGNOSIS — R91.1 LUNG NODULE: Primary | ICD-10-CM

## 2019-08-19 DIAGNOSIS — D49.1 NEOPLASM OF LUNG: ICD-10-CM

## 2019-08-19 DIAGNOSIS — R91.1 PULMONARY NODULE: ICD-10-CM

## 2019-08-19 DIAGNOSIS — R07.82 INTERCOSTAL PAIN: ICD-10-CM

## 2019-08-19 PROCEDURE — 99999 PR PBB SHADOW E&M-EST. PATIENT-LVL V: ICD-10-PCS | Mod: PBBFAC,,, | Performed by: INTERNAL MEDICINE

## 2019-08-19 PROCEDURE — 99214 OFFICE O/P EST MOD 30 MIN: CPT | Mod: S$GLB,,, | Performed by: INTERNAL MEDICINE

## 2019-08-19 PROCEDURE — 78815 PET IMAGE W/CT SKULL-THIGH: CPT | Mod: 26,PI,GC, | Performed by: RADIOLOGY

## 2019-08-19 PROCEDURE — 3008F PR BODY MASS INDEX (BMI) DOCUMENTED: ICD-10-PCS | Mod: CPTII,S$GLB,, | Performed by: INTERNAL MEDICINE

## 2019-08-19 PROCEDURE — 3008F BODY MASS INDEX DOCD: CPT | Mod: CPTII,S$GLB,, | Performed by: INTERNAL MEDICINE

## 2019-08-19 PROCEDURE — 99214 PR OFFICE/OUTPT VISIT, EST, LEVL IV, 30-39 MIN: ICD-10-PCS | Mod: S$GLB,,, | Performed by: INTERNAL MEDICINE

## 2019-08-19 PROCEDURE — 78815 PET IMAGE W/CT SKULL-THIGH: CPT | Mod: TC,PI

## 2019-08-19 PROCEDURE — A9552 F18 FDG: HCPCS

## 2019-08-19 PROCEDURE — 99999 PR PBB SHADOW E&M-EST. PATIENT-LVL V: CPT | Mod: PBBFAC,,, | Performed by: INTERNAL MEDICINE

## 2019-08-19 PROCEDURE — 78815 NM PET CT ROUTINE: ICD-10-PCS | Mod: 26,PI,GC, | Performed by: RADIOLOGY

## 2019-08-19 RX ORDER — CYCLOBENZAPRINE HCL 10 MG
TABLET ORAL
COMMUNITY
Start: 2019-07-30 | End: 2019-11-13

## 2019-08-19 NOTE — PROGRESS NOTES
Subjective:       Patient ID: Sparkle Jose is a 38 y.o. female.    Chief Complaint: She       Pulmonary Nodules    HPI   38-year-old patient returns today for evaluation of pulmonary nodules that were noted incidentally on a CT scan.  Pulmonary nodules were not present on prior study performed in January of 2019.  Patient is on medications that suppress her immune system.  Since her last office visit the patient has stopped smoking cigarettes.  Additionally the patient initially had lower chest discomfort that was somewhat pleuritic in nature in the symptoms are slowly improving and almost have resolved.  Patient reports that since her last office visit she has no new medical problems or medical issues.  The PET scan was reviewed in the office today and I indicated to her that based on the fact that her symptoms were improving and the PET scan was negative that a biopsy was not indicated at this time.  However, I indicated to her that she required close follow-up and repeat CT scan of the chest in 3 months was ordered.      Lung Nodule  She presents for evaluation and treatment of a lung nodule. The patient reports that the imaging was performed to evaluate symptoms of back pain which have been present for 3 weeks and are gradually worsening. Symptoms are exacerbated by supine position and relieved by rest and heat. The patient denies other associated symptoms. She  stopped smoking. The patient has no known exposure to tuberculosis and a negative PPD. The patient does not have a history of cancer.     On biologic for ankylosing spondlylitis   Hx of RUQ pain for 3-4 weeks now improved.      Past Medical History:   Diagnosis Date    Ankylosing spondylitis     Arthritis     Celiac disease     Depression     Family history of DVT     MGM DVT, mother spleenic    Family history of factor V Leiden mutation     mother and sister    GERD (gastroesophageal reflux disease)     Migraine     Osteoarthritis      Psoriasis     Psoriatic arthritis mutilans      Past Surgical History:   Procedure Laterality Date    brain decompression   2006    BRAIN SURGERY      [id number] Bilateral 10/18/2017    Performed by [facility]    TUBAL LIGATION       Social History     Socioeconomic History    Marital status:      Spouse name: Not on file    Number of children: Not on file    Years of education: Not on file    Highest education level: Not on file   Occupational History    Not on file   Social Needs    Financial resource strain: Not on file    Food insecurity:     Worry: Not on file     Inability: Not on file    Transportation needs:     Medical: Not on file     Non-medical: Not on file   Tobacco Use    Smoking status: Former Smoker     Packs/day: 0.50     Years: 16.00     Pack years: 8.00     Types: Cigarettes     Start date: 1993     Last attempt to quit: 2019     Years since quittin.0    Smokeless tobacco: Never Used   Substance and Sexual Activity    Alcohol use: No     Alcohol/week: 0.0 oz    Drug use: No    Sexual activity: Yes     Birth control/protection: See Surgical Hx   Lifestyle    Physical activity:     Days per week: Not on file     Minutes per session: Not on file    Stress: Not on file   Relationships    Social connections:     Talks on phone: Not on file     Gets together: Not on file     Attends Islam service: Not on file     Active member of club or organization: Not on file     Attends meetings of clubs or organizations: Not on file     Relationship status: Not on file   Other Topics Concern    Not on file   Social History Narrative    Not on file     Review of Systems   Constitutional: Negative for fever and fatigue.   HENT: Negative for postnasal drip and rhinorrhea.    Eyes: Negative for redness and itching.   Respiratory: Positive for pleurisy. Negative for cough, shortness of breath, wheezing, dyspnea on extertion and  "Paroxysmal Nocturnal Dyspnea.    Cardiovascular: Negative for chest pain.   Genitourinary: Negative for difficulty urinating and hematuria.   Endocrine: Negative for polyphagia, cold intolerance and heat intolerance.    Musculoskeletal: Negative for arthralgias.   Skin: Negative for rash.   Gastrointestinal: Negative for nausea, vomiting, abdominal pain and abdominal distention.   Neurological: Negative for dizziness and headaches.   Hematological: Negative for adenopathy. Does not bruise/bleed easily and no excessive bruising.   Psychiatric/Behavioral: The patient is not nervous/anxious.        Objective:      /72   Pulse 68   Resp 18   Ht 5' 1" (1.549 m)   Wt 65.9 kg (145 lb 6.3 oz)   SpO2 98%   BMI 27.47 kg/m²   Physical Exam   Constitutional: She is oriented to person, place, and time. She appears well-developed and well-nourished.   HENT:   Head: Normocephalic and atraumatic.   Eyes: Pupils are equal, round, and reactive to light. Conjunctivae are normal.   Neck: Neck supple. No JVD present. No tracheal deviation present. No thyromegaly present.   Cardiovascular: Normal rate, regular rhythm and normal heart sounds.   Pulmonary/Chest: Effort normal and breath sounds normal. No respiratory distress. She has no wheezes. She has no rales. She exhibits no tenderness.   Abdominal: Soft. Bowel sounds are normal.   Musculoskeletal: Normal range of motion. She exhibits no edema.   Lymphadenopathy:     She has no cervical adenopathy.   Neurological: She is alert and oriented to person, place, and time.   Skin: Skin is warm and dry.   Nursing note and vitals reviewed.    Personal Diagnostic Review  none pertinent  NM PET CT Routine Skull to Mid Thigh  Narrative: EXAMINATION:  NM PET CT ROUTINE    CLINICAL HISTORY:  Neoplasm: chest, lung, recurrence, suspected/known;Neoplasm: chest, lung, staging;pulmonary nodule; Neoplasm of unspecified behavior of respiratory system    TECHNIQUE:  12.17 mCi of F18-FDG was " administered intravenously.  After an approximately 60 min distribution time, PET/CT images were acquired from the skull base to the mid thigh. Transmission images were acquired to correct for attenuation using a whole body low-dose CT scan without contrast.    COMPARISON:  CT chest 08/01/2019    FINDINGS:  Head/neck: Normal physiologic activity present including suspected physiologic tonsillar uptake.    Chest: Subpleural 8 mm nodule within the right middle lobe demonstrates no significant FDG activity.  Smaller more anterior subpleural nodule is below threshold for PET-CT.  Previously described perifissural nodules demonstrate no concerning FDG activity inter most suggestive of intrapulmonary lymph nodes.    No FDG avid axillary or hilar lymph nodes.  There is a small focus of FDG activity present at the subcarinal location, SUV max 4.90, axial fused image 66.  Subcentimeter lymph node suspected at this location.    Abdomen/pelvis: Normal physiologic activity present without concerning FDG focus.    Skeletal/subcutaneous structures: No concerning FDG avid osseous lesion.  Well-defined round medial left breast lesion not demonstrating Hounsfield units of simple fluid.  No hypermetabolic activity.  Findings may represent fiber adenoma or complex cyst.  Correlate with mammographic imaging.  Impression: 1. No concerning FDG uptake within the previously described pulmonary nodules.  A toward the nodules are below PET-CT threshold.  Perifissural nodules most suggestive of intrapulmonary lymph nodes.  2. Nonspecific small focus of uptake within the subcarinal location possibly within subcentimeter short axis lymph node.  Follow-up recommended.  3. Left breast findings as above.    Electronically signed by: Vaibhav Vann MD  Date:    08/19/2019  Time:    14:46      Office Spirometry Results:     No flowsheet data found.  Pulmonary Studies Review 8/19/2019   SpO2 98   Height 61.000   Weight 2326.29   BMI (Calculated)  27.5   Predicted Distance 473.2   Predicted Distance Meters (Calculated) 611.61         Assessment:       Lung nodule  -     CT Chest Without Contrast; Future; Expected date: 08/19/2019    Intercostal pain  -     CT Chest Without Contrast; Future; Expected date: 08/19/2019          Outpatient Encounter Medications as of 8/19/2019   Medication Sig Dispense Refill    ALPRAZolam (XANAX) 0.25 MG tablet Take 1 tablet (0.25 mg total) by mouth 4 (four) times daily as needed for Anxiety. 40 tablet 3    busPIRone (BUSPAR) 15 MG tablet       cholecalciferol, vitamin D3, (VITAMIN D3) 1,000 unit capsule Take 3,000 Units by mouth once daily.      clobetasol 0.05% (TEMOVATE) 0.05 % Oint 1 application daily as needed.       COSENTYX PEN, 2 PENS, 150 mg/mL PnIj INJECT TWO PENS SUBCUTANEOUSLY EVERY MONTH. REFRIGERATE. ALLOW 15 TO 30 MINUTES AT ROOM TEMP PRIOR TO ADMINISTRATION. DO NOT FREEZE 2 Syringe 8    cyclobenzaprine (FLEXERIL) 10 MG tablet       cyclobenzaprine (FLEXERIL) 5 MG tablet TAKE 1 TO 2 TABLETS BY MOUTH AT BEDTIME AS NEEDED FOR MUSCLE PAIN  2    eletriptan (RELPAX) 40 MG tablet Take 1 tablet (40 mg total) by mouth as needed for Migraine. may repeat in 2 hours if necessary 9 tablet 2    ibuprofen (ADVIL,MOTRIN) 800 MG tablet Take 1 tablet (800 mg total) by mouth every 6 (six) hours as needed for Pain. 90 tablet 4    ketoconazole (NIZORAL) 2 % shampoo Wash body with medicated shampoo at least 2x/week - let soak at least 5 minutes prior to rinsing 120 mL 5    liothyronine (CYTOMEL) 5 MCG Tab TAKE ONE TABLET BY MOUTH ONCE DAILY ON AN EMPTY STOMACH.  1    norethindrone (MICRONOR) 0.35 mg tablet Take 1 tablet (0.35 mg total) by mouth once daily. 30 tablet 11    oxycodone-acetaminophen (PERCOCET)  mg per tablet Take 1 tablet by mouth every 8 (eight) hours as needed for Pain. 75 tablet 0    phentermine (ADIPEX-P) 37.5 mg tablet Take 37.5 mg by mouth once daily.  0    potassium chloride (KLOR-CON) 10 MEQ  TbSR Take 1 tablet (10 mEq total) by mouth once daily. 30 tablet 4    predniSONE (DELTASONE) 5 MG tablet Take 5 mg by mouth once daily.      promethazine (PHENERGAN) 25 MG tablet Take 1 tablet (25 mg total) by mouth every 6 (six) hours as needed. 30 tablet 3    protriptyline (VIVACTIL) 5 MG Tab Take 5 mg by mouth once daily.       ranitidine (ZANTAC) 150 MG capsule Take 1 capsule (150 mg total) by mouth 2 (two) times daily. (Patient taking differently: Take 150 mg by mouth 2 (two) times daily as needed. ) 60 capsule 6    trazodone (DESYREL) 50 MG tablet TAKE 1 TABLET (50 MG TOTAL) BY MOUTH EVERY EVENING. (Patient taking differently: Take 50 mg by mouth nightly as needed. ) 90 tablet 3    furosemide (LASIX) 20 MG tablet Take 1 tablet (20 mg total) by mouth once daily. (Patient taking differently: Take 20 mg by mouth daily as needed. ) 30 tablet 4     No facility-administered encounter medications on file as of 8/19/2019.      Plan:       Requested Prescriptions      No prescriptions requested or ordered in this encounter     Problem List Items Addressed This Visit     None      Visit Diagnoses     Lung nodule    -  Primary    Relevant Orders    CT Chest Without Contrast    Intercostal pain        Relevant Orders    CT Chest Without Contrast             Pulmonary nodule  08/19/2019.  Repeat CT scan in 3 months.  Contact office if symptoms change.        Follow up in about 3 months (around 11/19/2019) for Review CT/PET day of visit.    MEDICAL DECISION MAKING: Moderate to high complexity.  Overall, the multiple problems listed are of moderate to high severity that may impact quality of life and activities of daily living. Side effects of medications, treatment plan as well as options and alternatives reviewed and discussed with patient. There was counseling of patient concerning these issues.    Total time spent in face to face counseling and coordination of care - 25  minutes over 50% of time was used in  discussion of prognosis, risks, benefits of treatment, instructions and compliance with regimen . Discussion with other physicians or health care providers (DME, NP, pharmacy, respiratory therapy) occurred.

## 2019-08-21 ENCOUNTER — PATIENT MESSAGE (OUTPATIENT)
Dept: RHEUMATOLOGY | Facility: CLINIC | Age: 39
End: 2019-08-21

## 2019-08-22 ENCOUNTER — LAB VISIT (OUTPATIENT)
Dept: LAB | Facility: HOSPITAL | Age: 39
End: 2019-08-22
Attending: PHYSICAL MEDICINE & REHABILITATION
Payer: COMMERCIAL

## 2019-08-22 ENCOUNTER — PATIENT MESSAGE (OUTPATIENT)
Dept: RHEUMATOLOGY | Facility: CLINIC | Age: 39
End: 2019-08-22

## 2019-08-22 ENCOUNTER — PATIENT MESSAGE (OUTPATIENT)
Dept: PULMONOLOGY | Facility: CLINIC | Age: 39
End: 2019-08-22

## 2019-08-22 DIAGNOSIS — E03.9 HYPOTHYROIDISM: Primary | ICD-10-CM

## 2019-08-22 LAB
T3FREE SERPL-MCNC: 2.9 PG/ML (ref 2.3–4.2)
T4 FREE SERPL-MCNC: 0.92 NG/DL (ref 0.71–1.51)
TSH SERPL DL<=0.005 MIU/L-ACNC: 1.9 UIU/ML (ref 0.4–4)

## 2019-08-22 PROCEDURE — 84439 ASSAY OF FREE THYROXINE: CPT

## 2019-08-22 PROCEDURE — 84481 FREE ASSAY (FT-3): CPT

## 2019-08-22 PROCEDURE — 84443 ASSAY THYROID STIM HORMONE: CPT

## 2019-08-22 PROCEDURE — 36415 COLL VENOUS BLD VENIPUNCTURE: CPT | Mod: PO

## 2019-08-27 ENCOUNTER — PATIENT MESSAGE (OUTPATIENT)
Dept: RHEUMATOLOGY | Facility: CLINIC | Age: 39
End: 2019-08-27

## 2019-09-18 ENCOUNTER — PATIENT MESSAGE (OUTPATIENT)
Dept: RHEUMATOLOGY | Facility: CLINIC | Age: 39
End: 2019-09-18

## 2019-09-25 DIAGNOSIS — G43.719 INTRACTABLE CHRONIC MIGRAINE WITHOUT AURA AND WITHOUT STATUS MIGRAINOSUS: Primary | ICD-10-CM

## 2019-09-25 RX ORDER — ELETRIPTAN HYDROBROMIDE 40 MG/1
40 TABLET, FILM COATED ORAL
Qty: 9 TABLET | Refills: 2 | Status: SHIPPED | OUTPATIENT
Start: 2019-09-25 | End: 2019-09-26 | Stop reason: SDUPTHER

## 2019-09-26 RX ORDER — ELETRIPTAN HYDROBROMIDE 40 MG/1
40 TABLET, FILM COATED ORAL
Qty: 9 TABLET | Refills: 0 | Status: SHIPPED | OUTPATIENT
Start: 2019-09-26

## 2019-10-24 ENCOUNTER — PATIENT MESSAGE (OUTPATIENT)
Dept: RHEUMATOLOGY | Facility: CLINIC | Age: 39
End: 2019-10-24

## 2019-11-11 ENCOUNTER — LAB VISIT (OUTPATIENT)
Dept: LAB | Facility: HOSPITAL | Age: 39
End: 2019-11-11
Attending: INTERNAL MEDICINE
Payer: COMMERCIAL

## 2019-11-11 DIAGNOSIS — D72.829 LEUKOCYTOSIS, UNSPECIFIED TYPE: ICD-10-CM

## 2019-11-11 DIAGNOSIS — D69.1 ABNORMAL PLATELETS: ICD-10-CM

## 2019-11-11 DIAGNOSIS — L40.50 PSORIATIC ARTHRITIS: ICD-10-CM

## 2019-11-11 DIAGNOSIS — M45.9 ANKYLOSING SPONDYLITIS, UNSPECIFIED SITE OF SPINE: ICD-10-CM

## 2019-11-11 LAB
BASOPHILS # BLD AUTO: 0.14 K/UL (ref 0–0.2)
BASOPHILS NFR BLD: 1.1 % (ref 0–1.9)
DIFFERENTIAL METHOD: ABNORMAL
EOSINOPHIL # BLD AUTO: 0.2 K/UL (ref 0–0.5)
EOSINOPHIL NFR BLD: 1.2 % (ref 0–8)
ERYTHROCYTE [DISTWIDTH] IN BLOOD BY AUTOMATED COUNT: 13.8 % (ref 11.5–14.5)
ERYTHROCYTE [SEDIMENTATION RATE] IN BLOOD BY WESTERGREN METHOD: 8 MM/HR (ref 0–20)
HCT VFR BLD AUTO: 43.8 % (ref 37–48.5)
HGB BLD-MCNC: 13.4 G/DL (ref 12–16)
IMM GRANULOCYTES # BLD AUTO: 0.04 K/UL (ref 0–0.04)
IMM GRANULOCYTES NFR BLD AUTO: 0.3 % (ref 0–0.5)
LYMPHOCYTES # BLD AUTO: 4.8 K/UL (ref 1–4.8)
LYMPHOCYTES NFR BLD: 37 % (ref 18–48)
MCH RBC QN AUTO: 30.7 PG (ref 27–31)
MCHC RBC AUTO-ENTMCNC: 30.6 G/DL (ref 32–36)
MCV RBC AUTO: 100 FL (ref 82–98)
MONOCYTES # BLD AUTO: 1 K/UL (ref 0.3–1)
MONOCYTES NFR BLD: 7.5 % (ref 4–15)
NEUTROPHILS # BLD AUTO: 6.8 K/UL (ref 1.8–7.7)
NEUTROPHILS NFR BLD: 52.9 % (ref 38–73)
NRBC BLD-RTO: 0 /100 WBC
PLATELET # BLD AUTO: 438 K/UL (ref 150–350)
PMV BLD AUTO: 10.1 FL (ref 9.2–12.9)
RBC # BLD AUTO: 4.37 M/UL (ref 4–5.4)
WBC # BLD AUTO: 12.93 K/UL (ref 3.9–12.7)

## 2019-11-11 PROCEDURE — 86140 C-REACTIVE PROTEIN: CPT

## 2019-11-11 PROCEDURE — 36415 COLL VENOUS BLD VENIPUNCTURE: CPT | Mod: PO

## 2019-11-11 PROCEDURE — 85651 RBC SED RATE NONAUTOMATED: CPT | Mod: PO

## 2019-11-11 PROCEDURE — 85025 COMPLETE CBC W/AUTO DIFF WBC: CPT

## 2019-11-11 PROCEDURE — 80053 COMPREHEN METABOLIC PANEL: CPT

## 2019-11-12 LAB
ALBUMIN SERPL BCP-MCNC: 3.9 G/DL (ref 3.5–5.2)
ALP SERPL-CCNC: 55 U/L (ref 55–135)
ALT SERPL W/O P-5'-P-CCNC: 14 U/L (ref 10–44)
ANION GAP SERPL CALC-SCNC: 11 MMOL/L (ref 8–16)
AST SERPL-CCNC: 14 U/L (ref 10–40)
BILIRUB SERPL-MCNC: 0.2 MG/DL (ref 0.1–1)
BUN SERPL-MCNC: 16 MG/DL (ref 6–20)
CALCIUM SERPL-MCNC: 9.6 MG/DL (ref 8.7–10.5)
CHLORIDE SERPL-SCNC: 104 MMOL/L (ref 95–110)
CO2 SERPL-SCNC: 26 MMOL/L (ref 23–29)
CREAT SERPL-MCNC: 0.7 MG/DL (ref 0.5–1.4)
CRP SERPL-MCNC: 3.7 MG/L (ref 0–8.2)
EST. GFR  (AFRICAN AMERICAN): >60 ML/MIN/1.73 M^2
EST. GFR  (NON AFRICAN AMERICAN): >60 ML/MIN/1.73 M^2
GLUCOSE SERPL-MCNC: 84 MG/DL (ref 70–110)
POTASSIUM SERPL-SCNC: 4.3 MMOL/L (ref 3.5–5.1)
PROT SERPL-MCNC: 7.1 G/DL (ref 6–8.4)
SODIUM SERPL-SCNC: 141 MMOL/L (ref 136–145)

## 2019-11-13 ENCOUNTER — OFFICE VISIT (OUTPATIENT)
Dept: RHEUMATOLOGY | Facility: CLINIC | Age: 39
End: 2019-11-13
Payer: COMMERCIAL

## 2019-11-13 VITALS
HEART RATE: 78 BPM | HEIGHT: 61 IN | DIASTOLIC BLOOD PRESSURE: 79 MMHG | BODY MASS INDEX: 28.72 KG/M2 | WEIGHT: 152.13 LBS | SYSTOLIC BLOOD PRESSURE: 119 MMHG

## 2019-11-13 DIAGNOSIS — M25.552 HIP PAIN, CHRONIC, LEFT: ICD-10-CM

## 2019-11-13 DIAGNOSIS — G89.29 HIP PAIN, CHRONIC, LEFT: ICD-10-CM

## 2019-11-13 DIAGNOSIS — D68.51 FACTOR 5 LEIDEN MUTATION, HETEROZYGOUS: ICD-10-CM

## 2019-11-13 DIAGNOSIS — D68.59 PROTEIN S DEFICIENCY: ICD-10-CM

## 2019-11-13 DIAGNOSIS — Z72.0 TOBACCO ABUSE DISORDER: ICD-10-CM

## 2019-11-13 DIAGNOSIS — D69.1 ABNORMAL PLATELETS: ICD-10-CM

## 2019-11-13 DIAGNOSIS — L40.50 PSORIATIC ARTHRITIS: Primary | ICD-10-CM

## 2019-11-13 DIAGNOSIS — M54.2 NECK PAIN: ICD-10-CM

## 2019-11-13 DIAGNOSIS — M45.9 ANKYLOSING SPONDYLITIS, UNSPECIFIED SITE OF SPINE: ICD-10-CM

## 2019-11-13 DIAGNOSIS — M43.22 CERVICAL SPINE ANKYLOSIS: ICD-10-CM

## 2019-11-13 PROCEDURE — 99215 OFFICE O/P EST HI 40 MIN: CPT | Mod: 25,S$GLB,, | Performed by: INTERNAL MEDICINE

## 2019-11-13 PROCEDURE — 3008F PR BODY MASS INDEX (BMI) DOCUMENTED: ICD-10-PCS | Mod: CPTII,S$GLB,, | Performed by: INTERNAL MEDICINE

## 2019-11-13 PROCEDURE — 96372 PR INJECTION,THERAP/PROPH/DIAG2ST, IM OR SUBCUT: ICD-10-PCS | Mod: S$GLB,,, | Performed by: INTERNAL MEDICINE

## 2019-11-13 PROCEDURE — 99999 PR PBB SHADOW E&M-EST. PATIENT-LVL III: ICD-10-PCS | Mod: PBBFAC,,, | Performed by: INTERNAL MEDICINE

## 2019-11-13 PROCEDURE — 3008F BODY MASS INDEX DOCD: CPT | Mod: CPTII,S$GLB,, | Performed by: INTERNAL MEDICINE

## 2019-11-13 PROCEDURE — 99215 PR OFFICE/OUTPT VISIT, EST, LEVL V, 40-54 MIN: ICD-10-PCS | Mod: 25,S$GLB,, | Performed by: INTERNAL MEDICINE

## 2019-11-13 PROCEDURE — 96372 THER/PROPH/DIAG INJ SC/IM: CPT | Mod: S$GLB,,, | Performed by: INTERNAL MEDICINE

## 2019-11-13 PROCEDURE — 99999 PR PBB SHADOW E&M-EST. PATIENT-LVL III: CPT | Mod: PBBFAC,,, | Performed by: INTERNAL MEDICINE

## 2019-11-13 RX ORDER — ALCOHOL 2.38 KG/3.79L
1 GEL TOPICAL DAILY
Qty: 30 CAPSULE | Refills: 12 | Status: SHIPPED | OUTPATIENT
Start: 2019-11-13 | End: 2020-11-04

## 2019-11-13 RX ORDER — FLUOXETINE HYDROCHLORIDE 20 MG/1
20 CAPSULE ORAL DAILY
Qty: 30 CAPSULE | Refills: 11 | Status: SHIPPED | OUTPATIENT
Start: 2019-11-13 | End: 2020-07-10 | Stop reason: SDUPTHER

## 2019-11-13 RX ORDER — VARENICLINE TARTRATE 0.5 (11)-1
KIT ORAL
Qty: 1 PACKAGE | Refills: 0 | Status: SHIPPED | OUTPATIENT
Start: 2019-11-13 | End: 2019-12-26

## 2019-11-13 RX ORDER — VARENICLINE TARTRATE 1 MG/1
1 TABLET, FILM COATED ORAL 2 TIMES DAILY
Qty: 60 TABLET | Refills: 6 | Status: SHIPPED | OUTPATIENT
Start: 2019-11-13 | End: 2019-12-13

## 2019-11-13 RX ORDER — METAXALONE 800 MG/1
800 TABLET ORAL 3 TIMES DAILY
Qty: 90 TABLET | Refills: 3 | Status: SHIPPED | OUTPATIENT
Start: 2019-11-13 | End: 2019-12-13

## 2019-11-13 RX ORDER — METHYLPREDNISOLONE ACETATE 80 MG/ML
160 INJECTION, SUSPENSION INTRA-ARTICULAR; INTRALESIONAL; INTRAMUSCULAR; SOFT TISSUE
Status: COMPLETED | OUTPATIENT
Start: 2019-11-13 | End: 2019-11-13

## 2019-11-13 RX ORDER — KETOROLAC TROMETHAMINE 30 MG/ML
60 INJECTION, SOLUTION INTRAMUSCULAR; INTRAVENOUS
Status: COMPLETED | OUTPATIENT
Start: 2019-11-13 | End: 2019-11-13

## 2019-11-13 RX ORDER — CYANOCOBALAMIN 1000 UG/ML
1000 INJECTION, SOLUTION INTRAMUSCULAR; SUBCUTANEOUS
Status: COMPLETED | OUTPATIENT
Start: 2019-11-13 | End: 2019-11-13

## 2019-11-13 RX ADMIN — KETOROLAC TROMETHAMINE 60 MG: 30 INJECTION, SOLUTION INTRAMUSCULAR; INTRAVENOUS at 12:11

## 2019-11-13 RX ADMIN — METHYLPREDNISOLONE ACETATE 160 MG: 80 INJECTION, SUSPENSION INTRA-ARTICULAR; INTRALESIONAL; INTRAMUSCULAR; SOFT TISSUE at 12:11

## 2019-11-13 RX ADMIN — CYANOCOBALAMIN 1000 MCG: 1000 INJECTION, SOLUTION INTRAMUSCULAR; SUBCUTANEOUS at 12:11

## 2019-11-13 ASSESSMENT — ROUTINE ASSESSMENT OF PATIENT INDEX DATA (RAPID3)
TOTAL RAPID3 SCORE: 6.44
MDHAQ FUNCTION SCORE: 1.6
PSYCHOLOGICAL DISTRESS SCORE: 4.4
PAIN SCORE: 7
FATIGUE SCORE: 3.3
PATIENT GLOBAL ASSESSMENT SCORE: 7

## 2019-11-13 NOTE — PROGRESS NOTES
Subjective:       Patient ID: Sparkle Jose is a 38 y.o. female.    Chief Complaint: Psoriatic Arthritis and Disease Management    Follow up:  Diagnosis is psoriatic arthritis and ankylosing spondylitis  On consyntx, cervical spasm and restarted migraines, her sister had a MI age 34.  Patient complains of arthralgias and myalgias for which has been present for a few years. Pain is located in multiple joints, both shoulder(s), both elbow(s), both wrist(s), both MCP(s): 1st, 2nd, 3rd, 4th and 5th, both PIP(s): 1st, 2nd, 3rd, 4th and 5th, both DIP(s): 1st and 2nd, both hip(s), both knee(s) and both MTP(s): 1st, 2nd, 3rd, 4th and 5th, is described as aching, pulsating, shooting and throbbing, and is constant, moderate to severe pain .thyroid level are improved.            She complains of joint swelling. Associated symptoms include fatigue.         Review of Systems   Constitutional: Positive for activity change, appetite change, fatigue and unexpected weight change. Negative for chills and diaphoresis.   HENT: Negative for congestion, dental problem, ear discharge, ear pain, facial swelling, mouth sores, nosebleeds, postnasal drip, rhinorrhea, sinus pressure, sneezing, sore throat, tinnitus and voice change.    Eyes: Negative for photophobia, pain, discharge, redness and itching.   Respiratory: Negative for apnea, cough, chest tightness, shortness of breath and wheezing.    Cardiovascular: Positive for leg swelling. Negative for chest pain and palpitations.   Gastrointestinal: Negative for abdominal distention, abdominal pain, constipation, diarrhea, nausea and vomiting.   Endocrine: Negative for cold intolerance, heat intolerance, polydipsia and polyuria.   Genitourinary: Negative for decreased urine volume, difficulty urinating, flank pain, frequency, hematuria and urgency.   Musculoskeletal: Positive for arthralgias, gait problem, joint swelling, neck pain and neck stiffness. Negative for back pain.   Skin:  "Positive for rash. Negative for pallor and wound.        Psoriasis rash on arms   Allergic/Immunologic: Negative for immunocompromised state.   Neurological: Positive for weakness and numbness. Negative for dizziness and tremors.   Hematological: Negative for adenopathy. Does not bruise/bleed easily.   Psychiatric/Behavioral: Positive for sleep disturbance. The patient is nervous/anxious.         Depressed         Objective:     /79 (BP Location: Left arm, Patient Position: Sitting, BP Method: Medium (Automatic))   Pulse 78   Ht 5' 1" (1.549 m)   Wt 69 kg (152 lb 1.9 oz)   BMI 28.74 kg/m²      Physical Exam   Vitals reviewed.  Constitutional: She is oriented to person, place, and time.   HENT:   Head: Normocephalic and atraumatic.   Mouth/Throat: Oropharynx is clear and moist.   Eyes: EOM are normal. Pupils are equal, round, and reactive to light.   Neck: Neck supple. No thyromegaly present.   Cardiovascular: Normal rate, regular rhythm and normal heart sounds.  Exam reveals no gallop and no friction rub.    No murmur heard.  Pulmonary/Chest: Breath sounds normal. She has no wheezes. She has no rales. She exhibits no tenderness.   Abdominal: There is no tenderness. There is no rebound and no guarding.       Right Side Rheumatological Exam     Examination finds the elbow, 1st MCP, 2nd MCP, 3rd MCP, 4th MCP and 5th MCP normal.    The patient is tender to palpation of the shoulder and knee    She has swelling of the knee    The patient has an enlarged wrist, 1st PIP, 2nd PIP, 3rd PIP, 4th PIP and 5th PIP    Shoulder Exam   Tenderness Location: acromion    Range of Motion   Active abduction: abnormal   Adduction: abnormal  Sensation: normal    Knee Exam   Tenderness Location: medial joint line  Patellofemoral Crepitus: positive  Effusion: positive  Sensation: normal    Hip Exam   Tenderness Location: no tenderness  Sensation: normal    Elbow/Wrist Exam   Tenderness Location: no tenderness  Sensation: " normal    Left Side Rheumatological Exam     Examination finds the elbow, knee, 1st MCP, 2nd MCP, 3rd MCP, 4th MCP and 5th MCP normal.    The patient is tender to palpation of the shoulder.    The patient has an enlarged wrist, 1st PIP, 2nd PIP, 3rd PIP, 4th PIP and 5th PIP.    Shoulder Exam   Tenderness Location: acromion    Range of Motion   Active abduction: abnormal   Sensation: normal    Knee Exam   Tenderness Location: lateral joint line and medial joint line    Patellofemoral Crepitus: positive  Effusion: positive  Sensation: normal    Hip Exam   Tenderness Location: no tenderness  Sensation: normal    Elbow/Wrist Exam   Sensation: normal      Back/Neck Exam   General Inspection   Gait: normal       Tenderness Right paramedian tenderness of the Lower C-Spine, Lower L-Spine, Occ, Lower T-Spine and Upper C-Spine.Left paramedian tenderness of the Lower L-Spine, Lower C-Spine and Lower T-Spine.    Back Range of Motion   Lateral Bend Right: abnormal  Lateral Bend Left: abnormal  Rotation Right: abnormal  Rotation Left: abnormal      Lymphadenopathy:     She has no cervical adenopathy.   Neurological: She is alert and oriented to person, place, and time. She has normal sensation and normal strength.   Reflex Scores:       Patellar reflexes are 3+ on the right side and 3+ on the left side.  Skin: Rash noted. No erythema. No pallor.     Psychiatric: Mood and affect normal.   Musculoskeletal: She exhibits tenderness and deformity.       Results for orders placed or performed in visit on 11/13/19   Estrogens, total   Result Value Ref Range    Estrogen     PROGESTERONE   Result Value Ref Range    Progesterone <0.5 ng/mL   Methylenetetrahydrofol Genotyping (C677T/Y3953D)   Result Value Ref Range    MTHFR      Interpretation     Homocysteine, serum   Result Value Ref Range    Homocysteine, Cardiovascular 6.3 <10.4 umol/L       Assessment:     Encounter Diagnoses   Name Primary?    Psoriatic arthritis Yes    Abnormal  platelets     Hip pain, chronic, left     Ankylosing spondylitis, unspecified site of spine     Tobacco abuse disorder     Factor 5 Leiden mutation, heterozygous     Protein S deficiency     Cervical spine ankylosis     Neck pain          Plan:     Psoriatic arthritis  -     METHYLENETETRAHYDROFOL GENOTYPING (C677T/R9911B); Future; Expected date: 11/13/2019  -     Homocysteine, serum; Future; Expected date: 11/13/2019  -     FLUoxetine 20 MG capsule; Take 1 capsule (20 mg total) by mouth once daily. In am  Dispense: 30 capsule; Refill: 11  -     varenicline (CHANTIX STARTING MONTH BOX) 0.5 mg (11)- 1 mg (42) tablet; Take one 0.5mg tab by mouth once daily X3 days,then increase to one 0.5mg tab twice daily X4 days,then increase to one 1mg tab twice daily  Dispense: 1 Package; Refill: 0  -     varenicline (CHANTIX) 1 mg Tab; Take 1 tablet (1 mg total) by mouth 2 (two) times daily.  Dispense: 60 tablet; Refill: 6  -     Estrogens, total; Future; Expected date: 11/13/2019  -     PROGESTERONE; Future; Expected date: 11/13/2019  -     metaxalone (SKELAXIN) 800 MG tablet; Take 1 tablet (800 mg total) by mouth 3 (three) times daily.  Dispense: 90 tablet; Refill: 3  -     ketorolac injection 60 mg  -     methylPREDNISolone acetate injection 160 mg  -     cyanocobalamin injection 1,000 mcg    Abnormal platelets  -     METHYLENETETRAHYDROFOL GENOTYPING (C677T/B4966V); Future; Expected date: 11/13/2019  -     Homocysteine, serum; Future; Expected date: 11/13/2019  -     FLUoxetine 20 MG capsule; Take 1 capsule (20 mg total) by mouth once daily. In am  Dispense: 30 capsule; Refill: 11  -     varenicline (CHANTIX STARTING MONTH BOX) 0.5 mg (11)- 1 mg (42) tablet; Take one 0.5mg tab by mouth once daily X3 days,then increase to one 0.5mg tab twice daily X4 days,then increase to one 1mg tab twice daily  Dispense: 1 Package; Refill: 0  -     varenicline (CHANTIX) 1 mg Tab; Take 1 tablet (1 mg total) by mouth 2 (two) times  daily.  Dispense: 60 tablet; Refill: 6  -     Estrogens, total; Future; Expected date: 11/13/2019  -     PROGESTERONE; Future; Expected date: 11/13/2019  -     metaxalone (SKELAXIN) 800 MG tablet; Take 1 tablet (800 mg total) by mouth 3 (three) times daily.  Dispense: 90 tablet; Refill: 3  -     ketorolac injection 60 mg  -     methylPREDNISolone acetate injection 160 mg  -     cyanocobalamin injection 1,000 mcg    Hip pain, chronic, left  -     METHYLENETETRAHYDROFOL GENOTYPING (C677T/I8351M); Future; Expected date: 11/13/2019  -     Homocysteine, serum; Future; Expected date: 11/13/2019  -     FLUoxetine 20 MG capsule; Take 1 capsule (20 mg total) by mouth once daily. In am  Dispense: 30 capsule; Refill: 11  -     varenicline (CHANTIX STARTING MONTH BOX) 0.5 mg (11)- 1 mg (42) tablet; Take one 0.5mg tab by mouth once daily X3 days,then increase to one 0.5mg tab twice daily X4 days,then increase to one 1mg tab twice daily  Dispense: 1 Package; Refill: 0  -     varenicline (CHANTIX) 1 mg Tab; Take 1 tablet (1 mg total) by mouth 2 (two) times daily.  Dispense: 60 tablet; Refill: 6  -     Estrogens, total; Future; Expected date: 11/13/2019  -     PROGESTERONE; Future; Expected date: 11/13/2019  -     metaxalone (SKELAXIN) 800 MG tablet; Take 1 tablet (800 mg total) by mouth 3 (three) times daily.  Dispense: 90 tablet; Refill: 3  -     ketorolac injection 60 mg  -     methylPREDNISolone acetate injection 160 mg  -     cyanocobalamin injection 1,000 mcg    Ankylosing spondylitis, unspecified site of spine  -     METHYLENETETRAHYDROFOL GENOTYPING (C677T/H1700O); Future; Expected date: 11/13/2019  -     Homocysteine, serum; Future; Expected date: 11/13/2019  -     FLUoxetine 20 MG capsule; Take 1 capsule (20 mg total) by mouth once daily. In am  Dispense: 30 capsule; Refill: 11  -     varenicline (CHANTIX STARTING MONTH BOX) 0.5 mg (11)- 1 mg (42) tablet; Take one 0.5mg tab by mouth once daily X3 days,then increase to one  0.5mg tab twice daily X4 days,then increase to one 1mg tab twice daily  Dispense: 1 Package; Refill: 0  -     varenicline (CHANTIX) 1 mg Tab; Take 1 tablet (1 mg total) by mouth 2 (two) times daily.  Dispense: 60 tablet; Refill: 6  -     Estrogens, total; Future; Expected date: 11/13/2019  -     PROGESTERONE; Future; Expected date: 11/13/2019  -     metaxalone (SKELAXIN) 800 MG tablet; Take 1 tablet (800 mg total) by mouth 3 (three) times daily.  Dispense: 90 tablet; Refill: 3  -     ketorolac injection 60 mg  -     methylPREDNISolone acetate injection 160 mg  -     cyanocobalamin injection 1,000 mcg    Tobacco abuse disorder  -     METHYLENETETRAHYDROFOL GENOTYPING (C677T/B9277Y); Future; Expected date: 11/13/2019  -     Homocysteine, serum; Future; Expected date: 11/13/2019  -     FLUoxetine 20 MG capsule; Take 1 capsule (20 mg total) by mouth once daily. In am  Dispense: 30 capsule; Refill: 11  -     varenicline (CHANTIX STARTING MONTH BOX) 0.5 mg (11)- 1 mg (42) tablet; Take one 0.5mg tab by mouth once daily X3 days,then increase to one 0.5mg tab twice daily X4 days,then increase to one 1mg tab twice daily  Dispense: 1 Package; Refill: 0  -     varenicline (CHANTIX) 1 mg Tab; Take 1 tablet (1 mg total) by mouth 2 (two) times daily.  Dispense: 60 tablet; Refill: 6  -     Estrogens, total; Future; Expected date: 11/13/2019  -     PROGESTERONE; Future; Expected date: 11/13/2019  -     metaxalone (SKELAXIN) 800 MG tablet; Take 1 tablet (800 mg total) by mouth 3 (three) times daily.  Dispense: 90 tablet; Refill: 3  -     ketorolac injection 60 mg  -     methylPREDNISolone acetate injection 160 mg  -     cyanocobalamin injection 1,000 mcg    Factor 5 Leiden mutation, heterozygous  -     [id number]-algal oil (METANX/FOLTANX RF) 3 mg-35 mg-2 mg -90.314 mg Cap; Take 1 tablet by mouth once daily.  Dispense: 30 capsule; Refill: 12  -     Estrogens, total; Future; Expected date: 11/13/2019  -     PROGESTERONE;  Future; Expected date: 11/13/2019  -     metaxalone (SKELAXIN) 800 MG tablet; Take 1 tablet (800 mg total) by mouth 3 (three) times daily.  Dispense: 90 tablet; Refill: 3  -     ketorolac injection 60 mg  -     methylPREDNISolone acetate injection 160 mg  -     cyanocobalamin injection 1,000 mcg    Protein S deficiency  -     [id number]-algal oil (METANX/FOLTANX RF) 3 mg-35 mg-2 mg -90.314 mg Cap; Take 1 tablet by mouth once daily.  Dispense: 30 capsule; Refill: 12  -     Estrogens, total; Future; Expected date: 11/13/2019  -     PROGESTERONE; Future; Expected date: 11/13/2019  -     metaxalone (SKELAXIN) 800 MG tablet; Take 1 tablet (800 mg total) by mouth 3 (three) times daily.  Dispense: 90 tablet; Refill: 3  -     ketorolac injection 60 mg  -     methylPREDNISolone acetate injection 160 mg  -     cyanocobalamin injection 1,000 mcg    Cervical spine ankylosis  -     ketorolac injection 60 mg  -     methylPREDNISolone acetate injection 160 mg  -     cyanocobalamin injection 1,000 mcg  -     MRI Cervical Spine Without Contrast; Future; Expected date: 11/13/2019    Neck pain  -     ketorolac injection 60 mg  -     methylPREDNISolone acetate injection 160 mg  -     cyanocobalamin injection 1,000 mcg  -     MRI Cervical Spine Without Contrast; Future; Expected date: 11/13/2019    Other orders  -     Methylenetetrahydrofol Genotyping (C677T/C1810F)  -     Homocysteine, serum       On consyntx and her joints are stable,hip will be evaluated

## 2019-11-13 NOTE — PROGRESS NOTES
Administered 1 cc ( 1000 mcg/ml ) of b12 to the right upper outer gluteal. Informed of s/s to report verbalized understanding. No adverse reactions noted.    Lot # [id number]  Expiration feb 21    Administered 2 cc ( 80 mg/ml ) of depomedrol to the right upper outer gluteal. Informed of s/s to report verbalized understanding. No adverse reactions noted.    Lot # [id number]  Expiration 01/21    Administered 2 cc ( 30 mg/ml ) of toradol to the left upper outer gluteal. Informed of s/s to report verbalized understanding. No adverse reactions noted.    Lot # [id number]  Expiration 1 aug 2020

## 2019-11-19 ENCOUNTER — PATIENT MESSAGE (OUTPATIENT)
Dept: OBSTETRICS AND GYNECOLOGY | Facility: CLINIC | Age: 39
End: 2019-11-19

## 2019-11-20 LAB
ESTROGEN SERPL-MCNC: 98.8 PG/ML
HCYS SERPL-SCNC: 6.3 UMOL/L
MTHFR GENE MUT ANL BLD/T: NORMAL
MTHFR GENE MUT ANL BLD/T: NORMAL
PROGEST SERPL-MCNC: <0.5 NG/ML

## 2019-11-21 ENCOUNTER — OFFICE VISIT (OUTPATIENT)
Dept: PULMONOLOGY | Facility: CLINIC | Age: 39
End: 2019-11-21
Payer: COMMERCIAL

## 2019-11-21 ENCOUNTER — HOSPITAL ENCOUNTER (OUTPATIENT)
Dept: RADIOLOGY | Facility: HOSPITAL | Age: 39
Discharge: HOME | End: 2019-11-21
Attending: INTERNAL MEDICINE
Payer: COMMERCIAL

## 2019-11-21 VITALS
OXYGEN SATURATION: 97 % | HEIGHT: 61 IN | WEIGHT: 153.25 LBS | BODY MASS INDEX: 28.93 KG/M2 | DIASTOLIC BLOOD PRESSURE: 78 MMHG | SYSTOLIC BLOOD PRESSURE: 126 MMHG | HEART RATE: 97 BPM | RESPIRATION RATE: 18 BRPM

## 2019-11-21 DIAGNOSIS — R91.1 LUNG NODULE: ICD-10-CM

## 2019-11-21 DIAGNOSIS — R07.82 INTERCOSTAL PAIN: ICD-10-CM

## 2019-11-21 DIAGNOSIS — E04.1 THYROID NODULE: Primary | ICD-10-CM

## 2019-11-21 PROCEDURE — 3008F PR BODY MASS INDEX (BMI) DOCUMENTED: ICD-10-PCS | Mod: CPTII,S$GLB,, | Performed by: INTERNAL MEDICINE

## 2019-11-21 PROCEDURE — 99999 PR PBB SHADOW E&M-EST. PATIENT-LVL V: CPT | Mod: PBBFAC,,, | Performed by: INTERNAL MEDICINE

## 2019-11-21 PROCEDURE — 3008F BODY MASS INDEX DOCD: CPT | Mod: CPTII,S$GLB,, | Performed by: INTERNAL MEDICINE

## 2019-11-21 PROCEDURE — 71250 CT THORAX DX C-: CPT | Mod: TC

## 2019-11-21 PROCEDURE — 99999 PR PBB SHADOW E&M-EST. PATIENT-LVL V: ICD-10-PCS | Mod: PBBFAC,,, | Performed by: INTERNAL MEDICINE

## 2019-11-21 PROCEDURE — 99214 OFFICE O/P EST MOD 30 MIN: CPT | Mod: S$GLB,,, | Performed by: INTERNAL MEDICINE

## 2019-11-21 PROCEDURE — 99214 PR OFFICE/OUTPT VISIT, EST, LEVL IV, 30-39 MIN: ICD-10-PCS | Mod: S$GLB,,, | Performed by: INTERNAL MEDICINE

## 2019-11-21 RX ORDER — KETOROLAC TROMETHAMINE 10 MG/1
10 TABLET, FILM COATED ORAL
COMMUNITY
Start: 2019-11-17 | End: 2019-11-22

## 2019-11-21 NOTE — PROGRESS NOTES
Subjective:       Patient ID: Sparkle Jose is a 38 y.o. female.    Chief Complaint: She       Pulmonary Nodules (REV CT)       38-year-old patient returns today for evaluation of pulmonary nodules that were noted incidentally on a CT scan.  Pulmonary nodules were not present on prior study performed in January of 2019.  Patient is on medications that suppress her immune system.  Since her last office visit the patient has stopped smoking cigarettes.  Additionally the patient initially had lower chest discomfort that was somewhat pleuritic in nature in the symptoms are slowly improving and almost have resolved.  Patient reports that since her last office visit she has no new medical problems or medical issues.  The PET scan was reviewed in the office today and I indicated to her that based on the fact that her symptoms were improving and the PET scan was negative that a biopsy was not indicated at this time.  However, I indicated to her that she required close follow-up and repeat CT scan of the chest for 11/2019 was ordered. She returns today for review      Lung Nodule  She presents for evaluation and treatment of a lung nodule. The patient reports that the imaging was performed to evaluate symptoms of back pain which have been present for 3 weeks and are gradually worsening. Symptoms are exacerbated by supine position and relieved by rest and heat. The patient denies other associated symptoms. She  stopped smoking. The patient has no known exposure to tuberculosis and a negative PPD. The patient does not have a history of cancer.   On biologic for ankylosing spondlylitis   Hx of chest  pain for 3-4 days now improved on tramadol        Past Medical History:   Diagnosis Date    Ankylosing spondylitis     Arthritis     Celiac disease     Depression     Family history of DVT     MGM DVT, mother spleenic    Family history of factor V Leiden mutation     mother and sister    GERD (gastroesophageal reflux  disease)     Migraine     Osteoarthritis     Psoriasis     Psoriatic arthritis mutilans      Past Surgical History:   Procedure Laterality Date    brain decompression   2006    BRAIN SURGERY      TUBAL LIGATION       Social History     Socioeconomic History    Marital status:      Spouse name: Not on file    Number of children: Not on file    Years of education: Not on file    Highest education level: Not on file   Occupational History    Not on file   Social Needs    Financial resource strain: Not on file    Food insecurity:     Worry: Not on file     Inability: Not on file    Transportation needs:     Medical: Not on file     Non-medical: Not on file   Tobacco Use    Smoking status: Former Smoker     Packs/day: 0.50     Years: 16.00     Pack years: 8.00     Types: Cigarettes     Start date: 1993     Last attempt to quit: 2019     Years since quittin.2    Smokeless tobacco: Never Used    Tobacco comment: 5-6 CIGS DAILY   Substance and Sexual Activity    Alcohol use: No     Alcohol/week: 0.0 standard drinks    Drug use: No    Sexual activity: Yes     Birth control/protection: See Surgical Hx   Lifestyle    Physical activity:     Days per week: Not on file     Minutes per session: Not on file    Stress: Not on file   Relationships    Social connections:     Talks on phone: Not on file     Gets together: Not on file     Attends Jewish service: Not on file     Active member of club or organization: Not on file     Attends meetings of clubs or organizations: Not on file     Relationship status: Not on file   Other Topics Concern    Not on file   Social History Narrative    Not on file     Review of Systems   Constitutional: Negative for fever and fatigue.   HENT: Negative for postnasal drip and rhinorrhea.    Eyes: Negative for redness and itching.   Respiratory: Positive for pleurisy. Negative for cough, shortness of breath, wheezing, dyspnea on extertion and Paroxysmal  "Nocturnal Dyspnea.    Cardiovascular: Negative for chest pain.   Genitourinary: Negative for difficulty urinating and hematuria.   Endocrine: Negative for polyphagia, cold intolerance and heat intolerance.    Musculoskeletal: Negative for arthralgias.   Skin: Negative for rash.   Gastrointestinal: Negative for nausea, vomiting, abdominal pain and abdominal distention.   Neurological: Negative for dizziness and headaches.   Hematological: Negative for adenopathy. Does not bruise/bleed easily and no excessive bruising.   Psychiatric/Behavioral: The patient is not nervous/anxious.        Objective:      /78   Pulse 97   Resp 18   Ht 5' 1" (1.549 m)   Wt 69.5 kg (153 lb 3.5 oz)   SpO2 97%   BMI 28.95 kg/m²   Physical Exam   Constitutional: She is oriented to person, place, and time. She appears well-developed and well-nourished.   HENT:   Head: Normocephalic and atraumatic.   Eyes: Pupils are equal, round, and reactive to light. Conjunctivae are normal.   Neck: Neck supple. No JVD present. No tracheal deviation present. No thyromegaly present.   Cardiovascular: Normal rate, regular rhythm and normal heart sounds.   Pulmonary/Chest: Effort normal and breath sounds normal. No respiratory distress. She has no wheezes. She has no rales. She exhibits no tenderness.   Abdominal: Soft. Bowel sounds are normal.   Musculoskeletal: Normal range of motion. She exhibits no edema.   Lymphadenopathy:     She has no cervical adenopathy.   Neurological: She is alert and oriented to person, place, and time.   Skin: Skin is warm and dry.   Nursing note and vitals reviewed.    Personal Diagnostic Review  none pertinent  CT Chest Without Contrast  Narrative: EXAMINATION:  CT CHEST WITHOUT CONTRAST    CLINICAL HISTORY:  Lung nodule, <1cm;Intercostal pain; Intercostal pain    TECHNIQUE:  Low dose axial images, sagittal and coronal reformations were obtained from the thoracic inlet to the lung bases. Contrast was not " administered.    COMPARISON:  Chest CT 08/01/2019    FINDINGS:  Base of Neck: Suggestion of a 1.5 cm right thyroid nodule.  Recommend nonemergent further evaluation with thyroid ultrasound.    Thoracic soft tissues: 11 mm left breast nodule unchanged.  Correlate with mammography.    Aorta: Left-sided aortic arch.  No aneurysm and no significant atherosclerosis    Heart: Normal size. No effusion.    Pulmonary vasculature: Pulmonary arteries distribute normally.    Lara/Mediastinum: No pathologic marilynn enlargement.    Airways: Patent.    Lungs/Pleura:    Unchanged left 4 mm fissural nodule series 3, image 216.    Unchanged right fissural nodule 8 mm size image 224.    Unchanged right anterior middle lobe subpleural nodule 5 mm image 276.    Unchanged 7 mm nodule right middle lobe abutting the pleura image 273.    No new nodules.    Esophagus: Normal.    Upper Abdomen: No abnormality of the partially imaged upper abdomen.    Bones: No acute fracture. No suspicious lytic or sclerotic lesions.  Impression: Unchanged pulmonary nodules as described above. For multiple solid nodules with any 6 mm or greater, Fleischner Society guidelines recommend follow up with non-contrast chest CT at 3-6 months (this exam) and 18-24 months after discovery (approximately August 2021).    All CT scans at this facility use dose modulation, iterative reconstruction and/or weight based dosing when appropriate to reduce radiation dose to as low as reasonably achievable.    Electronically signed by: Davis Mckeon  Date:    11/21/2019  Time:    12:24      Office Spirometry Results:     No flowsheet data found.  Pulmonary Studies Review 11/21/2019   SpO2 97   Height 61.000   Weight 2451.52   BMI (Calculated) 29   Predicted Distance 463.84   Predicted Distance Meters (Calculated) 603.48         Assessment:       Thyroid nodule  -     US Thyroid; Future; Expected date: 11/21/2019    Lung nodule  -     CT Chest Without Contrast; Future; Expected date:  11/21/2019          Outpatient Encounter Medications as of 11/21/2019   Medication Sig Dispense Refill    clobetasol 0.05% (TEMOVATE) 0.05 % Oint 1 application daily as needed.       COSENTYX PEN, 2 PENS, 150 mg/mL PnIj INJECT TWO PENS SUBCUTANEOUSLY EVERY MONTH. REFRIGERATE. ALLOW 15 TO 30 MINUTES AT ROOM TEMP PRIOR TO ADMINISTRATION. DO NOT FREEZE 2 Syringe 8    eletriptan (RELPAX) 40 MG tablet Take 1 tablet (40 mg total) by mouth as needed. may repeat in 2 hours if necessary 9 tablet 0    FLUoxetine 20 MG capsule Take 1 capsule (20 mg total) by mouth once daily. In am 30 capsule 11    furosemide (LASIX) 20 MG tablet Take 1 tablet (20 mg total) by mouth once daily. (Patient taking differently: Take 20 mg by mouth daily as needed. ) 30 tablet 4    ibuprofen (ADVIL,MOTRIN) 800 MG tablet Take 1 tablet (800 mg total) by mouth every 6 (six) hours as needed for Pain. 90 tablet 4    ketoconazole (NIZORAL) 2 % shampoo Wash body with medicated shampoo at least 2x/week - let soak at least 5 minutes prior to rinsing 120 mL 5    ketorolac (TORADOL) 10 mg tablet Take 10 mg by mouth.      liothyronine (CYTOMEL) 5 MCG Tab TAKE ONE TABLET BY MOUTH ONCE DAILY ON AN EMPTY STOMACH.  1    metaxalone (SKELAXIN) 800 MG tablet Take 1 tablet (800 mg total) by mouth 3 (three) times daily. 90 tablet 3    norethindrone (MICRONOR) 0.35 mg tablet Take 1 tablet (0.35 mg total) by mouth once daily. 30 tablet 11    oxycodone-acetaminophen (PERCOCET)  mg per tablet Take 1 tablet by mouth every 8 (eight) hours as needed for Pain. 75 tablet 0    phentermine (ADIPEX-P) 37.5 mg tablet Take 37.5 mg by mouth once daily.  0    potassium chloride (KLOR-CON) 10 MEQ TbSR Take 1 tablet (10 mEq total) by mouth once daily. 30 tablet 4    predniSONE (DELTASONE) 5 MG tablet Take 5 mg by mouth once daily.      promethazine (PHENERGAN) 25 MG tablet Take 1 tablet (25 mg total) by mouth every 6 (six) hours as needed. 30 tablet 3    ranitidine  (ZANTAC) 150 MG capsule Take 1 capsule (150 mg total) by mouth 2 (two) times daily. (Patient taking differently: Take 150 mg by mouth 2 (two) times daily as needed. ) 60 capsule 6    varenicline (CHANTIX STARTING MONTH BOX) 0.5 mg (11)- 1 mg (42) tablet Take one 0.5mg tab by mouth once daily X3 days,then increase to one 0.5mg tab twice daily X4 days,then increase to one 1mg tab twice daily 1 Package 0    varenicline (CHANTIX) 1 mg Tab Take 1 tablet (1 mg total) by mouth 2 (two) times daily. 60 tablet 6    cholecalciferol, vitamin D3, (VITAMIN D3) 1,000 unit capsule Take 3,000 Units by mouth once daily.      zewvdywaw-[id number]-algal oil (METANX/FOLTANX RF) 3 mg-35 mg-2 mg -90.314 mg Cap Take 1 tablet by mouth once daily. (Patient not taking: Reported on 11/21/2019) 30 capsule 12    protriptyline (VIVACTIL) 5 MG Tab Take 5 mg by mouth once daily.       trazodone (DESYREL) 50 MG tablet TAKE 1 TABLET (50 MG TOTAL) BY MOUTH EVERY EVENING. (Patient not taking: Reported on 11/13/2019) 90 tablet 3     No facility-administered encounter medications on file as of 11/21/2019.      Plan:       Requested Prescriptions      No prescriptions requested or ordered in this encounter     Problem List Items Addressed This Visit     None      Visit Diagnoses     Thyroid nodule    -  Primary    Relevant Orders    US Thyroid    Lung nodule        Relevant Orders    CT Chest Without Contrast                      Follow up in about 9 months (around 8/21/2020) for Review CT/PET day of visit.    MEDICAL DECISION MAKING: Moderate to high complexity.  Overall, the multiple problems listed are of moderate to high severity that may impact quality of life and activities of daily living. Side effects of medications, treatment plan as well as options and alternatives reviewed and discussed with patient. There was counseling of patient concerning these issues.    Total time spent in face to face counseling and coordination of care - 25  minutes  over 50% of time was used in discussion of prognosis, risks, benefits of treatment, instructions and compliance with regimen . Discussion with other physicians or health care providers (DME, NP, pharmacy, respiratory therapy) occurred.

## 2019-11-22 ENCOUNTER — HOSPITAL ENCOUNTER (OUTPATIENT)
Dept: RADIOLOGY | Facility: HOSPITAL | Age: 39
Discharge: HOME | End: 2019-11-22
Attending: INTERNAL MEDICINE
Payer: COMMERCIAL

## 2019-11-22 DIAGNOSIS — E04.1 THYROID NODULE: ICD-10-CM

## 2019-11-22 PROCEDURE — 76536 US EXAM OF HEAD AND NECK: CPT | Mod: 26,,, | Performed by: RADIOLOGY

## 2019-11-22 PROCEDURE — 76536 US THYROID: ICD-10-PCS | Mod: 26,,, | Performed by: RADIOLOGY

## 2019-11-22 PROCEDURE — 76536 US EXAM OF HEAD AND NECK: CPT | Mod: TC

## 2019-12-06 ENCOUNTER — HOSPITAL ENCOUNTER (OUTPATIENT)
Dept: RADIOLOGY | Facility: HOSPITAL | Age: 39
Discharge: HOME | End: 2019-12-06
Attending: INTERNAL MEDICINE
Payer: COMMERCIAL

## 2019-12-06 DIAGNOSIS — M54.2 NECK PAIN: ICD-10-CM

## 2019-12-06 DIAGNOSIS — M43.22 CERVICAL SPINE ANKYLOSIS: ICD-10-CM

## 2019-12-06 PROCEDURE — 72141 MRI NECK SPINE W/O DYE: CPT | Mod: 26,,, | Performed by: RADIOLOGY

## 2019-12-06 PROCEDURE — 72141 MRI CERVICAL SPINE WITHOUT CONTRAST: ICD-10-PCS | Mod: 26,,, | Performed by: RADIOLOGY

## 2019-12-06 PROCEDURE — 72141 MRI NECK SPINE W/O DYE: CPT | Mod: TC,PO

## 2019-12-13 ENCOUNTER — LAB VISIT (OUTPATIENT)
Dept: LAB | Facility: HOSPITAL | Age: 39
End: 2019-12-13
Attending: PHYSICAL MEDICINE & REHABILITATION
Payer: COMMERCIAL

## 2019-12-13 DIAGNOSIS — E03.9 HYPOTHYROIDISM: ICD-10-CM

## 2019-12-13 DIAGNOSIS — M54.50 LOW BACK PAIN: Primary | ICD-10-CM

## 2019-12-13 LAB
ALBUMIN SERPL BCP-MCNC: 4.2 G/DL (ref 3.5–5.2)
ALP SERPL-CCNC: 72 U/L (ref 55–135)
ALT SERPL W/O P-5'-P-CCNC: 12 U/L (ref 10–44)
ANION GAP SERPL CALC-SCNC: 13 MMOL/L (ref 8–16)
AST SERPL-CCNC: 15 U/L (ref 10–40)
BASOPHILS # BLD AUTO: 0.15 K/UL (ref 0–0.2)
BASOPHILS NFR BLD: 1 % (ref 0–1.9)
BILIRUB SERPL-MCNC: 0.3 MG/DL (ref 0.1–1)
BUN SERPL-MCNC: 13 MG/DL (ref 6–20)
CALCIUM SERPL-MCNC: 10.3 MG/DL (ref 8.7–10.5)
CHLORIDE SERPL-SCNC: 104 MMOL/L (ref 95–110)
CO2 SERPL-SCNC: 25 MMOL/L (ref 23–29)
CREAT SERPL-MCNC: 0.8 MG/DL (ref 0.5–1.4)
DIFFERENTIAL METHOD: ABNORMAL
EOSINOPHIL # BLD AUTO: 0.1 K/UL (ref 0–0.5)
EOSINOPHIL NFR BLD: 0.9 % (ref 0–8)
ERYTHROCYTE [DISTWIDTH] IN BLOOD BY AUTOMATED COUNT: 14 % (ref 11.5–14.5)
EST. GFR  (AFRICAN AMERICAN): >60 ML/MIN/1.73 M^2
EST. GFR  (NON AFRICAN AMERICAN): >60 ML/MIN/1.73 M^2
GLUCOSE SERPL-MCNC: 82 MG/DL (ref 70–110)
HCT VFR BLD AUTO: 45.4 % (ref 37–48.5)
HGB BLD-MCNC: 14.5 G/DL (ref 12–16)
IMM GRANULOCYTES # BLD AUTO: 0.07 K/UL (ref 0–0.04)
IMM GRANULOCYTES NFR BLD AUTO: 0.5 % (ref 0–0.5)
LYMPHOCYTES # BLD AUTO: 3.8 K/UL (ref 1–4.8)
LYMPHOCYTES NFR BLD: 25.5 % (ref 18–48)
MCH RBC QN AUTO: 31.5 PG (ref 27–31)
MCHC RBC AUTO-ENTMCNC: 31.9 G/DL (ref 32–36)
MCV RBC AUTO: 99 FL (ref 82–98)
MONOCYTES # BLD AUTO: 1.1 K/UL (ref 0.3–1)
MONOCYTES NFR BLD: 7 % (ref 4–15)
NEUTROPHILS # BLD AUTO: 9.8 K/UL (ref 1.8–7.7)
NEUTROPHILS NFR BLD: 65.1 % (ref 38–73)
NRBC BLD-RTO: 0 /100 WBC
PLATELET # BLD AUTO: 433 K/UL (ref 150–350)
PMV BLD AUTO: 9.8 FL (ref 9.2–12.9)
POTASSIUM SERPL-SCNC: 4.3 MMOL/L (ref 3.5–5.1)
PROT SERPL-MCNC: 8 G/DL (ref 6–8.4)
RBC # BLD AUTO: 4.6 M/UL (ref 4–5.4)
SODIUM SERPL-SCNC: 142 MMOL/L (ref 136–145)
WBC # BLD AUTO: 15.02 K/UL (ref 3.9–12.7)

## 2019-12-13 PROCEDURE — 85025 COMPLETE CBC W/AUTO DIFF WBC: CPT

## 2019-12-13 PROCEDURE — 84144 ASSAY OF PROGESTERONE: CPT

## 2019-12-13 PROCEDURE — 84439 ASSAY OF FREE THYROXINE: CPT

## 2019-12-13 PROCEDURE — 84481 FREE ASSAY (FT-3): CPT

## 2019-12-13 PROCEDURE — 36415 COLL VENOUS BLD VENIPUNCTURE: CPT | Mod: PO

## 2019-12-13 PROCEDURE — 84443 ASSAY THYROID STIM HORMONE: CPT

## 2019-12-13 PROCEDURE — 80053 COMPREHEN METABOLIC PANEL: CPT

## 2019-12-14 LAB
PROGEST SERPL-MCNC: 0.1 NG/ML
T3FREE SERPL-MCNC: 2.9 PG/ML (ref 2.3–4.2)
T4 FREE SERPL-MCNC: 0.82 NG/DL (ref 0.71–1.51)
TSH SERPL DL<=0.005 MIU/L-ACNC: 1.03 UIU/ML (ref 0.4–4)

## 2019-12-17 ENCOUNTER — PATIENT MESSAGE (OUTPATIENT)
Dept: RHEUMATOLOGY | Facility: CLINIC | Age: 39
End: 2019-12-17

## 2019-12-17 ENCOUNTER — PATIENT MESSAGE (OUTPATIENT)
Dept: OBSTETRICS AND GYNECOLOGY | Facility: CLINIC | Age: 39
End: 2019-12-17

## 2019-12-17 DIAGNOSIS — G47.00 INSOMNIA, UNSPECIFIED TYPE: Primary | ICD-10-CM

## 2019-12-18 ENCOUNTER — PATIENT MESSAGE (OUTPATIENT)
Dept: OBSTETRICS AND GYNECOLOGY | Facility: CLINIC | Age: 39
End: 2019-12-18

## 2019-12-18 RX ORDER — PROGESTERONE 100 MG/1
100 CAPSULE ORAL NIGHTLY
Qty: 30 CAPSULE | Refills: 11 | Status: SHIPPED | OUTPATIENT
Start: 2019-12-18 | End: 2021-03-08

## 2019-12-26 DIAGNOSIS — Z72.0 TOBACCO ABUSE DISORDER: ICD-10-CM

## 2019-12-26 DIAGNOSIS — M45.9 ANKYLOSING SPONDYLITIS, UNSPECIFIED SITE OF SPINE: ICD-10-CM

## 2019-12-26 DIAGNOSIS — M25.552 HIP PAIN, CHRONIC, LEFT: ICD-10-CM

## 2019-12-26 DIAGNOSIS — L40.50 PSORIATIC ARTHRITIS: ICD-10-CM

## 2019-12-26 DIAGNOSIS — G89.29 HIP PAIN, CHRONIC, LEFT: ICD-10-CM

## 2019-12-26 DIAGNOSIS — D69.1 ABNORMAL PLATELETS: ICD-10-CM

## 2019-12-26 RX ORDER — VARENICLINE TARTRATE 0.5 (11)-1
KIT ORAL
Qty: 1 PACKAGE | Refills: 0 | Status: SHIPPED | OUTPATIENT
Start: 2019-12-26 | End: 2020-11-04

## 2019-12-27 RX ORDER — TRAZODONE HYDROCHLORIDE 100 MG/1
100 TABLET ORAL NIGHTLY
Qty: 30 TABLET | Refills: 3 | Status: SHIPPED | OUTPATIENT
Start: 2019-12-27 | End: 2020-02-17 | Stop reason: SDUPTHER

## 2020-01-28 ENCOUNTER — TELEPHONE (OUTPATIENT)
Dept: RHEUMATOLOGY | Facility: CLINIC | Age: 40
End: 2020-01-28

## 2020-01-28 DIAGNOSIS — L40.50 PSORIATIC ARTHRITIS: Primary | ICD-10-CM

## 2020-01-28 DIAGNOSIS — M45.9 ANKYLOSING SPONDYLITIS, UNSPECIFIED SITE OF SPINE: ICD-10-CM

## 2020-01-28 NOTE — TELEPHONE ENCOUNTER
----- Message from Arturo Quiroga sent at 1/28/2020  2:17 PM CST -----  Contact: pt  Type: Needs Medical Advice    Who Called:  pt    Best Call Back Number: [phone]  Additional Information: pt's insurance company is requiring a PA for the medication secukinumab (COSENTYX PEN) 150 mg/mL PnIj. Please call to advise.

## 2020-01-30 ENCOUNTER — TELEPHONE (OUTPATIENT)
Dept: PHARMACY | Facility: CLINIC | Age: 40
End: 2020-01-30

## 2020-01-30 NOTE — TELEPHONE ENCOUNTER
Informed Patient  that [facility] received prescription for Cosentyx and prior authorization is required.  [facility] will be back in touch once insurance determination is received.

## 2020-02-07 ENCOUNTER — TELEPHONE (OUTPATIENT)
Dept: RHEUMATOLOGY | Facility: CLINIC | Age: 40
End: 2020-02-07

## 2020-02-12 DIAGNOSIS — M45.9 ANKYLOSING SPONDYLITIS, UNSPECIFIED SITE OF SPINE: Primary | ICD-10-CM

## 2020-02-12 DIAGNOSIS — L40.50 PSORIATIC ARTHRITIS: ICD-10-CM

## 2020-02-12 NOTE — TELEPHONE ENCOUNTER
"----- Message from Juan Alberto Hopper sent at 2/12/2020  8:42 AM CST -----  Regarding: Cosentyx - [facility]  LEONEL Jiang: Cosentyx prior authorization has been approved through 2/10/2021. Patients insurance requires the patient to fill through [facility]. Please send prescription to [facility], which has been added to patient EPIC profile. Patient has been notified and provided with the necessary information to call and schedule a delivery.     To complete this in EPIC, the original order MUST be discontinued and re-typed as a new prescription with the updated pharmacy listed. Clicking "reorder" will continue to route the prescription to [facility] even if the pharmacy is changed. Please OPT the patient out of [facility] when the BPA is fired.    Thanks,  Juan Alberto Hopper  [phone]  "

## 2020-02-12 NOTE — TELEPHONE ENCOUNTER
DOCUMENTATION ONLY:  Prior authorization for Cosentyx approved from 2/10/2020 to 2/10/2021.   Case ID# [id number]    [facility]  [phone] (Phone)  [phone] (Fax)

## 2020-02-14 DIAGNOSIS — M45.9 ANKYLOSING SPONDYLITIS, UNSPECIFIED SITE OF SPINE: ICD-10-CM

## 2020-02-14 DIAGNOSIS — L40.50 PSORIATIC ARTHRITIS: ICD-10-CM

## 2020-02-17 DIAGNOSIS — G47.00 INSOMNIA, UNSPECIFIED TYPE: ICD-10-CM

## 2020-02-17 NOTE — TELEPHONE ENCOUNTER
Incoming fax refill request from [facility] Nakia dorsey Trazodone 100mg tablets 90 day supply, Take 1 tablet by mouth every evening.

## 2020-02-18 RX ORDER — TRAZODONE HYDROCHLORIDE 100 MG/1
100 TABLET ORAL NIGHTLY
Qty: 30 TABLET | Refills: 3 | Status: SHIPPED | OUTPATIENT
Start: 2020-02-18 | End: 2020-02-19 | Stop reason: SDUPTHER

## 2020-02-18 RX ORDER — SECUKINUMAB 150 MG/ML
INJECTION SUBCUTANEOUS
Qty: 2 ML | Refills: 7 | Status: SHIPPED | OUTPATIENT
Start: 2020-02-18 | End: 2020-11-08

## 2020-02-19 DIAGNOSIS — G47.00 INSOMNIA, UNSPECIFIED TYPE: ICD-10-CM

## 2020-02-19 NOTE — TELEPHONE ENCOUNTER
Incoming fax refill request from [facility] for Trazodone 100 mg tablet #90, Take 1 tablet by mouth every evening.

## 2020-02-20 RX ORDER — TRAZODONE HYDROCHLORIDE 100 MG/1
100 TABLET ORAL NIGHTLY
Qty: 90 TABLET | Refills: 0 | Status: SHIPPED | OUTPATIENT
Start: 2020-02-20 | End: 2020-05-29 | Stop reason: SDUPTHER

## 2020-02-20 NOTE — TELEPHONE ENCOUNTER
Cancel the prescription I sent for trazodone 100 #30 with 3 refills sent earlier this week. I resent for #90.

## 2020-02-26 NOTE — TELEPHONE ENCOUNTER
Spoke with  with pharmacy and advised to cancel the Trazodone #30 that was previously sent. No further questions.

## 2020-03-16 ENCOUNTER — TELEPHONE (OUTPATIENT)
Dept: RHEUMATOLOGY | Facility: CLINIC | Age: 40
End: 2020-03-16

## 2020-03-16 NOTE — TELEPHONE ENCOUNTER
Returned patient call regarding up coming appointment. Patient stated since she was on immunosuppressant medication and has a brand new grand baby at home would like to cancel appointment. Nurse informed that she has appointment in June with Dr Shea that needs to be kept. Patient verbalized understanding.

## 2020-03-16 NOTE — TELEPHONE ENCOUNTER
----- Message from Cherelle Quezada sent at 3/16/2020  1:32 PM CDT -----  Type: Needs Medical Advice    Who Called:  Patient  Best Call Back Number: [phone]  Additional Information: Patient requesting to speak with nurse concerning appointment on March 20th/needs advice/please call back to advise.

## 2020-05-29 DIAGNOSIS — G47.00 INSOMNIA, UNSPECIFIED TYPE: ICD-10-CM

## 2020-05-29 RX ORDER — TRAZODONE HYDROCHLORIDE 100 MG/1
100 TABLET ORAL NIGHTLY
Qty: 90 TABLET | Refills: 0 | Status: SHIPPED | OUTPATIENT
Start: 2020-05-29 | End: 2020-07-10 | Stop reason: SDUPTHER

## 2020-07-10 ENCOUNTER — OFFICE VISIT (OUTPATIENT)
Dept: RHEUMATOLOGY | Facility: CLINIC | Age: 40
End: 2020-07-10
Payer: COMMERCIAL

## 2020-07-10 DIAGNOSIS — M45.9 ANKYLOSING SPONDYLITIS, UNSPECIFIED SITE OF SPINE: ICD-10-CM

## 2020-07-10 DIAGNOSIS — D84.821 IMMUNOCOMPROMISED STATE DUE TO DRUG THERAPY: ICD-10-CM

## 2020-07-10 DIAGNOSIS — Z79.899 IMMUNOCOMPROMISED STATE DUE TO DRUG THERAPY: ICD-10-CM

## 2020-07-10 DIAGNOSIS — G47.00 INSOMNIA, UNSPECIFIED TYPE: ICD-10-CM

## 2020-07-10 DIAGNOSIS — L40.50 PSORIATIC ARTHRITIS: Primary | ICD-10-CM

## 2020-07-10 DIAGNOSIS — F41.9 ANXIETY: ICD-10-CM

## 2020-07-10 PROCEDURE — 99213 PR OFFICE/OUTPT VISIT, EST, LEVL III, 20-29 MIN: ICD-10-PCS | Mod: 95,,, | Performed by: PHYSICIAN ASSISTANT

## 2020-07-10 PROCEDURE — 99213 OFFICE O/P EST LOW 20 MIN: CPT | Mod: 95,,, | Performed by: PHYSICIAN ASSISTANT

## 2020-07-10 RX ORDER — TRAZODONE HYDROCHLORIDE 100 MG/1
100 TABLET ORAL NIGHTLY
Qty: 90 TABLET | Refills: 1 | Status: SHIPPED | OUTPATIENT
Start: 2020-07-10 | End: 2021-03-05 | Stop reason: SDUPTHER

## 2020-07-10 RX ORDER — CYCLOBENZAPRINE HCL 5 MG
5-10 TABLET ORAL 2 TIMES DAILY
Qty: 90 TABLET | Refills: 2 | Status: SHIPPED | OUTPATIENT
Start: 2020-07-10 | End: 2020-08-09

## 2020-07-10 RX ORDER — PREDNISONE 5 MG/1
5 TABLET ORAL DAILY
Qty: 30 TABLET | Refills: 1 | Status: SHIPPED | OUTPATIENT
Start: 2020-07-10 | End: 2020-09-09 | Stop reason: SDUPTHER

## 2020-07-10 RX ORDER — CELECOXIB 200 MG/1
200 CAPSULE ORAL DAILY
Qty: 30 CAPSULE | Refills: 3 | Status: SHIPPED | OUTPATIENT
Start: 2020-07-10 | End: 2020-11-04 | Stop reason: SDUPTHER

## 2020-07-10 RX ORDER — FLUOXETINE HYDROCHLORIDE 20 MG/1
20 CAPSULE ORAL DAILY
Qty: 90 CAPSULE | Refills: 1 | Status: SHIPPED | OUTPATIENT
Start: 2020-07-10 | End: 2021-03-05 | Stop reason: SDUPTHER

## 2020-07-10 NOTE — PROGRESS NOTES
The patient location is: home  The chief complaint leading to consultation is: AS, psoriatic arthritis    Visit type: audiovisual    Face to Face time with patient: 13 minutes  20 minutes of total time spent on the encounter, which includes face to face time and non-face to face time preparing to see the patient (eg, review of tests), Obtaining and/or reviewing separately obtained history, Documenting clinical information in the electronic or other health record, Independently interpreting results (not separately reported) and communicating results to the patient/family/caregiver, or Care coordination (not separately reported).   Each patient to whom he or she provides medical services by telemedicine is:  (1) informed of the relationship between the physician and patient and the respective role of any other health care provider with respect to management of the patient; and (2) notified that he or she may decline to receive medical services by telemedicine and may withdraw from such care at any time.    Notes:   Subjective:       Patient ID: Sparkle Jose is a 39 y.o. female.    Chief Complaint: Disease Management    Mrs. Jose is a 39 year old female who presents to telemedicine virtual visit for follow up on AS. She is a new patient to me. She has been doing fair since her last visit. She is on cosentyx 300 mg monthly and prednisone PRN. She complains of joint pain involving her low back, hips, and shoulders with prolonged morning stiffness lasting between 30 minutes to 1 hour. No serious infections in the last 6 months. She complains of increasing pain at night and difficulty sleeping. Skelaxin was not as effective as flexeril and she wants to change back to this treatment. She I due for labs now. Not currently on an NSAID.    She is followed by pain management.    Current tx:  1. cosentyx  2. Prednisone    Prior tx:  1. Humira  2. Remicade (drug induced lupus)    Review of Systems   Constitutional:  Positive for activity change. Negative for appetite change, chills, fatigue and fever.   Eyes: Negative for visual disturbance.   Respiratory: Negative for cough and shortness of breath.    Cardiovascular: Negative for chest pain, palpitations and leg swelling.   Gastrointestinal: Negative for abdominal pain, constipation, diarrhea, nausea and vomiting.   Musculoskeletal: Positive for arthralgias, back pain, joint swelling and myalgias.   Allergic/Immunologic: Positive for immunocompromised state.   Neurological: Negative for dizziness, weakness, light-headedness and headaches.         Objective:   There were no vitals filed for this visit.    Past Medical History:   Diagnosis Date    Ankylosing spondylitis     Arthritis     Celiac disease     Depression     Family history of DVT     MGM DVT, mother spleenic    Family history of factor V Leiden mutation     mother and sister    GERD (gastroesophageal reflux disease)     Migraine     Osteoarthritis     Psoriasis     Psoriatic arthritis mutilans      Past Surgical History:   Procedure Laterality Date    brain decompression   2006    BRAIN SURGERY      TUBAL LIGATION            Physical Exam   Constitutional: She is oriented to person, place, and time.   Neurological: She is alert and oriented to person, place, and time.       Prior labs reviewed:  Component      Latest Ref Rng & Units 12/13/2019 7/9/2019   WBC      3.90 - 12.70 K/uL 15.02 (H)    RBC      4.00 - 5.40 M/uL 4.60    Hemoglobin      12.0 - 16.0 g/dL 14.5    Hematocrit      37.0 - 48.5 % 45.4    MCV      82 - 98 fL 99 (H)    MCH      27.0 - 31.0 pg 31.5 (H)    MCHC      32.0 - 36.0 g/dL 31.9 (L)    RDW      11.5 - 14.5 % 14.0    Platelets      150 - 350 K/uL 433 (H)    MPV      9.2 - 12.9 fL 9.8    Immature Granulocytes      0.0 - 0.5 % 0.5    Gran # (ANC)      1.8 - 7.7 K/uL 9.8 (H)    Immature Grans (Abs)      0.00 - 0.04 K/uL 0.07 (H)    Lymph #      1.0 - 4.8 K/uL 3.8    Mono #      0.3 -  1.0 K/uL 1.1 (H)    Eos #      0.0 - 0.5 K/uL 0.1    Baso #      0.00 - 0.20 K/uL 0.15    nRBC      0 /100 WBC 0    Gran%      38.0 - 73.0 % 65.1    Lymph%      18.0 - 48.0 % 25.5    Mono%      4.0 - 15.0 % 7.0    Eosinophil%      0.0 - 8.0 % 0.9    Basophil%      0.0 - 1.9 % 1.0    Differential Method       Automated    Sodium      136 - 145 mmol/L 142    Potassium      3.5 - 5.1 mmol/L 4.3    Chloride      95 - 110 mmol/L 104    CO2      23 - 29 mmol/L 25    Glucose      70 - 110 mg/dL 82    BUN, Bld      6 - 20 mg/dL 13    Creatinine      0.5 - 1.4 mg/dL 0.8    Calcium      8.7 - 10.5 mg/dL 10.3    PROTEIN TOTAL      6.0 - 8.4 g/dL 8.0    Albumin      3.5 - 5.2 g/dL 4.2    BILIRUBIN TOTAL      0.1 - 1.0 mg/dL 0.3    Alkaline Phosphatase      55 - 135 U/L 72    AST      10 - 40 U/L 15    ALT      10 - 44 U/L 12    Anion Gap      8 - 16 mmol/L 13    eGFR if African American      >60 mL/min/1.73 m:2 >60.0    eGFR if non African American      >60 mL/min/1.73 m:2 >60.0    NIL      See text IU/mL  0.020   TB1 - Nil      See text IU/mL  0.000   TB2 - Nil      See text IU/mL  0.000   Mitogen - Nil      See text IU/mL  >10.000   TB Gold Plus        Negative   Hep B C IgM        Negative   Hepatitis C Ab        Negative   Vit D, 25-Hydroxy      30 - 96 ng/mL  43   TSH      0.400 - 4.000 uIU/mL 1.030      Imaging reviewed:  EXAMINATION:  US THYROID     CLINICAL HISTORY:  Nontoxic single thyroid nodule     TECHNIQUE:  Ultrasound of the thyroid and cervical lymph nodes was performed.     COMPARISON:  None.     FINDINGS:  The right lobe of the thyroid gland measures up to 4.9 cm in length.  The left lobe measures up to 4.6 cm in length.  The isthmus is not thickened.  There is a spongiform nodule noted within the upper pole of the right lobe of the thyroid gland that measures up to 1.2 cm.  There is also a predominantly cystic appearing nodule within the upper pole of the right lobe of the thyroid gland that measures 5 mm.   There is a nodule within the lower pole of the right lobe of the thyroid gland that appears to be predominantly solid but contain area needs a cystic spaces and measures up to 1.8 cm.  The borders of this nodule or somewhat difficult to visualize.     Impression:     1. Solid nodule within the lower pole of the right lobe of the thyroid gland containing a cystic space measuring up to 1.8 cm.  Per TI RADS criteria follow-up of this nodule at 1, 3 and 5 year intervals is recommended assuming stability.  EXAMINATION:  CT CHEST WITHOUT CONTRAST     CLINICAL HISTORY:  Lung nodule, <1cm;Intercostal pain; Intercostal pain     TECHNIQUE:  Low dose axial images, sagittal and coronal reformations were obtained from the thoracic inlet to the lung bases. Contrast was not administered.     COMPARISON:  Chest CT 08/01/2019     FINDINGS:  Base of Neck: Suggestion of a 1.5 cm right thyroid nodule.  Recommend nonemergent further evaluation with thyroid ultrasound.     Thoracic soft tissues: 11 mm left breast nodule unchanged.  Correlate with mammography.     Aorta: Left-sided aortic arch.  No aneurysm and no significant atherosclerosis     Heart: Normal size. No effusion.     Pulmonary vasculature: Pulmonary arteries distribute normally.     Lara/Mediastinum: No pathologic marilynn enlargement.     Airways: Patent.     Lungs/Pleura:     Unchanged left 4 mm fissural nodule series 3, image 216.     Unchanged right fissural nodule 8 mm size image 224.     Unchanged right anterior middle lobe subpleural nodule 5 mm image 276.     Unchanged 7 mm nodule right middle lobe abutting the pleura image 273.     No new nodules.     Esophagus: Normal.     Upper Abdomen: No abnormality of the partially imaged upper abdomen.     Bones: No acute fracture. No suspicious lytic or sclerotic lesions.     Impression:     Unchanged pulmonary nodules as described above. For multiple solid nodules with any 6 mm or greater, Fleischner Society guidelines  recommend follow up with non-contrast chest CT at 3-6 months (this exam) and 18-24 months after discovery (approximately August 2021).     All CT scans at this facility use dose modulation, iterative reconstruction and/or weight based dosing when appropriate to reduce radiation dose to as low as reasonably achievable.    Assessment:       1. Psoriatic arthritis    2. Ankylosing spondylitis, unspecified site of spine    3. Insomnia, unspecified type    4. Anxiety    5. Immunocompromised state due to drug therapy            Plan:       Psoriatic arthritis  -     predniSONE (DELTASONE) 5 MG tablet; Take 1 tablet (5 mg total) by mouth once daily.  Dispense: 30 tablet; Refill: 1  -     CBC auto differential; Future; Expected date: 07/10/2020  -     Comprehensive metabolic panel; Future; Expected date: 07/10/2020  -     C-Reactive Protein; Future; Expected date: 07/10/2020  -     Sedimentation rate; Future; Expected date: 07/10/2020  -     celecoxib (CELEBREX) 200 MG capsule; Take 1 capsule (200 mg total) by mouth once daily.  Dispense: 30 capsule; Refill: 3  -     cyclobenzaprine (FLEXERIL) 5 MG tablet; Take 1-2 tablets (5-10 mg total) by mouth 2 (two) times a day.  Dispense: 90 tablet; Refill: 2    Ankylosing spondylitis, unspecified site of spine  -     cyclobenzaprine (FLEXERIL) 5 MG tablet; Take 1-2 tablets (5-10 mg total) by mouth 2 (two) times a day.  Dispense: 90 tablet; Refill: 2    Insomnia, unspecified type  -     traZODone (DESYREL) 100 MG tablet; Take 1 tablet (100 mg total) by mouth every evening.  Dispense: 90 tablet; Refill: 1    Anxiety  -     FLUoxetine 20 MG capsule; Take 1 capsule (20 mg total) by mouth once daily. In am  Dispense: 90 capsule; Refill: 1    Immunocompromised state due to drug therapy        Assessment:  39 year old female with  Psoriatic arthritis, Ankylosing spondylitis on cosentyx 300 mg  --chronic insomnia on trazodone  --anxiety on prozac  --hx of brain surgery  --persistent  leukocytosis and thrombocytosis, hematology work up unrevealing  --thyroid nodule  --pulmonary nodule    Plan:  1. Start celebrex 200 mg daily  2. Cont. Prednisone PRN  3. Cont cosentyx 300 mg monthly  4. Prozac and trazodone refilled  5. D/C skelaxin. Start flexeril 5-10 mg bid prn    The patient is aware that there is a COVID-19 pandemic and that the immunosuppressants being taken to treat arthritis can increased the  risk for infection/Viruses.  This patient understands  to hold (not to take) all DMARD/Immunsuppressants with the exception of Plaquenil,  if having fever chills, cough, shortness of breath loss of sense of smell and or myalgias.  It is understood that the patient is to call the office as well as call their primary care physician and observe  social isolation    Follow up:  3-4 mo Dr. Shea

## 2020-07-14 ENCOUNTER — TELEPHONE (OUTPATIENT)
Dept: RHEUMATOLOGY | Facility: CLINIC | Age: 40
End: 2020-07-14

## 2020-07-14 NOTE — TELEPHONE ENCOUNTER
----- Message from Erin Velasquez PA-C sent at 7/10/2020  3:22 PM CDT -----  Lab [facility]4 mo dr. shrestha

## 2020-07-31 ENCOUNTER — LAB VISIT (OUTPATIENT)
Dept: LAB | Facility: HOSPITAL | Age: 40
End: 2020-07-31
Attending: PHYSICIAN ASSISTANT
Payer: COMMERCIAL

## 2020-07-31 DIAGNOSIS — L40.50 PSORIATIC ARTHRITIS: ICD-10-CM

## 2020-07-31 LAB
ALBUMIN SERPL BCP-MCNC: 4 G/DL (ref 3.5–5.2)
ALP SERPL-CCNC: 67 U/L (ref 55–135)
ALT SERPL W/O P-5'-P-CCNC: 13 U/L (ref 10–44)
ANION GAP SERPL CALC-SCNC: 12 MMOL/L (ref 8–16)
AST SERPL-CCNC: 16 U/L (ref 10–40)
BASOPHILS # BLD AUTO: 0.1 K/UL (ref 0–0.2)
BASOPHILS NFR BLD: 0.8 % (ref 0–1.9)
BILIRUB SERPL-MCNC: 0.3 MG/DL (ref 0.1–1)
BUN SERPL-MCNC: 12 MG/DL (ref 6–20)
CALCIUM SERPL-MCNC: 10.2 MG/DL (ref 8.7–10.5)
CHLORIDE SERPL-SCNC: 106 MMOL/L (ref 95–110)
CO2 SERPL-SCNC: 24 MMOL/L (ref 23–29)
CREAT SERPL-MCNC: 0.7 MG/DL (ref 0.5–1.4)
CRP SERPL-MCNC: 9.8 MG/L (ref 0–8.2)
DIFFERENTIAL METHOD: ABNORMAL
EOSINOPHIL # BLD AUTO: 0.2 K/UL (ref 0–0.5)
EOSINOPHIL NFR BLD: 1.3 % (ref 0–8)
ERYTHROCYTE [DISTWIDTH] IN BLOOD BY AUTOMATED COUNT: 14 % (ref 11.5–14.5)
ERYTHROCYTE [SEDIMENTATION RATE] IN BLOOD BY WESTERGREN METHOD: 8 MM/HR (ref 0–20)
EST. GFR  (AFRICAN AMERICAN): >60 ML/MIN/1.73 M^2
EST. GFR  (NON AFRICAN AMERICAN): >60 ML/MIN/1.73 M^2
GLUCOSE SERPL-MCNC: 80 MG/DL (ref 70–110)
HCT VFR BLD AUTO: 47 % (ref 37–48.5)
HGB BLD-MCNC: 14.7 G/DL (ref 12–16)
IMM GRANULOCYTES # BLD AUTO: 0.06 K/UL (ref 0–0.04)
IMM GRANULOCYTES NFR BLD AUTO: 0.5 % (ref 0–0.5)
LYMPHOCYTES # BLD AUTO: 4.1 K/UL (ref 1–4.8)
LYMPHOCYTES NFR BLD: 31.8 % (ref 18–48)
MCH RBC QN AUTO: 30.2 PG (ref 27–31)
MCHC RBC AUTO-ENTMCNC: 31.3 G/DL (ref 32–36)
MCV RBC AUTO: 97 FL (ref 82–98)
MONOCYTES # BLD AUTO: 0.9 K/UL (ref 0.3–1)
MONOCYTES NFR BLD: 7.3 % (ref 4–15)
NEUTROPHILS # BLD AUTO: 7.5 K/UL (ref 1.8–7.7)
NEUTROPHILS NFR BLD: 58.3 % (ref 38–73)
NRBC BLD-RTO: 0 /100 WBC
PLATELET # BLD AUTO: 440 K/UL (ref 150–350)
PMV BLD AUTO: 10.3 FL (ref 9.2–12.9)
POTASSIUM SERPL-SCNC: 4.4 MMOL/L (ref 3.5–5.1)
PROT SERPL-MCNC: 7.6 G/DL (ref 6–8.4)
RBC # BLD AUTO: 4.87 M/UL (ref 4–5.4)
SODIUM SERPL-SCNC: 142 MMOL/L (ref 136–145)
WBC # BLD AUTO: 12.81 K/UL (ref 3.9–12.7)

## 2020-07-31 PROCEDURE — 36415 COLL VENOUS BLD VENIPUNCTURE: CPT | Mod: PO

## 2020-07-31 PROCEDURE — 85025 COMPLETE CBC W/AUTO DIFF WBC: CPT

## 2020-07-31 PROCEDURE — 80053 COMPREHEN METABOLIC PANEL: CPT

## 2020-07-31 PROCEDURE — 86140 C-REACTIVE PROTEIN: CPT

## 2020-07-31 PROCEDURE — 85651 RBC SED RATE NONAUTOMATED: CPT | Mod: PO

## 2020-08-05 ENCOUNTER — TELEPHONE (OUTPATIENT)
Dept: PULMONOLOGY | Facility: CLINIC | Age: 40
End: 2020-08-05

## 2020-08-31 ENCOUNTER — OFFICE VISIT (OUTPATIENT)
Dept: PULMONOLOGY | Facility: CLINIC | Age: 40
End: 2020-08-31
Payer: COMMERCIAL

## 2020-08-31 ENCOUNTER — HOSPITAL ENCOUNTER (OUTPATIENT)
Dept: RADIOLOGY | Facility: HOSPITAL | Age: 40
Discharge: HOME | End: 2020-08-31
Attending: INTERNAL MEDICINE
Payer: COMMERCIAL

## 2020-08-31 VITALS
HEIGHT: 61 IN | RESPIRATION RATE: 16 BRPM | DIASTOLIC BLOOD PRESSURE: 80 MMHG | WEIGHT: 168.19 LBS | OXYGEN SATURATION: 97 % | BODY MASS INDEX: 31.75 KG/M2 | SYSTOLIC BLOOD PRESSURE: 120 MMHG | HEART RATE: 79 BPM

## 2020-08-31 DIAGNOSIS — R91.1 LUNG NODULE: ICD-10-CM

## 2020-08-31 DIAGNOSIS — F17.210 CIGARETTE NICOTINE DEPENDENCE WITHOUT COMPLICATION: ICD-10-CM

## 2020-08-31 DIAGNOSIS — F17.200 SMOKER: ICD-10-CM

## 2020-08-31 DIAGNOSIS — R91.1 SOLITARY PULMONARY NODULE: ICD-10-CM

## 2020-08-31 DIAGNOSIS — R91.1 PULMONARY NODULE: Primary | ICD-10-CM

## 2020-08-31 PROCEDURE — 99999 PR PBB SHADOW E&M-EST. PATIENT-LVL V: ICD-10-PCS | Mod: PBBFAC,,, | Performed by: NURSE PRACTITIONER

## 2020-08-31 PROCEDURE — 99214 PR OFFICE/OUTPT VISIT, EST, LEVL IV, 30-39 MIN: ICD-10-PCS | Mod: S$GLB,,, | Performed by: NURSE PRACTITIONER

## 2020-08-31 PROCEDURE — 99999 PR PBB SHADOW E&M-EST. PATIENT-LVL V: CPT | Mod: PBBFAC,,, | Performed by: NURSE PRACTITIONER

## 2020-08-31 PROCEDURE — 3008F BODY MASS INDEX DOCD: CPT | Mod: CPTII,S$GLB,, | Performed by: NURSE PRACTITIONER

## 2020-08-31 PROCEDURE — 3008F PR BODY MASS INDEX (BMI) DOCUMENTED: ICD-10-PCS | Mod: CPTII,S$GLB,, | Performed by: NURSE PRACTITIONER

## 2020-08-31 PROCEDURE — 99214 OFFICE O/P EST MOD 30 MIN: CPT | Mod: S$GLB,,, | Performed by: NURSE PRACTITIONER

## 2020-08-31 PROCEDURE — 71250 CT THORAX DX C-: CPT | Mod: TC

## 2020-08-31 NOTE — ASSESSMENT & PLAN NOTE
8/31/2020 1 year follow up CT chest Unchanged scattered bilateral pulmonary nodules.  No new nodules.  Follow up CT in 1 year, this will be 2 year follow up, August 2021.   Per Fleischner guidelines: For multiple solid nodules with any 6 mm or greater, Fleischner Society guidelines recommend follow up with non-contrast chest CT at 3-6 months (this exam) and 18-24 months after discovery (approximately August 2021).

## 2020-08-31 NOTE — PROGRESS NOTES
Subjective:      Patient ID: Sparkle Jose is a 39 y.o. female.    Patient Active Problem List   Diagnosis    Ankylosing spondylitis    Psoriatic arthritis    Encounter for sterilization    Thrombocytosis    Leukocytosis    Factor 5 Leiden mutation, heterozygous    Cigarette nicotine dependence without complication    Pulmonary nodule     she has been referred by No ref. provider found for evaluation and management for   Chief Complaint   Patient presents with    Pulmonary Nodules     Chief Complaint: Pulmonary Nodules    HPI:  Sparkle Jose is a 39 y.o. female presents to pulmonary clinic evaluation follow up related to pulmonary nodules review CT chest 8/31/2020 that are stable, unchanged scattered bilateral pulmonary nodule.  No new nodules.  Plan repeat CT chest August 2021, if pulmonary nodules are unchanged at this 2 year follow-up low risk 14 packed cigarette smoker, consideration for no additional follow-up per Fleischner guidelines: For multiple solid nodules with any 6 mm or greater, Fleischner Society guidelines recommend follow up with non-contrast chest CT at 3-6 months and 18-24 months after discovery.   No pulmonary symptoms.  No cough.  No wheezing.  No mucus production..    Previous Report Reviewed: lab reports, office notes and radiology reports     Past Medical History: The following portions of the patient's history were reviewed and updated as appropriate:   She  has a past surgical history that includes Brain surgery; brain decompression  (2006); and Tubal ligation.  Her family history includes Asthma in her brother; Clotting disorder in her mother and sister; Depression in her sister; Hypertension in her mother.  She  reports that she has been smoking cigarettes. She started smoking about 27 years ago. She has a 13.50 pack-year smoking history. She has never used smokeless tobacco. She reports that she does not drink alcohol or use drugs.  She has a current  "medication list which includes the following prescription(s): celecoxib, clobetasol 0.05%, cosentyx pen (2 pens), eletriptan, fluoxetine, ketoconazole, liothyronine, oxycodone-acetaminophen, phentermine, potassium chloride, prednisone, progesterone, promethazine, trazodone, cholecalciferol (vitamin d3), furosemide, [id number]-algal oil, norethindrone, protriptyline, and varenicline.  She has No Known Allergies..    Review of Systems   Constitutional: Negative for fever, chills, weight loss, weight gain, activity change, appetite change, fatigue and night sweats.   HENT: Negative for postnasal drip, rhinorrhea, sinus pressure, voice change and congestion.    Eyes: Negative for redness and itching.   Respiratory: Negative for snoring, cough, sputum production, chest tightness, shortness of breath, wheezing, orthopnea, asthma nighttime symptoms, dyspnea on extertion, use of rescue inhaler and somnolence.    Cardiovascular: Negative.  Negative for chest pain, palpitations and leg swelling.   Genitourinary: Negative for difficulty urinating and hematuria.   Endocrine: Negative for cold intolerance and heat intolerance.    Musculoskeletal: Negative for arthralgias, gait problem, joint swelling and myalgias.   Skin: Negative.    Gastrointestinal: Negative for nausea, vomiting, abdominal pain and acid reflux.   Neurological: Negative for dizziness, weakness, light-headedness and headaches.   Hematological: Negative for adenopathy. No excessive bruising.   All other systems reviewed and are negative.       Objective:   /80   Pulse 79   Resp 16   Ht 5' 1" (1.549 m)   Wt 76.3 kg (168 lb 3.4 oz)   SpO2 97%   BMI 31.78 kg/m²   Physical Exam  Vitals signs and nursing note reviewed.   Constitutional:       General: She is awake. She is not in acute distress.     Appearance: Normal appearance. She is well-developed and well-groomed. She is obese. She is not ill-appearing or toxic-appearing.   HENT:      Head: " Normocephalic.      Right Ear: External ear normal.      Left Ear: External ear normal.      Nose: Nose normal.      Mouth/Throat:      Pharynx: No oropharyngeal exudate.   Eyes:      Conjunctiva/sclera: Conjunctivae normal.   Neck:      Musculoskeletal: Normal range of motion and neck supple.   Cardiovascular:      Rate and Rhythm: Normal rate and regular rhythm.      Heart sounds: Normal heart sounds.   Pulmonary:      Effort: Pulmonary effort is normal.      Breath sounds: Normal breath sounds. No stridor.   Abdominal:      Palpations: Abdomen is soft.   Musculoskeletal: Normal range of motion.   Lymphadenopathy:      Cervical: No cervical adenopathy.   Skin:     General: Skin is warm and dry.   Neurological:      Mental Status: She is alert and oriented to person, place, and time.   Psychiatric:         Behavior: Behavior normal. Behavior is cooperative.         Thought Content: Thought content normal.         Judgment: Judgment normal.       Personal Diagnostic Review  CT Chest Without Contrast  Narrative: EXAMINATION:  CT CHEST WITHOUT CONTRAST    CLINICAL HISTORY:  Lung nodule, < 1cm, mod-high risk; Solitary pulmonary nodule    TECHNIQUE:  Low dose axial images, sagittal and coronal reformations were obtained from the thoracic inlet to the lung bases. Contrast was not administered.    COMPARISON:  08/01/2019, 11/21/2019    FINDINGS:  Base of Neck: No significant abnormality.    Thoracic soft tissues: Unchanged 15 mm right thyroid nodule.    Aorta: Left-sided aortic arch.  No aneurysm and no significant atherosclerosis    Heart: Normal size. No effusion.    Pulmonary vasculature: Pulmonary arteries distribute normally.    Lara/Mediastinum: No pathologic marilynn enlargement.    Airways: Patent.    Lungs/Pleura:    Unchanged 4 mm left fissural nodule series 3, image 213.    Right perihilar 8 mm triangular density within the fissure unchanged image 197.    Peripheral right middle lobe 7 mm nodule abutting the  pleura unchanged image 243.    Right posterior lower lobe calcified granuloma unchanged.    Inferior left lingular subsegmental atelectasis.    No new nodules.    Esophagus: Normal.    Upper Abdomen: No abnormality of the partially imaged upper abdomen.    Bones: No acute fracture. No suspicious lytic or sclerotic lesions.  Impression: Unchanged scattered bilateral pulmonary nodules.  No new nodules.    All CT scans at this facility use dose modulation, iterative reconstruction and/or weight based dosing when appropriate to reduce radiation dose to as low as reasonably achievable.    Electronically signed by: Davis Mckeon  Date:    08/31/2020  Time:    10:27    Assessment:     1. Pulmonary nodule    2. Cigarette nicotine dependence without complication    3. Solitary pulmonary nodule    4. Smoker      Orders Placed This Encounter   Procedures    CT Chest Without Contrast     Standing Status:   Future     Standing Expiration Date:   8/31/2021     Order Specific Question:   Is the patient pregnant?     Answer:   No     Order Specific Question:   May the Radiologist modify the order per protocol to meet the clinical needs of the patient?     Answer:   Yes    Ambulatory referral/consult to Smoking Cessation Program     Standing Status:   Future     Standing Expiration Date:   9/30/2021     Referral Priority:   Routine     Referral Type:   Consultation     Referral Reason:   Specialty Services Required     Requested Specialty:   CTTS     Number of Visits Requested:   1     Medication List with Changes/Refills   Current Medications    CELECOXIB (CELEBREX) 200 MG CAPSULE    Take 1 capsule (200 mg total) by mouth once daily.    CHOLECALCIFEROL, VITAMIN D3, (VITAMIN D3) 1,000 UNIT CAPSULE    Take 3,000 Units by mouth once daily.    CLOBETASOL 0.05% (TEMOVATE) 0.05 % OINT    1 application daily as needed.     COSENTYX PEN, 2 PENS, 150 MG/ML PNIJ    INJECT TWO PENS SUBCUTANEOUSLY EVERY 4 WEEKS. REFRIGERATE. ALLOW 15 TO30  MINUTES AT ROOM TEMP PRIOR TO ADMINISTRATION.    ELETRIPTAN (RELPAX) 40 MG TABLET    Take 1 tablet (40 mg total) by mouth as needed. may repeat in 2 hours if necessary    FLUOXETINE 20 MG CAPSULE    Take 1 capsule (20 mg total) by mouth once daily. In am    FUROSEMIDE (LASIX) 20 MG TABLET    Take 1 tablet (20 mg total) by mouth once daily.    KETOCONAZOLE (NIZORAL) 2 % SHAMPOO    Wash body with medicated shampoo at least 2x/week - let soak at least 5 minutes prior to rinsing    [id number]-ALGAL OIL (METANX/FOLTANX RF) 3 MG-35 MG-2 MG -90.314 MG CAP    Take 1 tablet by mouth once daily.    LIOTHYRONINE (CYTOMEL) 5 MCG TAB    TAKE ONE TABLET BY MOUTH ONCE DAILY ON AN EMPTY STOMACH.    NORETHINDRONE (MICRONOR) 0.35 MG TABLET    Take 1 tablet (0.35 mg total) by mouth once daily.    OXYCODONE-ACETAMINOPHEN (PERCOCET)  MG PER TABLET    Take 1 tablet by mouth every 8 (eight) hours as needed for Pain.    PHENTERMINE (ADIPEX-P) 37.5 MG TABLET    Take 37.5 mg by mouth once daily.    POTASSIUM CHLORIDE (KLOR-CON) 10 MEQ TBSR    Take 1 tablet (10 mEq total) by mouth once daily.    PREDNISONE (DELTASONE) 5 MG TABLET    Take 1 tablet (5 mg total) by mouth once daily.    PROGESTERONE (PROMETRIUM) 100 MG CAPSULE    Take 1 capsule (100 mg total) by mouth nightly.    PROMETHAZINE (PHENERGAN) 25 MG TABLET    Take 1 tablet (25 mg total) by mouth every 6 (six) hours as needed.    PROTRIPTYLINE (VIVACTIL) 5 MG TAB    Take 5 mg by mouth once daily.     TRAZODONE (DESYREL) 100 MG TABLET    Take 1 tablet (100 mg total) by mouth every evening.    VARENICLINE (CHANTIX STARTING MONTH BOX) 0.5 MG (11)- 1 MG (42) TABLET    Follow instructions on package     Plan:   Discussed diagnosis, its evaluation, treatment and usual course. All questions answered.  Problem List Items Addressed This Visit     Pulmonary nodule - Primary    Overview     8/31/2020 1 year follow up CT chest Unchanged scattered bilateral pulmonary nodules.  No new  nodules.  Follow up CT in 1 year, this will be 2 year follow up, August 2021.   Per Fleischner guidelines: For multiple solid nodules with any 6 mm or greater, Fleischner Society guidelines recommend follow up with non-contrast chest CT at 3-6 months (this exam) and 18-24 months after discovery (approximately August 2021).         Current Assessment & Plan     8/31/2020 1 year follow up CT chest Unchanged scattered bilateral pulmonary nodules.  No new nodules.  Follow up CT in 1 year, this will be 2 year follow up, August 2021.   Per Fleischner guidelines: For multiple solid nodules with any 6 mm or greater, Fleischner Society guidelines recommend follow up with non-contrast chest CT at 3-6 months (this exam) and 18-24 months after discovery (approximately August 2021).           Cigarette nicotine dependence without complication    Current Assessment & Plan     5-6 CIGS DAILY last quit attempt 8/14/2019.  Plans for complete cessation  Assistance with smoking cessation was offered, including:  []  Medications  [x]  Counseling  []  Printed Information on Smoking Cessation  [x]  Referral to a Smoking Cessation Program    Patient was counseled regarding smoking for 3-10 minutes.             Relevant Orders    Ambulatory referral/consult to Smoking Cessation Program      Other Visit Diagnoses     Solitary pulmonary nodule        Relevant Orders    CT Chest Without Contrast        Follow up in about 1 year (around 8/31/2021) for Pulmonary nodule review CT chest .

## 2020-09-01 PROBLEM — F17.210 CIGARETTE NICOTINE DEPENDENCE WITHOUT COMPLICATION: Status: ACTIVE | Noted: 2019-08-11

## 2020-09-01 NOTE — ASSESSMENT & PLAN NOTE
5-6 CIGS DAILY last quit attempt 8/14/2019.  Plans for complete cessation  Assistance with smoking cessation was offered, including:  []  Medications  [x]  Counseling  []  Printed Information on Smoking Cessation  [x]  Referral to a Smoking Cessation Program    Patient was counseled regarding smoking for 3-10 minutes.

## 2020-09-09 DIAGNOSIS — L40.50 PSORIATIC ARTHRITIS: ICD-10-CM

## 2020-09-09 NOTE — TELEPHONE ENCOUNTER
Pharmacy requesting refill on Prednisone 5 mg  Pt's LOV 07/10/2020  Pt's NOV 11/04/2020  Medication pending

## 2020-09-10 RX ORDER — PREDNISONE 5 MG/1
5 TABLET ORAL DAILY
Qty: 30 TABLET | Refills: 1 | Status: SHIPPED | OUTPATIENT
Start: 2020-09-10 | End: 2021-11-02 | Stop reason: SDUPTHER

## 2020-09-18 DIAGNOSIS — R91.1 SOLITARY PULMONARY NODULE: ICD-10-CM

## 2020-10-23 ENCOUNTER — PATIENT MESSAGE (OUTPATIENT)
Dept: RHEUMATOLOGY | Facility: CLINIC | Age: 40
End: 2020-10-23

## 2020-11-04 ENCOUNTER — OFFICE VISIT (OUTPATIENT)
Dept: RHEUMATOLOGY | Facility: CLINIC | Age: 40
End: 2020-11-04
Payer: COMMERCIAL

## 2020-11-04 VITALS — WEIGHT: 168 LBS | HEIGHT: 61 IN | BODY MASS INDEX: 31.72 KG/M2

## 2020-11-04 DIAGNOSIS — F41.9 ANXIETY: ICD-10-CM

## 2020-11-04 DIAGNOSIS — D84.821 IMMUNOCOMPROMISED STATE DUE TO DRUG THERAPY: ICD-10-CM

## 2020-11-04 DIAGNOSIS — Z79.899 IMMUNOCOMPROMISED STATE DUE TO DRUG THERAPY: ICD-10-CM

## 2020-11-04 DIAGNOSIS — D69.1 ABNORMAL PLATELETS: ICD-10-CM

## 2020-11-04 DIAGNOSIS — M45.9 ANKYLOSING SPONDYLITIS, UNSPECIFIED SITE OF SPINE: Primary | ICD-10-CM

## 2020-11-04 DIAGNOSIS — D68.59 PROTEIN S DEFICIENCY: ICD-10-CM

## 2020-11-04 DIAGNOSIS — L40.50 PSORIATIC ARTHRITIS: ICD-10-CM

## 2020-11-04 PROCEDURE — 3008F BODY MASS INDEX DOCD: CPT | Mod: CPTII,,, | Performed by: INTERNAL MEDICINE

## 2020-11-04 PROCEDURE — 3008F PR BODY MASS INDEX (BMI) DOCUMENTED: ICD-10-PCS | Mod: CPTII,,, | Performed by: INTERNAL MEDICINE

## 2020-11-04 PROCEDURE — 99214 PR OFFICE/OUTPT VISIT, EST, LEVL IV, 30-39 MIN: ICD-10-PCS | Mod: 95,,, | Performed by: INTERNAL MEDICINE

## 2020-11-04 PROCEDURE — 99214 OFFICE O/P EST MOD 30 MIN: CPT | Mod: 95,,, | Performed by: INTERNAL MEDICINE

## 2020-11-04 PROCEDURE — 1125F PR PAIN SEVERITY QUANTIFIED, PAIN PRESENT: ICD-10-PCS | Mod: ,,, | Performed by: INTERNAL MEDICINE

## 2020-11-04 PROCEDURE — 1125F AMNT PAIN NOTED PAIN PRSNT: CPT | Mod: ,,, | Performed by: INTERNAL MEDICINE

## 2020-11-04 RX ORDER — CELECOXIB 100 MG/1
100 CAPSULE ORAL DAILY
Qty: 30 CAPSULE | Refills: 5 | Status: SHIPPED | OUTPATIENT
Start: 2020-11-04 | End: 2020-12-04

## 2020-11-04 RX ORDER — CYCLOBENZAPRINE HCL 5 MG
5 TABLET ORAL 2 TIMES DAILY
COMMUNITY
Start: 2020-09-12 | End: 2022-02-04

## 2021-02-01 ENCOUNTER — DOCUMENTATION ONLY (OUTPATIENT)
Dept: RHEUMATOLOGY | Facility: CLINIC | Age: 41
End: 2021-02-01

## 2021-02-11 ENCOUNTER — DOCUMENTATION ONLY (OUTPATIENT)
Dept: RHEUMATOLOGY | Facility: CLINIC | Age: 41
End: 2021-02-11

## 2021-02-19 ENCOUNTER — PATIENT MESSAGE (OUTPATIENT)
Dept: RHEUMATOLOGY | Facility: CLINIC | Age: 41
End: 2021-02-19

## 2021-02-25 ENCOUNTER — TELEPHONE (OUTPATIENT)
Dept: RHEUMATOLOGY | Facility: CLINIC | Age: 41
End: 2021-02-25

## 2021-02-26 ENCOUNTER — LAB VISIT (OUTPATIENT)
Dept: LAB | Facility: HOSPITAL | Age: 41
End: 2021-02-26
Attending: INTERNAL MEDICINE
Payer: COMMERCIAL

## 2021-02-26 DIAGNOSIS — D84.821 IMMUNOCOMPROMISED STATE DUE TO DRUG THERAPY: ICD-10-CM

## 2021-02-26 DIAGNOSIS — D69.1 ABNORMAL PLATELETS: ICD-10-CM

## 2021-02-26 DIAGNOSIS — D68.59 PROTEIN S DEFICIENCY: ICD-10-CM

## 2021-02-26 DIAGNOSIS — F41.9 ANXIETY: ICD-10-CM

## 2021-02-26 DIAGNOSIS — L40.50 PSORIATIC ARTHRITIS: ICD-10-CM

## 2021-02-26 DIAGNOSIS — M45.9 ANKYLOSING SPONDYLITIS, UNSPECIFIED SITE OF SPINE: ICD-10-CM

## 2021-02-26 DIAGNOSIS — Z79.899 IMMUNOCOMPROMISED STATE DUE TO DRUG THERAPY: ICD-10-CM

## 2021-02-26 LAB
BASOPHILS # BLD AUTO: 0.13 K/UL (ref 0–0.2)
BASOPHILS NFR BLD: 1 % (ref 0–1.9)
DIFFERENTIAL METHOD: ABNORMAL
EOSINOPHIL # BLD AUTO: 0.2 K/UL (ref 0–0.5)
EOSINOPHIL NFR BLD: 1.2 % (ref 0–8)
ERYTHROCYTE [DISTWIDTH] IN BLOOD BY AUTOMATED COUNT: 14.3 % (ref 11.5–14.5)
ERYTHROCYTE [SEDIMENTATION RATE] IN BLOOD BY WESTERGREN METHOD: 5 MM/HR (ref 0–20)
HCT VFR BLD AUTO: 44.8 % (ref 37–48.5)
HGB BLD-MCNC: 14.2 G/DL (ref 12–16)
IMM GRANULOCYTES # BLD AUTO: 0.04 K/UL (ref 0–0.04)
IMM GRANULOCYTES NFR BLD AUTO: 0.3 % (ref 0–0.5)
LYMPHOCYTES # BLD AUTO: 3.7 K/UL (ref 1–4.8)
LYMPHOCYTES NFR BLD: 29.8 % (ref 18–48)
MCH RBC QN AUTO: 30.5 PG (ref 27–31)
MCHC RBC AUTO-ENTMCNC: 31.7 G/DL (ref 32–36)
MCV RBC AUTO: 96 FL (ref 82–98)
MONOCYTES # BLD AUTO: 0.8 K/UL (ref 0.3–1)
MONOCYTES NFR BLD: 6.4 % (ref 4–15)
NEUTROPHILS # BLD AUTO: 7.7 K/UL (ref 1.8–7.7)
NEUTROPHILS NFR BLD: 61.3 % (ref 38–73)
NRBC BLD-RTO: 0 /100 WBC
PLATELET # BLD AUTO: 462 K/UL (ref 150–350)
PMV BLD AUTO: 9.8 FL (ref 9.2–12.9)
RBC # BLD AUTO: 4.66 M/UL (ref 4–5.4)
WBC # BLD AUTO: 12.5 K/UL (ref 3.9–12.7)

## 2021-02-26 PROCEDURE — 80053 COMPREHEN METABOLIC PANEL: CPT

## 2021-02-26 PROCEDURE — 84165 PROTEIN E-PHORESIS SERUM: CPT | Mod: 26,,, | Performed by: PATHOLOGY

## 2021-02-26 PROCEDURE — 86140 C-REACTIVE PROTEIN: CPT

## 2021-02-26 PROCEDURE — 84165 PATHOLOGIST INTERPRETATION SPE: ICD-10-PCS | Mod: 26,,, | Performed by: PATHOLOGY

## 2021-02-26 PROCEDURE — 36415 COLL VENOUS BLD VENIPUNCTURE: CPT | Mod: PO

## 2021-02-26 PROCEDURE — 84165 PROTEIN E-PHORESIS SERUM: CPT

## 2021-02-26 PROCEDURE — 84481 FREE ASSAY (FT-3): CPT

## 2021-02-26 PROCEDURE — 85025 COMPLETE CBC W/AUTO DIFF WBC: CPT

## 2021-02-26 PROCEDURE — 85651 RBC SED RATE NONAUTOMATED: CPT | Mod: PO

## 2021-02-26 PROCEDURE — 84443 ASSAY THYROID STIM HORMONE: CPT

## 2021-02-26 PROCEDURE — 84439 ASSAY OF FREE THYROXINE: CPT

## 2021-02-27 LAB
ALBUMIN SERPL BCP-MCNC: 4 G/DL (ref 3.5–5.2)
ALP SERPL-CCNC: 87 U/L (ref 55–135)
ALT SERPL W/O P-5'-P-CCNC: 17 U/L (ref 10–44)
ANION GAP SERPL CALC-SCNC: 14 MMOL/L (ref 8–16)
AST SERPL-CCNC: 17 U/L (ref 10–40)
BILIRUB SERPL-MCNC: 0.1 MG/DL (ref 0.1–1)
BUN SERPL-MCNC: 16 MG/DL (ref 6–20)
CALCIUM SERPL-MCNC: 9.4 MG/DL (ref 8.7–10.5)
CHLORIDE SERPL-SCNC: 105 MMOL/L (ref 95–110)
CO2 SERPL-SCNC: 21 MMOL/L (ref 23–29)
CREAT SERPL-MCNC: 0.8 MG/DL (ref 0.5–1.4)
CRP SERPL-MCNC: 11 MG/L (ref 0–8.2)
EST. GFR  (AFRICAN AMERICAN): >60 ML/MIN/1.73 M^2
EST. GFR  (NON AFRICAN AMERICAN): >60 ML/MIN/1.73 M^2
GLUCOSE SERPL-MCNC: 146 MG/DL (ref 70–110)
POTASSIUM SERPL-SCNC: 4.2 MMOL/L (ref 3.5–5.1)
PROT SERPL-MCNC: 7.4 G/DL (ref 6–8.4)
SODIUM SERPL-SCNC: 140 MMOL/L (ref 136–145)
T3FREE SERPL-MCNC: 2.5 PG/ML (ref 2.3–4.2)
T4 FREE SERPL-MCNC: 0.79 NG/DL (ref 0.71–1.51)
TSH SERPL DL<=0.005 MIU/L-ACNC: 0.98 UIU/ML (ref 0.4–4)

## 2021-03-01 LAB
ALBUMIN SERPL ELPH-MCNC: 4.03 G/DL (ref 3.35–5.55)
ALPHA1 GLOB SERPL ELPH-MCNC: 0.32 G/DL (ref 0.17–0.41)
ALPHA2 GLOB SERPL ELPH-MCNC: 0.88 G/DL (ref 0.43–0.99)
B-GLOBULIN SERPL ELPH-MCNC: 0.84 G/DL (ref 0.5–1.1)
GAMMA GLOB SERPL ELPH-MCNC: 1.14 G/DL (ref 0.67–1.58)
PATHOLOGIST INTERPRETATION SPE: NORMAL
PROT SERPL-MCNC: 7.2 G/DL (ref 6–8.4)

## 2021-03-05 ENCOUNTER — OFFICE VISIT (OUTPATIENT)
Dept: RHEUMATOLOGY | Facility: CLINIC | Age: 41
End: 2021-03-05
Payer: COMMERCIAL

## 2021-03-05 DIAGNOSIS — G47.00 INSOMNIA, UNSPECIFIED TYPE: ICD-10-CM

## 2021-03-05 DIAGNOSIS — M47.812 CERVICAL ARTHRITIS: ICD-10-CM

## 2021-03-05 DIAGNOSIS — M89.8X1 CHRONIC SCAPULAR PAIN: ICD-10-CM

## 2021-03-05 DIAGNOSIS — F41.9 ANXIETY: ICD-10-CM

## 2021-03-05 DIAGNOSIS — G89.29 CHRONIC SCAPULAR PAIN: ICD-10-CM

## 2021-03-05 DIAGNOSIS — M45.9 ANKYLOSING SPONDYLITIS, UNSPECIFIED SITE OF SPINE: ICD-10-CM

## 2021-03-05 DIAGNOSIS — L40.50 PSORIATIC ARTHRITIS: Primary | ICD-10-CM

## 2021-03-05 PROCEDURE — 99214 PR OFFICE/OUTPT VISIT, EST, LEVL IV, 30-39 MIN: ICD-10-PCS | Mod: 95,,, | Performed by: PHYSICIAN ASSISTANT

## 2021-03-05 PROCEDURE — 99214 OFFICE O/P EST MOD 30 MIN: CPT | Mod: 95,,, | Performed by: PHYSICIAN ASSISTANT

## 2021-03-05 RX ORDER — TRAZODONE HYDROCHLORIDE 100 MG/1
100 TABLET ORAL NIGHTLY
Qty: 90 TABLET | Refills: 1 | Status: SHIPPED | OUTPATIENT
Start: 2021-03-05 | End: 2023-03-07 | Stop reason: SDUPTHER

## 2021-03-05 RX ORDER — SECUKINUMAB 150 MG/ML
300 INJECTION SUBCUTANEOUS
Qty: 2 ML | Refills: 6 | Status: SHIPPED | OUTPATIENT
Start: 2021-03-05 | End: 2021-07-02 | Stop reason: SDUPTHER

## 2021-03-05 RX ORDER — FLUOXETINE HYDROCHLORIDE 20 MG/1
20 CAPSULE ORAL DAILY
Qty: 90 CAPSULE | Refills: 1 | Status: SHIPPED | OUTPATIENT
Start: 2021-03-05 | End: 2021-07-02 | Stop reason: SDUPTHER

## 2021-03-09 ENCOUNTER — SPECIALTY PHARMACY (OUTPATIENT)
Dept: PHARMACY | Facility: CLINIC | Age: 41
End: 2021-03-09

## 2021-06-25 ENCOUNTER — LAB VISIT (OUTPATIENT)
Dept: LAB | Facility: HOSPITAL | Age: 41
End: 2021-06-25
Attending: PHYSICIAN ASSISTANT
Payer: COMMERCIAL

## 2021-06-25 DIAGNOSIS — M45.9 ANKYLOSING SPONDYLITIS, UNSPECIFIED SITE OF SPINE: ICD-10-CM

## 2021-06-25 DIAGNOSIS — L40.50 PSORIATIC ARTHRITIS: ICD-10-CM

## 2021-06-25 LAB
ALBUMIN SERPL BCP-MCNC: 3.5 G/DL (ref 3.5–5.2)
ALP SERPL-CCNC: 86 U/L (ref 55–135)
ALT SERPL W/O P-5'-P-CCNC: 13 U/L (ref 10–44)
ANION GAP SERPL CALC-SCNC: 9 MMOL/L (ref 8–16)
AST SERPL-CCNC: 15 U/L (ref 10–40)
BASOPHILS # BLD AUTO: 0.15 K/UL (ref 0–0.2)
BASOPHILS NFR BLD: 1.4 % (ref 0–1.9)
BILIRUB SERPL-MCNC: 0.1 MG/DL (ref 0.1–1)
BUN SERPL-MCNC: 11 MG/DL (ref 6–20)
CALCIUM SERPL-MCNC: 9.7 MG/DL (ref 8.7–10.5)
CHLORIDE SERPL-SCNC: 107 MMOL/L (ref 95–110)
CO2 SERPL-SCNC: 23 MMOL/L (ref 23–29)
CREAT SERPL-MCNC: 0.7 MG/DL (ref 0.5–1.4)
CRP SERPL-MCNC: 6.8 MG/L (ref 0–8.2)
DIFFERENTIAL METHOD: ABNORMAL
EOSINOPHIL # BLD AUTO: 0.2 K/UL (ref 0–0.5)
EOSINOPHIL NFR BLD: 1.7 % (ref 0–8)
ERYTHROCYTE [DISTWIDTH] IN BLOOD BY AUTOMATED COUNT: 14.9 % (ref 11.5–14.5)
ERYTHROCYTE [SEDIMENTATION RATE] IN BLOOD BY WESTERGREN METHOD: 5 MM/HR (ref 0–20)
EST. GFR  (AFRICAN AMERICAN): >60 ML/MIN/1.73 M^2
EST. GFR  (NON AFRICAN AMERICAN): >60 ML/MIN/1.73 M^2
GLUCOSE SERPL-MCNC: 102 MG/DL (ref 70–110)
HCT VFR BLD AUTO: 44.4 % (ref 37–48.5)
HGB BLD-MCNC: 14 G/DL (ref 12–16)
IMM GRANULOCYTES # BLD AUTO: 0.05 K/UL (ref 0–0.04)
IMM GRANULOCYTES NFR BLD AUTO: 0.5 % (ref 0–0.5)
LYMPHOCYTES # BLD AUTO: 4.2 K/UL (ref 1–4.8)
LYMPHOCYTES NFR BLD: 37.6 % (ref 18–48)
MCH RBC QN AUTO: 29.7 PG (ref 27–31)
MCHC RBC AUTO-ENTMCNC: 31.5 G/DL (ref 32–36)
MCV RBC AUTO: 94 FL (ref 82–98)
MONOCYTES # BLD AUTO: 0.8 K/UL (ref 0.3–1)
MONOCYTES NFR BLD: 7.1 % (ref 4–15)
NEUTROPHILS # BLD AUTO: 5.7 K/UL (ref 1.8–7.7)
NEUTROPHILS NFR BLD: 51.7 % (ref 38–73)
NRBC BLD-RTO: 0 /100 WBC
PLATELET # BLD AUTO: 443 K/UL (ref 150–450)
PMV BLD AUTO: 10.2 FL (ref 9.2–12.9)
POTASSIUM SERPL-SCNC: 4.1 MMOL/L (ref 3.5–5.1)
PROT SERPL-MCNC: 7 G/DL (ref 6–8.4)
RBC # BLD AUTO: 4.72 M/UL (ref 4–5.4)
SODIUM SERPL-SCNC: 139 MMOL/L (ref 136–145)
WBC # BLD AUTO: 11.05 K/UL (ref 3.9–12.7)

## 2021-06-25 PROCEDURE — 80053 COMPREHEN METABOLIC PANEL: CPT | Performed by: PHYSICIAN ASSISTANT

## 2021-06-25 PROCEDURE — 36415 COLL VENOUS BLD VENIPUNCTURE: CPT | Mod: PO | Performed by: PHYSICIAN ASSISTANT

## 2021-06-25 PROCEDURE — 85025 COMPLETE CBC W/AUTO DIFF WBC: CPT | Performed by: PHYSICIAN ASSISTANT

## 2021-06-25 PROCEDURE — 85651 RBC SED RATE NONAUTOMATED: CPT | Mod: PO | Performed by: PHYSICIAN ASSISTANT

## 2021-06-25 PROCEDURE — 86140 C-REACTIVE PROTEIN: CPT | Performed by: PHYSICIAN ASSISTANT

## 2021-07-02 ENCOUNTER — OFFICE VISIT (OUTPATIENT)
Dept: RHEUMATOLOGY | Facility: CLINIC | Age: 41
End: 2021-07-02
Payer: COMMERCIAL

## 2021-07-02 ENCOUNTER — PATIENT MESSAGE (OUTPATIENT)
Dept: RHEUMATOLOGY | Facility: CLINIC | Age: 41
End: 2021-07-02

## 2021-07-02 VITALS
HEART RATE: 96 BPM | DIASTOLIC BLOOD PRESSURE: 81 MMHG | SYSTOLIC BLOOD PRESSURE: 127 MMHG | HEIGHT: 61 IN | WEIGHT: 189 LBS | BODY MASS INDEX: 35.68 KG/M2

## 2021-07-02 DIAGNOSIS — L40.50 PSORIATIC ARTHRITIS: Primary | ICD-10-CM

## 2021-07-02 DIAGNOSIS — M45.9 ANKYLOSING SPONDYLITIS, UNSPECIFIED SITE OF SPINE: ICD-10-CM

## 2021-07-02 DIAGNOSIS — L40.50 PSORIATIC ARTHRITIS: ICD-10-CM

## 2021-07-02 DIAGNOSIS — F41.9 ANXIETY: ICD-10-CM

## 2021-07-02 DIAGNOSIS — M47.812 CERVICAL ARTHRITIS: ICD-10-CM

## 2021-07-02 DIAGNOSIS — G47.00 INSOMNIA, UNSPECIFIED TYPE: ICD-10-CM

## 2021-07-02 DIAGNOSIS — R94.6 ABNORMAL RESULTS OF THYROID FUNCTION STUDIES: ICD-10-CM

## 2021-07-02 DIAGNOSIS — M47.812 CERVICAL ARTHRITIS: Primary | ICD-10-CM

## 2021-07-02 PROCEDURE — 99213 OFFICE O/P EST LOW 20 MIN: CPT | Mod: PBBFAC,PN | Performed by: INTERNAL MEDICINE

## 2021-07-02 PROCEDURE — 99215 OFFICE O/P EST HI 40 MIN: CPT | Mod: 25,S$GLB,, | Performed by: INTERNAL MEDICINE

## 2021-07-02 PROCEDURE — 99999 PR PBB SHADOW E&M-EST. PATIENT-LVL III: ICD-10-PCS | Mod: PBBFAC,,, | Performed by: INTERNAL MEDICINE

## 2021-07-02 PROCEDURE — 96372 THER/PROPH/DIAG INJ SC/IM: CPT | Mod: PBBFAC,PN

## 2021-07-02 PROCEDURE — 99999 PR PBB SHADOW E&M-EST. PATIENT-LVL III: CPT | Mod: PBBFAC,,, | Performed by: INTERNAL MEDICINE

## 2021-07-02 PROCEDURE — 99215 PR OFFICE/OUTPT VISIT, EST, LEVL V, 40-54 MIN: ICD-10-PCS | Mod: 25,S$GLB,, | Performed by: INTERNAL MEDICINE

## 2021-07-02 RX ORDER — SECUKINUMAB 150 MG/ML
300 INJECTION SUBCUTANEOUS
Qty: 2 ML | Refills: 12 | Status: SHIPPED | OUTPATIENT
Start: 2021-07-02 | End: 2022-03-09 | Stop reason: SDUPTHER

## 2021-07-02 RX ORDER — KETOROLAC TROMETHAMINE 30 MG/ML
60 INJECTION, SOLUTION INTRAMUSCULAR; INTRAVENOUS
Status: COMPLETED | OUTPATIENT
Start: 2021-07-02 | End: 2021-07-02

## 2021-07-02 RX ORDER — FLUOXETINE HYDROCHLORIDE 20 MG/1
20 CAPSULE ORAL DAILY
Qty: 90 CAPSULE | Refills: 1 | Status: SHIPPED | OUTPATIENT
Start: 2021-07-02 | End: 2021-11-02 | Stop reason: SDUPTHER

## 2021-07-02 RX ORDER — METHYLPREDNISOLONE ACETATE 80 MG/ML
80 INJECTION, SUSPENSION INTRA-ARTICULAR; INTRALESIONAL; INTRAMUSCULAR; SOFT TISSUE
Status: COMPLETED | OUTPATIENT
Start: 2021-07-02 | End: 2021-07-02

## 2021-07-02 RX ORDER — ETODOLAC 500 MG/1
500 TABLET, FILM COATED ORAL 2 TIMES DAILY
Qty: 60 TABLET | Refills: 6 | Status: SHIPPED | OUTPATIENT
Start: 2021-07-02 | End: 2021-11-02

## 2021-07-02 RX ORDER — DEXAMETHASONE SODIUM PHOSPHATE 4 MG/ML
4 INJECTION, SOLUTION INTRA-ARTICULAR; INTRALESIONAL; INTRAMUSCULAR; INTRAVENOUS; SOFT TISSUE
Status: COMPLETED | OUTPATIENT
Start: 2021-07-02 | End: 2021-07-02

## 2021-07-02 RX ADMIN — KETOROLAC TROMETHAMINE 60 MG: 30 INJECTION, SOLUTION INTRAMUSCULAR at 11:07

## 2021-07-02 RX ADMIN — DEXAMETHASONE SODIUM PHOSPHATE 4 MG: 4 INJECTION INTRA-ARTICULAR; INTRALESIONAL; INTRAMUSCULAR; INTRAVENOUS; SOFT TISSUE at 11:07

## 2021-07-02 RX ADMIN — METHYLPREDNISOLONE ACETATE 80 MG: 80 INJECTION, SUSPENSION INTRA-ARTICULAR; INTRALESIONAL; INTRAMUSCULAR; SOFT TISSUE at 11:07

## 2021-07-02 ASSESSMENT — ROUTINE ASSESSMENT OF PATIENT INDEX DATA (RAPID3)
MDHAQ FUNCTION SCORE: 1.7
PAIN SCORE: 5.5
TOTAL RAPID3 SCORE: 5.72
PATIENT GLOBAL ASSESSMENT SCORE: 6
FATIGUE SCORE: 2
PSYCHOLOGICAL DISTRESS SCORE: 0.2

## 2021-07-07 ENCOUNTER — SPECIALTY PHARMACY (OUTPATIENT)
Dept: PHARMACY | Facility: CLINIC | Age: 41
End: 2021-07-07

## 2021-08-17 ENCOUNTER — PATIENT MESSAGE (OUTPATIENT)
Dept: RHEUMATOLOGY | Facility: CLINIC | Age: 41
End: 2021-08-17

## 2021-08-17 DIAGNOSIS — R30.0 DYSURIA: ICD-10-CM

## 2021-08-17 DIAGNOSIS — N39.0 URINARY TRACT INFECTION WITHOUT HEMATURIA, SITE UNSPECIFIED: Primary | ICD-10-CM

## 2021-08-17 DIAGNOSIS — Z79.899 IMMUNOCOMPROMISED STATE DUE TO DRUG THERAPY: Primary | ICD-10-CM

## 2021-08-17 DIAGNOSIS — D84.821 IMMUNOCOMPROMISED STATE DUE TO DRUG THERAPY: Primary | ICD-10-CM

## 2021-08-18 ENCOUNTER — LAB VISIT (OUTPATIENT)
Dept: LAB | Facility: HOSPITAL | Age: 41
End: 2021-08-18
Attending: INTERNAL MEDICINE
Payer: COMMERCIAL

## 2021-08-18 DIAGNOSIS — D84.821 IMMUNOCOMPROMISED STATE DUE TO DRUG THERAPY: ICD-10-CM

## 2021-08-18 DIAGNOSIS — Z79.899 IMMUNOCOMPROMISED STATE DUE TO DRUG THERAPY: ICD-10-CM

## 2021-08-18 DIAGNOSIS — R30.0 DYSURIA: ICD-10-CM

## 2021-08-18 PROCEDURE — 81003 URINALYSIS AUTO W/O SCOPE: CPT | Mod: 59 | Performed by: INTERNAL MEDICINE

## 2021-08-18 PROCEDURE — 81001 URINALYSIS AUTO W/SCOPE: CPT | Performed by: INTERNAL MEDICINE

## 2021-08-19 LAB
BACTERIA #/AREA URNS AUTO: ABNORMAL /HPF
BILIRUB UR QL STRIP: NEGATIVE
CLARITY UR REFRACT.AUTO: ABNORMAL
COLOR UR AUTO: YELLOW
GLUCOSE UR QL STRIP: ABNORMAL
HGB UR QL STRIP: ABNORMAL
KETONES UR QL STRIP: NEGATIVE
LEUKOCYTE ESTERASE UR QL STRIP: NEGATIVE
MICROSCOPIC COMMENT: ABNORMAL
NITRITE UR QL STRIP: POSITIVE
PH UR STRIP: 5 [PH] (ref 5–8)
PROT UR QL STRIP: NEGATIVE
RBC #/AREA URNS AUTO: 1 /HPF (ref 0–4)
SP GR UR STRIP: 1 (ref 1–1.03)
SQUAMOUS #/AREA URNS AUTO: 1 /HPF
URN SPEC COLLECT METH UR: ABNORMAL
WBC #/AREA URNS AUTO: 3 /HPF (ref 0–5)

## 2021-08-19 RX ORDER — NITROFURANTOIN 25; 75 MG/1; MG/1
100 CAPSULE ORAL 2 TIMES DAILY
Qty: 20 CAPSULE | Refills: 0 | Status: SHIPPED | OUTPATIENT
Start: 2021-08-19 | End: 2021-08-29

## 2021-08-25 ENCOUNTER — PATIENT MESSAGE (OUTPATIENT)
Dept: PULMONOLOGY | Facility: CLINIC | Age: 41
End: 2021-08-25

## 2021-09-14 ENCOUNTER — PATIENT MESSAGE (OUTPATIENT)
Dept: RHEUMATOLOGY | Facility: CLINIC | Age: 41
End: 2021-09-14

## 2021-09-14 DIAGNOSIS — R30.0 DYSURIA: Primary | ICD-10-CM

## 2021-09-15 ENCOUNTER — LAB VISIT (OUTPATIENT)
Dept: LAB | Facility: HOSPITAL | Age: 41
End: 2021-09-15
Attending: PHYSICIAN ASSISTANT
Payer: COMMERCIAL

## 2021-09-15 DIAGNOSIS — R30.0 DYSURIA: ICD-10-CM

## 2021-09-15 PROCEDURE — 87086 URINE CULTURE/COLONY COUNT: CPT | Performed by: PHYSICIAN ASSISTANT

## 2021-09-15 PROCEDURE — 81001 URINALYSIS AUTO W/SCOPE: CPT | Performed by: PHYSICIAN ASSISTANT

## 2021-09-16 LAB
BACTERIA #/AREA URNS AUTO: ABNORMAL /HPF
BILIRUB UR QL STRIP: NEGATIVE
CLARITY UR REFRACT.AUTO: ABNORMAL
COLOR UR AUTO: YELLOW
GLUCOSE UR QL STRIP: NEGATIVE
HGB UR QL STRIP: NEGATIVE
KETONES UR QL STRIP: NEGATIVE
LEUKOCYTE ESTERASE UR QL STRIP: ABNORMAL
MICROSCOPIC COMMENT: ABNORMAL
NITRITE UR QL STRIP: NEGATIVE
PH UR STRIP: 5 [PH] (ref 5–8)
PROT UR QL STRIP: NEGATIVE
RBC #/AREA URNS AUTO: 1 /HPF (ref 0–4)
SP GR UR STRIP: 1 (ref 1–1.03)
SQUAMOUS #/AREA URNS AUTO: 9 /HPF
URN SPEC COLLECT METH UR: ABNORMAL
WBC #/AREA URNS AUTO: 7 /HPF (ref 0–5)

## 2021-09-17 LAB — BACTERIA UR CULT: NORMAL

## 2021-09-20 ENCOUNTER — PATIENT MESSAGE (OUTPATIENT)
Dept: RHEUMATOLOGY | Facility: CLINIC | Age: 41
End: 2021-09-20

## 2021-10-09 ENCOUNTER — PATIENT MESSAGE (OUTPATIENT)
Dept: PULMONOLOGY | Facility: CLINIC | Age: 41
End: 2021-10-09

## 2021-10-09 DIAGNOSIS — F17.210 CIGARETTE NICOTINE DEPENDENCE WITHOUT COMPLICATION: Primary | ICD-10-CM

## 2021-10-09 DIAGNOSIS — R91.1 PULMONARY NODULE: ICD-10-CM

## 2021-10-09 DIAGNOSIS — R91.1 SOLITARY PULMONARY NODULE: ICD-10-CM

## 2021-10-15 ENCOUNTER — PATIENT MESSAGE (OUTPATIENT)
Dept: PULMONOLOGY | Facility: CLINIC | Age: 41
End: 2021-10-15
Payer: MEDICARE

## 2021-10-15 ENCOUNTER — PATIENT MESSAGE (OUTPATIENT)
Dept: RHEUMATOLOGY | Facility: CLINIC | Age: 41
End: 2021-10-15

## 2021-10-26 ENCOUNTER — OFFICE VISIT (OUTPATIENT)
Dept: PULMONOLOGY | Facility: CLINIC | Age: 41
End: 2021-10-26
Payer: COMMERCIAL

## 2021-10-26 ENCOUNTER — HOSPITAL ENCOUNTER (OUTPATIENT)
Dept: RADIOLOGY | Facility: HOSPITAL | Age: 41
Discharge: HOME | End: 2021-10-26
Attending: INTERNAL MEDICINE
Payer: COMMERCIAL

## 2021-10-26 ENCOUNTER — LAB VISIT (OUTPATIENT)
Dept: LAB | Facility: HOSPITAL | Age: 41
End: 2021-10-26
Attending: INTERNAL MEDICINE
Payer: COMMERCIAL

## 2021-10-26 VITALS
DIASTOLIC BLOOD PRESSURE: 86 MMHG | HEART RATE: 76 BPM | OXYGEN SATURATION: 100 % | BODY MASS INDEX: 37.83 KG/M2 | RESPIRATION RATE: 17 BRPM | WEIGHT: 200.38 LBS | HEIGHT: 61 IN | SYSTOLIC BLOOD PRESSURE: 120 MMHG

## 2021-10-26 DIAGNOSIS — R91.1 PULMONARY NODULE: ICD-10-CM

## 2021-10-26 DIAGNOSIS — F17.210 CIGARETTE NICOTINE DEPENDENCE WITHOUT COMPLICATION: ICD-10-CM

## 2021-10-26 DIAGNOSIS — E04.1 THYROID NODULE: ICD-10-CM

## 2021-10-26 DIAGNOSIS — M47.812 CERVICAL ARTHRITIS: ICD-10-CM

## 2021-10-26 DIAGNOSIS — M45.9 ANKYLOSING SPONDYLITIS, UNSPECIFIED SITE OF SPINE: ICD-10-CM

## 2021-10-26 DIAGNOSIS — R91.1 SOLITARY PULMONARY NODULE: ICD-10-CM

## 2021-10-26 DIAGNOSIS — L40.50 PSORIATIC ARTHRITIS: ICD-10-CM

## 2021-10-26 DIAGNOSIS — E04.1 THYROID NODULE: Primary | ICD-10-CM

## 2021-10-26 DIAGNOSIS — R94.6 ABNORMAL RESULTS OF THYROID FUNCTION STUDIES: ICD-10-CM

## 2021-10-26 LAB
ALBUMIN SERPL BCP-MCNC: 3.3 G/DL (ref 3.5–5.2)
ALP SERPL-CCNC: 82 U/L (ref 55–135)
ALT SERPL W/O P-5'-P-CCNC: 15 U/L (ref 10–44)
ANION GAP SERPL CALC-SCNC: 10 MMOL/L (ref 8–16)
AST SERPL-CCNC: 16 U/L (ref 10–40)
BASOPHILS # BLD AUTO: 0.14 K/UL (ref 0–0.2)
BASOPHILS NFR BLD: 0.9 % (ref 0–1.9)
BILIRUB SERPL-MCNC: 0.3 MG/DL (ref 0.1–1)
BUN SERPL-MCNC: 15 MG/DL (ref 6–20)
CALCIUM SERPL-MCNC: 10 MG/DL (ref 8.7–10.5)
CHLORIDE SERPL-SCNC: 103 MMOL/L (ref 95–110)
CO2 SERPL-SCNC: 24 MMOL/L (ref 23–29)
CREAT SERPL-MCNC: 0.6 MG/DL (ref 0.5–1.4)
CRP SERPL-MCNC: 12.9 MG/L (ref 0–8.2)
DIFFERENTIAL METHOD: ABNORMAL
EOSINOPHIL # BLD AUTO: 0.3 K/UL (ref 0–0.5)
EOSINOPHIL NFR BLD: 2 % (ref 0–8)
ERYTHROCYTE [DISTWIDTH] IN BLOOD BY AUTOMATED COUNT: 14.5 % (ref 11.5–14.5)
ERYTHROCYTE [SEDIMENTATION RATE] IN BLOOD BY WESTERGREN METHOD: 37 MM/HR (ref 0–36)
EST. GFR  (AFRICAN AMERICAN): >60 ML/MIN/1.73 M^2
EST. GFR  (NON AFRICAN AMERICAN): >60 ML/MIN/1.73 M^2
GLUCOSE SERPL-MCNC: 90 MG/DL (ref 70–110)
HCT VFR BLD AUTO: 41.7 % (ref 37–48.5)
HGB BLD-MCNC: 13.3 G/DL (ref 12–16)
IMM GRANULOCYTES # BLD AUTO: 0.05 K/UL (ref 0–0.04)
IMM GRANULOCYTES NFR BLD AUTO: 0.3 % (ref 0–0.5)
LYMPHOCYTES # BLD AUTO: 5.9 K/UL (ref 1–4.8)
LYMPHOCYTES NFR BLD: 40.1 % (ref 18–48)
MCH RBC QN AUTO: 30.5 PG (ref 27–31)
MCHC RBC AUTO-ENTMCNC: 31.9 G/DL (ref 32–36)
MCV RBC AUTO: 96 FL (ref 82–98)
MONOCYTES # BLD AUTO: 1.1 K/UL (ref 0.3–1)
MONOCYTES NFR BLD: 7.6 % (ref 4–15)
NEUTROPHILS # BLD AUTO: 7.3 K/UL (ref 1.8–7.7)
NEUTROPHILS NFR BLD: 49.1 % (ref 38–73)
NRBC BLD-RTO: 0 /100 WBC
PLATELET # BLD AUTO: 469 K/UL (ref 150–450)
PLATELET BLD QL SMEAR: ABNORMAL
PMV BLD AUTO: 9.7 FL (ref 9.2–12.9)
POTASSIUM SERPL-SCNC: 3.7 MMOL/L (ref 3.5–5.1)
PROT SERPL-MCNC: 6.9 G/DL (ref 6–8.4)
RBC # BLD AUTO: 4.36 M/UL (ref 4–5.4)
SODIUM SERPL-SCNC: 137 MMOL/L (ref 136–145)
T3FREE SERPL-MCNC: 3.5 PG/ML (ref 2.3–4.2)
T4 FREE SERPL-MCNC: 0.84 NG/DL (ref 0.71–1.51)
TSH SERPL DL<=0.005 MIU/L-ACNC: 2.83 UIU/ML (ref 0.4–4)
WBC # BLD AUTO: 14.82 K/UL (ref 3.9–12.7)

## 2021-10-26 PROCEDURE — 1159F MED LIST DOCD IN RCRD: CPT | Mod: CPTII,S$GLB,, | Performed by: INTERNAL MEDICINE

## 2021-10-26 PROCEDURE — 3008F PR BODY MASS INDEX (BMI) DOCUMENTED: ICD-10-PCS | Mod: CPTII,S$GLB,, | Performed by: INTERNAL MEDICINE

## 2021-10-26 PROCEDURE — 3074F PR MOST RECENT SYSTOLIC BLOOD PRESSURE < 130 MM HG: ICD-10-PCS | Mod: CPTII,S$GLB,, | Performed by: INTERNAL MEDICINE

## 2021-10-26 PROCEDURE — 3079F PR MOST RECENT DIASTOLIC BLOOD PRESSURE 80-89 MM HG: ICD-10-PCS | Mod: CPTII,S$GLB,, | Performed by: INTERNAL MEDICINE

## 2021-10-26 PROCEDURE — 86140 C-REACTIVE PROTEIN: CPT | Performed by: INTERNAL MEDICINE

## 2021-10-26 PROCEDURE — 3079F DIAST BP 80-89 MM HG: CPT | Mod: CPTII,S$GLB,, | Performed by: INTERNAL MEDICINE

## 2021-10-26 PROCEDURE — 85652 RBC SED RATE AUTOMATED: CPT | Performed by: INTERNAL MEDICINE

## 2021-10-26 PROCEDURE — 80053 COMPREHEN METABOLIC PANEL: CPT | Performed by: INTERNAL MEDICINE

## 2021-10-26 PROCEDURE — 99999 PR PBB SHADOW E&M-EST. PATIENT-LVL V: CPT | Mod: PBBFAC,,, | Performed by: INTERNAL MEDICINE

## 2021-10-26 PROCEDURE — 71250 CT THORAX DX C-: CPT | Mod: 26,,, | Performed by: RADIOLOGY

## 2021-10-26 PROCEDURE — 71250 CT CHEST WITHOUT CONTRAST: ICD-10-PCS | Mod: 26,,, | Performed by: RADIOLOGY

## 2021-10-26 PROCEDURE — 3008F BODY MASS INDEX DOCD: CPT | Mod: CPTII,S$GLB,, | Performed by: INTERNAL MEDICINE

## 2021-10-26 PROCEDURE — 84443 ASSAY THYROID STIM HORMONE: CPT | Performed by: INTERNAL MEDICINE

## 2021-10-26 PROCEDURE — 76536 US EXAM OF HEAD AND NECK: CPT | Mod: 26,,, | Performed by: RADIOLOGY

## 2021-10-26 PROCEDURE — 1159F PR MEDICATION LIST DOCUMENTED IN MEDICAL RECORD: ICD-10-PCS | Mod: CPTII,S$GLB,, | Performed by: INTERNAL MEDICINE

## 2021-10-26 PROCEDURE — 36415 COLL VENOUS BLD VENIPUNCTURE: CPT | Performed by: INTERNAL MEDICINE

## 2021-10-26 PROCEDURE — 84481 FREE ASSAY (FT-3): CPT | Performed by: INTERNAL MEDICINE

## 2021-10-26 PROCEDURE — 99214 PR OFFICE/OUTPT VISIT, EST, LEVL IV, 30-39 MIN: ICD-10-PCS | Mod: S$GLB,,, | Performed by: INTERNAL MEDICINE

## 2021-10-26 PROCEDURE — 99214 OFFICE O/P EST MOD 30 MIN: CPT | Mod: S$GLB,,, | Performed by: INTERNAL MEDICINE

## 2021-10-26 PROCEDURE — 71250 CT THORAX DX C-: CPT | Mod: TC

## 2021-10-26 PROCEDURE — 76536 US EXAM OF HEAD AND NECK: CPT | Mod: TC

## 2021-10-26 PROCEDURE — 85025 COMPLETE CBC W/AUTO DIFF WBC: CPT | Performed by: INTERNAL MEDICINE

## 2021-10-26 PROCEDURE — 84439 ASSAY OF FREE THYROXINE: CPT | Performed by: INTERNAL MEDICINE

## 2021-10-26 PROCEDURE — 76536 US THYROID: ICD-10-PCS | Mod: 26,,, | Performed by: RADIOLOGY

## 2021-10-26 PROCEDURE — 3074F SYST BP LT 130 MM HG: CPT | Mod: CPTII,S$GLB,, | Performed by: INTERNAL MEDICINE

## 2021-10-26 PROCEDURE — 99999 PR PBB SHADOW E&M-EST. PATIENT-LVL V: ICD-10-PCS | Mod: PBBFAC,,, | Performed by: INTERNAL MEDICINE

## 2021-10-29 ENCOUNTER — OFFICE VISIT (OUTPATIENT)
Dept: OTOLARYNGOLOGY | Facility: CLINIC | Age: 41
End: 2021-10-29
Payer: COMMERCIAL

## 2021-10-29 VITALS — HEIGHT: 61 IN | BODY MASS INDEX: 37.25 KG/M2 | WEIGHT: 197.31 LBS

## 2021-10-29 DIAGNOSIS — E04.1 THYROID NODULE: ICD-10-CM

## 2021-10-29 PROCEDURE — 99204 PR OFFICE/OUTPT VISIT, NEW, LEVL IV, 45-59 MIN: ICD-10-PCS | Mod: S$GLB,,, | Performed by: STUDENT IN AN ORGANIZED HEALTH CARE EDUCATION/TRAINING PROGRAM

## 2021-10-29 PROCEDURE — 99204 OFFICE O/P NEW MOD 45 MIN: CPT | Mod: S$GLB,,, | Performed by: STUDENT IN AN ORGANIZED HEALTH CARE EDUCATION/TRAINING PROGRAM

## 2021-10-29 PROCEDURE — 99999 PR PBB SHADOW E&M-EST. PATIENT-LVL III: CPT | Mod: PBBFAC,,, | Performed by: STUDENT IN AN ORGANIZED HEALTH CARE EDUCATION/TRAINING PROGRAM

## 2021-10-29 PROCEDURE — 1159F MED LIST DOCD IN RCRD: CPT | Mod: CPTII,S$GLB,, | Performed by: STUDENT IN AN ORGANIZED HEALTH CARE EDUCATION/TRAINING PROGRAM

## 2021-10-29 PROCEDURE — 3008F PR BODY MASS INDEX (BMI) DOCUMENTED: ICD-10-PCS | Mod: CPTII,S$GLB,, | Performed by: STUDENT IN AN ORGANIZED HEALTH CARE EDUCATION/TRAINING PROGRAM

## 2021-10-29 PROCEDURE — 3008F BODY MASS INDEX DOCD: CPT | Mod: CPTII,S$GLB,, | Performed by: STUDENT IN AN ORGANIZED HEALTH CARE EDUCATION/TRAINING PROGRAM

## 2021-10-29 PROCEDURE — 99999 PR PBB SHADOW E&M-EST. PATIENT-LVL III: ICD-10-PCS | Mod: PBBFAC,,, | Performed by: STUDENT IN AN ORGANIZED HEALTH CARE EDUCATION/TRAINING PROGRAM

## 2021-10-29 PROCEDURE — 1159F PR MEDICATION LIST DOCUMENTED IN MEDICAL RECORD: ICD-10-PCS | Mod: CPTII,S$GLB,, | Performed by: STUDENT IN AN ORGANIZED HEALTH CARE EDUCATION/TRAINING PROGRAM

## 2021-11-02 ENCOUNTER — OFFICE VISIT (OUTPATIENT)
Dept: RHEUMATOLOGY | Facility: CLINIC | Age: 41
End: 2021-11-02
Payer: COMMERCIAL

## 2021-11-02 VITALS
BODY MASS INDEX: 37 KG/M2 | DIASTOLIC BLOOD PRESSURE: 79 MMHG | WEIGHT: 196 LBS | HEART RATE: 82 BPM | HEIGHT: 61 IN | SYSTOLIC BLOOD PRESSURE: 115 MMHG

## 2021-11-02 DIAGNOSIS — G89.4 CHRONIC PAIN SYNDROME: ICD-10-CM

## 2021-11-02 DIAGNOSIS — R11.2 NAUSEA & VOMITING: ICD-10-CM

## 2021-11-02 DIAGNOSIS — F41.9 ANXIETY: ICD-10-CM

## 2021-11-02 DIAGNOSIS — M45.9 ANKYLOSING SPONDYLITIS, UNSPECIFIED SITE OF SPINE: Primary | ICD-10-CM

## 2021-11-02 DIAGNOSIS — L40.50 PSORIATIC ARTHRITIS: ICD-10-CM

## 2021-11-02 DIAGNOSIS — R11.0 NAUSEA: ICD-10-CM

## 2021-11-02 DIAGNOSIS — G89.4 CHRONIC PAIN SYNDROME: Primary | ICD-10-CM

## 2021-11-02 DIAGNOSIS — N20.0 KIDNEY STONE: ICD-10-CM

## 2021-11-02 DIAGNOSIS — E04.1 THYROID NODULE: ICD-10-CM

## 2021-11-02 PROCEDURE — 3008F PR BODY MASS INDEX (BMI) DOCUMENTED: ICD-10-PCS | Mod: CPTII,S$GLB,, | Performed by: PHYSICIAN ASSISTANT

## 2021-11-02 PROCEDURE — 99999 PR PBB SHADOW E&M-EST. PATIENT-LVL III: CPT | Mod: PBBFAC,,, | Performed by: PHYSICIAN ASSISTANT

## 2021-11-02 PROCEDURE — 99999 PR PBB SHADOW E&M-EST. PATIENT-LVL III: ICD-10-PCS | Mod: PBBFAC,,, | Performed by: PHYSICIAN ASSISTANT

## 2021-11-02 PROCEDURE — 96372 THER/PROPH/DIAG INJ SC/IM: CPT | Mod: S$GLB,,, | Performed by: PHYSICIAN ASSISTANT

## 2021-11-02 PROCEDURE — 3074F PR MOST RECENT SYSTOLIC BLOOD PRESSURE < 130 MM HG: ICD-10-PCS | Mod: CPTII,S$GLB,, | Performed by: PHYSICIAN ASSISTANT

## 2021-11-02 PROCEDURE — 3078F PR MOST RECENT DIASTOLIC BLOOD PRESSURE < 80 MM HG: ICD-10-PCS | Mod: CPTII,S$GLB,, | Performed by: PHYSICIAN ASSISTANT

## 2021-11-02 PROCEDURE — 99214 PR OFFICE/OUTPT VISIT, EST, LEVL IV, 30-39 MIN: ICD-10-PCS | Mod: 25,S$GLB,, | Performed by: PHYSICIAN ASSISTANT

## 2021-11-02 PROCEDURE — 1159F PR MEDICATION LIST DOCUMENTED IN MEDICAL RECORD: ICD-10-PCS | Mod: CPTII,S$GLB,, | Performed by: PHYSICIAN ASSISTANT

## 2021-11-02 PROCEDURE — 3078F DIAST BP <80 MM HG: CPT | Mod: CPTII,S$GLB,, | Performed by: PHYSICIAN ASSISTANT

## 2021-11-02 PROCEDURE — 99214 OFFICE O/P EST MOD 30 MIN: CPT | Mod: 25,S$GLB,, | Performed by: PHYSICIAN ASSISTANT

## 2021-11-02 PROCEDURE — 3074F SYST BP LT 130 MM HG: CPT | Mod: CPTII,S$GLB,, | Performed by: PHYSICIAN ASSISTANT

## 2021-11-02 PROCEDURE — 96372 PR INJECTION,THERAP/PROPH/DIAG2ST, IM OR SUBCUT: ICD-10-PCS | Mod: S$GLB,,, | Performed by: PHYSICIAN ASSISTANT

## 2021-11-02 PROCEDURE — 1159F MED LIST DOCD IN RCRD: CPT | Mod: CPTII,S$GLB,, | Performed by: PHYSICIAN ASSISTANT

## 2021-11-02 PROCEDURE — 3008F BODY MASS INDEX DOCD: CPT | Mod: CPTII,S$GLB,, | Performed by: PHYSICIAN ASSISTANT

## 2021-11-02 RX ORDER — PREDNISONE 5 MG/1
5 TABLET ORAL DAILY
Qty: 30 TABLET | Refills: 3 | Status: SHIPPED | OUTPATIENT
Start: 2021-11-02 | End: 2022-06-03

## 2021-11-02 RX ORDER — PROMETHAZINE HYDROCHLORIDE 25 MG/1
25 TABLET ORAL EVERY 6 HOURS PRN
Qty: 30 TABLET | Refills: 3 | Status: SHIPPED | OUTPATIENT
Start: 2021-11-02 | End: 2022-02-04 | Stop reason: SDUPTHER

## 2021-11-02 RX ORDER — DEXAMETHASONE SODIUM PHOSPHATE 4 MG/ML
4 INJECTION, SOLUTION INTRA-ARTICULAR; INTRALESIONAL; INTRAMUSCULAR; INTRAVENOUS; SOFT TISSUE
Status: COMPLETED | OUTPATIENT
Start: 2021-11-02 | End: 2021-11-02

## 2021-11-02 RX ORDER — KETOROLAC TROMETHAMINE 30 MG/ML
60 INJECTION, SOLUTION INTRAMUSCULAR; INTRAVENOUS
Status: COMPLETED | OUTPATIENT
Start: 2021-11-02 | End: 2021-11-02

## 2021-11-02 RX ORDER — FLUOXETINE HYDROCHLORIDE 20 MG/1
20 CAPSULE ORAL DAILY
Qty: 90 CAPSULE | Refills: 1 | Status: SHIPPED | OUTPATIENT
Start: 2021-11-02 | End: 2022-09-07

## 2021-11-02 RX ORDER — TAMSULOSIN HYDROCHLORIDE 0.4 MG/1
0.4 CAPSULE ORAL DAILY
Qty: 30 CAPSULE | Refills: 0 | Status: SHIPPED | OUTPATIENT
Start: 2021-11-02 | End: 2022-02-04

## 2021-11-02 RX ORDER — METHYLPREDNISOLONE ACETATE 80 MG/ML
80 INJECTION, SUSPENSION INTRA-ARTICULAR; INTRALESIONAL; INTRAMUSCULAR; SOFT TISSUE
Status: COMPLETED | OUTPATIENT
Start: 2021-11-02 | End: 2021-11-02

## 2021-11-02 RX ADMIN — KETOROLAC TROMETHAMINE 60 MG: 30 INJECTION, SOLUTION INTRAMUSCULAR; INTRAVENOUS at 10:11

## 2021-11-02 RX ADMIN — METHYLPREDNISOLONE ACETATE 80 MG: 80 INJECTION, SUSPENSION INTRA-ARTICULAR; INTRALESIONAL; INTRAMUSCULAR; SOFT TISSUE at 10:11

## 2021-11-02 RX ADMIN — DEXAMETHASONE SODIUM PHOSPHATE 4 MG: 4 INJECTION, SOLUTION INTRA-ARTICULAR; INTRALESIONAL; INTRAMUSCULAR; INTRAVENOUS; SOFT TISSUE at 10:11

## 2021-11-02 NOTE — TELEPHONE ENCOUNTER
Followed by Dr. Ugarte for pain management, unable to get refill in foreseeable future. Percocet 10/325 #90 pended

## 2021-11-03 ENCOUNTER — TELEPHONE (OUTPATIENT)
Dept: PULMONOLOGY | Facility: CLINIC | Age: 41
End: 2021-11-03
Payer: MEDICARE

## 2021-11-03 RX ORDER — OXYCODONE AND ACETAMINOPHEN 10; 325 MG/1; MG/1
1 TABLET ORAL EVERY 8 HOURS PRN
Qty: 90 TABLET | Refills: 0 | Status: SHIPPED | OUTPATIENT
Start: 2021-11-03 | End: 2022-02-04 | Stop reason: SDUPTHER

## 2021-11-05 ENCOUNTER — HOSPITAL ENCOUNTER (OUTPATIENT)
Dept: RADIOLOGY | Facility: HOSPITAL | Age: 41
Discharge: HOME | End: 2021-11-05
Attending: STUDENT IN AN ORGANIZED HEALTH CARE EDUCATION/TRAINING PROGRAM
Payer: COMMERCIAL

## 2021-11-05 DIAGNOSIS — E04.1 THYROID NODULE: ICD-10-CM

## 2021-11-05 PROCEDURE — 88173 CYTOPATH EVAL FNA REPORT: CPT | Performed by: PATHOLOGY

## 2021-11-05 PROCEDURE — 88173 CYTOPATH EVAL FNA REPORT: CPT | Mod: 26,,, | Performed by: PATHOLOGY

## 2021-11-05 PROCEDURE — 88173 PR  INTERPRETATION OF FNA SMEAR: ICD-10-PCS | Mod: 26,,, | Performed by: PATHOLOGY

## 2021-11-05 PROCEDURE — 10005 FNA BX W/US GDN 1ST LES: CPT

## 2021-11-09 ENCOUNTER — PATIENT MESSAGE (OUTPATIENT)
Dept: RHEUMATOLOGY | Facility: CLINIC | Age: 41
End: 2021-11-09
Payer: MEDICARE

## 2021-11-09 DIAGNOSIS — R31.9 HEMATURIA, UNSPECIFIED TYPE: Primary | ICD-10-CM

## 2021-11-09 DIAGNOSIS — R10.9 ABDOMINAL PAIN, UNSPECIFIED ABDOMINAL LOCATION: ICD-10-CM

## 2021-11-10 LAB
COMMENT: ABNORMAL
FINAL PATHOLOGIC DIAGNOSIS: ABNORMAL
Lab: ABNORMAL

## 2021-11-11 ENCOUNTER — PATIENT MESSAGE (OUTPATIENT)
Dept: OTOLARYNGOLOGY | Facility: CLINIC | Age: 41
End: 2021-11-11
Payer: MEDICARE

## 2021-11-12 ENCOUNTER — TELEPHONE (OUTPATIENT)
Dept: RHEUMATOLOGY | Facility: CLINIC | Age: 41
End: 2021-11-12
Payer: MEDICARE

## 2021-11-12 ENCOUNTER — HOSPITAL ENCOUNTER (OUTPATIENT)
Dept: RADIOLOGY | Facility: HOSPITAL | Age: 41
Discharge: HOME | End: 2021-11-12
Attending: PHYSICIAN ASSISTANT
Payer: COMMERCIAL

## 2021-11-12 DIAGNOSIS — R10.9 ABDOMINAL PAIN, UNSPECIFIED ABDOMINAL LOCATION: ICD-10-CM

## 2021-11-12 DIAGNOSIS — R31.9 HEMATURIA, UNSPECIFIED TYPE: ICD-10-CM

## 2021-11-12 DIAGNOSIS — E04.1 THYROID NODULE: Primary | ICD-10-CM

## 2021-11-12 PROCEDURE — 74176 CT ABD & PELVIS W/O CONTRAST: CPT | Mod: 26,,, | Performed by: RADIOLOGY

## 2021-11-12 PROCEDURE — 74176 CT RENAL STONE STUDY ABD PELVIS WO: ICD-10-PCS | Mod: 26,,, | Performed by: RADIOLOGY

## 2021-11-12 PROCEDURE — 74176 CT ABD & PELVIS W/O CONTRAST: CPT | Mod: TC,PO

## 2021-11-15 ENCOUNTER — PATIENT MESSAGE (OUTPATIENT)
Dept: RHEUMATOLOGY | Facility: CLINIC | Age: 41
End: 2021-11-15
Payer: MEDICARE

## 2021-11-16 ENCOUNTER — PATIENT MESSAGE (OUTPATIENT)
Dept: RHEUMATOLOGY | Facility: CLINIC | Age: 41
End: 2021-11-16
Payer: MEDICARE

## 2021-11-16 DIAGNOSIS — N39.0 URINARY TRACT INFECTION WITHOUT HEMATURIA, SITE UNSPECIFIED: Primary | ICD-10-CM

## 2021-11-17 RX ORDER — NITROFURANTOIN 25; 75 MG/1; MG/1
100 CAPSULE ORAL 2 TIMES DAILY
Qty: 20 CAPSULE | Refills: 0 | Status: SHIPPED | OUTPATIENT
Start: 2021-11-17 | End: 2021-11-27

## 2021-11-19 ENCOUNTER — TELEPHONE (OUTPATIENT)
Dept: RHEUMATOLOGY | Facility: CLINIC | Age: 41
End: 2021-11-19
Payer: MEDICARE

## 2021-12-03 ENCOUNTER — LAB VISIT (OUTPATIENT)
Dept: LAB | Facility: HOSPITAL | Age: 41
End: 2021-12-03
Attending: INTERNAL MEDICINE
Payer: COMMERCIAL

## 2021-12-03 DIAGNOSIS — G93.32 CHRONIC FATIGUE SYNDROME: ICD-10-CM

## 2021-12-03 DIAGNOSIS — R63.5 ABNORMAL WEIGHT GAIN: Primary | ICD-10-CM

## 2021-12-03 LAB
ANION GAP SERPL CALC-SCNC: 10 MMOL/L (ref 8–16)
BUN SERPL-MCNC: 12 MG/DL (ref 6–20)
CALCIUM SERPL-MCNC: 9.9 MG/DL (ref 8.7–10.5)
CHLORIDE SERPL-SCNC: 104 MMOL/L (ref 95–110)
CO2 SERPL-SCNC: 25 MMOL/L (ref 23–29)
CORTIS SERPL-MCNC: 15.5 UG/DL
CREAT SERPL-MCNC: 0.6 MG/DL (ref 0.5–1.4)
EST. GFR  (AFRICAN AMERICAN): >60 ML/MIN/1.73 M^2
EST. GFR  (NON AFRICAN AMERICAN): >60 ML/MIN/1.73 M^2
GLUCOSE SERPL-MCNC: 100 MG/DL (ref 70–110)
POTASSIUM SERPL-SCNC: 4.3 MMOL/L (ref 3.5–5.1)
SODIUM SERPL-SCNC: 139 MMOL/L (ref 136–145)

## 2021-12-03 PROCEDURE — 82533 TOTAL CORTISOL: CPT | Performed by: INTERNAL MEDICINE

## 2021-12-03 PROCEDURE — 36415 COLL VENOUS BLD VENIPUNCTURE: CPT | Mod: PO | Performed by: INTERNAL MEDICINE

## 2021-12-03 PROCEDURE — 80048 BASIC METABOLIC PNL TOTAL CA: CPT | Performed by: INTERNAL MEDICINE

## 2022-01-28 ENCOUNTER — LAB VISIT (OUTPATIENT)
Dept: LAB | Facility: HOSPITAL | Age: 42
End: 2022-01-28
Attending: PHYSICIAN ASSISTANT
Payer: COMMERCIAL

## 2022-01-28 DIAGNOSIS — M45.9 ANKYLOSING SPONDYLITIS, UNSPECIFIED SITE OF SPINE: ICD-10-CM

## 2022-01-28 LAB
ALBUMIN SERPL BCP-MCNC: 3.4 G/DL (ref 3.5–5.2)
ALP SERPL-CCNC: 80 U/L (ref 55–135)
ALT SERPL W/O P-5'-P-CCNC: 13 U/L (ref 10–44)
ANION GAP SERPL CALC-SCNC: 8 MMOL/L (ref 8–16)
AST SERPL-CCNC: 16 U/L (ref 10–40)
BASOPHILS # BLD AUTO: 0.14 K/UL (ref 0–0.2)
BASOPHILS NFR BLD: 1.1 % (ref 0–1.9)
BILIRUB SERPL-MCNC: 0.2 MG/DL (ref 0.1–1)
BUN SERPL-MCNC: 11 MG/DL (ref 6–20)
CALCIUM SERPL-MCNC: 9.8 MG/DL (ref 8.7–10.5)
CHLORIDE SERPL-SCNC: 103 MMOL/L (ref 95–110)
CO2 SERPL-SCNC: 27 MMOL/L (ref 23–29)
CREAT SERPL-MCNC: 0.7 MG/DL (ref 0.5–1.4)
CRP SERPL-MCNC: 6.5 MG/L (ref 0–8.2)
DIFFERENTIAL METHOD: ABNORMAL
EOSINOPHIL # BLD AUTO: 0.4 K/UL (ref 0–0.5)
EOSINOPHIL NFR BLD: 2.9 % (ref 0–8)
ERYTHROCYTE [DISTWIDTH] IN BLOOD BY AUTOMATED COUNT: 14.6 % (ref 11.5–14.5)
ERYTHROCYTE [SEDIMENTATION RATE] IN BLOOD BY WESTERGREN METHOD: 11 MM/HR (ref 0–20)
EST. GFR  (AFRICAN AMERICAN): >60 ML/MIN/1.73 M^2
EST. GFR  (NON AFRICAN AMERICAN): >60 ML/MIN/1.73 M^2
GLUCOSE SERPL-MCNC: 89 MG/DL (ref 70–110)
HCT VFR BLD AUTO: 45.2 % (ref 37–48.5)
HGB BLD-MCNC: 13.9 G/DL (ref 12–16)
IMM GRANULOCYTES # BLD AUTO: 0.05 K/UL (ref 0–0.04)
IMM GRANULOCYTES NFR BLD AUTO: 0.4 % (ref 0–0.5)
LYMPHOCYTES # BLD AUTO: 5.3 K/UL (ref 1–4.8)
LYMPHOCYTES NFR BLD: 42.2 % (ref 18–48)
MCH RBC QN AUTO: 30.7 PG (ref 27–31)
MCHC RBC AUTO-ENTMCNC: 30.8 G/DL (ref 32–36)
MCV RBC AUTO: 100 FL (ref 82–98)
MONOCYTES # BLD AUTO: 0.9 K/UL (ref 0.3–1)
MONOCYTES NFR BLD: 7.4 % (ref 4–15)
NEUTROPHILS # BLD AUTO: 5.8 K/UL (ref 1.8–7.7)
NEUTROPHILS NFR BLD: 46 % (ref 38–73)
NRBC BLD-RTO: 0 /100 WBC
PLATELET # BLD AUTO: 478 K/UL (ref 150–450)
PMV BLD AUTO: 9.6 FL (ref 9.2–12.9)
POTASSIUM SERPL-SCNC: 4.3 MMOL/L (ref 3.5–5.1)
PROT SERPL-MCNC: 7.1 G/DL (ref 6–8.4)
RBC # BLD AUTO: 4.53 M/UL (ref 4–5.4)
SODIUM SERPL-SCNC: 138 MMOL/L (ref 136–145)
WBC # BLD AUTO: 12.5 K/UL (ref 3.9–12.7)

## 2022-01-28 PROCEDURE — 86140 C-REACTIVE PROTEIN: CPT | Performed by: PHYSICIAN ASSISTANT

## 2022-01-28 PROCEDURE — 36415 COLL VENOUS BLD VENIPUNCTURE: CPT | Mod: PO | Performed by: PHYSICIAN ASSISTANT

## 2022-01-28 PROCEDURE — 85025 COMPLETE CBC W/AUTO DIFF WBC: CPT | Performed by: PHYSICIAN ASSISTANT

## 2022-01-28 PROCEDURE — 85651 RBC SED RATE NONAUTOMATED: CPT | Mod: PO | Performed by: PHYSICIAN ASSISTANT

## 2022-01-28 PROCEDURE — 80053 COMPREHEN METABOLIC PANEL: CPT | Performed by: PHYSICIAN ASSISTANT

## 2022-02-04 ENCOUNTER — OFFICE VISIT (OUTPATIENT)
Dept: RHEUMATOLOGY | Facility: CLINIC | Age: 42
End: 2022-02-04
Payer: COMMERCIAL

## 2022-02-04 VITALS
DIASTOLIC BLOOD PRESSURE: 85 MMHG | HEART RATE: 106 BPM | BODY MASS INDEX: 37.38 KG/M2 | SYSTOLIC BLOOD PRESSURE: 136 MMHG | WEIGHT: 198 LBS | HEIGHT: 61 IN

## 2022-02-04 DIAGNOSIS — G89.4 CHRONIC PAIN SYNDROME: ICD-10-CM

## 2022-02-04 DIAGNOSIS — M45.9 ANKYLOSING SPONDYLITIS, UNSPECIFIED SITE OF SPINE: Primary | ICD-10-CM

## 2022-02-04 DIAGNOSIS — J32.9 SINUSITIS, UNSPECIFIED CHRONICITY, UNSPECIFIED LOCATION: ICD-10-CM

## 2022-02-04 DIAGNOSIS — R11.2 NAUSEA & VOMITING: ICD-10-CM

## 2022-02-04 DIAGNOSIS — R11.0 NAUSEA: ICD-10-CM

## 2022-02-04 DIAGNOSIS — K21.9 GASTROESOPHAGEAL REFLUX DISEASE, UNSPECIFIED WHETHER ESOPHAGITIS PRESENT: ICD-10-CM

## 2022-02-04 DIAGNOSIS — L40.50 PSORIATIC ARTHRITIS: ICD-10-CM

## 2022-02-04 PROCEDURE — 99214 OFFICE O/P EST MOD 30 MIN: CPT | Mod: 25,S$GLB,, | Performed by: PHYSICIAN ASSISTANT

## 2022-02-04 PROCEDURE — 99214 PR OFFICE/OUTPT VISIT, EST, LEVL IV, 30-39 MIN: ICD-10-PCS | Mod: 25,S$GLB,, | Performed by: PHYSICIAN ASSISTANT

## 2022-02-04 PROCEDURE — 99999 PR PBB SHADOW E&M-EST. PATIENT-LVL III: ICD-10-PCS | Mod: PBBFAC,,, | Performed by: PHYSICIAN ASSISTANT

## 2022-02-04 PROCEDURE — 99999 PR PBB SHADOW E&M-EST. PATIENT-LVL III: CPT | Mod: PBBFAC,,, | Performed by: PHYSICIAN ASSISTANT

## 2022-02-04 PROCEDURE — 99213 OFFICE O/P EST LOW 20 MIN: CPT | Mod: PBBFAC,25,PN | Performed by: PHYSICIAN ASSISTANT

## 2022-02-04 PROCEDURE — 96372 THER/PROPH/DIAG INJ SC/IM: CPT | Mod: PBBFAC,PN

## 2022-02-04 RX ORDER — ORAL SEMAGLUTIDE 3 MG/1
3 TABLET ORAL
COMMUNITY
Start: 2021-12-08 | End: 2022-02-04

## 2022-02-04 RX ORDER — KETOROLAC TROMETHAMINE 30 MG/ML
60 INJECTION, SOLUTION INTRAMUSCULAR; INTRAVENOUS
Status: COMPLETED | OUTPATIENT
Start: 2022-02-04 | End: 2022-02-04

## 2022-02-04 RX ORDER — DEXAMETHASONE SODIUM PHOSPHATE 4 MG/ML
8 INJECTION, SOLUTION INTRA-ARTICULAR; INTRALESIONAL; INTRAMUSCULAR; INTRAVENOUS; SOFT TISSUE
Status: COMPLETED | OUTPATIENT
Start: 2022-02-04 | End: 2022-02-04

## 2022-02-04 RX ORDER — CHLORZOXAZONE 500 MG/1
500 TABLET ORAL 2 TIMES DAILY PRN
Qty: 60 TABLET | Refills: 0 | Status: SHIPPED | OUTPATIENT
Start: 2022-02-04 | End: 2022-09-07

## 2022-02-04 RX ORDER — PROMETHAZINE HYDROCHLORIDE 25 MG/1
25 TABLET ORAL EVERY 6 HOURS PRN
Qty: 45 TABLET | Refills: 5 | Status: SHIPPED | OUTPATIENT
Start: 2022-02-04 | End: 2023-07-21 | Stop reason: SDUPTHER

## 2022-02-04 RX ORDER — FAMOTIDINE 40 MG/1
40 TABLET, FILM COATED ORAL DAILY
Qty: 30 TABLET | Refills: 5 | Status: SHIPPED | OUTPATIENT
Start: 2022-02-04 | End: 2022-07-28 | Stop reason: SDUPTHER

## 2022-02-04 RX ORDER — AZITHROMYCIN 250 MG/1
TABLET, FILM COATED ORAL
Qty: 6 TABLET | Refills: 0 | Status: SHIPPED | OUTPATIENT
Start: 2022-02-04 | End: 2022-03-15

## 2022-02-04 RX ORDER — ORAL SEMAGLUTIDE 7 MG/1
7 TABLET ORAL
COMMUNITY
Start: 2021-12-08 | End: 2022-02-04

## 2022-02-04 RX ADMIN — DEXAMETHASONE SODIUM PHOSPHATE 8 MG: 4 INJECTION INTRA-ARTICULAR; INTRALESIONAL; INTRAMUSCULAR; INTRAVENOUS; SOFT TISSUE at 10:02

## 2022-02-04 RX ADMIN — KETOROLAC TROMETHAMINE 60 MG: 60 INJECTION, SOLUTION INTRAMUSCULAR at 10:02

## 2022-02-04 NOTE — PROGRESS NOTES
Subjective:       Patient ID: Sparkle Jose is a 41 y.o. female.    Chief Complaint: Disease Management    Mrs. Jose is a 41 year old female who presents to clinic for follow up on AS. She is doing fair on cosentyx 300 mg monthly. She held cosentyx for 3 months due to infections and she currently has sinus infection. Negative for covid.  She noticed increased joint pain and stiffness while off treatment. She continues to have pain in her elbows, knees, neck, and low back. Pain is constant and aching. Taking prednisone on rare occasion for flares. Flexeril causes grogginess.     She is taking percocet more frequently since she has been off treatment.    GERD has been worse at night.     Current tx:  1. cosentyx 300 mg  2. Prednisone rare use    Prior tx:  1. Humira  2. Remicade (drug induced lupus)    Review of Systems   Constitutional: Positive for activity change. Negative for appetite change, chills, fatigue and fever.   HENT: Positive for sinus pressure and sinus pain.    Eyes: Negative for visual disturbance.   Respiratory: Negative for cough and shortness of breath.    Cardiovascular: Negative for chest pain, palpitations and leg swelling.   Gastrointestinal: Negative for abdominal pain, constipation, diarrhea, nausea and vomiting.   Musculoskeletal: Positive for arthralgias, back pain, joint swelling and myalgias.   Allergic/Immunologic: Positive for immunocompromised state.   Neurological: Negative for dizziness, weakness, light-headedness and headaches.         Objective:     Vitals:    02/04/22 0950   BP: 136/85   Pulse: 106       Past Medical History:   Diagnosis Date    Ankylosing spondylitis     Arthritis     Celiac disease     Depression     Family history of DVT     MGM DVT, mother spleenic    Family history of factor V Leiden mutation     mother and sister    GERD (gastroesophageal reflux disease)     Migraine     Osteoarthritis     Psoriasis     Psoriatic arthritis mutilans       Past Surgical History:   Procedure Laterality Date    brain decompression   2006    BRAIN SURGERY      TUBAL LIGATION            Physical Exam   Constitutional: She is oriented to person, place, and time.   Eyes: Right conjunctiva is not injected. Left conjunctiva is not injected.   Neck: No JVD present. No thyromegaly present.   Cardiovascular: Normal rate and regular rhythm. Exam reveals no decreased pulses.   Pulmonary/Chest: She has no wheezes. She has no rhonchi. She has no rales.   Musculoskeletal:      Right shoulder: Normal.      Left shoulder: Normal.      Right elbow: Tenderness present.      Left elbow: Tenderness present.      Right wrist: Normal.      Left wrist: Normal.      Right knee: Swelling present. Tenderness present.      Left knee: Swelling present. Tenderness present.      Right ankle: Swelling present. Tenderness present.      Left ankle: Swelling present. Tenderness present.   Lymphadenopathy:     She has no cervical adenopathy.   Neurological: She is alert and oriented to person, place, and time. Gait normal.   Skin: No rash noted.   Psychiatric: Mood and affect normal.       Right Side Rheumatological Exam     Examination finds the shoulder, wrist, 1st PIP, 1st MCP, 2nd PIP, 3rd PIP, 4th PIP, 4th MCP, 5th PIP and 5th MCP normal.    The patient is tender to palpation of the elbow, knee, 2nd MCP, 3rd MCP and ankle    She has swelling of the knee, 2nd MCP, 3rd MCP and ankle    Left Side Rheumatological Exam     Examination finds the shoulder, wrist, 1st PIP, 1st MCP, 2nd PIP, 2nd MCP, 3rd PIP, 3rd MCP, 4th PIP, 4th MCP, 5th PIP and 5th MCP normal.    The patient is tender to palpation of the elbow, knee and ankle.    She has swelling of the knee and knee          Labs reviewed:  Component      Latest Ref Rng & Units 1/28/2022   WBC      3.90 - 12.70 K/uL 12.50   RBC      4.00 - 5.40 M/uL 4.53   Hemoglobin      12.0 - 16.0 g/dL 13.9   Hematocrit      37.0 - 48.5 % 45.2   MCV      82 -  98 fL 100 (H)   MCH      27.0 - 31.0 pg 30.7   MCHC      32.0 - 36.0 g/dL 30.8 (L)   RDW      11.5 - 14.5 % 14.6 (H)   Platelets      150 - 450 K/uL 478 (H)   MPV      9.2 - 12.9 fL 9.6   Immature Granulocytes      0.0 - 0.5 % 0.4   Gran # (ANC)      1.8 - 7.7 K/uL 5.8   Immature Grans (Abs)      0.00 - 0.04 K/uL 0.05 (H)   Lymph #      1.0 - 4.8 K/uL 5.3 (H)   Mono #      0.3 - 1.0 K/uL 0.9   Eos #      0.0 - 0.5 K/uL 0.4   Baso #      0.00 - 0.20 K/uL 0.14   nRBC      0 /100 WBC 0   Gran %      38.0 - 73.0 % 46.0   Lymph %      18.0 - 48.0 % 42.2   Mono %      4.0 - 15.0 % 7.4   Eosinophil %      0.0 - 8.0 % 2.9   Basophil %      0.0 - 1.9 % 1.1   Differential Method       Automated   Sodium      136 - 145 mmol/L 138   Potassium      3.5 - 5.1 mmol/L 4.3   Chloride      95 - 110 mmol/L 103   CO2      23 - 29 mmol/L 27   Glucose      70 - 110 mg/dL 89   BUN      6 - 20 mg/dL 11   Creatinine      0.5 - 1.4 mg/dL 0.7   Calcium      8.7 - 10.5 mg/dL 9.8   PROTEIN TOTAL      6.0 - 8.4 g/dL 7.1   Albumin      3.5 - 5.2 g/dL 3.4 (L)   BILIRUBIN TOTAL      0.1 - 1.0 mg/dL 0.2   Alkaline Phosphatase      55 - 135 U/L 80   AST      10 - 40 U/L 16   ALT      10 - 44 U/L 13   Anion Gap      8 - 16 mmol/L 8   eGFR if African American      >60 mL/min/1.73 m:2 >60.0   eGFR if non African American      >60 mL/min/1.73 m:2 >60.0   CRP      0.0 - 8.2 mg/L 6.5   Sed Rate      0 - 20 mm/Hr 11     Assessment:       1. Ankylosing spondylitis, unspecified site of spine    2. Psoriatic arthritis    3. Sinusitis, unspecified chronicity, unspecified location    4. Nausea    5. Gastroesophageal reflux disease, unspecified whether esophagitis present            Plan:       Ankylosing spondylitis, unspecified site of spine  -     chlorzoxazone (PARAFON FORTE) 500 mg Tab; Take 1 tablet (500 mg total) by mouth 2 (two) times daily as needed.  Dispense: 60 tablet; Refill: 0  -     ketorolac injection 60 mg  -     dexamethasone injection 8 mg  -      CBC Auto Differential; Future; Expected date: 02/04/2022  -     Comprehensive Metabolic Panel; Future; Expected date: 02/04/2022  -     C-Reactive Protein; Future; Expected date: 02/04/2022  -     Sedimentation rate; Future; Expected date: 02/04/2022    Psoriatic arthritis  -     chlorzoxazone (PARAFON FORTE) 500 mg Tab; Take 1 tablet (500 mg total) by mouth 2 (two) times daily as needed.  Dispense: 60 tablet; Refill: 0  -     ketorolac injection 60 mg  -     dexamethasone injection 8 mg  -     CBC Auto Differential; Future; Expected date: 02/04/2022  -     Comprehensive Metabolic Panel; Future; Expected date: 02/04/2022  -     C-Reactive Protein; Future; Expected date: 02/04/2022  -     Sedimentation rate; Future; Expected date: 02/04/2022    Sinusitis, unspecified chronicity, unspecified location  -     azithromycin (Z-DEMARCUS) 250 MG tablet; Take 2 tablets by mouth on day 1; Take 1 tablet by mouth on days 2-5  Dispense: 6 tablet; Refill: 0    Nausea  -     promethazine (PHENERGAN) 25 MG tablet; Take 1 tablet (25 mg total) by mouth every 6 (six) hours as needed for Nausea.  Dispense: 45 tablet; Refill: 5    Gastroesophageal reflux disease, unspecified whether esophagitis present  -     famotidine (PEPCID) 40 MG tablet; Take 1 tablet (40 mg total) by mouth once daily.  Dispense: 30 tablet; Refill: 5        Assessment:  41 year old female with  Psoriatic arthritis, Ankylosing spondylitis on cosentyx 300 mg, elevated CRP  --chronic insomnia on trazodone  --anxiety on prozac  --hx of brain surgery  --persistent leukocytosis and thrombocytosis, hematology work up unrevealing  --thyroid nodule  --pulmonary nodule  --chronic pain syndrome followed by pain management    Plan:  1. Cont cosentyx 300 mg monthly. Hold until infection resolves  2. toradol 60, dexa 8  3. Famotidine at bedtime  4. zpack  5. Percocet pended for MD. I have checked [place] prescription monitoring program site and no unusual or abnormal behavior has  occurred pt understand the risk and benefits of taking opioid medications and has decided to continue the medication.  6. Trial of parafon    Follow up:  4 mo Dr. Shea w/labs prior

## 2022-02-04 NOTE — PROGRESS NOTES
Administered Decadron 8mg, 2cc's in Right Upper Gluteal    Administered Ketorolac 60mg, 2cc's in  Left Upper Gluteal    2 Patient Identifiers were used

## 2022-02-04 NOTE — TELEPHONE ENCOUNTER
----- Message from Brayden Hoover sent at 2/4/2022  9:12 AM CST -----  Type: Needs Medical Advice  Who Called: Patient    Best Call Back Number: [phone]  Additional Information: Patient states that she may be 15 minutes late for her 9:20 appointment.

## 2022-02-07 RX ORDER — OXYCODONE AND ACETAMINOPHEN 10; 325 MG/1; MG/1
1 TABLET ORAL EVERY 8 HOURS PRN
Qty: 90 TABLET | Refills: 0 | Status: SHIPPED | OUTPATIENT
Start: 2022-03-05 | End: 2022-04-25 | Stop reason: SDUPTHER

## 2022-02-07 RX ORDER — OXYCODONE AND ACETAMINOPHEN 10; 325 MG/1; MG/1
1 TABLET ORAL EVERY 8 HOURS PRN
Qty: 90 TABLET | Refills: 0 | Status: SHIPPED | OUTPATIENT
Start: 2022-02-07 | End: 2022-03-15 | Stop reason: SDUPTHER

## 2022-02-28 ENCOUNTER — PATIENT MESSAGE (OUTPATIENT)
Dept: RHEUMATOLOGY | Facility: CLINIC | Age: 42
End: 2022-02-28
Payer: COMMERCIAL

## 2022-02-28 DIAGNOSIS — M45.9 ANKYLOSING SPONDYLITIS, UNSPECIFIED SITE OF SPINE: ICD-10-CM

## 2022-02-28 DIAGNOSIS — L40.50 PSORIATIC ARTHRITIS: ICD-10-CM

## 2022-03-09 ENCOUNTER — DOCUMENTATION ONLY (OUTPATIENT)
Dept: PHARMACY | Facility: CLINIC | Age: 42
End: 2022-03-09
Payer: COMMERCIAL

## 2022-03-09 ENCOUNTER — TELEPHONE (OUTPATIENT)
Dept: RHEUMATOLOGY | Facility: CLINIC | Age: 42
End: 2022-03-09
Payer: COMMERCIAL

## 2022-03-09 RX ORDER — SECUKINUMAB 150 MG/ML
300 INJECTION SUBCUTANEOUS
Qty: 2 ML | Refills: 6 | OUTPATIENT
Start: 2022-03-09 | End: 2022-03-23 | Stop reason: SDUPTHER

## 2022-03-09 NOTE — TELEPHONE ENCOUNTER
----- Message from Brayden Hoover sent at 3/9/2022 10:41 AM CST -----  Type: Needs Medical Advice  Who Called:  Patient    Pharmacy name and phone #:    [facility] [id number] [facility] - [place] - [address] [address]  [address]  [place] [address]  Phone: [phone] Fax: [phone]    [facility] [place] - [place] - [address] [facility]  [address] [address]  Phone: [phone] Fax: [phone]      Best Call Back Number: [phone]  Additional Information: Patient states that she has been waiting for a callback regarding her medications needing a PA:    oxyCODONE-acetaminophen (PERCOCET)  mg per tablet--   [facility]    secukinumab (COSENTYX PEN, 2 PENS,) 150 mg/mL PnIj  [facility]

## 2022-03-09 NOTE — PROGRESS NOTES
PA submitted for OXYCODONE-ACETAMINOPHEN  [facility]    Key:  [id number]    ANDRES CALDERON CPHT  MED ACCESS  3/9/2022

## 2022-03-10 ENCOUNTER — PATIENT MESSAGE (OUTPATIENT)
Dept: RHEUMATOLOGY | Facility: CLINIC | Age: 42
End: 2022-03-10
Payer: COMMERCIAL

## 2022-03-10 ENCOUNTER — DOCUMENTATION ONLY (OUTPATIENT)
Dept: PHARMACY | Facility: CLINIC | Age: 42
End: 2022-03-10
Payer: COMMERCIAL

## 2022-03-10 NOTE — PROGRESS NOTES
PA APPROVED FOR OXYCODONE-ACETAMINOPHEN  MG THROUGH 9-9-22      PHARMACY NOTIFIED    ANDRES CALDERON CPHT  MED ACCESS  3/10/22

## 2022-03-11 ENCOUNTER — DOCUMENTATION ONLY (OUTPATIENT)
Dept: RHEUMATOLOGY | Facility: CLINIC | Age: 42
End: 2022-03-11
Payer: COMMERCIAL

## 2022-03-11 NOTE — PROGRESS NOTES
PA approved for Cosentyx     Approval valid from 03/10/2022-03/10/2023    Approval letter faxed to pharmacy    Bonnie Ly MA  3/11/2022

## 2022-03-14 ENCOUNTER — PATIENT MESSAGE (OUTPATIENT)
Dept: RHEUMATOLOGY | Facility: CLINIC | Age: 42
End: 2022-03-14
Payer: COMMERCIAL

## 2022-03-15 ENCOUNTER — OFFICE VISIT (OUTPATIENT)
Dept: FAMILY MEDICINE | Facility: CLINIC | Age: 42
End: 2022-03-15
Payer: COMMERCIAL

## 2022-03-15 VITALS
TEMPERATURE: 98 F | HEIGHT: 61 IN | WEIGHT: 202.88 LBS | RESPIRATION RATE: 16 BRPM | SYSTOLIC BLOOD PRESSURE: 123 MMHG | BODY MASS INDEX: 38.3 KG/M2 | DIASTOLIC BLOOD PRESSURE: 88 MMHG | HEART RATE: 98 BPM | OXYGEN SATURATION: 99 %

## 2022-03-15 DIAGNOSIS — B96.20 E. COLI UTI: ICD-10-CM

## 2022-03-15 DIAGNOSIS — E66.9 OBESITY (BMI 30-39.9): ICD-10-CM

## 2022-03-15 DIAGNOSIS — Z13.220 ENCOUNTER FOR SCREENING FOR LIPID DISORDER: ICD-10-CM

## 2022-03-15 DIAGNOSIS — Z11.59 NEED FOR HEPATITIS C SCREENING TEST: ICD-10-CM

## 2022-03-15 DIAGNOSIS — Z12.31 ENCOUNTER FOR SCREENING MAMMOGRAM FOR MALIGNANT NEOPLASM OF BREAST: ICD-10-CM

## 2022-03-15 DIAGNOSIS — M45.9 ANKYLOSING SPONDYLITIS, UNSPECIFIED SITE OF SPINE: ICD-10-CM

## 2022-03-15 DIAGNOSIS — R30.0 DYSURIA: Primary | ICD-10-CM

## 2022-03-15 DIAGNOSIS — Z79.899 ENCOUNTER FOR LONG-TERM CURRENT USE OF MEDICATION: ICD-10-CM

## 2022-03-15 DIAGNOSIS — Z86.69 HISTORY OF CHIARI MALFORMATION: ICD-10-CM

## 2022-03-15 DIAGNOSIS — F32.A ANXIETY AND DEPRESSION: ICD-10-CM

## 2022-03-15 DIAGNOSIS — D68.51 FACTOR 5 LEIDEN MUTATION, HETEROZYGOUS: ICD-10-CM

## 2022-03-15 DIAGNOSIS — Z11.4 SCREENING FOR HIV (HUMAN IMMUNODEFICIENCY VIRUS): ICD-10-CM

## 2022-03-15 DIAGNOSIS — E04.1 THYROID NODULE: ICD-10-CM

## 2022-03-15 DIAGNOSIS — K21.9 GASTROESOPHAGEAL REFLUX DISEASE WITHOUT ESOPHAGITIS: ICD-10-CM

## 2022-03-15 DIAGNOSIS — F17.210 CIGARETTE NICOTINE DEPENDENCE WITHOUT COMPLICATION: ICD-10-CM

## 2022-03-15 DIAGNOSIS — N39.0 E. COLI UTI: ICD-10-CM

## 2022-03-15 DIAGNOSIS — F51.01 PRIMARY INSOMNIA: ICD-10-CM

## 2022-03-15 DIAGNOSIS — F41.9 ANXIETY AND DEPRESSION: ICD-10-CM

## 2022-03-15 DIAGNOSIS — L40.50 PSORIATIC ARTHRITIS: ICD-10-CM

## 2022-03-15 LAB
BILIRUB UR QL STRIP: NEGATIVE
CLARITY UR: CLEAR
COLOR UR: YELLOW
GLUCOSE UR QL STRIP: NEGATIVE
HGB UR QL STRIP: ABNORMAL
KETONES UR QL STRIP: NEGATIVE
LEUKOCYTE ESTERASE UR QL STRIP: NEGATIVE
NITRITE UR QL STRIP: NEGATIVE
PH UR STRIP: 7 [PH] (ref 5–8)
PROT UR QL STRIP: NEGATIVE
SP GR UR STRIP: 1.01 (ref 1–1.03)
URN SPEC COLLECT METH UR: ABNORMAL

## 2022-03-15 PROCEDURE — 99204 PR OFFICE/OUTPT VISIT, NEW, LEVL IV, 45-59 MIN: ICD-10-PCS | Mod: S$GLB,,, | Performed by: STUDENT IN AN ORGANIZED HEALTH CARE EDUCATION/TRAINING PROGRAM

## 2022-03-15 PROCEDURE — 99215 OFFICE O/P EST HI 40 MIN: CPT | Mod: PBBFAC,PO | Performed by: STUDENT IN AN ORGANIZED HEALTH CARE EDUCATION/TRAINING PROGRAM

## 2022-03-15 PROCEDURE — 99999 PR PBB SHADOW E&M-EST. PATIENT-LVL V: CPT | Mod: PBBFAC,,, | Performed by: STUDENT IN AN ORGANIZED HEALTH CARE EDUCATION/TRAINING PROGRAM

## 2022-03-15 PROCEDURE — 99999 PR PBB SHADOW E&M-EST. PATIENT-LVL V: ICD-10-PCS | Mod: PBBFAC,,, | Performed by: STUDENT IN AN ORGANIZED HEALTH CARE EDUCATION/TRAINING PROGRAM

## 2022-03-15 PROCEDURE — 81003 URINALYSIS AUTO W/O SCOPE: CPT | Mod: PO | Performed by: STUDENT IN AN ORGANIZED HEALTH CARE EDUCATION/TRAINING PROGRAM

## 2022-03-15 PROCEDURE — 99204 OFFICE O/P NEW MOD 45 MIN: CPT | Mod: S$GLB,,, | Performed by: STUDENT IN AN ORGANIZED HEALTH CARE EDUCATION/TRAINING PROGRAM

## 2022-03-15 RX ORDER — CIPROFLOXACIN 500 MG/1
500 TABLET ORAL 2 TIMES DAILY
Qty: 6 TABLET | Refills: 0 | Status: SHIPPED | OUTPATIENT
Start: 2022-03-15 | End: 2022-03-18

## 2022-03-15 RX ORDER — PHENTERMINE HYDROCHLORIDE 37.5 MG/1
37.5 CAPSULE ORAL EVERY MORNING
COMMUNITY
End: 2022-09-07

## 2022-03-15 NOTE — PROGRESS NOTES
Problem List Items Addressed This Visit        Neuro    History of Chiari malformation    Overview     Hx of chiari malformation dxd 2004 s/p brain decompression, follows with neurology q3-4 yrs. Doing well, no concerns              Psychiatric    Anxiety and depression    Overview     Chronic hx; doing well on fluoxetine 20 mg daily   Denies SI/HI; hallucinations     -patient was educated, advised of side effects, and all questions were answered.  Patient voiced understanding  -patient will follow up routinely and notify us if having any side effects or worsening or persistent symptoms.  ER precautions were given.                Hematology    Factor 5 Leiden mutation, heterozygous    Overview     Leiden factor 5 deficiency - no previous hx of DVT, PE   Avoid estrogen therapy              Endocrine    Thyroid nodule    Overview     Thyroid Nodules; incidental finding via CT 2019 s/p bx (Nov 2021) neg for malignancy   Reports previously taking Cytomel 5 mg daily due to fatigue and weight    Lab Results   Component Value Date    TSH 2.829 10/26/2021       - repeat US Thyroid scheduled for May 2022   - follows with endocrinology Dr. Wagner             Obesity (BMI 30-39.9)    Overview     The patient and I had a discussion about obesity and ways to treat it.  At this time, we agreed to use the following to address this:    General weight loss/lifestyle modification strategies discussed (elicit support from others; identify saboteurs; non-food rewards, etc).  Informal exercise measures discussed, e.g. taking stairs instead of elevator.  Regular aerobic exercise program discussed.                   GI    Gastroesophageal reflux disease without esophagitis    Overview     -symptoms controlled with daily PPI  -denies alarm symptoms, such as dysphagia, weight loss or N/V  -continue lifestyle modification with avoidance of acidic foods, caffeine, late night eating                Orthopedic    Ankylosing spondylitis     Overview     Chronic hx; follows with rheumatology Dr. Almonte  Stable on cosentyx 300mg q30d and prn percocet, prednisone, and chlorzoxazone (msk relaxer)  - follows q3-4m with rheum           Psoriatic arthritis    Overview     Chronic hx; follows with rheumatology Dr. Almonte  - prn ketoconazole shampoo and topical steroid  - follows q3-4m with rheum              Other    Cigarette nicotine dependence without complication    Overview     Reports smoking about 1/2 ppd 25 years. Family smokes. Previously on chantix and quit for about 1 month; however, sister was sick and started smoking.    Assistance with smoking cessation was offered, including:  [x]  Medications  [x]  Counseling  []  Printed Information on Smoking Cessation  [x]  Referral to a Smoking Cessation Program    Patient was counseled regarding smoking for 3-10 minutes.               Primary insomnia    Overview     -is on prn trazodone chronically (rxd by Dr. Almonte)  -denies adverse effects of medication  -has tried multiple OTC medication with minimal benefit  -discussed importance of good sleep hygiene practices               Other Visit Diagnoses     Dysuria    -  Primary    Relevant Medications    ciprofloxacin HCl (CIPRO) 500 MG tablet    Other Relevant Orders    Urinalysis, Reflex to Urine Culture Urine, Clean Catch    E. coli UTI        Relevant Medications    ciprofloxacin HCl (CIPRO) 500 MG tablet    Screening for HIV (human immunodeficiency virus)        Relevant Orders    HIV 1/2 Ag/Ab (4th Gen)    Need for hepatitis C screening test        Relevant Orders    Hepatitis C Antibody    Encounter for screening for lipid disorder        Relevant Orders    Lipid Panel    Encounter for long-term current use of medication        Relevant Orders    Lipid Panel    Encounter for screening mammogram for malignant neoplasm of breast        Relevant Orders    Mammo Digital Screening Bilat w/ Miguelangel            Patient ID: Sparkle Jose is a 41 y.o.  female.    Chief Complaint:  establish care    Previous PCP: n/a     Patient is here to establish care. Has a hx of  has a past medical history of Ankylosing spondylitis, Arthritis, Celiac disease, Depression, Family history of DVT, Family history of factor V Leiden mutation, GERD (gastroesophageal reflux disease), Migraine, Osteoarthritis, Psoriasis, and Psoriatic arthritis mutilans.   Reports recurrent UTIs over past few months. Reports hesitancy. Starting cystex which has helped.    Reports smoking about 1/2 ppd 25 years. Family smokes. Previously on chantix and quit for about 1 month; however, sister was sick and started smoking.     Has repeat US thyroid scheduled for May 2022    Hx migraines s/p botox, most recent 1-2 years ago; prn triptan uses seldomly     AS rxd percocet 10mg TID prn by Dr. Almonte. Will return to pain mgmt     Hx of chiari malformation 2004 s/p repair, follows with neurology q3-4 yrs    Leiden factor 5 deficiency - no previous hx of DVT, PE     Insomnia trazodone prn via Dr. almonte     History:  OBGYN: [facility]      LMP: No LMP recorded. Patient is perimenopausal.   MGM: reports previous MMG with L nodule  And US showed possible cyst, due for repeat.    PAP: no hx abnml pap  Colonoscopy: No personal history of colon cancer, hematochezia, melena, crohn's, ulcerative colitis; No family history of colon cancer.      Health Maintenance Topics with due status: Not Due       Topic Last Completion Date    Cervical Cancer Screening 01/18/2019        ==============================================  History reviewed.   Health Maintenance Due   Topic Date Due    Lipid Panel  Never done    HIV Screening  Never done    TETANUS VACCINE  Never done    Mammogram  01/23/2020       Past Medical History:  Past Medical History:   Diagnosis Date    Ankylosing spondylitis     Arthritis     Celiac disease     Depression     Family history of DVT     MGM DVT, mother spleenic    Family history of  factor V Leiden mutation     mother and sister    GERD (gastroesophageal reflux disease)     Migraine     Osteoarthritis     Psoriasis     Psoriatic arthritis mutilans      Past Surgical History:   Procedure Laterality Date    brain decompression   2006    BRAIN SURGERY      TUBAL LIGATION       Review of patient's allergies indicates:  No Known Allergies  Current Outpatient Medications on File Prior to Visit   Medication Sig Dispense Refill    chlorzoxazone (PARAFON FORTE) 500 mg Tab Take 1 tablet (500 mg total) by mouth 2 (two) times daily as needed. 60 tablet 0    clobetasol 0.05% (TEMOVATE) 0.05 % Oint 1 application daily as needed.       eletriptan (RELPAX) 40 MG tablet Take 1 tablet (40 mg total) by mouth as needed. may repeat in 2 hours if necessary 9 tablet 0    famotidine (PEPCID) 40 MG tablet Take 1 tablet (40 mg total) by mouth once daily. 30 tablet 5    FLUoxetine 20 MG capsule Take 1 capsule (20 mg total) by mouth once daily. In am 90 capsule 1    furosemide (LASIX) 20 MG tablet Take 1 tablet (20 mg total) by mouth once daily. (Patient taking differently: Take 20 mg by mouth daily as needed.) 30 tablet 4    ketoconazole (NIZORAL) 2 % shampoo Wash body with medicated shampoo at least 2x/week - let soak at least 5 minutes prior to rinsing 120 mL 5    oxyCODONE-acetaminophen (PERCOCET)  mg per tablet Take 1 tablet by mouth every 8 (eight) hours as needed for Pain. 90 tablet 0    phentermine 37.5 MG capsule Take 37.5 mg by mouth every morning.      predniSONE (DELTASONE) 5 MG tablet Take 1 tablet (5 mg total) by mouth once daily. 30 tablet 3    progesterone (PROMETRIUM) 100 MG capsule TAKE 1 CAPSULE BY MOUTH EVERY DAY IN THE EVENING 90 capsule 3    promethazine (PHENERGAN) 25 MG tablet Take 1 tablet (25 mg total) by mouth every 6 (six) hours as needed for Nausea. 45 tablet 5    secukinumab (COSENTYX PEN, 2 PENS,) 150 mg/mL PnIj Inject 300 mg into the skin every 30 days. 2 mL 6     traZODone (DESYREL) 100 MG tablet Take 1 tablet (100 mg total) by mouth every evening. 90 tablet 1    [DISCONTINUED] azithromycin (Z-DEMARCUS) 250 MG tablet Take 2 tablets by mouth on day 1; Take 1 tablet by mouth on days 2-5 (Patient not taking: Reported on 3/15/2022) 6 tablet 0    [DISCONTINUED] oxyCODONE-acetaminophen (PERCOCET)  mg per tablet Take 1 tablet by mouth every 8 (eight) hours as needed for Pain. (Patient not taking: Reported on 3/15/2022) 90 tablet 0     No current facility-administered medications on file prior to visit.     Social History     Socioeconomic History    Marital status:    Tobacco Use    Smoking status: Current Every Day Smoker     Packs/day: 0.50     Years: 27.00     Pack years: 13.50     Types: Cigarettes     Start date: 1/1/1993    Smokeless tobacco: Never Used    Tobacco comment: 5-6 CIGS DAILY last quit attempt 8/14/2019   Substance and Sexual Activity    Alcohol use: No     Alcohol/week: 0.0 standard drinks    Drug use: No    Sexual activity: Yes     Birth control/protection: See Surgical Hx     Family History   Problem Relation Age of Onset    Hypertension Mother     Clotting disorder Mother     Depression Sister     Asthma Brother     Clotting disorder Sister     Breast cancer Neg Hx     Ovarian cancer Neg Hx           Review of Systems   12 point review of systems per hpi, otherwise negative         Objective:    Nursing note and vitals reviewed.  Vitals:    03/15/22 1306   BP: 123/88   Pulse: 98   Resp: 16   Temp: 98.1 °F (36.7 °C)     Body mass index is 38.34 kg/m².     Physical Exam   Constitutional: SHE is oriented to person, place, and time. She appears well-developed and well-nourished. No distress.   HENT: WNL  Head: Normocephalic and atraumatic.   Eyes: Pupils are equal, round, and reactive to light. EOM are normal.   Neck: Normal range of motion. Neck supple.   Cardiovascular: Normal rate, regular rhythm, normal heart sounds and intact distal  pulses.   No murmur heard.  Pulmonary/Chest: Effort normal and breath sounds normal. No respiratory distress. She has no wheezes.   GI: soft, non distended, no ttp, no rebound/guarding  Musculoskeletal: Normal range of motion. She exhibits no edema.   Neurological: She is alert and oriented to person, place, and time. No cranial nerve deficit.   Skin: Skin is warm and dry. Capillary refill takes less than 2 seconds.   Psychiatric: She has a normal mood and affect. Her behavior is normal.           Chrissie Lancaster MD    We Offer Telehealth & Same Day Appointments!   Book your Telehealth appointment with me through my nurse or   Clinic appointments on [employer]!  [phone]     To Schedule appointments online, go to [employer]: [email or web address]

## 2022-03-17 ENCOUNTER — HOSPITAL ENCOUNTER (OUTPATIENT)
Dept: RADIOLOGY | Facility: HOSPITAL | Age: 42
Discharge: HOME | End: 2022-03-17
Attending: STUDENT IN AN ORGANIZED HEALTH CARE EDUCATION/TRAINING PROGRAM
Payer: COMMERCIAL

## 2022-03-17 VITALS — BODY MASS INDEX: 38.14 KG/M2 | WEIGHT: 202 LBS | HEIGHT: 61 IN

## 2022-03-17 DIAGNOSIS — Z12.31 ENCOUNTER FOR SCREENING MAMMOGRAM FOR MALIGNANT NEOPLASM OF BREAST: ICD-10-CM

## 2022-03-17 PROCEDURE — 77067 SCR MAMMO BI INCL CAD: CPT | Mod: 26,,, | Performed by: RADIOLOGY

## 2022-03-17 PROCEDURE — 77067 SCR MAMMO BI INCL CAD: CPT | Mod: TC,PO

## 2022-03-17 PROCEDURE — 77067 MAMMO DIGITAL SCREENING BILAT WITH TOMO: ICD-10-PCS | Mod: 26,,, | Performed by: RADIOLOGY

## 2022-03-17 PROCEDURE — 77063 MAMMO DIGITAL SCREENING BILAT WITH TOMO: ICD-10-PCS | Mod: 26,,, | Performed by: RADIOLOGY

## 2022-03-17 PROCEDURE — 77063 BREAST TOMOSYNTHESIS BI: CPT | Mod: TC,PO

## 2022-03-17 PROCEDURE — 77063 BREAST TOMOSYNTHESIS BI: CPT | Mod: 26,,, | Performed by: RADIOLOGY

## 2022-03-23 ENCOUNTER — PATIENT MESSAGE (OUTPATIENT)
Dept: RHEUMATOLOGY | Facility: CLINIC | Age: 42
End: 2022-03-23
Payer: COMMERCIAL

## 2022-04-24 ENCOUNTER — PATIENT MESSAGE (OUTPATIENT)
Dept: RHEUMATOLOGY | Facility: CLINIC | Age: 42
End: 2022-04-24
Payer: COMMERCIAL

## 2022-04-24 DIAGNOSIS — G89.4 CHRONIC PAIN SYNDROME: ICD-10-CM

## 2022-04-24 DIAGNOSIS — R11.2 NAUSEA & VOMITING: ICD-10-CM

## 2022-04-24 DIAGNOSIS — M45.9 ANKYLOSING SPONDYLITIS, UNSPECIFIED SITE OF SPINE: Primary | ICD-10-CM

## 2022-04-25 RX ORDER — OXYCODONE AND ACETAMINOPHEN 10; 325 MG/1; MG/1
1 TABLET ORAL EVERY 8 HOURS PRN
Qty: 90 TABLET | Refills: 0 | Status: SHIPPED | OUTPATIENT
Start: 2022-04-25 | End: 2022-06-03 | Stop reason: SDUPTHER

## 2022-05-12 ENCOUNTER — HOSPITAL ENCOUNTER (OUTPATIENT)
Dept: RADIOLOGY | Facility: HOSPITAL | Age: 42
Discharge: HOME | End: 2022-05-12
Attending: STUDENT IN AN ORGANIZED HEALTH CARE EDUCATION/TRAINING PROGRAM
Payer: COMMERCIAL

## 2022-05-12 DIAGNOSIS — E04.1 THYROID NODULE: ICD-10-CM

## 2022-05-12 PROCEDURE — 76536 US EXAM OF HEAD AND NECK: CPT | Mod: 26,,, | Performed by: RADIOLOGY

## 2022-05-12 PROCEDURE — 76536 US SOFT TISSUE HEAD NECK THYROID: ICD-10-PCS | Mod: 26,,, | Performed by: RADIOLOGY

## 2022-05-12 PROCEDURE — 76536 US EXAM OF HEAD AND NECK: CPT | Mod: TC,PO

## 2022-05-26 ENCOUNTER — LAB VISIT (OUTPATIENT)
Dept: LAB | Facility: HOSPITAL | Age: 42
End: 2022-05-26
Attending: STUDENT IN AN ORGANIZED HEALTH CARE EDUCATION/TRAINING PROGRAM
Payer: MEDICARE

## 2022-05-26 DIAGNOSIS — L40.50 PSORIATIC ARTHRITIS: ICD-10-CM

## 2022-05-26 DIAGNOSIS — M45.9 ANKYLOSING SPONDYLITIS, UNSPECIFIED SITE OF SPINE: ICD-10-CM

## 2022-05-26 LAB
ALBUMIN SERPL BCP-MCNC: 4 G/DL (ref 3.5–5.2)
ALP SERPL-CCNC: 96 U/L (ref 55–135)
ALT SERPL W/O P-5'-P-CCNC: 16 U/L (ref 10–44)
ANION GAP SERPL CALC-SCNC: 12 MMOL/L (ref 8–16)
AST SERPL-CCNC: 16 U/L (ref 10–40)
BASOPHILS # BLD AUTO: 0.18 K/UL (ref 0–0.2)
BASOPHILS NFR BLD: 1.2 % (ref 0–1.9)
BILIRUB SERPL-MCNC: 0.2 MG/DL (ref 0.1–1)
BUN SERPL-MCNC: 15 MG/DL (ref 6–20)
CALCIUM SERPL-MCNC: 9.7 MG/DL (ref 8.7–10.5)
CHLORIDE SERPL-SCNC: 101 MMOL/L (ref 95–110)
CO2 SERPL-SCNC: 25 MMOL/L (ref 23–29)
CREAT SERPL-MCNC: 0.8 MG/DL (ref 0.5–1.4)
CRP SERPL-MCNC: 22.8 MG/L (ref 0–8.2)
DIFFERENTIAL METHOD: ABNORMAL
EOSINOPHIL # BLD AUTO: 0.2 K/UL (ref 0–0.5)
EOSINOPHIL NFR BLD: 1.5 % (ref 0–8)
ERYTHROCYTE [DISTWIDTH] IN BLOOD BY AUTOMATED COUNT: 14.5 % (ref 11.5–14.5)
ERYTHROCYTE [SEDIMENTATION RATE] IN BLOOD BY WESTERGREN METHOD: 15 MM/HR (ref 0–20)
EST. GFR  (AFRICAN AMERICAN): >60 ML/MIN/1.73 M^2
EST. GFR  (NON AFRICAN AMERICAN): >60 ML/MIN/1.73 M^2
GLUCOSE SERPL-MCNC: 108 MG/DL (ref 70–110)
HCT VFR BLD AUTO: 45.6 % (ref 37–48.5)
HGB BLD-MCNC: 14.2 G/DL (ref 12–16)
IMM GRANULOCYTES # BLD AUTO: 0.05 K/UL (ref 0–0.04)
IMM GRANULOCYTES NFR BLD AUTO: 0.3 % (ref 0–0.5)
LYMPHOCYTES # BLD AUTO: 4.4 K/UL (ref 1–4.8)
LYMPHOCYTES NFR BLD: 30.1 % (ref 18–48)
MCH RBC QN AUTO: 30.5 PG (ref 27–31)
MCHC RBC AUTO-ENTMCNC: 31.1 G/DL (ref 32–36)
MCV RBC AUTO: 98 FL (ref 82–98)
MONOCYTES # BLD AUTO: 1 K/UL (ref 0.3–1)
MONOCYTES NFR BLD: 6.9 % (ref 4–15)
NEUTROPHILS # BLD AUTO: 8.8 K/UL (ref 1.8–7.7)
NEUTROPHILS NFR BLD: 60 % (ref 38–73)
NRBC BLD-RTO: 0 /100 WBC
PLATELET # BLD AUTO: 449 K/UL (ref 150–450)
PMV BLD AUTO: 10.6 FL (ref 9.2–12.9)
POTASSIUM SERPL-SCNC: 4.1 MMOL/L (ref 3.5–5.1)
PROT SERPL-MCNC: 7.4 G/DL (ref 6–8.4)
RBC # BLD AUTO: 4.66 M/UL (ref 4–5.4)
SODIUM SERPL-SCNC: 138 MMOL/L (ref 136–145)
WBC # BLD AUTO: 14.59 K/UL (ref 3.9–12.7)

## 2022-05-26 PROCEDURE — 85025 COMPLETE CBC W/AUTO DIFF WBC: CPT | Performed by: PHYSICIAN ASSISTANT

## 2022-05-26 PROCEDURE — 85651 RBC SED RATE NONAUTOMATED: CPT | Mod: PO | Performed by: PHYSICIAN ASSISTANT

## 2022-05-26 PROCEDURE — 86140 C-REACTIVE PROTEIN: CPT | Performed by: PHYSICIAN ASSISTANT

## 2022-05-26 PROCEDURE — 80053 COMPREHEN METABOLIC PANEL: CPT | Performed by: PHYSICIAN ASSISTANT

## 2022-05-26 PROCEDURE — 36415 COLL VENOUS BLD VENIPUNCTURE: CPT | Mod: PO | Performed by: PHYSICIAN ASSISTANT

## 2022-05-30 ENCOUNTER — PATIENT MESSAGE (OUTPATIENT)
Dept: FAMILY MEDICINE | Facility: CLINIC | Age: 42
End: 2022-05-30
Payer: COMMERCIAL

## 2022-06-03 ENCOUNTER — OFFICE VISIT (OUTPATIENT)
Dept: RHEUMATOLOGY | Facility: CLINIC | Age: 42
End: 2022-06-03
Payer: COMMERCIAL

## 2022-06-03 ENCOUNTER — TELEPHONE (OUTPATIENT)
Dept: PHARMACY | Facility: CLINIC | Age: 42
End: 2022-06-03
Payer: COMMERCIAL

## 2022-06-03 VITALS
HEIGHT: 61 IN | HEART RATE: 91 BPM | BODY MASS INDEX: 36.21 KG/M2 | DIASTOLIC BLOOD PRESSURE: 79 MMHG | SYSTOLIC BLOOD PRESSURE: 123 MMHG | WEIGHT: 191.81 LBS

## 2022-06-03 DIAGNOSIS — R11.2 NAUSEA & VOMITING: ICD-10-CM

## 2022-06-03 DIAGNOSIS — R94.6 ABNORMAL RESULTS OF THYROID FUNCTION STUDIES: ICD-10-CM

## 2022-06-03 DIAGNOSIS — M45.9 ANKYLOSING SPONDYLITIS, UNSPECIFIED SITE OF SPINE: Primary | ICD-10-CM

## 2022-06-03 DIAGNOSIS — K21.9 GASTROESOPHAGEAL REFLUX DISEASE, UNSPECIFIED WHETHER ESOPHAGITIS PRESENT: ICD-10-CM

## 2022-06-03 DIAGNOSIS — J32.9 SINUSITIS, UNSPECIFIED CHRONICITY, UNSPECIFIED LOCATION: ICD-10-CM

## 2022-06-03 DIAGNOSIS — R73.09 IMPAIRED GLUCOSE REGULATION WITH FEATURES OF INSULIN RESISTANCE: ICD-10-CM

## 2022-06-03 DIAGNOSIS — G89.4 CHRONIC PAIN SYNDROME: ICD-10-CM

## 2022-06-03 PROCEDURE — 99215 PR OFFICE/OUTPT VISIT, EST, LEVL V, 40-54 MIN: ICD-10-PCS | Mod: 25,S$GLB,, | Performed by: INTERNAL MEDICINE

## 2022-06-03 PROCEDURE — 99999 PR PBB SHADOW E&M-EST. PATIENT-LVL III: CPT | Mod: PBBFAC,,, | Performed by: INTERNAL MEDICINE

## 2022-06-03 PROCEDURE — 96372 THER/PROPH/DIAG INJ SC/IM: CPT | Mod: PBBFAC,PN

## 2022-06-03 PROCEDURE — 99215 OFFICE O/P EST HI 40 MIN: CPT | Mod: 25,S$GLB,, | Performed by: INTERNAL MEDICINE

## 2022-06-03 PROCEDURE — 99999 PR PBB SHADOW E&M-EST. PATIENT-LVL III: ICD-10-PCS | Mod: PBBFAC,,, | Performed by: INTERNAL MEDICINE

## 2022-06-03 PROCEDURE — 99213 OFFICE O/P EST LOW 20 MIN: CPT | Mod: PBBFAC,PN | Performed by: INTERNAL MEDICINE

## 2022-06-03 RX ORDER — OXYCODONE AND ACETAMINOPHEN 10; 325 MG/1; MG/1
1 TABLET ORAL EVERY 8 HOURS PRN
Qty: 90 TABLET | Refills: 0 | Status: SHIPPED | OUTPATIENT
Start: 2022-07-01 | End: 2022-07-31

## 2022-06-03 RX ORDER — AZITHROMYCIN 250 MG/1
TABLET, FILM COATED ORAL
Qty: 6 TABLET | Refills: 0 | Status: SHIPPED | OUTPATIENT
Start: 2022-06-03 | End: 2022-06-15

## 2022-06-03 RX ORDER — SEMAGLUTIDE 1.34 MG/ML
0.25 INJECTION, SOLUTION SUBCUTANEOUS
Qty: 1 PEN | Refills: 5 | Status: SHIPPED | OUTPATIENT
Start: 2022-06-03 | End: 2022-06-15

## 2022-06-03 RX ORDER — OXYCODONE AND ACETAMINOPHEN 10; 325 MG/1; MG/1
1 TABLET ORAL EVERY 8 HOURS PRN
Qty: 90 TABLET | Refills: 0 | Status: SHIPPED | OUTPATIENT
Start: 2022-06-03 | End: 2022-10-16 | Stop reason: SDUPTHER

## 2022-06-03 RX ORDER — METHYLPREDNISOLONE ACETATE 80 MG/ML
160 INJECTION, SUSPENSION INTRA-ARTICULAR; INTRALESIONAL; INTRAMUSCULAR; SOFT TISSUE
Status: COMPLETED | OUTPATIENT
Start: 2022-06-03 | End: 2022-06-03

## 2022-06-03 RX ORDER — KETOROLAC TROMETHAMINE 30 MG/ML
60 INJECTION, SOLUTION INTRAMUSCULAR; INTRAVENOUS
Status: COMPLETED | OUTPATIENT
Start: 2022-06-03 | End: 2022-06-03

## 2022-06-03 RX ORDER — PROGESTERONE 100 MG/1
CAPSULE ORAL
Qty: 90 CAPSULE | Refills: 3 | Status: SHIPPED | OUTPATIENT
Start: 2022-06-03 | End: 2022-09-23 | Stop reason: SDUPTHER

## 2022-06-03 RX ORDER — OXYCODONE AND ACETAMINOPHEN 10; 325 MG/1; MG/1
1 TABLET ORAL EVERY 8 HOURS PRN
Qty: 90 TABLET | Refills: 0 | Status: SHIPPED | OUTPATIENT
Start: 2022-08-01 | End: 2022-08-31

## 2022-06-03 RX ADMIN — KETOROLAC TROMETHAMINE 60 MG: 60 INJECTION, SOLUTION INTRAMUSCULAR at 12:06

## 2022-06-03 RX ADMIN — METHYLPREDNISOLONE ACETATE 160 MG: 80 INJECTION, SUSPENSION INTRA-ARTICULAR; INTRALESIONAL; INTRAMUSCULAR; SOFT TISSUE at 12:06

## 2022-06-03 ASSESSMENT — ROUTINE ASSESSMENT OF PATIENT INDEX DATA (RAPID3)
PAIN SCORE: 7.5
PATIENT GLOBAL ASSESSMENT SCORE: 6.5
PSYCHOLOGICAL DISTRESS SCORE: 2.2
MDHAQ FUNCTION SCORE: 1.5
FATIGUE SCORE: 2.2
TOTAL RAPID3 SCORE: 6.33

## 2022-06-03 NOTE — PROGRESS NOTES
Subjective:       Patient ID: Sparkle Jose is a 41 y.o. female.    Chief Complaint: Disease Management    Follow up: 41 year old female who presents to clinic for follow up on AS. She is doing fair on cosentyx 300 mg monthly. On treatment she has cervical,  Thoracic and   lumbar. We reviewed her chart and she gained 45 lbs and now has glucose issues, she has taken metformin in the past and has had diarrhea. She noticed increased joint pain and stiffness  She has been on for 3 months.. She continues to have pain in her elbows, knees, neck, and low back. Pain is constant and aching. Taking prednisone on rare occasion for flares. Also she DC progesterone due to ineffectiveness.  She is taking percocet more frequently since she has been off treatment.    GERD has been worse at night.     Current tx:  1. cosentyx 300 mg  2. No longer take oral prednisone  Prior tx:  1. Humira  2. Remicade (drug induced lupus)            She complains of joint swelling. Associated symptoms include fatigue and myalgias. Pertinent negatives include no fever or headaches.         Review of Systems   Constitutional: Positive for activity change, fatigue and unexpected weight change. Negative for appetite change, chills and fever.   HENT: Positive for sinus pressure and sinus pain.    Eyes: Negative for visual disturbance.   Respiratory: Negative for cough and shortness of breath.    Cardiovascular: Negative for chest pain, palpitations and leg swelling.   Gastrointestinal: Negative for abdominal pain, constipation, diarrhea, nausea and vomiting.   Musculoskeletal: Positive for arthralgias, back pain, joint swelling and myalgias.   Allergic/Immunologic: Positive for immunocompromised state.   Neurological: Negative for dizziness, weakness, light-headedness and headaches.         Objective:     Vitals:    06/03/22 1058   BP: 123/79   Pulse: 91       Past Medical History:   Diagnosis Date    Ankylosing spondylitis     Arthritis      Celiac disease     Depression     Family history of DVT     MGM DVT, mother spleenic    Family history of factor V Leiden mutation     mother and sister    GERD (gastroesophageal reflux disease)     Migraine     Osteoarthritis     Psoriasis     Psoriatic arthritis mutilans      Past Surgical History:   Procedure Laterality Date    brain decompression   2006    BRAIN SURGERY      TUBAL LIGATION            Physical Exam   Constitutional: She is oriented to person, place, and time.   Eyes: Right conjunctiva is not injected. Left conjunctiva is not injected.   Neck: No JVD present. No thyromegaly present.   Cardiovascular: Normal rate and regular rhythm. Exam reveals no decreased pulses.   Pulmonary/Chest: She has no wheezes. She has no rhonchi. She has no rales.   Musculoskeletal:      Right shoulder: Normal.      Left shoulder: Normal.      Right elbow: Tenderness present.      Left elbow: Tenderness present.      Right wrist: Normal.      Left wrist: Normal.      Right knee: Swelling present. Tenderness present.      Left knee: Swelling present. Tenderness present.      Right ankle: Swelling present. Tenderness present.      Left ankle: Swelling present. Tenderness present.   Lymphadenopathy:     She has no cervical adenopathy.   Neurological: She is alert and oriented to person, place, and time. Gait normal.   Skin: No rash noted.   Psychiatric: Mood and affect normal.       Right Side Rheumatological Exam     Examination finds the shoulder, wrist, 1st PIP, 1st MCP, 2nd PIP, 3rd PIP, 4th PIP, 4th MCP, 5th PIP and 5th MCP normal.    The patient is tender to palpation of the elbow, knee, 2nd MCP, 3rd MCP and ankle    She has swelling of the knee, 2nd MCP, 3rd MCP and ankle    Left Side Rheumatological Exam     Examination finds the shoulder, wrist, 1st PIP, 1st MCP, 2nd PIP, 2nd MCP, 3rd PIP, 3rd MCP, 4th PIP, 4th MCP, 5th PIP and 5th MCP normal.    The patient is tender to palpation of the elbow, knee and  ankle.    She has swelling of the knee and knee            Results for orders placed or performed in visit on 05/26/22   CBC Auto Differential   Result Value Ref Range    WBC 14.59 (H) 3.90 - 12.70 K/uL    RBC 4.66 4.00 - 5.40 M/uL    Hemoglobin 14.2 12.0 - 16.0 g/dL    Hematocrit 45.6 37.0 - 48.5 %    MCV 98 82 - 98 fL    MCH 30.5 27.0 - 31.0 pg    MCHC 31.1 (L) 32.0 - 36.0 g/dL    RDW 14.5 11.5 - 14.5 %    Platelets 449 150 - 450 K/uL    MPV 10.6 9.2 - 12.9 fL    Immature Granulocytes 0.3 0.0 - 0.5 %    Gran # (ANC) 8.8 (H) 1.8 - 7.7 K/uL    Immature Grans (Abs) 0.05 (H) 0.00 - 0.04 K/uL    Lymph # 4.4 1.0 - 4.8 K/uL    Mono # 1.0 0.3 - 1.0 K/uL    Eos # 0.2 0.0 - 0.5 K/uL    Baso # 0.18 0.00 - 0.20 K/uL    nRBC 0 0 /100 WBC    Gran % 60.0 38.0 - 73.0 %    Lymph % 30.1 18.0 - 48.0 %    Mono % 6.9 4.0 - 15.0 %    Eosinophil % 1.5 0.0 - 8.0 %    Basophil % 1.2 0.0 - 1.9 %    Differential Method Automated    Comprehensive Metabolic Panel   Result Value Ref Range    Sodium 138 136 - 145 mmol/L    Potassium 4.1 3.5 - 5.1 mmol/L    Chloride 101 95 - 110 mmol/L    CO2 25 23 - 29 mmol/L    Glucose 108 70 - 110 mg/dL    BUN 15 6 - 20 mg/dL    Creatinine 0.8 0.5 - 1.4 mg/dL    Calcium 9.7 8.7 - 10.5 mg/dL    Total Protein 7.4 6.0 - 8.4 g/dL    Albumin 4.0 3.5 - 5.2 g/dL    Total Bilirubin 0.2 0.1 - 1.0 mg/dL    Alkaline Phosphatase 96 55 - 135 U/L    AST 16 10 - 40 U/L    ALT 16 10 - 44 U/L    Anion Gap 12 8 - 16 mmol/L    eGFR if African American >60.0 >60 mL/min/1.73 m^2    eGFR if non African American >60.0 >60 mL/min/1.73 m^2   C-Reactive Protein   Result Value Ref Range    CRP 22.8 (H) 0.0 - 8.2 mg/L   Sedimentation rate   Result Value Ref Range    Sed Rate 15 0 - 20 mm/Hr         Assessment:       1. Ankylosing spondylitis, unspecified site of spine    2. Impaired glucose regulation with features of insulin resistance    3. Chronic pain syndrome    4. Sinusitis, unspecified chronicity, unspecified location    5.  Gastroesophageal reflux disease, unspecified whether esophagitis present    6. Abnormal results of thyroid function studies    7. Nausea & vomiting            Plan:       Ankylosing spondylitis, unspecified site of spine  -     Estrogens, Total; Future; Expected date: 06/03/2022  -     PROGESTERONE; Future; Expected date: 06/03/2022  -     TESTOSTERONE; Future; Expected date: 06/03/2022  -     Insulin, Random; Future; Expected date: 06/03/2022  -     semaglutide (OZEMPIC) 0.25 mg or 0.5 mg(2 mg/1.5 mL) pen injector; Inject 0.25 mg into the skin every 7 days.  Dispense: 1 pen; Refill: 5  -     Hemoglobin A1C; Future; Expected date: 06/03/2022  -     progesterone (PROMETRIUM) 100 MG capsule; TAKE 1 CAPSULE BY MOUTH EVERY DAY IN THE EVENING  Dispense: 90 capsule; Refill: 3  -     azithromycin (Z-DEMARCUS) 250 MG tablet; Take 2 tablets by mouth on day 1; Take 1 tablet by mouth on days 2-5  Dispense: 6 tablet; Refill: 0  -     oxyCODONE-acetaminophen (PERCOCET)  mg per tablet; Take 1 tablet by mouth every 8 (eight) hours as needed for Pain.  Dispense: 90 tablet; Refill: 0  -     oxyCODONE-acetaminophen (PERCOCET)  mg per tablet; Take 1 tablet by mouth every 8 (eight) hours as needed for Pain.  Dispense: 90 tablet; Refill: 0  -     oxyCODONE-acetaminophen (PERCOCET)  mg per tablet; Take 1 tablet by mouth every 8 (eight) hours as needed for Pain.  Dispense: 90 tablet; Refill: 0  -     ketorolac injection 60 mg  -     methylPREDNISolone acetate injection 160 mg    Impaired glucose regulation with features of insulin resistance  -     Estrogens, Total; Future; Expected date: 06/03/2022  -     PROGESTERONE; Future; Expected date: 06/03/2022  -     TESTOSTERONE; Future; Expected date: 06/03/2022  -     Insulin, Random; Future; Expected date: 06/03/2022  -     semaglutide (OZEMPIC) 0.25 mg or 0.5 mg(2 mg/1.5 mL) pen injector; Inject 0.25 mg into the skin every 7 days.  Dispense: 1 pen; Refill: 5  -     Hemoglobin  A1C; Future; Expected date: 06/03/2022  -     progesterone (PROMETRIUM) 100 MG capsule; TAKE 1 CAPSULE BY MOUTH EVERY DAY IN THE EVENING  Dispense: 90 capsule; Refill: 3  -     azithromycin (Z-DEMARCUS) 250 MG tablet; Take 2 tablets by mouth on day 1; Take 1 tablet by mouth on days 2-5  Dispense: 6 tablet; Refill: 0    Chronic pain syndrome  -     Estrogens, Total; Future; Expected date: 06/03/2022  -     PROGESTERONE; Future; Expected date: 06/03/2022  -     TESTOSTERONE; Future; Expected date: 06/03/2022  -     Insulin, Random; Future; Expected date: 06/03/2022  -     semaglutide (OZEMPIC) 0.25 mg or 0.5 mg(2 mg/1.5 mL) pen injector; Inject 0.25 mg into the skin every 7 days.  Dispense: 1 pen; Refill: 5  -     Hemoglobin A1C; Future; Expected date: 06/03/2022  -     progesterone (PROMETRIUM) 100 MG capsule; TAKE 1 CAPSULE BY MOUTH EVERY DAY IN THE EVENING  Dispense: 90 capsule; Refill: 3  -     azithromycin (Z-DEMARCUS) 250 MG tablet; Take 2 tablets by mouth on day 1; Take 1 tablet by mouth on days 2-5  Dispense: 6 tablet; Refill: 0  -     oxyCODONE-acetaminophen (PERCOCET)  mg per tablet; Take 1 tablet by mouth every 8 (eight) hours as needed for Pain.  Dispense: 90 tablet; Refill: 0  -     oxyCODONE-acetaminophen (PERCOCET)  mg per tablet; Take 1 tablet by mouth every 8 (eight) hours as needed for Pain.  Dispense: 90 tablet; Refill: 0  -     oxyCODONE-acetaminophen (PERCOCET)  mg per tablet; Take 1 tablet by mouth every 8 (eight) hours as needed for Pain.  Dispense: 90 tablet; Refill: 0  -     ketorolac injection 60 mg  -     methylPREDNISolone acetate injection 160 mg    Sinusitis, unspecified chronicity, unspecified location  -     Estrogens, Total; Future; Expected date: 06/03/2022  -     PROGESTERONE; Future; Expected date: 06/03/2022  -     TESTOSTERONE; Future; Expected date: 06/03/2022  -     Insulin, Random; Future; Expected date: 06/03/2022  -     semaglutide (OZEMPIC) 0.25 mg or 0.5 mg(2 mg/1.5  mL) pen injector; Inject 0.25 mg into the skin every 7 days.  Dispense: 1 pen; Refill: 5  -     Hemoglobin A1C; Future; Expected date: 06/03/2022  -     progesterone (PROMETRIUM) 100 MG capsule; TAKE 1 CAPSULE BY MOUTH EVERY DAY IN THE EVENING  Dispense: 90 capsule; Refill: 3  -     azithromycin (Z-DEMARCUS) 250 MG tablet; Take 2 tablets by mouth on day 1; Take 1 tablet by mouth on days 2-5  Dispense: 6 tablet; Refill: 0  -     ketorolac injection 60 mg  -     methylPREDNISolone acetate injection 160 mg    Gastroesophageal reflux disease, unspecified whether esophagitis present  -     Estrogens, Total; Future; Expected date: 06/03/2022  -     PROGESTERONE; Future; Expected date: 06/03/2022  -     TESTOSTERONE; Future; Expected date: 06/03/2022  -     Insulin, Random; Future; Expected date: 06/03/2022  -     semaglutide (OZEMPIC) 0.25 mg or 0.5 mg(2 mg/1.5 mL) pen injector; Inject 0.25 mg into the skin every 7 days.  Dispense: 1 pen; Refill: 5  -     Hemoglobin A1C; Future; Expected date: 06/03/2022  -     progesterone (PROMETRIUM) 100 MG capsule; TAKE 1 CAPSULE BY MOUTH EVERY DAY IN THE EVENING  Dispense: 90 capsule; Refill: 3  -     azithromycin (Z-DEMARCUS) 250 MG tablet; Take 2 tablets by mouth on day 1; Take 1 tablet by mouth on days 2-5  Dispense: 6 tablet; Refill: 0  -     ketorolac injection 60 mg  -     methylPREDNISolone acetate injection 160 mg    Abnormal results of thyroid function studies  -     Estrogens, Total; Future; Expected date: 06/03/2022  -     PROGESTERONE; Future; Expected date: 06/03/2022  -     TESTOSTERONE; Future; Expected date: 06/03/2022  -     Insulin, Random; Future; Expected date: 06/03/2022  -     semaglutide (OZEMPIC) 0.25 mg or 0.5 mg(2 mg/1.5 mL) pen injector; Inject 0.25 mg into the skin every 7 days.  Dispense: 1 pen; Refill: 5  -     Hemoglobin A1C; Future; Expected date: 06/03/2022  -     progesterone (PROMETRIUM) 100 MG capsule; TAKE 1 CAPSULE BY MOUTH EVERY DAY IN THE EVENING   Dispense: 90 capsule; Refill: 3  -     azithromycin (Z-DEMARCUS) 250 MG tablet; Take 2 tablets by mouth on day 1; Take 1 tablet by mouth on days 2-5  Dispense: 6 tablet; Refill: 0  -     ketorolac injection 60 mg  -     methylPREDNISolone acetate injection 160 mg    Nausea & vomiting  Comments:  promethazine prescribed  Orders:  -     oxyCODONE-acetaminophen (PERCOCET)  mg per tablet; Take 1 tablet by mouth every 8 (eight) hours as needed for Pain.  Dispense: 90 tablet; Refill: 0  -     oxyCODONE-acetaminophen (PERCOCET)  mg per tablet; Take 1 tablet by mouth every 8 (eight) hours as needed for Pain.  Dispense: 90 tablet; Refill: 0  -     oxyCODONE-acetaminophen (PERCOCET)  mg per tablet; Take 1 tablet by mouth every 8 (eight) hours as needed for Pain.  Dispense: 90 tablet; Refill: 0  -     ketorolac injection 60 mg  -     methylPREDNISolone acetate injection 160 mg        Assessment:  41 year old female with  Psoriatic arthritis, Ankylosing spondylitis on cosentyx 300 mg, elevated CRP  --chronic insomnia on trazodone  --anxiety on prozac  --hx of brain surgery  --persistent leukocytosis and thrombocytosis, hematology work up unrevealing  --thyroid nodule  --pulmonary nodule  --chronic pain syndrome followed by pain management    Plan:  1. Cont cosentyx 300 mg monthly. Hold until infection resolves  2. toradol 60, depomedrol 160  3. Famotidine at bedtime  4. z-pack  5. Percocet  I have checked [place] prescription monitoring program site and no unusual or abnormal behavior has occurred pt understand the risk and benefits of taking opioid medications and has decided to continue the medication.

## 2022-06-06 ENCOUNTER — LAB VISIT (OUTPATIENT)
Dept: LAB | Facility: HOSPITAL | Age: 42
End: 2022-06-06
Attending: INTERNAL MEDICINE
Payer: COMMERCIAL

## 2022-06-06 DIAGNOSIS — K21.9 GASTROESOPHAGEAL REFLUX DISEASE, UNSPECIFIED WHETHER ESOPHAGITIS PRESENT: ICD-10-CM

## 2022-06-06 DIAGNOSIS — G89.4 CHRONIC PAIN SYNDROME: ICD-10-CM

## 2022-06-06 DIAGNOSIS — J32.9 SINUSITIS, UNSPECIFIED CHRONICITY, UNSPECIFIED LOCATION: ICD-10-CM

## 2022-06-06 DIAGNOSIS — M45.9 ANKYLOSING SPONDYLITIS, UNSPECIFIED SITE OF SPINE: ICD-10-CM

## 2022-06-06 DIAGNOSIS — R94.6 ABNORMAL RESULTS OF THYROID FUNCTION STUDIES: ICD-10-CM

## 2022-06-06 DIAGNOSIS — R73.09 IMPAIRED GLUCOSE REGULATION WITH FEATURES OF INSULIN RESISTANCE: ICD-10-CM

## 2022-06-06 LAB
ESTIMATED AVG GLUCOSE: 120 MG/DL (ref 68–131)
HBA1C MFR BLD: 5.8 % (ref 4–5.6)
INSULIN COLLECTION INTERVAL: NORMAL
INSULIN SERPL-ACNC: 10.2 UU/ML
PROGEST SERPL-MCNC: 1.1 NG/ML
TESTOST SERPL-MCNC: 21 NG/DL (ref 5–73)

## 2022-06-06 PROCEDURE — 36415 COLL VENOUS BLD VENIPUNCTURE: CPT | Mod: PO | Performed by: INTERNAL MEDICINE

## 2022-06-06 PROCEDURE — 82672 ASSAY OF ESTROGEN: CPT | Performed by: INTERNAL MEDICINE

## 2022-06-06 PROCEDURE — 84403 ASSAY OF TOTAL TESTOSTERONE: CPT | Performed by: INTERNAL MEDICINE

## 2022-06-06 PROCEDURE — 83525 ASSAY OF INSULIN: CPT | Performed by: INTERNAL MEDICINE

## 2022-06-06 PROCEDURE — 84144 ASSAY OF PROGESTERONE: CPT | Performed by: INTERNAL MEDICINE

## 2022-06-06 PROCEDURE — 83036 HEMOGLOBIN GLYCOSYLATED A1C: CPT | Performed by: INTERNAL MEDICINE

## 2022-06-07 ENCOUNTER — PATIENT MESSAGE (OUTPATIENT)
Dept: RHEUMATOLOGY | Facility: CLINIC | Age: 42
End: 2022-06-07
Payer: COMMERCIAL

## 2022-06-07 NOTE — PROGRESS NOTES
EEG monitoring/videorecord    Date/Time: 5/24/2022 10:18 AM  Performed by: Karen Amezquita MD  Authorized by: Karen Amezquita MD         [facility] EEG/VIDEO TELEMETRY REPORT    DATE OF ADMISSION: 5/24/22-5/26/22  REQUESTED BY: Bia  LOCATION OF SERVICE:   METHODOLOGY      Electroencephalographic (EEG) is recorded with electrodes placed according to the International 10-20 placement system.  Thirty Two (32) channels of digital signal including the T1 and T2 electrodes are simultaneously recorded from the scalp and also including EKG, EMG  and/or eye movement monitors.  Recording band pass was 0.1 to 512 hz.  Digital video recording of the patient is simultaneously recorded with the EEG.  The patient is instructed report clinical symptoms which may occur during the recording session.  EEG and video recording is stored and archived in digital format. Activation procedures which include photic stimulation, hyperventilation and instructing patients to perform simple task are done in selected patients.         The EEG is displayed on a monitor screen and can be reformatted into different montages for evaluation.  The entire recoding is submitted for computer assisted analysis to detect spike and electrographic seizure activity.  The entire recording is visually reviewed and the times identified by computer analysis as being spikes or seizures are reviewed again.  Compresses spectral analysis (CSA) is also performed on the activity recorded from each individual channel.  This is displayed as a power display of frequencies from 0 to 30 Hz over time.   The CSA analysis is done and displayed continuously.  This is reviewed for asymmetries in power between homologous areas of the scalp and for presence of changes in power which canbe seen when seizures occur.  Sections of suspected abnormalities on the CSA is then compared with the original EEG recording.      [employer] software was also utilized in the review of this  Subjective:       Patient ID: Sparkle Jose is a 36 y.o. female.    Chief Complaint: Headache  INTERVAL HISTORY  The patient comes for follow up. Botox has put her in remission. She finds that most of the time, ibuprofen is effective and when insufficient, Relpax resolves the attack. She is quite please with her progress  HPI  The patient is a pleasant 35 y/o female who presents with chief complaint of headache. She was previously seen by Dr. Toure who diagnosed her with cervicogenic headache, migraine and pseudodementia. The patient was referred by Dr. Shea as her headaches remain intractable. Her case is complicated in that, she has history of Arnold-Chiari Type 1 status post cervical decompression on 12/6/2005 which is stable (last MRI 5/15/15), she also suffers with Ankylosing Spondilitis and is under Pain Medicine care. She is opioid dependent on Fentanyl 25 mcg every 48 hours and prn use of oxycodone. She is on disability, stopped working on 2013.    She has been on multiple preventives like Topiramate, Gabapentin, associated with side effects, unable to tolerate.  Amitriptyline, Nortriptyline both ineffective. For acute treatment she takes Imitrex with inconsistent results. In the past Maxalt was ineffective.  Please see details of headache characteristics headache below.    Review of Systems      Past Medical History:   Diagnosis Date    Ankylosing spondylitis     Arthritis     Celiac disease     Depression     GERD (gastroesophageal reflux disease)     Migraine     Osteoarthritis     Psoriasis     Psoriatic arthritis mutilans      Past Surgical History:   Procedure Laterality Date    brain decompression   2006    BRAIN SURGERY       Family History   Problem Relation Age of Onset    Hypertension Mother     Depression Sister     Asthma Brother      Social History     Social History    Marital status:      Spouse name: N/A    Number of children: N/A    Years of education: N/A      Occupational History    Not on file.     Social History Main Topics    Smoking status: Current Every Day Smoker     Packs/day: 0.50     Types: Cigarettes    Smokeless tobacco: Never Used    Alcohol use No    Drug use: No    Sexual activity: Not on file     Other Topics Concern    Not on file     Social History Narrative     Review of patient's allergies indicates:  No Known Allergies  Current Outpatient Prescriptions   Medication Sig    alprazolam (XANAX) 0.25 MG tablet Take 1 tablet (0.25 mg total) by mouth 4 (four) times daily as needed for Anxiety.    AMRIX 15 mg 24 hr capsule Take 15 mg by mouth once daily.    clobetasol 0.05% (TEMOVATE) 0.05 % Oint 1 application daily as needed.     eletriptan (RELPAX) 40 MG tablet Take 1 tablet (40 mg total) by mouth as needed for Migraine. may repeat in 2 hours if necessary    ergocalciferol (ERGOCALCIFEROL) 50,000 unit Cap Take 1 capsule (50,000 Units total) by mouth every 7 days.    fentaNYL (DURAGESIC) 25 mcg/hr Place 1 patch onto the skin every 72 hours.     furosemide (LASIX) 20 MG tablet Take 1 tablet (20 mg total) by mouth once daily.    ibuprofen (ADVIL,MOTRIN) 800 MG tablet Take 1 tablet (800 mg total) by mouth every 6 (six) hours as needed for Pain.    ketoconazole (NIZORAL) 2 % shampoo Wash body with medicated shampoo at least 2x/week - let soak at least 5 minutes prior to rinsing    modafinil (PROVIGIL) 200 MG Tab Take 200 mg by mouth once daily.    oxycodone-acetaminophen (PERCOCET)  mg per tablet Take 1 tablet by mouth every 8 (eight) hours as needed for Pain.    oxycodone-acetaminophen (ROXICET) 5-325 mg per tablet Take 1 tablet by mouth every 4 (four) hours as needed for Pain.    potassium chloride (KLOR-CON) 10 MEQ TbSR Take 1 tablet (10 mEq total) by mouth once daily.    promethazine (PHENERGAN) 25 MG tablet TAKE ONE TABLET BY MOUTH EVERY SIX HOURS AS NEEDED    ranitidine (ZANTAC) 150 MG capsule Take 1 capsule (150 mg total)  study.  This software suite analyzes the EEG recording in multiple domains.  Coherence and rhythmicity is computed to identify EEG sections which may contain organized seizures.  Each channel undergoes analysis to detect presence of spike and sharp waves which have special and morphological characteristic of epileptic activity.  The routine EEG recording is converted from spacial into frequency domain.  This is then displayed comparing homologous areas to identify areas of significant asymmetry.  Algorithm to identify non-cortically generated artifact is used to separate eye movement, EMG and other artifact from the EEG.        Day 1  Provider: [employer]  RECORDING TIMES  Start on 05/24/22at 1330   Stop on 05/25/22 at 1330  A total EE G recording time - 24 hrs     EEG FINDINGS  Description-  Waking background is characterized by a 11 hz PDR that is medium amplitude, symmetric and does attenuated with eye opening. Lower voltage, faster frequencies are seen on anterior regions bilaterally. HVN and photic stimulation produce no abnormalities.  Drowsiness in marked by alpha rhythm attenuation and mild background slowing. Stage 2 sleep is marked by vertex activity, k complexes and bisyncronous sleep spindles. Normal slow wave sleep is seen.   There are no spikes, paroxysms or focal abnormalities on this recording.  There is a single push button event in which the patient complained of chest pain and that it is difficult to breathe. This is non epileptic on EEG    Activation Procedures  Hyperventilation: expected slowing  Intermittent photic stimulation: No abnormalities  Sensory stimulation: Not performed  Cognitive testing: Not performed  Cardiac Monitor:   Single lead EKG was obtained and showed a normal cardiac rhythm, with a regular rate of approximately        Day 2  Provider: [employer]  RECORDING TIMES  Start on 05/25/22at 1330   Stop on 05/26/22 at 0830  A total EE G recording time - 19 hrs     EEG  FINDINGS  Description-  Waking background is characterized by a 11 hz PDR that is medium amplitude, symmetric and does attenuated with eye opening. Lower voltage, faster frequencies are seen on anterior regions bilaterally. HVN and photic stimulation produce no abnormalities.  Drowsiness in marked by alpha rhythm attenuation and mild background slowing. Stage 2 sleep is marked by vertex activity, k complexes and bisyncronous sleep spindles. Normal slow wave sleep is seen.   There are no spikes, paroxysms or focal abnormalities on this recording.    Activation Procedures  Hyperventilation: not performed  Intermittent photic stimulation: Not performed  Sensory stimulation: Not performed  Cognitive testing: Not performed  Cardiac Monitor:   Single lead EKG was obtained and showed a normal cardiac rhythm, with a regular rate     FINAL SUMMARY  This is a normal 43 hour electroencephalogram with accompanying video monitoring.  Event of chest pain with difficulty breathing is nonepileptic.      Karen Amezquita MD   by mouth 2 (two) times daily. (Patient taking differently: Take 150 mg by mouth 2 (two) times daily as needed. )    secukinumab (COSENTYX PEN, 2 PENS,) 150 mg/mL PnIj INJECT 2 PENS SUBCUTANEOUSLY ONCE MONTHLY    trazodone (DESYREL) 50 MG tablet TAKE 1 TABLET (50 MG TOTAL) BY MOUTH EVERY EVENING. (Patient taking differently: Take 50 mg by mouth nightly as needed. )     No current facility-administered medications for this visit.            Objective:      Vitals:    03/02/18 1408   BP: 115/65   Pulse: 89   Resp: 16     Body mass index is 34.28 kg/m².    Physical Exam    Constitutional:   She appears well-developed and well-nourished. She is well groomed    HENT:    Head: Atraumatic, oral and nasal mucosa intact  Eyes: Conjunctivae and EOM are normal. Pupils are equal, round, and reactive to light OU  Neck: Neck supple. No thyromegaly present  Cardiovascular: Tachycardic, normal heart sounds  No murmur heard  Pulmonary/Chest: Effort normal and breath sounds normal  Musculoskeletal: Decreased range of motion, cervical and lumbar spine. Spasm of muscle of right paraspinal and trapezius  Skin: Skin is warm and dry, no rash appreciated today  Psychiatric: Normal mood and affect     Neuro exam:    Mental status:  Awake, attentive, Alert, oriented to self, place, year and month  Language function is intact  Naming, repetition and spontaneous meaningful speech expression are intact  Speech fluency is good and speech is clear  Able to spell backwards    Cranial Nerves:  Smell was not formally evaluated  Cranial Nerves II - XII: intact  Pursuits were smooth, normal saccades, no nystagmus OU  Funduscopic exam - disc were flat and pink, no exudates or hemorrhages OU  Motor - facial movement was symmetrical and normal     Palate moved well and was symmetrical with normal palatal and oral sensation  Tongue movements were full    Coordination:     Rapid alternating movements and rapid finger tapping - normal bilaterally  Finger  to nose - normal and symmetric bilaterally   Heel to shin test - normal and symmetric bilaterally   Arm roll - smooth and symmetric   No intentional or positional tremor.     Motor:  Normal muscle bulk and symmetry. No fasciculations were noted    No pronator drift  Strength5/5 bilaterally     Reflexes:  Tendon reflexes were 2 + at biceps, triceps, brachioradialis, patellar, and Achilles bilaterally  On plantar stimulation toes were down going bilaterally  No clonus was noted     Sensory: Intact to light touch, pin prick in all extremities.   Gait: Romberg absent. Normal gait. Normal arm swing and turns. Normal postural reflexes.     Review of Data: MRI of the brain 5/15 No acute abnormalities, Stable posterior fossa decompression. No syrinx. MRI of C spine: No significant abnormality  Results for orders placed or performed during the hospital encounter of 05/15/15   MRI Brain W WO Contrast    Narrative    Exam: MRI of the brain without and with contrast -- 5/15/2015    Comparison: None.    Technique: Multiplanar MR imaging of the brain was performed both before and following the intravenous administration of 14 cc of Multihance contrast material.    Findings:     The brain is normally formed. No evidence of acute/recent major vascular distribution cerebral infarction, intraparenchymal hemorrhage, or intra-axial space occupying lesion. No significant vasogenic or cytotoxic edema.     The ventricular system is normal in appearance for age. No extra-axial fluid collections or blood products. No abnormal dural enhancement or enhancing masses. No enhancing vascular malformations. The skull base flow-voids are unremarkable. The sella and   parasellar regions show no significant abnormality. The visualized orbits are unremarkable.    There are postoperative changes of occipital/suboccipital decompression for treatment of a Chiari type I malformation.  No cervical cord syrinx appreciated.    The mastoid air cells are  unremarkable. There is mucoperiosteal thickening present within the ethmoid air cells.    Impression         1.  No acute intracranial abnormalities are appreciated.    2.  Postoperative change of occipital/suboccipital decompression for treatment of a Chiari malformation.  No cervical cord syrinx or postoperative fluid collection.    3.  Ethmoid sinus disease      Electronically signed by: Becki Doyle MD  Date:     05/15/15  Time:    15:21            Assessment and Plan     The patient has chronic intractable migraine status post failure to Topiramate, Gabapentin, Amitriptyline, Nortriptyline, who went into remission after Botox treatment.  Continue Relpax prn but use the dose indicated for adults, 40 mg.  Arnold-Chiari Type I status post successful decompression in 2005. Stable, last MRI in 5/15   Ankylosing Spondylitis under Dr. Shea's care  Chronic neck and back pain on chronic opioids by Pain Medicine (Fentanyl 25 mcg Q48 hours and prn Oxycodone)  Psoriatic arthritis, under Dr. Shea's care  Anxiety          Linda Schaefer M.D  Medical Director, Headache and Facial Pain  [facility]

## 2022-06-09 ENCOUNTER — PATIENT MESSAGE (OUTPATIENT)
Dept: RHEUMATOLOGY | Facility: CLINIC | Age: 42
End: 2022-06-09
Payer: COMMERCIAL

## 2022-06-09 LAB — ESTROGEN SERPL-MCNC: 64 PG/ML

## 2022-06-15 ENCOUNTER — OFFICE VISIT (OUTPATIENT)
Dept: FAMILY MEDICINE | Facility: CLINIC | Age: 42
End: 2022-06-15
Payer: MEDICARE

## 2022-06-15 ENCOUNTER — PATIENT MESSAGE (OUTPATIENT)
Dept: FAMILY MEDICINE | Facility: CLINIC | Age: 42
End: 2022-06-15

## 2022-06-15 VITALS
SYSTOLIC BLOOD PRESSURE: 136 MMHG | WEIGHT: 190 LBS | HEIGHT: 60 IN | DIASTOLIC BLOOD PRESSURE: 83 MMHG | HEART RATE: 90 BPM | BODY MASS INDEX: 37.3 KG/M2 | TEMPERATURE: 99 F

## 2022-06-15 DIAGNOSIS — R73.03 PREDIABETES: ICD-10-CM

## 2022-06-15 DIAGNOSIS — F41.9 ANXIETY AND DEPRESSION: Primary | ICD-10-CM

## 2022-06-15 DIAGNOSIS — F32.A ANXIETY AND DEPRESSION: Primary | ICD-10-CM

## 2022-06-15 DIAGNOSIS — E66.9 OBESITY (BMI 30-39.9): ICD-10-CM

## 2022-06-15 DIAGNOSIS — E88.810 METABOLIC SYNDROME: ICD-10-CM

## 2022-06-15 PROCEDURE — 99214 PR OFFICE/OUTPT VISIT, EST, LEVL IV, 30-39 MIN: ICD-10-PCS | Mod: S$GLB,,, | Performed by: STUDENT IN AN ORGANIZED HEALTH CARE EDUCATION/TRAINING PROGRAM

## 2022-06-15 PROCEDURE — 99999 PR PBB SHADOW E&M-EST. PATIENT-LVL IV: ICD-10-PCS | Mod: PBBFAC,,, | Performed by: STUDENT IN AN ORGANIZED HEALTH CARE EDUCATION/TRAINING PROGRAM

## 2022-06-15 PROCEDURE — 99999 PR PBB SHADOW E&M-EST. PATIENT-LVL IV: CPT | Mod: PBBFAC,,, | Performed by: STUDENT IN AN ORGANIZED HEALTH CARE EDUCATION/TRAINING PROGRAM

## 2022-06-15 PROCEDURE — 99214 OFFICE O/P EST MOD 30 MIN: CPT | Mod: PBBFAC,PO | Performed by: STUDENT IN AN ORGANIZED HEALTH CARE EDUCATION/TRAINING PROGRAM

## 2022-06-15 PROCEDURE — 99214 OFFICE O/P EST MOD 30 MIN: CPT | Mod: S$GLB,,, | Performed by: STUDENT IN AN ORGANIZED HEALTH CARE EDUCATION/TRAINING PROGRAM

## 2022-06-15 RX ORDER — ORAL SEMAGLUTIDE 3 MG/1
3 TABLET ORAL DAILY
Qty: 30 TABLET | Refills: 0 | Status: SHIPPED | OUTPATIENT
Start: 2022-06-15 | End: 2022-06-21

## 2022-06-15 RX ORDER — BUPROPION HYDROCHLORIDE 150 MG/1
150 TABLET ORAL DAILY
Qty: 30 TABLET | Refills: 11 | Status: SHIPPED | OUTPATIENT
Start: 2022-06-15 | End: 2022-09-07 | Stop reason: SDUPTHER

## 2022-06-15 NOTE — PROGRESS NOTES
Problem List Items Addressed This Visit        Psychiatric    Anxiety and depression - Primary    Overview     Chronic hx;  fluoxetine 20 mg daily (will switch as it has been causing low libido and weight gain)   previously did well on Wellbutrin, will restart   Denies SI/HI; no hallucinations     -patient was educated, advised of side effects, and all questions were answered.  Patient voiced understanding  -patient will follow up routinely and notify us if having any side effects or worsening or persistent symptoms.  ER precautions were given.             Relevant Medications    buPROPion (WELLBUTRIN XL) 150 MG TB24 tablet       Endocrine    Obesity (BMI 30-39.9)    Overview     The patient and I had a discussion about obesity and ways to treat it.  At this time, we agreed to use the following to address this:    General weight loss/lifestyle modification strategies discussed (elicit support from others; identify saboteurs; non-food rewards, etc).  Informal exercise measures discussed, e.g. taking stairs instead of elevator.  Regular aerobic exercise program discussed.       - reports prozac has been causing weight gain. She would like to retry wellbutrin              Relevant Medications    semaglutide (RYBELSUS) 3 mg tablet    Prediabetes    Overview     Lab Results   Component Value Date    HGBA1C 5.8 (H) 06/06/2022     -current meds: Sent rybelsus 3mg, may need PA. If PA denied, will try metformin. No known fhx of thyroid cancer/MEN       -discussed the importance of limiting sugary drinks (sodas, juices, sweet teas) and participating in regular daily exercise 30 min 3-5 days weekly.   3-6 m follow up               Relevant Medications    semaglutide (RYBELSUS) 3 mg tablet      Other Visit Diagnoses     Metabolic syndrome        Relevant Medications    semaglutide (RYBELSUS) 3 mg tablet              Follow up in about 3 months (around 9/15/2022) for has f/u scheduled.    Chrissie Lancaster,  MD  _________________________________________________________________________      Patient ID: Sparkle Jose is a 41 y.o. female.    Chief Complaint: med change     Would like switch from Prozac to Wellbutrin as it was causing weight gain and low libido.      Denies SI/HI; no hallucinations. Denies fevers, chills, chest pain, SOB, fatigue, abdominal pain, nausea, vomiting, dysuria, hematuria, hematochezia, or melena.     Reports she will work on diet, limiting sugar/sweets. And increase exercise.     Past medical histories reviewed, including past medical, surgical, family and social histories.      Current Outpatient Medications on File Prior to Visit   Medication Sig Dispense Refill    chlorzoxazone (PARAFON FORTE) 500 mg Tab Take 1 tablet (500 mg total) by mouth 2 (two) times daily as needed. 60 tablet 0    clobetasol 0.05% (TEMOVATE) 0.05 % Oint 1 application daily as needed.       eletriptan (RELPAX) 40 MG tablet Take 1 tablet (40 mg total) by mouth as needed. may repeat in 2 hours if necessary 9 tablet 0    famotidine (PEPCID) 40 MG tablet Take 1 tablet (40 mg total) by mouth once daily. 30 tablet 5    FLUoxetine 20 MG capsule Take 1 capsule (20 mg total) by mouth once daily. In am 90 capsule 1    ketoconazole (NIZORAL) 2 % shampoo Wash body with medicated shampoo at least 2x/week - let soak at least 5 minutes prior to rinsing 120 mL 5    oxyCODONE-acetaminophen (PERCOCET)  mg per tablet Take 1 tablet by mouth every 8 (eight) hours as needed for Pain. 90 tablet 0    [START ON 7/1/2022] oxyCODONE-acetaminophen (PERCOCET)  mg per tablet Take 1 tablet by mouth every 8 (eight) hours as needed for Pain. 90 tablet 0    [START ON 8/1/2022] oxyCODONE-acetaminophen (PERCOCET)  mg per tablet Take 1 tablet by mouth every 8 (eight) hours as needed for Pain. 90 tablet 0    phentermine 37.5 MG capsule Take 37.5 mg by mouth every morning.      progesterone (PROMETRIUM) 100 MG capsule TAKE  1 CAPSULE BY MOUTH EVERY DAY IN THE EVENING 90 capsule 3    promethazine (PHENERGAN) 25 MG tablet Take 1 tablet (25 mg total) by mouth every 6 (six) hours as needed for Nausea. 45 tablet 5    secukinumab (COSENTYX PEN, 2 PENS,) 150 mg/mL PnIj Inject 300 mg into the skin every 30 days. 2 mL 6    traZODone (DESYREL) 100 MG tablet Take 1 tablet (100 mg total) by mouth every evening. 90 tablet 1    [DISCONTINUED] azithromycin (Z-DEMARCUS) 250 MG tablet Take 2 tablets by mouth on day 1; Take 1 tablet by mouth on days 2-5 (Patient not taking: Reported on 6/15/2022) 6 tablet 0    [DISCONTINUED] furosemide (LASIX) 20 MG tablet Take 1 tablet (20 mg total) by mouth once daily. (Patient taking differently: Take 20 mg by mouth daily as needed.) 30 tablet 4    [DISCONTINUED] semaglutide (OZEMPIC) 0.25 mg or 0.5 mg(2 mg/1.5 mL) pen injector Inject 0.25 mg into the skin every 7 days. (Patient not taking: Reported on 6/15/2022) 1 pen 5     No current facility-administered medications on file prior to visit.       Review of Systems   12 point review of systems negative except for listed in HPI.     Objective:    Nursing note and vitals reviewed.  Vitals:    06/15/22 0905   BP: 136/83   Pulse: 90   Temp: 98.6 °F (37 °C)     Body mass index is 36.99 kg/m².     Physical Exam   Constitutional: oriented to person, place, and time. well-developed and well-nourished. No distress.   HENT: WNL  Head: Normocephalic and atraumatic.   Eyes: EOM are normal.   Neck: Normal range of motion. Neck supple.   Cardiovascular: Normal rate  Pulmonary/Chest: Effort normal. No respiratory distress.   Musculoskeletal: Normal range of motion. no edema.   Neurological: CN II-XII intact  Skin: warm and dry.   Psychiatric: normal mood and affect. behavior is normal.           We Offer Telehealth & Same Day Appointments!   Book your Telehealth appointment with me through my nurse or   Clinic appointments on [facility]!  [phone]     To Schedule  appointments online, go to MyChart: [email or web address]

## 2022-06-19 ENCOUNTER — PATIENT MESSAGE (OUTPATIENT)
Dept: FAMILY MEDICINE | Facility: CLINIC | Age: 42
End: 2022-06-19
Payer: COMMERCIAL

## 2022-06-21 ENCOUNTER — PATIENT MESSAGE (OUTPATIENT)
Dept: FAMILY MEDICINE | Facility: CLINIC | Age: 42
End: 2022-06-21
Payer: COMMERCIAL

## 2022-06-21 DIAGNOSIS — R73.03 PREDIABETES: Primary | ICD-10-CM

## 2022-06-21 RX ORDER — METFORMIN HYDROCHLORIDE 500 MG/1
500 TABLET, EXTENDED RELEASE ORAL NIGHTLY
Qty: 90 TABLET | Refills: 3 | Status: SHIPPED | OUTPATIENT
Start: 2022-06-21 | End: 2023-05-15 | Stop reason: SDUPTHER

## 2022-06-21 NOTE — TELEPHONE ENCOUNTER
sent    Medications Ordered This Encounter   Medications    metFORMIN (GLUCOPHAGE-XR) 500 MG ER 24hr tablet     Sig: Take 1 tablet (500 mg total) by mouth every evening.     Dispense:  90 tablet     Refill:  3

## 2022-07-28 DIAGNOSIS — K21.9 GASTROESOPHAGEAL REFLUX DISEASE, UNSPECIFIED WHETHER ESOPHAGITIS PRESENT: ICD-10-CM

## 2022-07-28 NOTE — TELEPHONE ENCOUNTER
Pharmacy requesting refill on Famotidine 40mg  Pt's LOV 06/03/2022  Pt's NOV 12/08/2022  Medication pending

## 2022-07-30 RX ORDER — FAMOTIDINE 40 MG/1
40 TABLET, FILM COATED ORAL DAILY
Qty: 30 TABLET | Refills: 5 | Status: SHIPPED | OUTPATIENT
Start: 2022-07-30 | End: 2023-07-21

## 2022-09-02 ENCOUNTER — PATIENT MESSAGE (OUTPATIENT)
Dept: FAMILY MEDICINE | Facility: CLINIC | Age: 42
End: 2022-09-02
Payer: COMMERCIAL

## 2022-09-07 ENCOUNTER — TELEPHONE (OUTPATIENT)
Dept: FAMILY MEDICINE | Facility: CLINIC | Age: 42
End: 2022-09-07

## 2022-09-07 ENCOUNTER — OFFICE VISIT (OUTPATIENT)
Dept: FAMILY MEDICINE | Facility: CLINIC | Age: 42
End: 2022-09-07
Payer: MEDICARE

## 2022-09-07 VITALS
HEIGHT: 61 IN | HEART RATE: 91 BPM | DIASTOLIC BLOOD PRESSURE: 87 MMHG | TEMPERATURE: 98 F | SYSTOLIC BLOOD PRESSURE: 134 MMHG | OXYGEN SATURATION: 98 % | RESPIRATION RATE: 16 BRPM | BODY MASS INDEX: 34.28 KG/M2 | WEIGHT: 181.56 LBS

## 2022-09-07 DIAGNOSIS — R73.03 PREDIABETES: ICD-10-CM

## 2022-09-07 DIAGNOSIS — F41.9 ANXIETY AND DEPRESSION: ICD-10-CM

## 2022-09-07 DIAGNOSIS — F17.210 CIGARETTE NICOTINE DEPENDENCE WITHOUT COMPLICATION: ICD-10-CM

## 2022-09-07 DIAGNOSIS — E78.2 MIXED HYPERLIPIDEMIA: ICD-10-CM

## 2022-09-07 DIAGNOSIS — E88.819 INSULIN RESISTANCE: ICD-10-CM

## 2022-09-07 DIAGNOSIS — F32.A ANXIETY AND DEPRESSION: ICD-10-CM

## 2022-09-07 DIAGNOSIS — E88.810 METABOLIC SYNDROME: ICD-10-CM

## 2022-09-07 DIAGNOSIS — E66.9 OBESITY (BMI 30-39.9): Primary | ICD-10-CM

## 2022-09-07 PROCEDURE — 99999 PR PBB SHADOW E&M-EST. PATIENT-LVL V: ICD-10-PCS | Mod: PBBFAC,,, | Performed by: STUDENT IN AN ORGANIZED HEALTH CARE EDUCATION/TRAINING PROGRAM

## 2022-09-07 PROCEDURE — 99396 PREV VISIT EST AGE 40-64: CPT | Mod: S$GLB,,, | Performed by: STUDENT IN AN ORGANIZED HEALTH CARE EDUCATION/TRAINING PROGRAM

## 2022-09-07 PROCEDURE — 99999 PR PBB SHADOW E&M-EST. PATIENT-LVL V: CPT | Mod: PBBFAC,,, | Performed by: STUDENT IN AN ORGANIZED HEALTH CARE EDUCATION/TRAINING PROGRAM

## 2022-09-07 PROCEDURE — 99215 OFFICE O/P EST HI 40 MIN: CPT | Mod: PBBFAC,PO | Performed by: STUDENT IN AN ORGANIZED HEALTH CARE EDUCATION/TRAINING PROGRAM

## 2022-09-07 PROCEDURE — 99396 PR PREVENTIVE VISIT,EST,40-64: ICD-10-PCS | Mod: S$GLB,,, | Performed by: STUDENT IN AN ORGANIZED HEALTH CARE EDUCATION/TRAINING PROGRAM

## 2022-09-07 RX ORDER — BUPROPION HYDROCHLORIDE 300 MG/1
300 TABLET ORAL DAILY
Qty: 90 TABLET | Refills: 3 | Status: SHIPPED | OUTPATIENT
Start: 2022-09-07 | End: 2022-09-08 | Stop reason: SDUPTHER

## 2022-09-07 RX ORDER — TIRZEPATIDE 2.5 MG/.5ML
2.5 INJECTION, SOLUTION SUBCUTANEOUS
COMMUNITY
Start: 2022-08-09 | End: 2022-11-16 | Stop reason: SDUPTHER

## 2022-09-07 NOTE — TELEPHONE ENCOUNTER
Should it be can increase to 600mg after 7 days or should the 150 have been sent in to increase to 300mg after 7days?    Take 1 tablet (300 mg total) by mouth once daily. Can increase to 300 mg (2 tabs) daily after 7 days

## 2022-09-07 NOTE — TELEPHONE ENCOUNTER
----- Message from Abbi Ortiz sent at 9/7/2022 11:04 AM CDT -----  Contact: /[facility]  Type:  Pharmacy Calling to Clarify an RX    Name of Caller:  Pharmacy Name:[facility]  Prescription Name:buPROPion (WELLBUTRIN XL) 300 MG 24 hr tablet  What do they need to clarify?:Directions  Best Call Back Number:[phone]  Additional Information: Pharmacy request to clarify directions of prescription.  Thank you,  GH

## 2022-09-07 NOTE — PROGRESS NOTES
Problem List Items Addressed This Visit          Psychiatric    Anxiety and depression    Overview     Chronic hx; doing well on wellbutrin 300mg XR  Denies SI/HI; no hallucinations     -patient was educated, advised of side effects, and all questions were answered.  Patient voiced understanding  -patient will follow up routinely and notify us if having any side effects or worsening or persistent symptoms.  ER precautions were given.           Relevant Medications    buPROPion (WELLBUTRIN XL) 300 MG 24 hr tablet       Endocrine    Obesity (BMI 30-39.9) - Primary    Overview     Wt Readings from Last 3 Encounters:   09/07/22 1027 82.3 kg (181 lb 8.8 oz)   06/15/22 0905 86.2 kg (190 lb)   06/03/22 1058 87 kg (191 lb 12.8 oz)   General weight loss/lifestyle modification strategies discussed: limit sugary drinks, exercise 3-5x per week  Informal exercise measures discussed, e.g. taking stairs instead of elevator.      - mounjaro            Relevant Medications    tirzepatide 5 mg/0.5 mL PnIj    Prediabetes    Overview     Lab Results   Component Value Date    HGBA1C 5.8 (H) 06/06/2022   - endo started mounjaro, refills sent   3-6m f/u          Relevant Medications    tirzepatide 5 mg/0.5 mL PnIj       Other    Cigarette nicotine dependence without complication    Overview     Reports smoking about 1/2 ppd 25 years. Family smokes. Previously on chantix and quit smoking for about 1 month; however, sister was sick and restarted smoking.    Has cut down to about 5 cigarettes daily     Assistance with smoking cessation was offered, including:  [x]  Medications  [x]  Counseling  []  Printed Information on Smoking Cessation  [x]  Referral to a Smoking Cessation Program    Patient was counseled regarding smoking for 3-10 minutes.              Other Visit Diagnoses       Mixed hyperlipidemia        Relevant Medications    tirzepatide 5 mg/0.5 mL PnIj    Insulin resistance        Relevant Medications    tirzepatide 5 mg/0.5 mL  PnIj    Metabolic syndrome        Relevant Medications    tirzepatide 5 mg/0.5 mL PnIj          ANNUAL EXAM   patient here for annual exam.    - Labs ordered. Health maintenance was reviewed and ordered. Medications were reviewed and reconciled.  - All questions were answered. Advised of Wellness plan.   - Follow up in 1 year for ANNUAL WELLNESS EXAM        Follow up in about 6 months (around 3/7/2023).    Chrissie Lancaster MD  _________________________________________________________________________      Patient ID: Sparkle Jose is a 41 y.o. female.    Chief Complaint: follow up/annual   Doing well on wellbutrin and mounjaro.      Denies SI/HI; no hallucinations. Denies fevers, chills, chest pain, SOB, fatigue, abdominal pain, nausea, vomiting, dysuria, hematuria, hematochezia, or melena.     Reports she will work on diet, limiting sugar/sweets. And increase exercise. Also working to quit smoking.    Past medical histories reviewed, including past medical, surgical, family and social histories.      Current Outpatient Medications on File Prior to Visit   Medication Sig Dispense Refill    clobetasol 0.05% (TEMOVATE) 0.05 % Oint 1 application daily as needed.       eletriptan (RELPAX) 40 MG tablet Take 1 tablet (40 mg total) by mouth as needed. may repeat in 2 hours if necessary 9 tablet 0    famotidine (PEPCID) 40 MG tablet Take 1 tablet (40 mg total) by mouth once daily. 30 tablet 5    ketoconazole (NIZORAL) 2 % shampoo Wash body with medicated shampoo at least 2x/week - let soak at least 5 minutes prior to rinsing 120 mL 5    metFORMIN (GLUCOPHAGE-XR) 500 MG ER 24hr tablet Take 1 tablet (500 mg total) by mouth every evening. 90 tablet 3    oxyCODONE-acetaminophen (PERCOCET)  mg per tablet Take 1 tablet by mouth every 8 (eight) hours as needed for Pain. 90 tablet 0    progesterone (PROMETRIUM) 100 MG capsule TAKE 1 CAPSULE BY MOUTH EVERY DAY IN THE EVENING 90 capsule 3    promethazine (PHENERGAN) 25 MG  tablet Take 1 tablet (25 mg total) by mouth every 6 (six) hours as needed for Nausea. 45 tablet 5    secukinumab (COSENTYX PEN, 2 PENS,) 150 mg/mL PnIj Inject 300 mg into the skin every 30 days. 2 mL 6    tirzepatide (MOUNJARO) 2.5 mg/0.5 mL PnIj Inject 2.5 mg into the skin.      traZODone (DESYREL) 100 MG tablet Take 1 tablet (100 mg total) by mouth every evening. 90 tablet 1    [DISCONTINUED] buPROPion (WELLBUTRIN XL) 150 MG TB24 tablet Take 1 tablet (150 mg total) by mouth once daily. Can increase to 300 mg (2 tabs) daily after 7 days 30 tablet 11    [DISCONTINUED] chlorzoxazone (PARAFON FORTE) 500 mg Tab Take 1 tablet (500 mg total) by mouth 2 (two) times daily as needed. 60 tablet 0    [DISCONTINUED] FLUoxetine 20 MG capsule Take 1 capsule (20 mg total) by mouth once daily. In am (Patient not taking: Reported on 9/7/2022) 90 capsule 1    [DISCONTINUED] phentermine 37.5 MG capsule Take 37.5 mg by mouth every morning.       No current facility-administered medications on file prior to visit.       Review of Systems   12 point review of systems negative except for listed in HPI.     Objective:    Nursing note and vitals reviewed.  Vitals:    09/07/22 1027   BP: 134/87   Pulse: 91   Resp: 16   Temp: 98.1 °F (36.7 °C)     Body mass index is 34.3 kg/m².     Physical Exam   Constitutional: oriented to person, place, and time. well-developed and well-nourished. No distress.   HENT: WNL  Head: Normocephalic and atraumatic.   Eyes: EOM are normal.   Neck: Normal range of motion. Neck supple.   Cardiovascular: Normal rate  Pulmonary/Chest: Effort normal. No respiratory distress.   Musculoskeletal: Normal range of motion. no edema.   Neurological: CN II-XII intact  Skin: warm and dry.   Psychiatric: normal mood and affect. behavior is normal.           We Offer Telehealth & Same Day Appointments!   Book your Telehealth appointment with me through my nurse or   Clinic appointments on [employer]!  Ibkiwr-[phone]     To  Schedule appointments online, go to MyChart: [email or web address]

## 2022-09-08 RX ORDER — BUPROPION HYDROCHLORIDE 300 MG/1
300 TABLET ORAL DAILY
Qty: 90 TABLET | Refills: 3 | Status: SHIPPED | OUTPATIENT
Start: 2022-09-08 | End: 2023-08-18

## 2022-09-23 ENCOUNTER — OFFICE VISIT (OUTPATIENT)
Dept: OBSTETRICS AND GYNECOLOGY | Facility: CLINIC | Age: 42
End: 2022-09-23
Payer: COMMERCIAL

## 2022-09-23 VITALS
HEIGHT: 61 IN | WEIGHT: 178.56 LBS | DIASTOLIC BLOOD PRESSURE: 86 MMHG | BODY MASS INDEX: 33.71 KG/M2 | SYSTOLIC BLOOD PRESSURE: 130 MMHG

## 2022-09-23 DIAGNOSIS — Z01.419 ENCOUNTER FOR GYNECOLOGICAL EXAMINATION (GENERAL) (ROUTINE) WITHOUT ABNORMAL FINDINGS: Primary | ICD-10-CM

## 2022-09-23 DIAGNOSIS — Z12.4 SCREENING FOR CERVICAL CANCER: ICD-10-CM

## 2022-09-23 DIAGNOSIS — N76.4 VULVAR BOIL: ICD-10-CM

## 2022-09-23 DIAGNOSIS — N95.1 SYMPTOMATIC MENOPAUSAL OR FEMALE CLIMACTERIC STATES: ICD-10-CM

## 2022-09-23 DIAGNOSIS — D68.51 FACTOR 5 LEIDEN MUTATION, HETEROZYGOUS: ICD-10-CM

## 2022-09-23 DIAGNOSIS — Z12.31 SCREENING MAMMOGRAM, ENCOUNTER FOR: ICD-10-CM

## 2022-09-23 PROCEDURE — 88175 CYTOPATH C/V AUTO FLUID REDO: CPT | Performed by: OBSTETRICS & GYNECOLOGY

## 2022-09-23 PROCEDURE — 99213 OFFICE O/P EST LOW 20 MIN: CPT | Mod: PBBFAC,25 | Performed by: OBSTETRICS & GYNECOLOGY

## 2022-09-23 PROCEDURE — 87624 HPV HI-RISK TYP POOLED RSLT: CPT | Performed by: OBSTETRICS & GYNECOLOGY

## 2022-09-23 PROCEDURE — 99999 PR PBB SHADOW E&M-EST. PATIENT-LVL III: CPT | Mod: PBBFAC,,, | Performed by: OBSTETRICS & GYNECOLOGY

## 2022-09-23 PROCEDURE — 99386 PR PREVENTIVE VISIT,NEW,40-64: ICD-10-PCS | Mod: S$GLB,,, | Performed by: OBSTETRICS & GYNECOLOGY

## 2022-09-23 PROCEDURE — 99386 PREV VISIT NEW AGE 40-64: CPT | Mod: S$GLB,,, | Performed by: OBSTETRICS & GYNECOLOGY

## 2022-09-23 PROCEDURE — 99999 PR PBB SHADOW E&M-EST. PATIENT-LVL III: ICD-10-PCS | Mod: PBBFAC,,, | Performed by: OBSTETRICS & GYNECOLOGY

## 2022-09-23 PROCEDURE — G0101 CA SCREEN;PELVIC/BREAST EXAM: HCPCS | Mod: PBBFAC | Performed by: OBSTETRICS & GYNECOLOGY

## 2022-09-23 RX ORDER — SULFAMETHOXAZOLE AND TRIMETHOPRIM 800; 160 MG/1; MG/1
1 TABLET ORAL 2 TIMES DAILY
Qty: 14 TABLET | Refills: 0 | Status: SHIPPED | OUTPATIENT
Start: 2022-09-23 | End: 2022-09-30

## 2022-09-23 RX ORDER — CLONIDINE HYDROCHLORIDE 0.1 MG/1
0.1 TABLET ORAL NIGHTLY
Qty: 90 TABLET | Refills: 3 | Status: SHIPPED | OUTPATIENT
Start: 2022-09-23 | End: 2023-06-22

## 2022-09-23 RX ORDER — PROGESTERONE 100 MG/1
CAPSULE ORAL
Qty: 90 CAPSULE | Refills: 3 | Status: SHIPPED | OUTPATIENT
Start: 2022-09-23 | End: 2023-08-18

## 2022-09-23 NOTE — PROGRESS NOTES
Subjective:       Patient ID: Sparkle Jose is a 41 y.o. female.    Chief Complaint:  Well Woman      History of Present Illness  HPI  Annual Exam-Premenopausal  Patient presents for annual exam. The patient has no complaints today--wonders if her . The patient is sexually active.-no problems with intercourse, no prob lubricating;  GYN screening history: last pap: was normal and patient does not recall when last pap was and last mammogram: approximate date 3/2022 and was normal. The patient wears seatbelts: yes. The patient participates in regular exercise: yes. --walks 3-4 times/wk; ; 1.-2 minles; Has the patient ever been transfused or tattooed?: no. The patient reports that there is not domestic violence in her life.  C/o hot flushes; problems staying asleep/falling asleep    Denies urinary lekaage;   Pmhx--factor 5 heterozygous;  protein s defic        GYN & OB History  No LMP recorded. Patient is perimenopausal.   Date of Last Pap: 2022    OB History    Para Term  AB Living   2 2 1     1   SAB IAB Ectopic Multiple Live Births           1      # Outcome Date GA Lbr Isauro/2nd Weight Sex Delivery Anes PTL Lv   2 Term            1 Para     F Vag-Spont   DAKSHA       Review of Systems  Review of Systems   Genitourinary:  Positive for hot flashes.   Psychiatric/Behavioral:  Positive for depression and sleep disturbance.    All other systems reviewed and are negative.        Objective:      Physical Exam:   Constitutional: She is oriented to person, place, and time. She appears well-developed and well-nourished.     Eyes: Pupils are equal, round, and reactive to light. Conjunctivae and EOM are normal.      Pulmonary/Chest: Effort normal. Right breast exhibits no mass, no nipple discharge, no skin change and no tenderness. Left breast exhibits no mass, no nipple discharge, no skin change and no tenderness. Breasts are symmetrical.        Abdominal: Soft.     Genitourinary:    Inguinal canal,  vagina, uterus, right adnexa, left adnexa and rectum normal.      Pelvic exam was performed with patient supine.   The external female genitalia was normal.   Labial bartholins normal.Cervix is normal. Right adnexum displays no mass and no tenderness. Left adnexum displays no mass and no tenderness. No erythema,  no vaginal discharge, bleeding, rectocele, cystocele or unspecified prolapse of vaginal walls in the vagina. Vagina was moist.   pap smear completedUerus contour normal  Normal urethral meatus.Urethra findings: no urethral massBladder findings: no bladder distention and no bladder tenderness          Musculoskeletal: Normal range of motion and moves all extremeties.       Neurological: She is alert and oriented to person, place, and time.    Skin: Skin is warm.    Psychiatric: She has a normal mood and affect. Her behavior is normal.         Assessment:        1. Encounter for gynecological examination (general) (routine) without abnormal findings    2. Screening for cervical cancer    3. Factor 5 Leiden mutation, heterozygous    4. Symptomatic menopausal or female climacteric states    5. Screening mammogram, encounter for    6. Vulvar boil               Plan:      Continue annual well woman exam.  Pap today; Reviewed updated recommendations for pap smears (every 3 years) in low risk patients.   Recommend annual pelvic exams.  Reviewed recommendations for annual CBE.  mammo ordered, continue yearly until age 73   Rx sent for vulvar boil--septra; return for I&d if no improvement  Continue prometrium, given hx of protein s defic and factor 5 mutation, SHOULD NOT take ERT  Accepts trial of clonidine  Continue diet, exercise, weight loss   Screening colonoscopy due age 43

## 2022-09-27 DIAGNOSIS — M45.9 ANKYLOSING SPONDYLITIS, UNSPECIFIED SITE OF SPINE: ICD-10-CM

## 2022-09-27 DIAGNOSIS — L40.50 PSORIATIC ARTHRITIS: ICD-10-CM

## 2022-09-27 RX ORDER — SECUKINUMAB 150 MG/ML
300 INJECTION SUBCUTANEOUS
Qty: 2 ML | Refills: 6 | Status: SHIPPED | OUTPATIENT
Start: 2022-09-27 | End: 2023-01-04 | Stop reason: SDUPTHER

## 2022-09-28 LAB
HPV HR 12 DNA SPEC QL NAA+PROBE: NEGATIVE
HPV16 AG SPEC QL: NEGATIVE
HPV18 DNA SPEC QL NAA+PROBE: NEGATIVE

## 2022-09-29 LAB
FINAL PATHOLOGIC DIAGNOSIS: NORMAL
Lab: NORMAL

## 2022-10-03 ENCOUNTER — TELEPHONE (OUTPATIENT)
Dept: OBSTETRICS AND GYNECOLOGY | Facility: CLINIC | Age: 42
End: 2022-10-03
Payer: COMMERCIAL

## 2022-10-03 NOTE — TELEPHONE ENCOUNTER
Attempted to contact patient, no answer. Left patient voice mail to return call to clinic.      ----- Message from Amber Singh sent at 10/3/2022  1:57 PM CDT -----  Contact: Sparkle  Type:  Patient Returning Call    Who Called:Sparkle  Who Left Message for Patient:Unknown  Does the patient know what this is regarding?:follow up from appointment  Would the patient rather a call back or a response via [facility]? callback  Best Call Back Number:[phone]  Additional Information: Patient states they left voicemail and she is returning the call      Thanks  EVA

## 2022-10-03 NOTE — TELEPHONE ENCOUNTER
----- Message from Oanh Brock sent at 10/3/2022  2:46 PM CDT -----  Contact: [phone]  Type:  Patient Returning Call    Who Called:Sparkle   Who Left Message for Patient:nurse   Does the patient know what this is regarding?: n/a   Would the patient rather a call back or a response via [facility]? Call back   Best Call Back Number:[phone]  Additional Information: n/a      Thanks KB

## 2022-10-03 NOTE — TELEPHONE ENCOUNTER
"Adilene Mcnally MD   9/30/2022  5:00 PM CDT       Good News! Your pap smear came back and it was normal.   I still recommend doing a pelvic exam and annual visit every year, but you only need the pap test every 3 years. Please call me if you have any further questions.   Sincerely,   Dr. Uli Mcnally MD   9/28/2022  3:02 PM CDT       I am happy to report --The HPV is negative; The pap results are still pending          Patient Communication   Append Comments   Seen Back to Top    Your results look fine and do not require any change in treatment.      Please contact me if you have any additional concerns.   ...   Called patient and she stated "they called me back" and denied any further needs.  "

## 2022-10-10 ENCOUNTER — PATIENT MESSAGE (OUTPATIENT)
Dept: OBSTETRICS AND GYNECOLOGY | Facility: CLINIC | Age: 42
End: 2022-10-10
Payer: COMMERCIAL

## 2022-10-10 DIAGNOSIS — N95.0 POSTMENOPAUSAL BLEEDING: Primary | ICD-10-CM

## 2022-10-11 NOTE — TELEPHONE ENCOUNTER
Needs pelvic sono  If lining >4mm, will need emb     (procedure where I sample the lining to make sure no abnormal tissue is present in the uterus)   Will need to premedicate with nsaid prior to emb

## 2022-10-12 ENCOUNTER — HOSPITAL ENCOUNTER (OUTPATIENT)
Dept: RADIOLOGY | Facility: HOSPITAL | Age: 42
Discharge: HOME | End: 2022-10-12
Attending: OBSTETRICS & GYNECOLOGY
Payer: COMMERCIAL

## 2022-10-12 DIAGNOSIS — N95.0 POSTMENOPAUSAL BLEEDING: ICD-10-CM

## 2022-10-12 PROCEDURE — 76830 TRANSVAGINAL US NON-OB: CPT | Mod: TC,PO

## 2022-10-12 PROCEDURE — 76830 US PELVIS COMP WITH TRANSVAG NON-OB (XPD): ICD-10-PCS | Mod: 26,,, | Performed by: RADIOLOGY

## 2022-10-12 PROCEDURE — 76830 TRANSVAGINAL US NON-OB: CPT | Mod: 26,,, | Performed by: RADIOLOGY

## 2022-10-12 PROCEDURE — 76856 US PELVIS COMP WITH TRANSVAG NON-OB (XPD): ICD-10-PCS | Mod: 26,,, | Performed by: RADIOLOGY

## 2022-10-12 PROCEDURE — 76856 US EXAM PELVIC COMPLETE: CPT | Mod: 26,,, | Performed by: RADIOLOGY

## 2022-10-13 ENCOUNTER — PATIENT MESSAGE (OUTPATIENT)
Dept: OBSTETRICS AND GYNECOLOGY | Facility: CLINIC | Age: 42
End: 2022-10-13
Payer: COMMERCIAL

## 2022-10-13 DIAGNOSIS — L02.92 BOIL: Primary | ICD-10-CM

## 2022-10-14 ENCOUNTER — PATIENT MESSAGE (OUTPATIENT)
Dept: OBSTETRICS AND GYNECOLOGY | Facility: CLINIC | Age: 42
End: 2022-10-14
Payer: COMMERCIAL

## 2022-10-14 RX ORDER — SULFAMETHOXAZOLE AND TRIMETHOPRIM 800; 160 MG/1; MG/1
1 TABLET ORAL 2 TIMES DAILY
Qty: 20 TABLET | Refills: 0 | Status: SHIPPED | OUTPATIENT
Start: 2022-10-14 | End: 2022-10-24

## 2022-11-01 ENCOUNTER — PROCEDURE VISIT (OUTPATIENT)
Dept: OBSTETRICS AND GYNECOLOGY | Facility: CLINIC | Age: 42
End: 2022-11-01
Payer: MEDICARE

## 2022-11-01 VITALS
HEIGHT: 61 IN | BODY MASS INDEX: 32.38 KG/M2 | DIASTOLIC BLOOD PRESSURE: 76 MMHG | SYSTOLIC BLOOD PRESSURE: 110 MMHG | WEIGHT: 171.5 LBS

## 2022-11-01 DIAGNOSIS — N92.6 IRREGULAR MENSES: Primary | ICD-10-CM

## 2022-11-01 DIAGNOSIS — N95.0 POSTMENOPAUSAL BLEEDING: ICD-10-CM

## 2022-11-01 LAB
B-HCG UR QL: NEGATIVE
CTP QC/QA: YES

## 2022-11-01 PROCEDURE — 58100 BIOPSY OF UTERUS LINING: CPT | Mod: S$GLB,,, | Performed by: OBSTETRICS & GYNECOLOGY

## 2022-11-01 PROCEDURE — 81025 URINE PREGNANCY TEST: CPT | Mod: S$GLB,,, | Performed by: OBSTETRICS & GYNECOLOGY

## 2022-11-01 PROCEDURE — 81025 URINE PREGNANCY TEST: CPT | Mod: PBBFAC | Performed by: OBSTETRICS & GYNECOLOGY

## 2022-11-01 PROCEDURE — 58100 BIOPSY OF UTERUS LINING: CPT | Mod: PBBFAC | Performed by: OBSTETRICS & GYNECOLOGY

## 2022-11-01 PROCEDURE — 81025 PR  URINE PREGNANCY TEST: ICD-10-PCS | Mod: S$GLB,,, | Performed by: OBSTETRICS & GYNECOLOGY

## 2022-11-01 PROCEDURE — 88305 TISSUE EXAM BY PATHOLOGIST: ICD-10-PCS | Mod: 26,,, | Performed by: PATHOLOGY

## 2022-11-01 PROCEDURE — 88305 TISSUE EXAM BY PATHOLOGIST: CPT | Mod: 26,,, | Performed by: PATHOLOGY

## 2022-11-01 PROCEDURE — 58100 PR BIOPSY OF UTERUS LINING: ICD-10-PCS | Mod: S$GLB,,, | Performed by: OBSTETRICS & GYNECOLOGY

## 2022-11-01 PROCEDURE — 88305 TISSUE EXAM BY PATHOLOGIST: CPT | Performed by: PATHOLOGY

## 2022-11-01 NOTE — PROCEDURES
Procedures  CC: ENDOMETRIAL BIOPSPY    Sparkle Jose is a 41 y.o. female  presents for an endometrial biopsy secondary to postmenopausal bleeding on prometrium; reports menses stopped after tubal in 2018; started on prometrium and amenorrheic until had bleeding in 2022  UPT is Negative.      PRE-ENDOMETRIAL BIOPSY COUNSELING:    The patient was informed of the risk of bleeding, infection, uterine perforation and pain and that the test will rule-out endometrial cancer with accuracy greater than 95%.  She was counseled on the alternatives to endometrial biopsy and agrees to proceed.      TIME OUT PERFORMED.    The cervix was visualized with a speculum.  A single tooth tenaculum was placed on the anterior lip prior to the biopsy.      A sterile endometrial pipelle was passed without difficulty to a depth of 8 cm.    Endometrial tissue was obtained.      The specimen was placed in formalyn and sent to Pathology of histology evaluation.    The patient tolerated the procedure well.      ASSESSMENT AND PLAN  1. Irregular menses  POCT urine pregnancy          POST ENDOMETRIAL BIOPSY COUNSELING:  Manage post biopsy cramping with NSAIDs or Tylenol.  Expect spotting or light bleeding for a few days.  Report bleeding heavier than a period, fever > 101.0 F, worsening pain or a foul smelling vaginal discharge.      Counseling lasted approximately 15 minutes and all her questions were answered.      FOLLOW-UP:  Pending biopsy

## 2022-11-04 LAB
FINAL PATHOLOGIC DIAGNOSIS: NORMAL
GROSS: NORMAL
Lab: NORMAL

## 2022-11-08 ENCOUNTER — PATIENT MESSAGE (OUTPATIENT)
Dept: OBSTETRICS AND GYNECOLOGY | Facility: CLINIC | Age: 42
End: 2022-11-08
Payer: COMMERCIAL

## 2022-11-09 NOTE — PROGRESS NOTES
The emb is negative for abnormal tissue or cancer    You may continue to have irregular bleeding on daily prometrium as you may not yet be in menopause

## 2022-11-14 ENCOUNTER — E-VISIT (OUTPATIENT)
Dept: FAMILY MEDICINE | Facility: CLINIC | Age: 42
End: 2022-11-14
Payer: COMMERCIAL

## 2022-11-14 ENCOUNTER — CLINICAL SUPPORT (OUTPATIENT)
Dept: FAMILY MEDICINE | Facility: CLINIC | Age: 42
End: 2022-11-14
Payer: COMMERCIAL

## 2022-11-14 ENCOUNTER — TELEPHONE (OUTPATIENT)
Dept: FAMILY MEDICINE | Facility: CLINIC | Age: 42
End: 2022-11-14
Payer: COMMERCIAL

## 2022-11-14 ENCOUNTER — PATIENT MESSAGE (OUTPATIENT)
Dept: FAMILY MEDICINE | Facility: CLINIC | Age: 42
End: 2022-11-14

## 2022-11-14 DIAGNOSIS — J32.9 SINUSITIS, UNSPECIFIED CHRONICITY, UNSPECIFIED LOCATION: ICD-10-CM

## 2022-11-14 DIAGNOSIS — J34.89 RHINORRHEA: Primary | ICD-10-CM

## 2022-11-14 LAB
CTP QC/QA: YES
FLUAV AG NPH QL: NEGATIVE
FLUBV AG NPH QL: NEGATIVE
MOLECULAR STREP A: NEGATIVE
SARS-COV-2 RDRP RESP QL NAA+PROBE: NEGATIVE

## 2022-11-14 PROCEDURE — 87651 STREP A DNA AMP PROBE: CPT | Mod: PBBFAC,PO | Performed by: STUDENT IN AN ORGANIZED HEALTH CARE EDUCATION/TRAINING PROGRAM

## 2022-11-14 PROCEDURE — 87804 PR  DETECT AGENT,IMMUN,DIR OBS,INFLUENZA: ICD-10-PCS | Mod: 59,QW,S$GLB, | Performed by: STUDENT IN AN ORGANIZED HEALTH CARE EDUCATION/TRAINING PROGRAM

## 2022-11-14 PROCEDURE — 87651 PR  STREP A, DNA, AMP PROBE: ICD-10-PCS | Mod: QW,S$GLB,, | Performed by: STUDENT IN AN ORGANIZED HEALTH CARE EDUCATION/TRAINING PROGRAM

## 2022-11-14 PROCEDURE — 99211 OFF/OP EST MAY X REQ PHY/QHP: CPT | Mod: PBBFAC,PO

## 2022-11-14 PROCEDURE — 99421 OL DIG E/M SVC 5-10 MIN: CPT | Mod: ,,, | Performed by: STUDENT IN AN ORGANIZED HEALTH CARE EDUCATION/TRAINING PROGRAM

## 2022-11-14 PROCEDURE — 87651 STREP A DNA AMP PROBE: CPT | Mod: QW,S$GLB,, | Performed by: STUDENT IN AN ORGANIZED HEALTH CARE EDUCATION/TRAINING PROGRAM

## 2022-11-14 PROCEDURE — 87635 PR SARS-COV-2 COVID-19 AMPLIFIED PROBE: ICD-10-PCS | Mod: QW,S$GLB,, | Performed by: STUDENT IN AN ORGANIZED HEALTH CARE EDUCATION/TRAINING PROGRAM

## 2022-11-14 PROCEDURE — 87635 SARS-COV-2 COVID-19 AMP PRB: CPT | Mod: PBBFAC,PO | Performed by: STUDENT IN AN ORGANIZED HEALTH CARE EDUCATION/TRAINING PROGRAM

## 2022-11-14 PROCEDURE — 87804 INFLUENZA ASSAY W/OPTIC: CPT | Mod: PBBFAC,PO | Performed by: STUDENT IN AN ORGANIZED HEALTH CARE EDUCATION/TRAINING PROGRAM

## 2022-11-14 PROCEDURE — 87804 INFLUENZA ASSAY W/OPTIC: CPT | Mod: 59,QW,S$GLB, | Performed by: STUDENT IN AN ORGANIZED HEALTH CARE EDUCATION/TRAINING PROGRAM

## 2022-11-14 PROCEDURE — 99421 PR E&M, ONLINE DIGIT, EST, < 7 DAYS, 5-10 MINS: ICD-10-PCS | Mod: ,,, | Performed by: STUDENT IN AN ORGANIZED HEALTH CARE EDUCATION/TRAINING PROGRAM

## 2022-11-14 PROCEDURE — 99999 PR PBB SHADOW E&M-EST. PATIENT-LVL I: ICD-10-PCS | Mod: PBBFAC,,,

## 2022-11-14 PROCEDURE — 87635 SARS-COV-2 COVID-19 AMP PRB: CPT | Mod: QW,S$GLB,, | Performed by: STUDENT IN AN ORGANIZED HEALTH CARE EDUCATION/TRAINING PROGRAM

## 2022-11-14 PROCEDURE — 99999 PR PBB SHADOW E&M-EST. PATIENT-LVL I: CPT | Mod: PBBFAC,,,

## 2022-11-14 RX ORDER — AZITHROMYCIN 250 MG/1
TABLET, FILM COATED ORAL
Qty: 6 TABLET | Refills: 0 | Status: SHIPPED | OUTPATIENT
Start: 2022-11-14 | End: 2022-12-26

## 2022-11-14 NOTE — TELEPHONE ENCOUNTER
----- Message from Amber Singh sent at 11/14/2022 12:10 PM CST -----  Contact: Sparkle Villagran is calling to see if there is a appointment for today for stuffy nose and congestion. Please give her a call back at [phone]    Thanks  LJ

## 2022-11-14 NOTE — PROGRESS NOTES
Patient ID: Sparkle Jose is a 41 y.o. female.    Chief Complaint: URI    The patient initiated a request through Maintenance Assistant on 11/14/2022 for evaluation and management with a chief complaint of URI     I evaluated the questionnaire submission on 11/14/2022.    Ohs Peq Evisit Upper Respitatory/Cough Questionnaire    11/14/2022  1:22 PM CST - Filed by Patient   Do you agree to participate in an E-Visit? Yes   If you have any of the following symptoms, please present to your local ER or call 911:  I acknowledge   What is the main issue that you would like for your doctor to address today? Runny nose sinus congestion, nasal drip, stopped up ears   Are you able to take your vital signs? No   What symptoms do you currently have?  Cough;  Runny nose   Have you had a fever? No   When did your symptoms first appear? 11/9/2022   In the last two weeks, have you been in close contact with someone who has COVID-19 or the Flu? No   In the last two weeks, have you worked or volunteered in a healthcare facility or as a ? Healthcare facilities include a hospital, medical or dental clinic, long-term care facility, or nursing home No   Do you live in a long-term care facility, nursing home, or homeless shelter? No   List what you have done or taken to help your symptoms. Over the counter sinus and cold medicine   How severe are your symptoms? Moderate   Have you taken an at home Covid test? Yes   What were the results? Negative   Have you taken a Flu test? No   Have you been fully vaccinated for COVID? (2 Pfizer, 2 Moderna or 1 Francisco & Francisco vaccine injections) No   Have you received your first dose of the Pfizer or Moderna COVID vaccine? No   Have you recieved a Flu shot? No   Do you have transportation to get tested for COVID if it is indicated and ordered for you at an [facility] location? Yes   Provide any information you feel is important to your history not asked above Sinus congestion in ears   Please  attach any relevant images or files           Active Problem List with Overview Notes    Diagnosis Date Noted    Prediabetes 06/15/2022     Lab Results   Component Value Date    HGBA1C 5.8 (H) 06/06/2022     - endo started mounjaro, refills sent   3-6m f/u       Obesity (BMI 30-39.9) 03/15/2022     Wt Readings from Last 3 Encounters:   09/07/22 1027 82.3 kg (181 lb 8.8 oz)   06/15/22 0905 86.2 kg (190 lb)   06/03/22 1058 87 kg (191 lb 12.8 oz)     General weight loss/lifestyle modification strategies discussed: limit sugary drinks, exercise 3-5x per week  Informal exercise measures discussed, e.g. taking stairs instead of elevator.      - mounjaro         Anxiety and depression 03/15/2022     Chronic hx; doing well on wellbutrin 300mg XR  Denies SI/HI; no hallucinations     -patient was educated, advised of side effects, and all questions were answered.  Patient voiced understanding  -patient will follow up routinely and notify us if having any side effects or worsening or persistent symptoms.  ER precautions were given.        Primary insomnia 03/15/2022     -is on prn trazodone chronically (rxd by Dr. Almonte)  -denies adverse effects of medication  -has tried multiple OTC medication with minimal benefit  -discussed importance of good sleep hygiene practices        History of Chiari malformation 03/15/2022     Hx of chiari malformation dxd 2004 s/p brain decompression, follows with neurology q3-4 yrs. Doing well, no concerns      Gastroesophageal reflux disease without esophagitis 03/15/2022     -symptoms controlled with daily PPI  -denies alarm symptoms, such as dysphagia, weight loss or N/V  -continue lifestyle modification with avoidance of acidic foods, caffeine, late night eating        Thyroid nodule 11/26/2021     Thyroid Nodules; incidental finding via CT 2019 s/p bx (Nov 2021) neg for malignancy   Reports previously taking Cytomel 5 mg daily due to fatigue and weight    Lab Results   Component Value Date     TSH 2.829 10/26/2021     - repeat US Thyroid scheduled for May 2022   - follows with endocrinology Dr. Wagner        Cigarette nicotine dependence without complication 08/11/2019     Reports smoking about 1/2 ppd 25 years. Family smokes. Previously on chantix and quit smoking for about 1 month; however, sister was sick and restarted smoking.    Has cut down to about 5 cigarettes daily     Assistance with smoking cessation was offered, including:  [x]  Medications  [x]  Counseling  []  Printed Information on Smoking Cessation  [x]  Referral to a Smoking Cessation Program    Patient was counseled regarding smoking for 3-10 minutes.          Pulmonary nodule 08/11/2019 8/31/2020 1 year follow up CT chest Unchanged scattered bilateral pulmonary nodules.  No new nodules.  Follow up CT in 1 year, this will be 2 year follow up, August 2021.   Per Fleischner guidelines: For multiple solid nodules with any 6 mm or greater, Fleischner Society guidelines recommend follow up with non-contrast chest CT at 3-6 months (this exam) and 18-24 months after discovery (approximately August 2021).      Thrombocytosis 08/22/2018    Leukocytosis 08/22/2018    Factor 5 Leiden mutation, heterozygous 08/22/2018     Leiden factor 5 deficiency - no previous hx of DVT, PE   Avoid estrogen therapy      Encounter for sterilization 10/12/2017    Psoriatic arthritis 02/19/2015     Chronic hx; follows with rheumatology Dr. Almonte  - prn ketoconazole shampoo and topical steroid  - follows q3-4m with rheum      Ankylosing spondylitis 11/12/2014     Chronic hx; follows with rheumatology Dr. Almonte  Stable on cosentyx 300mg q30d and prn percocet, prednisone, and chlorzoxazone (msk relaxer)  - follows q3-4m with rheum        Recent Labs Obtained:  No visits with results within 7 Day(s) from this visit.   Latest known visit with results is:   Procedure visit on 11/01/2022   Component Date Value Ref Range Status    POC Preg Test, Ur 11/01/2022  "Negative  Negative Final     Acceptable 2022 Yes   Final    Final Pathologic Diagnosis 2022    Final                    Value:1. Endometrial biopsy:      -  Superficial fragments of lower uterine segment      -  Intact endometrial mucosa is not present for evaluation      -  Negative for atypia or malignancy      Interp By Iesha Rodriguez M.D., Signed on 2022 at 12:30    Gross 2022    Final                    Value:Pathology ID:  [id number]  Patient ID: [id number]  The specimen is received in formalin labeled "EMB".  The specimen consists of  multiple tan-yellow fragments of soft tissue measuring 1.0 x 0.4 x 0.1 cm in  aggregate.  The specimen is submitted entirely in [id number]  DANIELLE Amado MS      Disclaimer 2022    Final                    Value:Unless the case is a 'gross only' or additional testing only, the final  diagnosis for each specimen is based on a microscopic examination of  appropriate tissue sections.         Encounter Diagnosis   Name Primary?    Rhinorrhea Yes        Orders Placed This Encounter   Procedures    POCT COVID-19 Rapid Screening     Order Specific Question:   Is the patient symptomatic?     Answer:   Yes    POCT Strep A, Molecular    POCT Influenza A/B            E-Visit Time Trackin min                        "

## 2022-11-14 NOTE — PROGRESS NOTES
Pt presents today for Covid, flu and strep screening with symptoms  2 patient identifiers  Pt tolerated swab

## 2022-11-15 ENCOUNTER — PATIENT MESSAGE (OUTPATIENT)
Dept: FAMILY MEDICINE | Facility: CLINIC | Age: 42
End: 2022-11-15
Payer: COMMERCIAL

## 2022-11-16 ENCOUNTER — PATIENT MESSAGE (OUTPATIENT)
Dept: RHEUMATOLOGY | Facility: CLINIC | Age: 42
End: 2022-11-16
Payer: COMMERCIAL

## 2022-11-16 ENCOUNTER — PATIENT MESSAGE (OUTPATIENT)
Dept: FAMILY MEDICINE | Facility: CLINIC | Age: 42
End: 2022-11-16
Payer: COMMERCIAL

## 2022-11-16 DIAGNOSIS — E88.810 METABOLIC SYNDROME: ICD-10-CM

## 2022-11-16 DIAGNOSIS — E88.819 INSULIN RESISTANCE: ICD-10-CM

## 2022-11-16 DIAGNOSIS — R73.03 PREDIABETES: Primary | ICD-10-CM

## 2022-11-16 DIAGNOSIS — E78.2 MIXED HYPERLIPIDEMIA: ICD-10-CM

## 2022-11-16 DIAGNOSIS — M45.9 ANKYLOSING SPONDYLITIS, UNSPECIFIED SITE OF SPINE: Primary | ICD-10-CM

## 2022-11-16 DIAGNOSIS — E66.9 OBESITY (BMI 30-39.9): ICD-10-CM

## 2022-11-16 RX ORDER — TIRZEPATIDE 10 MG/.5ML
INJECTION, SOLUTION SUBCUTANEOUS
COMMUNITY
Start: 2022-11-08 | End: 2022-11-16 | Stop reason: SDUPTHER

## 2022-11-16 RX ORDER — TIRZEPATIDE 10 MG/.5ML
INJECTION, SOLUTION SUBCUTANEOUS
Qty: 1 PEN | Refills: 11 | Status: SHIPPED | OUTPATIENT
Start: 2022-11-16 | End: 2022-12-08

## 2022-11-17 ENCOUNTER — HOSPITAL ENCOUNTER (OUTPATIENT)
Dept: RADIOLOGY | Facility: HOSPITAL | Age: 42
Discharge: HOME | End: 2022-11-17
Attending: PHYSICIAN ASSISTANT
Payer: COMMERCIAL

## 2022-11-17 ENCOUNTER — PATIENT MESSAGE (OUTPATIENT)
Dept: RHEUMATOLOGY | Facility: CLINIC | Age: 42
End: 2022-11-17
Payer: COMMERCIAL

## 2022-11-17 DIAGNOSIS — M45.9 ANKYLOSING SPONDYLITIS, UNSPECIFIED SITE OF SPINE: ICD-10-CM

## 2022-11-17 PROCEDURE — 72202 X-RAY EXAM SI JOINTS 3/> VWS: CPT | Mod: TC,PO

## 2022-11-17 PROCEDURE — 72202 X-RAY EXAM SI JOINTS 3/> VWS: CPT | Mod: 26,,, | Performed by: RADIOLOGY

## 2022-11-17 PROCEDURE — 72202 XR SACROILIAC JOINTS COMPLETE: ICD-10-PCS | Mod: 26,,, | Performed by: RADIOLOGY

## 2022-11-17 PROCEDURE — 73502 XR HIP WITH PELVIS WHEN PERFORMED, 2 OR 3 VIEWS LEFT: ICD-10-PCS | Mod: 26,LT,, | Performed by: RADIOLOGY

## 2022-11-17 PROCEDURE — 73502 X-RAY EXAM HIP UNI 2-3 VIEWS: CPT | Mod: TC,PO,LT

## 2022-11-17 PROCEDURE — 73502 X-RAY EXAM HIP UNI 2-3 VIEWS: CPT | Mod: 26,LT,, | Performed by: RADIOLOGY

## 2022-11-19 ENCOUNTER — PATIENT MESSAGE (OUTPATIENT)
Dept: FAMILY MEDICINE | Facility: CLINIC | Age: 42
End: 2022-11-19
Payer: COMMERCIAL

## 2022-11-21 ENCOUNTER — PATIENT MESSAGE (OUTPATIENT)
Dept: RHEUMATOLOGY | Facility: CLINIC | Age: 42
End: 2022-11-21
Payer: COMMERCIAL

## 2022-11-23 ENCOUNTER — PATIENT MESSAGE (OUTPATIENT)
Dept: FAMILY MEDICINE | Facility: CLINIC | Age: 42
End: 2022-11-23
Payer: COMMERCIAL

## 2022-11-28 ENCOUNTER — TELEPHONE (OUTPATIENT)
Dept: FAMILY MEDICINE | Facility: CLINIC | Age: 42
End: 2022-11-28
Payer: COMMERCIAL

## 2022-11-28 NOTE — TELEPHONE ENCOUNTER
I spoke with the patient about this. Patient informed PA is currently in process. Pt reports that Dr. Wagner office must've started the process and it was not complete. Pt also reports that Dr. Wagner is the provider managing her diabetes.

## 2022-11-28 NOTE — TELEPHONE ENCOUNTER
----- Message from Agatha Marino sent at 11/28/2022 10:35 AM CST -----  Contact: self/[phone]  Type:  Patient Returning Call    Who Called:Sparkle Jose  Who Left Message for Patient:Dayan Lowery  Does the patient know what this is regarding?:MOUNJARO 10 mg/0.5 mL PnIj(need a PA)  Would the patient rather a call back or a response via [facility]? Call back  Best Call Back Number:  Additional Information:

## 2022-11-28 NOTE — TELEPHONE ENCOUNTER
----- Message from Oanh Brock sent at 11/28/2022 11:01 AM CST -----  Contact: [facility] galina Duran is calling in regards to prior authorization for pt mounjaro medication. She stated that the prior authorization contact number is [phone]. Thanks KB

## 2022-11-29 ENCOUNTER — PATIENT MESSAGE (OUTPATIENT)
Dept: FAMILY MEDICINE | Facility: CLINIC | Age: 42
End: 2022-11-29
Payer: COMMERCIAL

## 2022-11-30 ENCOUNTER — PATIENT MESSAGE (OUTPATIENT)
Dept: FAMILY MEDICINE | Facility: CLINIC | Age: 42
End: 2022-11-30
Payer: COMMERCIAL

## 2022-12-01 ENCOUNTER — PATIENT MESSAGE (OUTPATIENT)
Dept: FAMILY MEDICINE | Facility: CLINIC | Age: 42
End: 2022-12-01
Payer: COMMERCIAL

## 2022-12-05 ENCOUNTER — PATIENT MESSAGE (OUTPATIENT)
Dept: FAMILY MEDICINE | Facility: CLINIC | Age: 42
End: 2022-12-05
Payer: COMMERCIAL

## 2022-12-06 ENCOUNTER — PATIENT MESSAGE (OUTPATIENT)
Dept: FAMILY MEDICINE | Facility: CLINIC | Age: 42
End: 2022-12-06
Payer: COMMERCIAL

## 2022-12-07 ENCOUNTER — PATIENT MESSAGE (OUTPATIENT)
Dept: FAMILY MEDICINE | Facility: CLINIC | Age: 42
End: 2022-12-07
Payer: COMMERCIAL

## 2022-12-08 ENCOUNTER — OFFICE VISIT (OUTPATIENT)
Dept: RHEUMATOLOGY | Facility: CLINIC | Age: 42
End: 2022-12-08
Payer: COMMERCIAL

## 2022-12-08 ENCOUNTER — PATIENT MESSAGE (OUTPATIENT)
Dept: FAMILY MEDICINE | Facility: CLINIC | Age: 42
End: 2022-12-08
Payer: COMMERCIAL

## 2022-12-08 ENCOUNTER — PATIENT MESSAGE (OUTPATIENT)
Dept: RHEUMATOLOGY | Facility: CLINIC | Age: 42
End: 2022-12-08

## 2022-12-08 DIAGNOSIS — R11.2 NAUSEA AND VOMITING, UNSPECIFIED VOMITING TYPE: ICD-10-CM

## 2022-12-08 DIAGNOSIS — E11.69 TYPE 2 DIABETES MELLITUS WITH OTHER SPECIFIED COMPLICATION, UNSPECIFIED WHETHER LONG TERM INSULIN USE: ICD-10-CM

## 2022-12-08 DIAGNOSIS — G89.29 CHRONIC LEFT SI JOINT PAIN: ICD-10-CM

## 2022-12-08 DIAGNOSIS — M45.9 ANKYLOSING SPONDYLITIS, UNSPECIFIED SITE OF SPINE: Primary | ICD-10-CM

## 2022-12-08 DIAGNOSIS — M53.3 CHRONIC LEFT SI JOINT PAIN: ICD-10-CM

## 2022-12-08 DIAGNOSIS — G89.4 CHRONIC PAIN SYNDROME: ICD-10-CM

## 2022-12-08 PROCEDURE — 99215 OFFICE O/P EST HI 40 MIN: CPT | Mod: 95,,, | Performed by: INTERNAL MEDICINE

## 2022-12-08 PROCEDURE — 99215 PR OFFICE/OUTPT VISIT, EST, LEVL V, 40-54 MIN: ICD-10-PCS | Mod: 95,,, | Performed by: INTERNAL MEDICINE

## 2022-12-08 RX ORDER — SEMAGLUTIDE 1.34 MG/ML
INJECTION, SOLUTION SUBCUTANEOUS
Qty: 1 PEN | Refills: 6 | Status: SHIPPED | OUTPATIENT
Start: 2022-12-08 | End: 2022-12-20

## 2022-12-08 RX ORDER — OXYCODONE AND ACETAMINOPHEN 10; 325 MG/1; MG/1
1 TABLET ORAL EVERY 8 HOURS PRN
Qty: 90 TABLET | Refills: 0 | Status: SHIPPED | OUTPATIENT
Start: 2023-02-06 | End: 2023-03-23

## 2022-12-08 RX ORDER — OXYCODONE AND ACETAMINOPHEN 10; 325 MG/1; MG/1
1 TABLET ORAL EVERY 8 HOURS PRN
Qty: 90 TABLET | Refills: 0 | Status: SHIPPED | OUTPATIENT
Start: 2023-01-05 | End: 2023-03-23

## 2022-12-08 RX ORDER — OXYCODONE AND ACETAMINOPHEN 10; 325 MG/1; MG/1
1 TABLET ORAL EVERY 8 HOURS PRN
Qty: 90 TABLET | Refills: 0 | Status: SHIPPED | OUTPATIENT
Start: 2022-12-08 | End: 2023-03-12 | Stop reason: SDUPTHER

## 2022-12-08 NOTE — PROGRESS NOTES
Subjective:       Patient ID: Sparkle Jose is a 42 y.o. female.    Chief Complaint: No chief complaint on file.      Follow up: 42 year old female who presents to clinic for follow up on AS. She is doing fair on cosentyx 300 mg monthly. On treatment she has cervical,  Thoracic and   lumbar. We reviewed her chart and she lost 45 lbs and  on mounjaro . She noticed increased joint pain and stiffness  She has been on for 3 months.. She continues to have pain in her elbows, knees, neck, and low back. Pain is constant and aching. Taking prednisone on rare occasion for flares. She is taking percocet more frequently since she has been off treatment.    GERD has been worse at night.     Current tx:  1. cosentyx 300 mg  2. No longer take oral prednisone  Prior tx:  1. Humira  2. Remicade (drug induced lupus)    Review of Systems   Constitutional:  Positive for activity change and unexpected weight change. Negative for appetite change and chills.   HENT:  Positive for sinus pressure and sinus pain. Negative for mouth sores.    Eyes:  Negative for redness and visual disturbance.   Respiratory:  Negative for cough and shortness of breath.    Cardiovascular:  Negative for chest pain, palpitations and leg swelling.   Gastrointestinal:  Negative for abdominal pain, constipation, diarrhea, nausea and vomiting.   Genitourinary:  Negative for genital sores.   Musculoskeletal:  Positive for arthralgias and back pain.   Skin:  Negative for rash.   Allergic/Immunologic: Positive for immunocompromised state.   Neurological:  Negative for dizziness, weakness and light-headedness.   Hematological:  Does not bruise/bleed easily.       Objective:     There were no vitals filed for this visit.      Past Medical History:   Diagnosis Date    Ankylosing spondylitis     Arthritis     Celiac disease     Depression     Family history of DVT     MGM DVT, mother spleenic    Family history of factor V Leiden mutation     mother and sister     GERD (gastroesophageal reflux disease)     Migraine     Osteoarthritis     Psoriasis     Psoriatic arthritis mutilans      Past Surgical History:   Procedure Laterality Date    brain decompression   2006    BRAIN SURGERY      TUBAL LIGATION            Physical Exam   Constitutional: She is oriented to person, place, and time.   Neurological: She is alert and oriented to person, place, and time.   Psychiatric: Mood, affect and judgment normal.       Results for orders placed or performed in visit on 11/14/22   POCT COVID-19 Rapid Screening   Result Value Ref Range    POC Rapid COVID Negative Negative     Acceptable Yes    POCT Strep A, Molecular   Result Value Ref Range    Molecular Strep A, POC Negative Negative     Acceptable Yes    POCT Influenza A/B   Result Value Ref Range    Rapid Influenza A Ag Negative Negative    Rapid Influenza B Ag Negative Negative     Acceptable Yes          Assessment:       1. Ankylosing spondylitis, unspecified site of spine    2. Chronic left SI joint pain    3. Type 2 diabetes mellitus with other specified complication, unspecified whether long term insulin use    4. Chronic pain syndrome    5. Nausea & vomiting              Plan:       Ankylosing spondylitis, unspecified site of spine  -     X-Ray Lumbar Spine AP And Lateral; Future; Expected date: 12/08/2022  -     Ambulatory referral/consult to Pain Clinic; Future; Expected date: 12/15/2022  -     Ambulatory referral/consult to Physical/Occupational Therapy; Future; Expected date: 12/15/2022  -     oxyCODONE-acetaminophen (PERCOCET)  mg per tablet; Take 1 tablet by mouth every 8 (eight) hours as needed for Pain.  Dispense: 90 tablet; Refill: 0  -     oxyCODONE-acetaminophen (PERCOCET)  mg per tablet; Take 1 tablet by mouth every 8 (eight) hours as needed for Pain.  Dispense: 90 tablet; Refill: 0  -     oxyCODONE-acetaminophen (PERCOCET)  mg per tablet; Take 1 tablet  by mouth every 8 (eight) hours as needed for Pain.  Dispense: 90 tablet; Refill: 0  -     Insulin, Random; Future; Expected date: 12/08/2022  -     TESTOSTERONE; Future; Expected date: 12/08/2022  -     Estrogens, Total; Future; Expected date: 12/08/2022  -     DHEA; Future; Expected date: 12/08/2022    Chronic left SI joint pain  -     Ambulatory referral/consult to Pain Clinic; Future; Expected date: 12/15/2022  -     Ambulatory referral/consult to Physical/Occupational Therapy; Future; Expected date: 12/15/2022  -     oxyCODONE-acetaminophen (PERCOCET)  mg per tablet; Take 1 tablet by mouth every 8 (eight) hours as needed for Pain.  Dispense: 90 tablet; Refill: 0  -     oxyCODONE-acetaminophen (PERCOCET)  mg per tablet; Take 1 tablet by mouth every 8 (eight) hours as needed for Pain.  Dispense: 90 tablet; Refill: 0  -     oxyCODONE-acetaminophen (PERCOCET)  mg per tablet; Take 1 tablet by mouth every 8 (eight) hours as needed for Pain.  Dispense: 90 tablet; Refill: 0  -     Insulin, Random; Future; Expected date: 12/08/2022  -     TESTOSTERONE; Future; Expected date: 12/08/2022  -     Estrogens, Total; Future; Expected date: 12/08/2022  -     DHEA; Future; Expected date: 12/08/2022    Type 2 diabetes mellitus with other specified complication, unspecified whether long term insulin use  -     Discontinue: semaglutide (OZEMPIC) 1 mg/dose (4 mg/3 mL); Inject 1 mg into the skin every 7 days for 30 days, THEN 2 mg every 7 days.  Dispense: 1 pen; Refill: 6  -     oxyCODONE-acetaminophen (PERCOCET)  mg per tablet; Take 1 tablet by mouth every 8 (eight) hours as needed for Pain.  Dispense: 90 tablet; Refill: 0  -     oxyCODONE-acetaminophen (PERCOCET)  mg per tablet; Take 1 tablet by mouth every 8 (eight) hours as needed for Pain.  Dispense: 90 tablet; Refill: 0  -     oxyCODONE-acetaminophen (PERCOCET)  mg per tablet; Take 1 tablet by mouth every 8 (eight) hours as needed for Pain.   Dispense: 90 tablet; Refill: 0  -     Insulin, Random; Future; Expected date: 12/08/2022  -     TESTOSTERONE; Future; Expected date: 12/08/2022  -     Estrogens, Total; Future; Expected date: 12/08/2022  -     DHEA; Future; Expected date: 12/08/2022    Chronic pain syndrome  -     oxyCODONE-acetaminophen (PERCOCET)  mg per tablet; Take 1 tablet by mouth every 8 (eight) hours as needed for Pain.  Dispense: 90 tablet; Refill: 0  -     oxyCODONE-acetaminophen (PERCOCET)  mg per tablet; Take 1 tablet by mouth every 8 (eight) hours as needed for Pain.  Dispense: 90 tablet; Refill: 0  -     oxyCODONE-acetaminophen (PERCOCET)  mg per tablet; Take 1 tablet by mouth every 8 (eight) hours as needed for Pain.  Dispense: 90 tablet; Refill: 0  -     Insulin, Random; Future; Expected date: 12/08/2022  -     TESTOSTERONE; Future; Expected date: 12/08/2022  -     Estrogens, Total; Future; Expected date: 12/08/2022  -     DHEA; Future; Expected date: 12/08/2022    Nausea & vomiting  Comments:  promethazine prescribed  Orders:  -     oxyCODONE-acetaminophen (PERCOCET)  mg per tablet; Take 1 tablet by mouth every 8 (eight) hours as needed for Pain.  Dispense: 90 tablet; Refill: 0  -     oxyCODONE-acetaminophen (PERCOCET)  mg per tablet; Take 1 tablet by mouth every 8 (eight) hours as needed for Pain.  Dispense: 90 tablet; Refill: 0  -     oxyCODONE-acetaminophen (PERCOCET)  mg per tablet; Take 1 tablet by mouth every 8 (eight) hours as needed for Pain.  Dispense: 90 tablet; Refill: 0  -     Insulin, Random; Future; Expected date: 12/08/2022  -     TESTOSTERONE; Future; Expected date: 12/08/2022  -     Estrogens, Total; Future; Expected date: 12/08/2022  -     DHEA; Future; Expected date: 12/08/2022          Assessment:  41 year old female with  Psoriatic arthritis, Ankylosing spondylitis on cosentyx 300 mg, elevated CRP  --chronic insomnia on trazodone  --anxiety on prozac  --hx of brain  surgery  --persistent leukocytosis and thrombocytosis, hematology work up unrevealing  --thyroid nodule  --pulmonary nodule  --chronic pain syndrome followed by pain management    Plan:  1. Cont cosentyx 300 mg monthly. Hold until infection resolves  2.on mounjaro insurance  3.Percocet  I have checked [place] prescription monitoring program site and no unusual or abnormal behavior has occurred pt understand the risk and benefits of taking opioid medications and has decided to continue the medication.    The patient location is: home  The chief complaint leading to consultation is: psa    Visit type: audiovisual    Face to Face time with patient: 40   minutes of total time spent on the encounter, which includes face to face time and non-face to face time preparing to see the patient (eg, review of tests), Obtaining and/or reviewing separately obtained history, Documenting clinical information in the electronic or other health record, Independently interpreting results (not separately reported) and communicating results to the patient/family/caregiver, or Care coordination (not separately reported).         Each patient to whom he or she provides medical services by telemedicine is:  (1) informed of the relationship between the physician and patient and the respective role of any other health care provider with respect to management of the patient; and (2) notified that he or she may decline to receive medical services by telemedicine and may withdraw from such care at any time.    Notes:

## 2022-12-09 ENCOUNTER — HOSPITAL ENCOUNTER (OUTPATIENT)
Dept: RADIOLOGY | Facility: HOSPITAL | Age: 42
Discharge: HOME | End: 2022-12-09
Attending: INTERNAL MEDICINE
Payer: COMMERCIAL

## 2022-12-09 ENCOUNTER — PATIENT MESSAGE (OUTPATIENT)
Dept: RHEUMATOLOGY | Facility: CLINIC | Age: 42
End: 2022-12-09
Payer: COMMERCIAL

## 2022-12-09 ENCOUNTER — TELEPHONE (OUTPATIENT)
Dept: RHEUMATOLOGY | Facility: CLINIC | Age: 42
End: 2022-12-09
Payer: COMMERCIAL

## 2022-12-09 DIAGNOSIS — M45.9 ANKYLOSING SPONDYLITIS, UNSPECIFIED SITE OF SPINE: ICD-10-CM

## 2022-12-09 PROCEDURE — 72100 XR LUMBAR SPINE AP AND LATERAL: ICD-10-PCS | Mod: 26,,, | Performed by: RADIOLOGY

## 2022-12-09 PROCEDURE — 82626 DEHYDROEPIANDROSTERONE: CPT | Performed by: INTERNAL MEDICINE

## 2022-12-09 PROCEDURE — 72100 X-RAY EXAM L-S SPINE 2/3 VWS: CPT | Mod: 26,,, | Performed by: RADIOLOGY

## 2022-12-09 PROCEDURE — 72100 X-RAY EXAM L-S SPINE 2/3 VWS: CPT | Mod: TC,PO

## 2022-12-09 NOTE — TELEPHONE ENCOUNTER
Patient sent my chart message response there  ----- Message from Katarina Preciado sent at 12/9/2022  9:59 AM CST -----  Regarding: med question  Type:  Pharmacy Calling to Clarify an RX    Name of Caller:  Sparkle    Pharmacy Name:      [facility] [place]  [id number]  [address] [address]  Phone: [phone] Fax: [phone]      Prescription Name:    semaglutide (OZEMPIC) 1 mg/dose (4 mg/3 mL) 1 pen 6 12/8/2022 2/6/2023   Sig - Route: Inject 1 mg into the skin every 7 days for 30 days, THEN 2 mg every 7 days. - Subcutaneous   Sent to pharmacy as: semaglutide (OZEMPIC) 1 mg/dose (4 mg/3 mL)   E-Prescribing Status: Receipt confirmed by pharmacy (12/8/2022  2:11 PM CST)     What do they need to clarify?:  pharm states cannot fill as written please call phar to discuss  Best Call Back Number:  [phone]  Additional Information:  pharmacy is holding medication until they hear from office, need to be called back today

## 2022-12-12 ENCOUNTER — PATIENT MESSAGE (OUTPATIENT)
Dept: RHEUMATOLOGY | Facility: CLINIC | Age: 42
End: 2022-12-12
Payer: COMMERCIAL

## 2022-12-15 ENCOUNTER — PATIENT MESSAGE (OUTPATIENT)
Dept: RHEUMATOLOGY | Facility: CLINIC | Age: 42
End: 2022-12-15
Payer: COMMERCIAL

## 2022-12-18 ENCOUNTER — PATIENT MESSAGE (OUTPATIENT)
Dept: RHEUMATOLOGY | Facility: CLINIC | Age: 42
End: 2022-12-18
Payer: COMMERCIAL

## 2022-12-18 DIAGNOSIS — E11.69 TYPE 2 DIABETES MELLITUS WITH OTHER SPECIFIED COMPLICATION, UNSPECIFIED WHETHER LONG TERM INSULIN USE: Primary | ICD-10-CM

## 2022-12-20 ENCOUNTER — PATIENT MESSAGE (OUTPATIENT)
Dept: RHEUMATOLOGY | Facility: CLINIC | Age: 42
End: 2022-12-20
Payer: COMMERCIAL

## 2022-12-20 RX ORDER — TIRZEPATIDE 10 MG/.5ML
10 INJECTION, SOLUTION SUBCUTANEOUS
Qty: 12 PEN | Refills: 1 | Status: SHIPPED | OUTPATIENT
Start: 2022-12-20 | End: 2022-12-27

## 2022-12-21 ENCOUNTER — PATIENT MESSAGE (OUTPATIENT)
Dept: RHEUMATOLOGY | Facility: CLINIC | Age: 42
End: 2022-12-21
Payer: COMMERCIAL

## 2022-12-22 ENCOUNTER — PATIENT MESSAGE (OUTPATIENT)
Dept: RHEUMATOLOGY | Facility: CLINIC | Age: 42
End: 2022-12-22
Payer: COMMERCIAL

## 2022-12-23 ENCOUNTER — PATIENT MESSAGE (OUTPATIENT)
Dept: RHEUMATOLOGY | Facility: CLINIC | Age: 42
End: 2022-12-23
Payer: COMMERCIAL

## 2022-12-24 ENCOUNTER — PATIENT MESSAGE (OUTPATIENT)
Dept: RHEUMATOLOGY | Facility: CLINIC | Age: 42
End: 2022-12-24
Payer: COMMERCIAL

## 2022-12-26 ENCOUNTER — PATIENT MESSAGE (OUTPATIENT)
Dept: RHEUMATOLOGY | Facility: CLINIC | Age: 42
End: 2022-12-26
Payer: COMMERCIAL

## 2022-12-29 ENCOUNTER — PATIENT MESSAGE (OUTPATIENT)
Dept: RHEUMATOLOGY | Facility: CLINIC | Age: 42
End: 2022-12-29
Payer: MEDICARE

## 2023-01-02 ENCOUNTER — PATIENT MESSAGE (OUTPATIENT)
Dept: RHEUMATOLOGY | Facility: CLINIC | Age: 43
End: 2023-01-02
Payer: MEDICARE

## 2023-01-03 ENCOUNTER — PATIENT MESSAGE (OUTPATIENT)
Dept: RHEUMATOLOGY | Facility: CLINIC | Age: 43
End: 2023-01-03
Payer: MEDICARE

## 2023-01-04 ENCOUNTER — PATIENT MESSAGE (OUTPATIENT)
Dept: RHEUMATOLOGY | Facility: CLINIC | Age: 43
End: 2023-01-04
Payer: MEDICARE

## 2023-01-04 DIAGNOSIS — L40.50 PSORIATIC ARTHRITIS: ICD-10-CM

## 2023-01-04 DIAGNOSIS — M45.9 ANKYLOSING SPONDYLITIS, UNSPECIFIED SITE OF SPINE: ICD-10-CM

## 2023-01-04 RX ORDER — SECUKINUMAB 150 MG/ML
300 INJECTION SUBCUTANEOUS
Qty: 2 ML | Refills: 6 | Status: SHIPPED | OUTPATIENT
Start: 2023-01-04 | End: 2023-07-21

## 2023-01-05 ENCOUNTER — SPECIALTY PHARMACY (OUTPATIENT)
Dept: PHARMACY | Facility: CLINIC | Age: 43
End: 2023-01-05
Payer: MEDICARE

## 2023-01-05 DIAGNOSIS — M45.9 ANKYLOSING SPONDYLITIS, UNSPECIFIED SITE OF SPINE: Primary | ICD-10-CM

## 2023-01-05 NOTE — TELEPHONE ENCOUNTER
Vnaessa, this is Karyn Yao with [facility].  We are working on your prescription that your doctor has sent us. We will be working with your insurance to get this approved for you. We will be calling you along the way with updates on your medication.  If you have any questions, you can reach us at [phone].    Welcome call outcome: Patient/caregiver reached    Pt fills at [facility]. Does not need an injection for a month

## 2023-01-05 NOTE — TELEPHONE ENCOUNTER
Submitted demographic information for Cosentyx via CMM (Key: [id number]). Waiting on clinical questions to come back.

## 2023-01-06 ENCOUNTER — OFFICE VISIT (OUTPATIENT)
Dept: PAIN MEDICINE | Facility: CLINIC | Age: 43
End: 2023-01-06
Payer: COMMERCIAL

## 2023-01-06 ENCOUNTER — PATIENT MESSAGE (OUTPATIENT)
Dept: RHEUMATOLOGY | Facility: CLINIC | Age: 43
End: 2023-01-06
Payer: MEDICARE

## 2023-01-06 VITALS
BODY MASS INDEX: 30.09 KG/M2 | SYSTOLIC BLOOD PRESSURE: 122 MMHG | WEIGHT: 159.38 LBS | DIASTOLIC BLOOD PRESSURE: 84 MMHG | HEIGHT: 61 IN | HEART RATE: 97 BPM

## 2023-01-06 DIAGNOSIS — M53.3 CHRONIC LEFT SI JOINT PAIN: ICD-10-CM

## 2023-01-06 DIAGNOSIS — M54.2 CERVICALGIA: ICD-10-CM

## 2023-01-06 DIAGNOSIS — M54.50 LUMBAR BACK PAIN: Primary | ICD-10-CM

## 2023-01-06 DIAGNOSIS — M45.9 ANKYLOSING SPONDYLITIS, UNSPECIFIED SITE OF SPINE: ICD-10-CM

## 2023-01-06 DIAGNOSIS — G89.29 CHRONIC LEFT SI JOINT PAIN: ICD-10-CM

## 2023-01-06 PROCEDURE — 99999 PR PBB SHADOW E&M-EST. PATIENT-LVL V: ICD-10-PCS | Mod: PBBFAC,,, | Performed by: PHYSICIAN ASSISTANT

## 2023-01-06 PROCEDURE — 99999 PR PBB SHADOW E&M-EST. PATIENT-LVL V: CPT | Mod: PBBFAC,,, | Performed by: PHYSICIAN ASSISTANT

## 2023-01-06 PROCEDURE — 99204 PR OFFICE/OUTPT VISIT, NEW, LEVL IV, 45-59 MIN: ICD-10-PCS | Mod: S$GLB,,, | Performed by: PHYSICIAN ASSISTANT

## 2023-01-06 PROCEDURE — 99215 OFFICE O/P EST HI 40 MIN: CPT | Mod: PBBFAC,PN | Performed by: PHYSICIAN ASSISTANT

## 2023-01-06 PROCEDURE — 99204 OFFICE O/P NEW MOD 45 MIN: CPT | Mod: S$GLB,,, | Performed by: PHYSICIAN ASSISTANT

## 2023-01-06 RX ORDER — TIZANIDINE 4 MG/1
4 TABLET ORAL NIGHTLY PRN
Qty: 40 TABLET | Refills: 0 | Status: SHIPPED | OUTPATIENT
Start: 2023-01-06 | End: 2023-01-31

## 2023-01-09 ENCOUNTER — PATIENT MESSAGE (OUTPATIENT)
Dept: RHEUMATOLOGY | Facility: CLINIC | Age: 43
End: 2023-01-09
Payer: MEDICARE

## 2023-01-09 NOTE — TELEPHONE ENCOUNTER
PA denied due to updated chart notes not showing that the patient has achieved or maintained a positive clinical response as evidenced by low disease activity or improvement in signs  and symptoms of the condition since starting treatment with the requested drug. Messaged Dr Shea/Erin Velasquez if they can update OV notes. Awaiting response.

## 2023-01-09 NOTE — PROGRESS NOTES
[facility] New Patient Evaluation      Referred by: Dr. Rafiq Shea    PCP: Chrissie Lancaster MD    CC:   Chief Complaint   Patient presents with    Low-back Pain     Pain radiates down into the bottom of the tailbone.     Neck Pain     Pain radiates down into both shoulders R>L.      No flowsheet data found.      HPI:   Sparkle Jose is a 42 y.o. female smoker with history of ankylosing spondylitis, arthritis, GERD, psoriasis, obesity presents with neck and back pain.  She has years of neck pain into the right greater than left shoulders and interscapular region.  She denies any radicular arm pain, numbness or tingling.  She has 6-8 months of pain in the lowre back to the coccyxgeal region.  Denies any radicular leg pain, numbness or tingling.  She has tried muscle relaxants, nsaids, percocet through the years.  She had PT for the neck and back years ago, but none recent.  Cervical neve blocks  in the past with none recently.  No lumbar injections.      Past and current medications:  Antineuropathics:  NSAIDs: tried  Antidepressants:  Muscle relaxers:  tried  Opioids: percocet (prescribed by Dr. Shea)  Antiplatelets/Anticoagulants:    Physical therapy/ Chiropractic care:  Years ago for neck and lower back    Pain Intervention History:  Patient reported cervical nerve blocks by Dr. Luna in the past.    Past Spine Surgical History:        History:    Current Outpatient Medications:     buPROPion (WELLBUTRIN XL) 300 MG 24 hr tablet, Take 1 tablet (300 mg total) by mouth once daily., Disp: 90 tablet, Rfl: 3    clobetasol 0.05% (TEMOVATE) 0.05 % Oint, 1 application daily as needed. , Disp: , Rfl:     cloNIDine (CATAPRES) 0.1 MG tablet, Take 1 tablet (0.1 mg total) by mouth every evening., Disp: 90 tablet, Rfl: 3    eletriptan (RELPAX) 40 MG tablet, Take 1 tablet (40 mg total) by mouth as needed. may repeat in 2 hours if necessary, Disp: 9 tablet, Rfl: 0    famotidine (PEPCID) 40 MG tablet, Take  1 tablet (40 mg total) by mouth once daily., Disp: 30 tablet, Rfl: 5    ketoconazole (NIZORAL) 2 % shampoo, Wash body with medicated shampoo at least 2x/week - let soak at least 5 minutes prior to rinsing, Disp: 120 mL, Rfl: 5    metFORMIN (GLUCOPHAGE-XR) 500 MG ER 24hr tablet, Take 1 tablet (500 mg total) by mouth every evening., Disp: 90 tablet, Rfl: 3    oxyCODONE-acetaminophen (PERCOCET)  mg per tablet, Take 1 tablet by mouth every 8 (eight) hours as needed for Pain., Disp: 90 tablet, Rfl: 0    oxyCODONE-acetaminophen (PERCOCET)  mg per tablet, Take 1 tablet by mouth every 8 (eight) hours as needed for Pain., Disp: 90 tablet, Rfl: 0    [START ON 2/6/2023] oxyCODONE-acetaminophen (PERCOCET)  mg per tablet, Take 1 tablet by mouth every 8 (eight) hours as needed for Pain., Disp: 90 tablet, Rfl: 0    progesterone (PROMETRIUM) 100 MG capsule, TAKE 1 CAPSULE BY MOUTH EVERY DAY IN THE EVENING, Disp: 90 capsule, Rfl: 3    promethazine (PHENERGAN) 25 MG tablet, Take 1 tablet (25 mg total) by mouth every 6 (six) hours as needed for Nausea., Disp: 45 tablet, Rfl: 5    secukinumab (COSENTYX PEN, 2 PENS,) 150 mg/mL PnIj, Inject 300 mg into the skin every 30 days., Disp: 2 mL, Rfl: 6    tirzepatide (MOUNJARO) 10 mg/0.5 mL PnIj, INJECT 10 MG INTO THE SKIN EVERY 7 DAYS., Disp: 6 pen, Rfl: 1    tiZANidine (ZANAFLEX) 4 MG tablet, Take 1 tablet (4 mg total) by mouth nightly as needed (as needed for muscle spasm/ pain)., Disp: 40 tablet, Rfl: 0    traZODone (DESYREL) 100 MG tablet, Take 1 tablet (100 mg total) by mouth every evening., Disp: 90 tablet, Rfl: 1    Past Medical History:   Diagnosis Date    Ankylosing spondylitis     Arthritis     Celiac disease     Depression     Family history of DVT     MGM DVT, mother spleenic    Family history of factor V Leiden mutation     mother and sister    GERD (gastroesophageal reflux disease)     Migraine     Osteoarthritis     Psoriasis     Psoriatic arthritis mutilans   "      Past Surgical History:   Procedure Laterality Date    brain decompression   2006    BRAIN SURGERY      TUBAL LIGATION         Family History   Problem Relation Age of Onset    Hypertension Mother     Clotting disorder Mother     Depression Sister     Asthma Brother     Clotting disorder Sister     Breast cancer Neg Hx     Ovarian cancer Neg Hx        Social History     Socioeconomic History    Marital status:    Tobacco Use    Smoking status: Every Day     Packs/day: 0.50     Years: 27.00     Pack years: 13.50     Types: Cigarettes     Start date: 1/1/1993    Smokeless tobacco: Never    Tobacco comments:     5-6 CIGS DAILY last quit attempt 8/14/2019   Substance and Sexual Activity    Alcohol use: No     Alcohol/week: 0.0 standard drinks    Drug use: No    Sexual activity: Yes     Birth control/protection: See Surgical Hx       Review of patient's allergies indicates:  No Known Allergies    Review of Systems:  Neck pain.  Low back pain.  Shoulder pain..  Balance of review of systems is negative.    Physical Exam:  Vitals:    01/06/23 1533   BP: 122/84   Pulse: 97   Weight: 72.3 kg (159 lb 6.3 oz)   Height: 5' 1" (1.549 m)   PainSc:   5   PainLoc: Back     Body mass index is 30.12 kg/m².    Gen: NAD  Psych: mood appropriate for given condition  HEENT: eyes anicteric   CV: RRR, 2+ radial pulse  HEENT: anicteric   Respiratory: non-labored, no signs of respiratory distress  Abd: non-distended  Skin: warm, dry and intact.  Gait: Able to heel walk, toe walk. No antalgic gait.     Coordination:   Romberg: negative  Finger to nose coordination: normal  Heel to shin coordination: normal  Tandem walking coordination: normal    Cervical spine:   ROM is full in flexion, extension and lateral rotation without increased pain.  Spurling's maneuver causes no neck pain to either side.  Myofascial exam: No Tenderness to palpation across cervical paraspinous region bilaterally.    Lumbar spine:   ROM is full with flexion " extension and oblique extension with no increased pain.    Prem's test causes no increased pain on either side.    Supine straight leg raise is negative bilaterally.    Internal and external rotation of the hip causes no increased pain on either side.  Myofascial exam: No tenderness to palpation across lumbar paraspinous muscles. No tenderness to palpation over the bilateral greater trochanters and bilateral SI joint    Sensory:  Intact and symmetrical to light touch in C4-T1 dermatomes bilaterally. Intact and symmetrical to light touch in L1-S1 dermatomes bilaterally.    Motor:    Right Left   C4 Shoulder Abduction  5  5   C5 Elbow Flexion    5  5   C6 Wrist Extension  5  5   C7 Elbow Extension   5  5   C8/T1 Hand Intrinsics   5  5        Right Left   L2/3 Iliacus Hip flexion  5  5   L3/4 Qudratus Femoris Knee Extension  5  5   L4/5 Tib Anterior Ankle Dorsiflexion   5  5   L5/S1 Extensor Hallicus Longus Great toe extension  5  5   S1/S2 Gastroc/Soleus Plantar Flexion  5  5      Right Left   Triceps DTR 2+ 2+   Biceps DTR 2+ 2+   Brachioradialis DTR 2+ 2+   Patellar DTR 2+ 2+   Achilles DTR 2+ 2+   Cha Absent  Absent   Clonus Absent Absent   Babinski Absent Absent     Imaging:    MRI cervical spine 12-6-19:  no significant central canal stenosis or foraminal stenosis.  No significant abnormality.    Xray SI joint 11-17-22:  within normal limits    Xray hips 11-17-22:  no significant abnormality    Xray lumbar psin 12-9-22:  no significant spinal abnormality.  No evidence of ankylosis or briding osteophytes.    Labs:  Lab Results   Component Value Date    HGBA1C 5.8 (H) 06/06/2022       Lab Results   Component Value Date    WBC 14.59 (H) 05/26/2022    HGB 14.2 05/26/2022    HCT 45.6 05/26/2022    MCV 98 05/26/2022     05/26/2022           Assessment:     Ms. Villagran has chronic neck and lower back pain.  Pain can be myofascial, mechanical, influenced by history of ankylosing spondylitis. With no  neurologicial deficits, no radicular pain, and no significant abnormality on imaging that has been completed, I recommend further PT for the neck and lower back at this time before proceeding with further MRI of the neck or lower back to consider additional interventional procedures.  She would like to PT.  Follow up in 6-8 weeks to monitor progress.  Zanaflex prescribed for muscle spasms at night.    Problem List Items Addressed This Visit       Ankylosing spondylitis     Other Visit Diagnoses       Lumbar back pain    -  Primary    Relevant Medications    tiZANidine (ZANAFLEX) 4 MG tablet    Other Relevant Orders    Ambulatory referral/consult to Physical/Occupational Therapy    Chronic left SI joint pain        Cervicalgia        Relevant Medications    tiZANidine (ZANAFLEX) 4 MG tablet    Other Relevant Orders    Ambulatory referral/consult to Physical/Occupational Therapy              Follow Up: RTC 6-8 weeks.    : Not viewed.          Cassandra Alvarez PA-C  [facility]      This note was completed with dictation software and grammatical errors may exist.

## 2023-01-09 NOTE — TELEPHONE ENCOUNTER
Incoming call from [facility] wanting to know the last time the pt got a TB test. Informed her it was 7/9/2019 and the pt has been on the medication since 2016.

## 2023-01-10 NOTE — TELEPHONE ENCOUNTER
PA for Cosentyx approved from 1/9/23-1/9/24.     Test claim rejects. Pt must fill at [facility] [phone]    Pt already fills medication there.    Outgoing call to pt to inform her, No answer, LVM.     Will route rx there and close out of [facility] services.

## 2023-01-11 ENCOUNTER — PATIENT MESSAGE (OUTPATIENT)
Dept: RHEUMATOLOGY | Facility: CLINIC | Age: 43
End: 2023-01-11
Payer: MEDICARE

## 2023-01-15 ENCOUNTER — TELEPHONE (OUTPATIENT)
Dept: RHEUMATOLOGY | Facility: CLINIC | Age: 43
End: 2023-01-15

## 2023-01-16 NOTE — TELEPHONE ENCOUNTER
----- Message from Karyn Yao PharmD sent at 1/9/2023 10:18 AM CST -----  Regarding: Cosentyx  Erin,     I know the patient messaged you that her Cosentyx PA was approved but it was actually denied because the chart notes do not reflect that the patient has achieved or maintained a positive clinical response as evidenced by low disease activity or improvement in signs and symptoms of the condition since starting treatment with the requested drug. The chart notes say she is doing fair on Cosentyx and has only been on for 3 months, having continued pain/stiffness. I think this may be outdated information bc she has been on for a lot longer than 3 months. Is there anyway the chart notes could be updated to reflect a positive clinical response so I can re-submit the PA?    Thanks,     Karyn Yao, PharmD   [facility] [place] [address]  [phone] (phone)  [phone] (fax)

## 2023-01-16 NOTE — TELEPHONE ENCOUNTER
I'm sorry for the delay. I see you were able to get cosentyx approved and forwarded to [facility]. Thank you!

## 2023-01-17 ENCOUNTER — PATIENT MESSAGE (OUTPATIENT)
Dept: RHEUMATOLOGY | Facility: CLINIC | Age: 43
End: 2023-01-17
Payer: MEDICARE

## 2023-01-17 NOTE — TELEPHONE ENCOUNTER
Please call the patient and find out what is needed for multiple medications and do not email. There has been confusion with what is needed via email communication.

## 2023-01-18 ENCOUNTER — CLINICAL SUPPORT (OUTPATIENT)
Dept: REHABILITATION | Facility: HOSPITAL | Age: 43
End: 2023-01-18
Payer: MEDICARE

## 2023-01-18 DIAGNOSIS — R53.1 WEAKNESS: ICD-10-CM

## 2023-01-18 DIAGNOSIS — M54.2 CERVICALGIA: ICD-10-CM

## 2023-01-18 DIAGNOSIS — M54.50 LUMBAR PAIN: ICD-10-CM

## 2023-01-18 DIAGNOSIS — M54.50 LUMBAR BACK PAIN: ICD-10-CM

## 2023-01-18 PROCEDURE — 97161 PT EVAL LOW COMPLEX 20 MIN: CPT | Mod: PN | Performed by: PHYSICAL THERAPIST

## 2023-01-18 PROCEDURE — 97110 THERAPEUTIC EXERCISES: CPT | Mod: PN | Performed by: PHYSICAL THERAPIST

## 2023-01-18 NOTE — PLAN OF CARE
[facility]  Physical Therapy Initial Evaluation    Date: 1/18/2023   Name: Sparkle Jose  Clinic Number: [id number]    Therapy Diagnosis:   Encounter Diagnoses   Name Primary?    Cervicalgia     Lumbar back pain     Lumbar pain     Weakness      Physician: Cassandra Alvarez PA-C    Physician Orders: PT Eval and Treat   Medical Diagnosis from Referral: Cervicalgia and Lumbar Back pain  Evaluation Date: 1/18/2023  Authorization Period Expiration: 12/31/23  Plan of Care Expiration: 3/2/23  Progress Note Due: 2/17/23  Visit # / Visits authorized: 1/ 1 (eval)   FOTO: 1/3      Precautions: Standard    Time In: 3:05  Time Out: 4:00  Total Appointment Time (timed & untimed codes): 55 minutes    Subjective   Date of onset: 6 months ago  History of current condition - Sparkle reports: 4-6 months insidious gradual onset of LBP and previous problems with lower back. She has pain with laying down, bending, twisting, pain comes and goes and with sleeping. She states that she has difficulty with lifting heavy objects.       Past Medical History:   Diagnosis Date    Ankylosing spondylitis     Arthritis     Celiac disease     Depression     Family history of DVT     MGM DVT, mother spleenic    Family history of factor V Leiden mutation     mother and sister    GERD (gastroesophageal reflux disease)     Migraine     Osteoarthritis     Psoriasis     Psoriatic arthritis mutilans      Sparkle Jose  has a past surgical history that includes Brain surgery; brain decompression  (2006); and Tubal ligation.    Sparkle has a current medication list which includes the following prescription(s): bupropion, clobetasol 0.05%, clonidine, eletriptan, famotidine, ketoconazole, metformin, oxycodone-acetaminophen, oxycodone-acetaminophen, [START ON 2/6/2023] oxycodone-acetaminophen, progesterone, promethazine, cosentyx pen (2 pens), mounjaro, tizanidine, and trazodone.    Review of patient's allergies  indicates:  No Known Allergies     Imaging: Xray, MRI    Prior Therapy: Yes  Social History: Lives with , daughter and granddaughter  Occupation: Not working  Prior Level of Function: Independent  Current Level of Function: Requires assistance with lifting heavy objects    Pain:  Current 4/10, worst 8/10, best 2/10   Location: left back   Description: Sharp  Aggravating Factors: Sitting, Laying, Bending, Night Time, Morning, Flexing, and Lifting  Easing Factors: heating pad    Pts goals: Gain ROM, sleep better, independence with household work, interested in long term exercise program      Objective   Red Flag Screening:   Cough  Sneeze  Strain: (--)  Bladder/ bowel: (--)  Falls: (--)  Night pain: (--)  Unexplained weight loss: (--)  General health: generally healthy,     Posture/ Alignment: Protruded Head, Protracted Scapula, reduced cervical lordosis, decreased lumbar lordosis     GAIT DEVIATIONS: Sparkle amb with  normal gait mechanics and speed  .    ROM:    AROM  Comment   Flexion: Moderate loss   *   Extension: Mild loss   *   Lat Flex R: Mild loss   *   Lat Flex L: Mild loss   *   Rotation R: Major loss      Rotation L: Major loss   *   *pain      Dermatomes:   Right Left Comment   L2 (lateral thigh) intact intact    L3 (medial thigh) intact intact    L4 (medial calf) intact intact    L5 (lateral calf) intact intact    S1 (lateral foot) intact intact    S2 (gastro/HS) intact intact    Saddle (cauda equina) intact intact      Myotomes:   Right Left Comment   Iliacus: (L2-3) 5/5 5/5    Knee extension (L3-4): 5/5 5/5    DF (L4-5): 5/5 5/5    Extensor digitorum longus, Peroneus(L5-S1): 5/5 5/5    Gastroc/Soleus(S1-S2): 5/5 5/5      Hip extension: R: 4/5  L: 4/5  Hip abduction: R: 4/5 L:4/5    DTR:   Right Left Comment   Patellar (L3-4) 0 1+    Achilles (S1) 0 1+        Special Tests:  REPEATED TEST MOVEMENTS:  Repeated Flexion in Standing    Repeated Extension in Standing    Repeated Flexion in lying no  effect   Repeated Extension in lying  no effect   *appears to have flexion preference    - RENEE (+)   - SI distraction (+)   - Sacral thrust (+)   - Gaenslens (+)   - SI compression (+)     Functional Tests (* indicates w/ pain)   Squat: Dysfunctional pattern; without pain     Palpation:  TTP left PSIS    Joint Play:      Pt/family was provided educational information, including: role of PT, goals for PT, scheduling - pt verbalized understanding. Discussed insurance plan with pt.       CMS Impairment/Limitation/Restriction for FOTO Lumbar Survey    Therapist reviewed FOTO scores for Sparkle Jose on 1/18/2023.   FOTO documents entered into [employer] - see Media section.    Limitation Score: 47%  Category: Mobility               TREATMENT   Treatment Time In: 3:45 PM  Treatment Time Out: 4:00 PM  Total Treatment time separate from Evaluation: 15 minutes    Sparkle received therapeutic exercises to develop strength, endurance, ROM, posture, and core stabilization for 15 minutes including:    Repeated flexion in supine, x10, knees to chest  Repeated extension in prone, x10, prone pressup (increased pain)  Open book supine trunk rotation, x10 each side    Educated on directional exercises, HEP,         Sparkle received the following manual therapy techniques: Joint mobilizations, Myofacial release, and Soft tissue Mobilization were applied to the:  for  minutes, including:      Sparkle participated in neuromuscular re-education activities to improve: Balance, Coordination, Proprioception, and Posture for  minutes. The following activities were included:      Sparkle participated in dynamic functional therapeutic activities to improve functional performance for   minutes, including:        Home Exercises and Patient Education Provided:   - Daily walking 10-20 minutes    Education provided:   - Educated on HEP    Written Home Exercises Provided: yes.  Exercises were reviewed and Sparkle was able to demonstrate them  prior to the end of the session.  Sparkle demonstrated good  understanding of the education provided.     See EMR under Patient Instructions for exercises provided 1/18/2023.      Assessment   Pt presents with chronic LBP with clinical signs of SI joint dysfunction and pain. Patient is impaired in daily activities by loss of motion in the spine, pain, and decreased strength.     Pt prognosis is Good.   Pt will benefit from skilled outpatient Physical Therapy to address the deficits stated above and in the chart below, provide pt/family education, and to maximize pt's level of independence.     Plan of care discussed with patient: Yes  Pt's spiritual, cultural and educational needs considered and patient is agreeable to the plan of care and goals as stated below:     Anticipated Barriers for therapy: None    Medical Necessity is demonstrated by the following  History  Co-morbidities and personal factors that may impact the plan of care Co-morbidities:   anxiety, depression, difficulty sleeping, high BMI, and prior brain surgery    Personal Factors:   no deficits     moderate   Examination  Body Structures and Functions, activity limitations and participation restrictions that may impact the plan of care Body Regions:   neck  back    Body Systems:    ROM  strength  gross coordinated movement    Participation Restrictions:   None    Activity limitations:   Learning and applying knowledge  no deficits    General Tasks and Commands  no deficits    Communication  no deficits    Mobility  no deficits    Self care  no deficits    Domestic Life  doing house work (cleaning house, washing dishes, laundry)    Interactions/Relationships  no deficits    Life Areas  no deficits    Community and Social Life  no deficits         moderate   Clinical Presentation stable and uncomplicated moderate   Decision Making/ Complexity Score: moderate     Goals:  Short Term Goals: 3 weeks   - Patient will report 5/10 or better level of pain  when performing household ADLs   - Patient will report independence and >80% compliance with HEP    Long Term Goals: 5 weeks   - Patient will demonstrate functional squat pattern in order to improve independence with household ADLs  - Patient will demonstrate minor loss of motion or better in all three planes of motion of the trunk  - Patient will demonstrate proper lifting mechanics when lifting an object 10 pounds or higher        Plan   Plan of care Certification: 1/18/2023 to 3/2/23.    Outpatient Physical Therapy 2 times weekly for 5 weeks to include the following interventions: Manual Therapy, Moist Heat/ Ice, Neuromuscular Re-ed, Patient Education, Therapeutic Activities, and Therapeutic Exercise.     Zeferino Betancur, SPT     I certify that I was present in the room directing the student in service delivery and guiding them using my skilled judgment. As the co-signing therapist I have reviewed the students documentation and am responsible for the treatment, assessment, and plan.         I CERTIFY THE NEED FOR THESE SERVICES FURNISHED UNDER THIS PLAN OF TREATMENT AND WHILE UNDER MY CARE   Physician's comments:     Physician's Signature: ___________________________________________________

## 2023-01-19 ENCOUNTER — PATIENT MESSAGE (OUTPATIENT)
Dept: RHEUMATOLOGY | Facility: CLINIC | Age: 43
End: 2023-01-19
Payer: MEDICARE

## 2023-01-19 DIAGNOSIS — E11.69 TYPE 2 DIABETES MELLITUS WITH OTHER SPECIFIED COMPLICATION, UNSPECIFIED WHETHER LONG TERM INSULIN USE: Primary | ICD-10-CM

## 2023-01-19 RX ORDER — SEMAGLUTIDE 2.68 MG/ML
2 INJECTION, SOLUTION SUBCUTANEOUS
Qty: 3 PEN | Refills: 5 | Status: SHIPPED | OUTPATIENT
Start: 2023-01-19 | End: 2023-03-07 | Stop reason: SDUPTHER

## 2023-01-20 ENCOUNTER — PATIENT MESSAGE (OUTPATIENT)
Dept: RHEUMATOLOGY | Facility: CLINIC | Age: 43
End: 2023-01-20
Payer: MEDICARE

## 2023-01-23 ENCOUNTER — CLINICAL SUPPORT (OUTPATIENT)
Dept: REHABILITATION | Facility: HOSPITAL | Age: 43
End: 2023-01-23
Payer: COMMERCIAL

## 2023-01-23 DIAGNOSIS — M54.50 LUMBAR PAIN: Primary | ICD-10-CM

## 2023-01-23 DIAGNOSIS — R53.1 WEAKNESS: ICD-10-CM

## 2023-01-23 PROCEDURE — 97110 THERAPEUTIC EXERCISES: CPT | Mod: PN | Performed by: PHYSICAL THERAPIST

## 2023-01-23 PROCEDURE — 97112 NEUROMUSCULAR REEDUCATION: CPT | Mod: PN | Performed by: PHYSICAL THERAPIST

## 2023-01-23 NOTE — PROGRESS NOTES
"      [facility]   Physical Therapy Treatment Note     Name: Sparkle Jose  Clinic Number: [id number]    Therapy Diagnosis:   Encounter Diagnoses   Name Primary?    Lumbar pain Yes    Weakness      Physician: Cassandra Alvarez PA-C    Visit Date: 1/23/2023    Physician Orders: PT Eval and Treat   Medical Diagnosis from Referral: Cervicalgia and Lumbar Back pain  Evaluation Date: 1/18/2023  Authorization Period Expiration: 12/31/23  Plan of Care Expiration: 3/2/23  Progress Note Due: 2/17/23  Visit # / Visits authorized:   FOTO: 1/3      PTA Visit #: /5       Precautions: Standard     Time In: 11;10a  Time Out: 11:55  Total Billable Time: 40 minutes      SUBJECTIVE     Pt reports: Feeling some pain in the low back today  She was compliant with home exercise program.  Response to previous treatment: Less pain  Functional change:     Pain: 4/10  Location: left back  and buttocks      OBJECTIVE     Objective Measures updated at progress report unless specified.     Treatment     Sparkle received the treatments listed below:      therapeutic exercises to develop strength, endurance, ROM, and flexibility for 20 minutes including:  Seated ball flexion stretch, x2min  Seated trunk rotation with dowel, x2min  LTR, x2min   Bridging, 3x10  Hooklying abduction with green tband, 3x10 each side  Straight leg raise, 3x10    manual therapy techniques: Joint mobilizations, Manual traction, Myofacial release, and Soft tissue Mobilization were applied to the:  for  minutes, including:      neuromuscular re-education activities to improve: Balance, Coordination, Proprioception, and Posture for 20 minutes. The following activities were included:  TRX assisted squat, 10x (patient c/o increased LBP 6/10 with this exercise)  Reverse deadlift with dowel, 3x10  Box squat to 18", 2x10    Patient educated on squatting and deadlift       Patient Education and Home Exercises     Home Exercises Provided and Patient " Education Provided     Education provided:   - Functional squatting pattern    Written Home Exercises Provided: yes. Exercises were reviewed and Sparkle was able to demonstrate them prior to the end of the session.  Sparkle demonstrated good  understanding of the education provided. See EMR under Patient Instructions for exercises provided during therapy sessions    ASSESSMENT     Patient tolerated therapy well. Patient complained of some increased pain with squatting. Exercises were modified to increase squatting tolerance    Sparkle Is progressing well towards her goals.   Pt prognosis is Good.     Pt will continue to benefit from skilled outpatient physical therapy to address the deficits listed in the problem list box on initial evaluation, provide pt/family education and to maximize pt's level of independence in the home and community environment.     Pt's spiritual, cultural and educational needs considered and pt agreeable to plan of care and goals.     Anticipated barriers to physical therapy: None    Goals:  Short Term Goals: 3 weeks   - Patient will report 5/10 or better level of pain when performing household ADLs   - Patient will report independence and >80% compliance with HEP     Long Term Goals: 5 weeks   - Patient will demonstrate functional squat pattern in order to improve independence with household ADLs  - Patient will demonstrate minor loss of motion or better in all three planes of motion of the trunk  - Patient will demonstrate proper lifting mechanics when lifting an object 10 pounds or higher       PLAN     Progress with functional activities and strengthening to patient tolerance.    Zeferino Betancur, SPT

## 2023-01-25 ENCOUNTER — CLINICAL SUPPORT (OUTPATIENT)
Dept: REHABILITATION | Facility: HOSPITAL | Age: 43
End: 2023-01-25
Payer: COMMERCIAL

## 2023-01-25 DIAGNOSIS — M54.50 LUMBAR PAIN: Primary | ICD-10-CM

## 2023-01-25 DIAGNOSIS — R53.1 WEAKNESS: ICD-10-CM

## 2023-01-25 PROCEDURE — 97112 NEUROMUSCULAR REEDUCATION: CPT | Mod: PN | Performed by: PHYSICAL THERAPIST

## 2023-01-25 PROCEDURE — 97110 THERAPEUTIC EXERCISES: CPT | Mod: PN | Performed by: PHYSICAL THERAPIST

## 2023-01-25 NOTE — PROGRESS NOTES
[facility]   Physical Therapy Treatment Note     Name: Sparkle Daniels [facility]  Clinic Number: [id number]    Therapy Diagnosis:   Encounter Diagnoses   Name Primary?    Lumbar pain Yes    Weakness        Physician: Cassandra Alvarez PA-C    Visit Date: 1/25/2023    Physician Orders: PT Eval and Treat   Medical Diagnosis from Referral: Cervicalgia and Lumbar Back pain  Evaluation Date: 1/18/2023  Authorization Period Expiration: 12/31/23  Plan of Care Expiration: 3/2/23  Progress Note Due: 2/17/23  Visit # / Visits authorized: 2/13  FOTO: 1/3      PTA Visit #: /5       Precautions: Standard     Time In: 11;05a  Time Out: 12:00  Total Billable Time: 50 minutes      SUBJECTIVE     Pt reports: Feeling some pain in the low back today following housework yesterday.   She was compliant with home exercise program.  Response to previous treatment: Tolerated well, some minor soreness  Functional change: ongoing    Pain: 5/10  Location: left back  and buttocks      OBJECTIVE     Objective Measures updated at progress report unless specified.     Treatment     Sparkle received the treatments listed below:      therapeutic exercises to develop strength, endurance, ROM, and flexibility for 30 minutes including:    Seated ball flexion stretch, x2 min  Seated trunk extension over ball, x2 min  Seated trunk rotation with dowel, x2 min  LTR, x2min   Bridging, 3x10  Sidelying abduction with extension, 3x5 each side  Lunge stretch, 2min each side (tolerated well)  Prone hip extension muscle energy technique, Bilateral (increased pain)  Supine piriformis stretch, x1min (increased pain)    manual therapy techniques: Joint mobilizations, Manual traction, Myofacial release, and Soft tissue Mobilization were applied to the:  for  minutes, including:      neuromuscular re-education activities to improve: Balance, Coordination, Proprioception, and Posture for 20 minutes. The following activities were included:  TA  activation in supine, 3x10  Standing posterior pelvic tilt  Supine posterior pelvic tilt, 2x20    Patient educated on motor control patterns of the pelvis and coached on muscle firing to achieve posterior pelvic tilting and abdominal bracing.       Patient Education and Home Exercises     Home Exercises Provided and Patient Education Provided     Education provided:   - Functional squatting pattern    Written Home Exercises Provided: yes. Exercises were reviewed and Sparkle was able to demonstrate them prior to the end of the session.  Sparkle demonstrated good  understanding of the education provided. See EMR under Patient Instructions for exercises provided during therapy sessions    ASSESSMENT     Patient tolerated new exercises without increasing back pain. Patient stated that she felt more mobile following today's session. Patient demonstrated difficulty with motor control of the pelvis and lower core musculature    Sparkle Is progressing well towards her goals.   Pt prognosis is Good.     Pt will continue to benefit from skilled outpatient physical therapy to address the deficits listed in the problem list box on initial evaluation, provide pt/family education and to maximize pt's level of independence in the home and community environment.     Pt's spiritual, cultural and educational needs considered and pt agreeable to plan of care and goals.     Anticipated barriers to physical therapy: None    Goals:  Short Term Goals: 3 weeks   - Patient will report 5/10 or better level of pain when performing household ADLs   - Patient will report independence and >80% compliance with HEP     Long Term Goals: 5 weeks   - Patient will demonstrate functional squat pattern in order to improve independence with household ADLs  - Patient will demonstrate minor loss of motion or better in all three planes of motion of the trunk  - Patient will demonstrate proper lifting mechanics when lifting an object 10 pounds or higher        PLAN     Progress with functional activities and strengthening to patient tolerance.    Zeferino Betancur, SPT

## 2023-01-30 ENCOUNTER — LAB VISIT (OUTPATIENT)
Dept: LAB | Facility: HOSPITAL | Age: 43
End: 2023-01-30
Attending: INTERNAL MEDICINE
Payer: COMMERCIAL

## 2023-01-30 ENCOUNTER — CLINICAL SUPPORT (OUTPATIENT)
Dept: REHABILITATION | Facility: HOSPITAL | Age: 43
End: 2023-01-30
Payer: COMMERCIAL

## 2023-01-30 DIAGNOSIS — R53.1 WEAKNESS: ICD-10-CM

## 2023-01-30 DIAGNOSIS — E66.3 OVERWEIGHT: ICD-10-CM

## 2023-01-30 DIAGNOSIS — E04.1 THYROID NODULE: ICD-10-CM

## 2023-01-30 DIAGNOSIS — E88.810 METABOLIC SYNDROME: Primary | ICD-10-CM

## 2023-01-30 DIAGNOSIS — M54.50 LUMBAR PAIN: Primary | ICD-10-CM

## 2023-01-30 PROCEDURE — 80048 BASIC METABOLIC PNL TOTAL CA: CPT | Performed by: INTERNAL MEDICINE

## 2023-01-30 PROCEDURE — 84443 ASSAY THYROID STIM HORMONE: CPT | Performed by: INTERNAL MEDICINE

## 2023-01-30 PROCEDURE — 36415 COLL VENOUS BLD VENIPUNCTURE: CPT | Mod: PO | Performed by: INTERNAL MEDICINE

## 2023-01-30 PROCEDURE — 97110 THERAPEUTIC EXERCISES: CPT | Mod: PN | Performed by: PHYSICAL THERAPIST

## 2023-01-30 NOTE — PROGRESS NOTES
"[facility]   Physical Therapy Treatment Note     Name: Sparkle Jose  Clinic Number: [id number]    Therapy Diagnosis:   Encounter Diagnoses   Name Primary?    Lumbar pain Yes    Weakness          Physician: Cassandra Alvarez PA-C    Visit Date: 1/30/2023    Physician Orders: PT Eval and Treat   Medical Diagnosis from Referral: Cervicalgia and Lumbar Back pain  Evaluation Date: 1/18/2023  Authorization Period Expiration: 12/31/23  Plan of Care Expiration: 3/2/23  Progress Note Due: 2/17/23  Visit # / Visits authorized: 3/13  FOTO: 1/3      PTA Visit #: /5       Precautions: Standard     Time In: 11:05 PM   Time Out: 12:05 PM  Total Billable Time: 55 minutes      SUBJECTIVE     Pt reports: Feeling improvement in low pain  She was compliant with home exercise program.  Response to previous treatment: Tolerated well, some minor soreness  Functional change: ongoing    Pain: 3/10  Location: left back  and buttocks      OBJECTIVE     Objective Measures updated at progress report unless specified.     Treatment     Sparkle received the treatments listed below:      therapeutic exercises to develop strength, endurance, ROM, and flexibility for 55 minutes including:    Seated ball flexion stretch, x2 min  Seated trunk extension over ball, x2 min  Seated trunk rotation with dowel, x2 min  LTR, x2min   Bridging, 3x10  Sidelying abduction with extension, 2x10 each side  Seated piriformis stretch, x2min each side  Supine hip flexor stretch, leg off table, x2min each side  Job Curl, 2x5  Lunge stretch, 2min each side  TRX assisted single leg box squat, 18" box, 3x6 each leg  Standing row with red tband, 3x10    Patient educated on spinal mobility and healthy behaviors including sleep hygiene, importance of protein in the diet, and posture   Patient educated on increasing home exercise plan    manual therapy techniques: Joint mobilizations, Manual traction, Myofacial release, and Soft tissue " Mobilization were applied to the:  for  minutes, including:      neuromuscular re-education activities to improve: Balance, Coordination, Proprioception, and Posture for minutes. The following activities were included:    Patient educated on motor control patterns of the pelvis and coached on muscle firing to achieve posterior pelvic tilting and abdominal bracing.       Patient Education and Home Exercises     Home Exercises Provided and Patient Education Provided     Education provided:   - Functional squatting pattern  - Standing posture  - Sleep hygiene    Written Home Exercises Provided: yes. Exercises were reviewed and Sparkle was able to demonstrate them prior to the end of the session.  Sparkle demonstrated good  understanding of the education provided. See EMR under Patient Instructions for exercises provided during therapy sessions    ASSESSMENT     Patient tolerated new exercises without increasing back pain. Patient stated that she felt more mobile following today's session. Patient demonstrated difficulty with motor control of the pelvis and lower core musculature    Sparkle Is progressing well towards her goals.   Pt prognosis is Good.     Pt will continue to benefit from skilled outpatient physical therapy to address the deficits listed in the problem list box on initial evaluation, provide pt/family education and to maximize pt's level of independence in the home and community environment.     Pt's spiritual, cultural and educational needs considered and pt agreeable to plan of care and goals.     Anticipated barriers to physical therapy: None    Goals:  Short Term Goals: 3 weeks   - Patient will report 5/10 or better level of pain when performing household ADLs   - Patient will report independence and >80% compliance with HEP     Long Term Goals: 5 weeks   - Patient will demonstrate functional squat pattern in order to improve independence with household ADLs  - Patient will demonstrate minor loss of  motion or better in all three planes of motion of the trunk  - Patient will demonstrate proper lifting mechanics when lifting an object 10 pounds or higher       PLAN     Progress with functional activities and strengthening to patient tolerance.    Zeferino Betancur, SPT

## 2023-01-31 DIAGNOSIS — M54.50 LUMBAR BACK PAIN: ICD-10-CM

## 2023-01-31 DIAGNOSIS — M54.2 CERVICALGIA: ICD-10-CM

## 2023-01-31 LAB
ANION GAP SERPL CALC-SCNC: 11 MMOL/L (ref 8–16)
BUN SERPL-MCNC: 15 MG/DL (ref 6–20)
CALCIUM SERPL-MCNC: 10.5 MG/DL (ref 8.7–10.5)
CHLORIDE SERPL-SCNC: 103 MMOL/L (ref 95–110)
CO2 SERPL-SCNC: 25 MMOL/L (ref 23–29)
CREAT SERPL-MCNC: 0.8 MG/DL (ref 0.5–1.4)
EST. GFR  (NO RACE VARIABLE): >60 ML/MIN/1.73 M^2
GLUCOSE SERPL-MCNC: 88 MG/DL (ref 70–110)
POTASSIUM SERPL-SCNC: 4.5 MMOL/L (ref 3.5–5.1)
SODIUM SERPL-SCNC: 139 MMOL/L (ref 136–145)
TSH SERPL DL<=0.005 MIU/L-ACNC: 0.59 UIU/ML (ref 0.4–4)

## 2023-01-31 RX ORDER — TIZANIDINE 4 MG/1
4 TABLET ORAL NIGHTLY PRN
Qty: 40 TABLET | Refills: 0 | Status: SHIPPED | OUTPATIENT
Start: 2023-01-31 | End: 2023-03-06

## 2023-02-01 ENCOUNTER — CLINICAL SUPPORT (OUTPATIENT)
Dept: REHABILITATION | Facility: HOSPITAL | Age: 43
End: 2023-02-01
Payer: MEDICARE

## 2023-02-01 DIAGNOSIS — R53.1 WEAKNESS: ICD-10-CM

## 2023-02-01 DIAGNOSIS — M54.50 LUMBAR PAIN: Primary | ICD-10-CM

## 2023-02-01 PROCEDURE — 97530 THERAPEUTIC ACTIVITIES: CPT | Mod: PN | Performed by: PHYSICAL THERAPIST

## 2023-02-01 PROCEDURE — 97110 THERAPEUTIC EXERCISES: CPT | Mod: PN | Performed by: PHYSICAL THERAPIST

## 2023-02-01 NOTE — PROGRESS NOTES
"[facility]   Physical Therapy Treatment Note     Name: Sparkle Jose  Clinic Number: [id number]    Therapy Diagnosis:   Encounter Diagnoses   Name Primary?    Lumbar pain Yes    Weakness        Physician: Cassandra Avlarez PA-C    Visit Date: 2/1/2023    Physician Orders: PT Eval and Treat   Medical Diagnosis from Referral: Cervicalgia and Lumbar Back pain  Evaluation Date: 1/18/2023  Authorization Period Expiration: 12/31/23  Plan of Care Expiration: 3/2/23  Progress Note Due: 2/17/23  Visit # / Visits authorized: 3/13  FOTO: 1/3      PTA Visit #: /5     Precautions: Standard     Time In: 11:02 AM   Time Out: 12:02 PM  Total Billable Time: 55 minutes      SUBJECTIVE     Pt reports: Feeling some soreness following Monday and that she feels ok today.   She was compliant with home exercise program.  Response to previous treatment: Tolerated well, some moderate soreness  Functional change: ongoing    Pain: 3/10  Location: left back and buttocks      OBJECTIVE     Objective Measures updated at progress report unless specified.     Treatment     Sparkle received the treatments listed below:      therapeutic exercises to develop strength, endurance, ROM, and flexibility for 45 minutes including:    Walking on treadmill, 6:00  Seated ball flexion stretch, x2 min  Seated trunk extension over ball, x2 min  Seated trunk rotation with dowel, x2 min  Seated piriformis stretch, x2min each side  LTR, x2min   Bridging, 3x10  Sidelying abduction with extension, 2x10 each side  Supine hip flexor stretch, leg off table, x2min each side  Standing lumbar extension, 20x    Therapeutic Activities to improve functional independence with daily tasks for 10 minutes including:    HEP updated to include squatting and lunges  Box Squat from 20", 3x10  Lunge stretch with trunk twist, 2min each side  Wide base reverse deadlift unweighted, 3x10      manual therapy techniques: Joint mobilizations, Manual traction, " Myofacial release, and Soft tissue Mobilization were applied to the:  for  minutes, including:    neuromuscular re-education activities to improve: Balance, Coordination, Proprioception, and Posture for minutes. The following activities were included:      Patient Education and Home Exercises     Home Exercises Provided and Patient Education Provided     Education provided:   - Functional squatting pattern  - Standing posture  - Sleep hygiene    Written Home Exercises Provided: yes. Exercises were reviewed and Sparkle was able to demonstrate them prior to the end of the session.  Sparkle demonstrated good  understanding of the education provided. See EMR under Patient Instructions for exercises provided during therapy sessions    ASSESSMENT     Patient tolerated new exercises without increasing back pain. Patient stated that she felt more mobile following today's session. Patient reports improvements with ADLs and pain but is still performing home tasks with pain.     Sparkle Is progressing well towards her goals.   Pt prognosis is Good.     Pt will continue to benefit from skilled outpatient physical therapy to address the deficits listed in the problem list box on initial evaluation, provide pt/family education and to maximize pt's level of independence in the home and community environment.     Pt's spiritual, cultural and educational needs considered and pt agreeable to plan of care and goals.     Anticipated barriers to physical therapy: None    Goals:  Short Term Goals: 3 weeks   - Patient will report 5/10 or better level of pain when performing household ADLs   - Patient will report independence and >80% compliance with HEP met 2/1/23     Long Term Goals: 5 weeks   - Patient will demonstrate functional squat pattern in order to improve independence with household ADLs  - Patient will demonstrate minor loss of motion or better in all three planes of motion of the trunk  - Patient will demonstrate proper  lifting mechanics when lifting an object 10 pounds or higher       PLAN     Progress with functional activities and strengthening to patient tolerance.    Zeferino Betancur, SPT

## 2023-02-06 ENCOUNTER — CLINICAL SUPPORT (OUTPATIENT)
Dept: REHABILITATION | Facility: HOSPITAL | Age: 43
End: 2023-02-06
Payer: COMMERCIAL

## 2023-02-06 DIAGNOSIS — M54.50 LUMBAR PAIN: Primary | ICD-10-CM

## 2023-02-06 DIAGNOSIS — R53.1 WEAKNESS: ICD-10-CM

## 2023-02-06 PROCEDURE — 97110 THERAPEUTIC EXERCISES: CPT | Mod: PN | Performed by: PHYSICAL THERAPIST

## 2023-02-06 PROCEDURE — 97530 THERAPEUTIC ACTIVITIES: CPT | Mod: PN | Performed by: PHYSICAL THERAPIST

## 2023-02-06 NOTE — PROGRESS NOTES
"[facility]   Physical Therapy Treatment Note     Name: Sparkle Jose  Clinic Number: [id number]    Therapy Diagnosis:   Encounter Diagnoses   Name Primary?    Lumbar pain Yes    Weakness          Physician: Cassandra Alvarez PA-C    Visit Date: 2/6/2023    Physician Orders: PT Eval and Treat   Medical Diagnosis from Referral: Cervicalgia and Lumbar Back pain  Evaluation Date: 1/18/2023  Authorization Period Expiration: 12/31/23  Plan of Care Expiration: 3/2/23  Progress Note Due: 2/17/23  Visit # / Visits authorized: 5/13  FOTO: 1/3      PTA Visit #: /5     Precautions: Standard     Time In: 1:03 PM  Time Out: 2:01 PM  Total Billable Time: 55 minutes      SUBJECTIVE     Pt reports: Feeling some soreness following Monday and that she feels ok today.   She was compliant with home exercise program.  Response to previous treatment: Tolerated well, some moderate soreness  Functional change: ongoing    Pain: 3/10  Location: left back and buttocks      OBJECTIVE     Objective Measures updated at progress report unless specified.     Treatment     Sparkle received the treatments listed below:      therapeutic exercises to develop strength, endurance, ROM, and flexibility for 25  minutes including:    Walking on treadmill, 10:00, 1.8 mph  Seated ball flexion stretch, x2 min  Seated trunk extension over ball, x2 min  Seated trunk rotation with dowel, x2 min  Seated piriformis stretch, x2min each side  Hip box, x2min     HEP updated    Therapeutic Activities to improve functional independence with daily tasks for 30 minutes including:    Lateral step down from 6" box with trx assist, 3x5 each side  Body weight squat, 3x10  Kettle bell carry across 30', various weight, 5#-20#, 4 laps  Kettle bell  and place high, 5#-20#  Kettle bell  from high place on ground, 5#-20#  Kettle bell  from high, turn and place at weight height, 5#-20#    Educated on proper lifting " mechanics        manual therapy techniques: Joint mobilizations, Manual traction, Myofacial release, and Soft tissue Mobilization were applied to the:  for  minutes, including:    neuromuscular re-education activities to improve: Balance, Coordination, Proprioception, and Posture for minutes. The following activities were included:      Patient Education and Home Exercises     Home Exercises Provided and Patient Education Provided     Education provided:   - Functional squatting pattern  - Standing posture  - Sleep hygiene    Written Home Exercises Provided: yes. Exercises were reviewed and Sparkle was able to demonstrate them prior to the end of the session.  Sparkle demonstrated good  understanding of the education provided. See EMR under Patient Instructions for exercises provided during therapy sessions    ASSESSMENT     Patient tolerated new exercises without increasing back pain. Patient stated that she felt more mobile following today's session. Patient reports improvements with ADLs and strength.     Sparkle Is progressing well towards her goals.   Pt prognosis is Good.     Pt will continue to benefit from skilled outpatient physical therapy to address the deficits listed in the problem list box on initial evaluation, provide pt/family education and to maximize pt's level of independence in the home and community environment.     Pt's spiritual, cultural and educational needs considered and pt agreeable to plan of care and goals.     Anticipated barriers to physical therapy: None    Goals:  Short Term Goals: 3 weeks   - Patient will report 5/10 or better level of pain when performing household ADLs   - Patient will report independence and >80% compliance with HEP met 2/1/23     Long Term Goals: 5 weeks   - Patient will demonstrate functional squat pattern in order to improve independence with household ADLs  - Patient will demonstrate minor loss of motion or better in all three planes of motion of the  trunk  - Patient will demonstrate proper lifting mechanics when lifting an object 10 pounds or higher       PLAN     Progress with functional activities and strengthening to patient tolerance.    Zeferino Betancur, SPT

## 2023-02-13 ENCOUNTER — CLINICAL SUPPORT (OUTPATIENT)
Dept: REHABILITATION | Facility: HOSPITAL | Age: 43
End: 2023-02-13
Payer: COMMERCIAL

## 2023-02-13 DIAGNOSIS — M54.50 LUMBAR PAIN: Primary | ICD-10-CM

## 2023-02-13 DIAGNOSIS — R53.1 WEAKNESS: ICD-10-CM

## 2023-02-13 PROCEDURE — 97530 THERAPEUTIC ACTIVITIES: CPT | Mod: PN | Performed by: PHYSICAL THERAPIST

## 2023-02-13 PROCEDURE — 97110 THERAPEUTIC EXERCISES: CPT | Mod: PN | Performed by: PHYSICAL THERAPIST

## 2023-02-13 NOTE — PROGRESS NOTES
"[facility]   Physical Therapy Treatment Note     Name: Sparkle Jose  Clinic Number: [id number]    Therapy Diagnosis:   Encounter Diagnoses   Name Primary?    Lumbar pain Yes    Weakness            Physician: Cassandra Alvarez PA-C    Visit Date: 2/13/2023    Physician Orders: PT Eval and Treat   Medical Diagnosis from Referral: Cervicalgia and Lumbar Back pain  Evaluation Date: 1/18/2023  Authorization Period Expiration: 12/31/23  Plan of Care Expiration: 3/2/23  Progress Note Due: 2/17/23  Visit # / Visits authorized: 6/13  FOTO: 1/3      PTA Visit #: /5     Precautions: Standard     Time In: 11:00 AM  Time Out: 12:00 PM  Total Billable Time: 55 minutes      SUBJECTIVE     Pt reports: Feeling some soreness following Monday and that she feels ok today.   She was compliant with home exercise program.  Response to previous treatment: Tolerated well, some moderate soreness  Functional change: ongoing    Pain: 3/10  Location: left back and buttocks      OBJECTIVE     Objective Measures updated at progress report unless specified.     Treatment     Sparkle received the treatments listed below:      therapeutic exercises to develop strength, endurance, ROM, and flexibility for 30  minutes including:    Walking on treadmill, 10:00, 1.8 mph  Seated ball flexion stretch, x2 min  Seated trunk extension over ball, x2 min  Seated trunk rotation with dowel, x2 min  Open book rotations with band, 20x each side  Psoas march with green hip band, 3x5 each leg    HEP updated and educated on role of HEP    Therapeutic Activities to improve functional independence with daily tasks for 25 minutes including:    Goblet squat with 20# med ball, 3x6  Step up to 12" box, 3x5 each LE with 7.5# kettlebell  Weighted ball toss, Low toss, chest pass, twisting toss, 4kg, 2x10 each direction    Educated on proper technique when moving ball and stepping up        manual therapy techniques: Joint mobilizations, Manual " traction, Myofacial release, and Soft tissue Mobilization were applied to the:  for  minutes, including:    neuromuscular re-education activities to improve: Balance, Coordination, Proprioception, and Posture for minutes. The following activities were included:      Patient Education and Home Exercises     Home Exercises Provided and Patient Education Provided     Education provided:   - Step up pattern  - Trunk bracing  - Role of HEP    Written Home Exercises Provided: yes. Exercises were reviewed and Sparkle was able to demonstrate them prior to the end of the session.  Sparkle demonstrated good  understanding of the education provided. See EMR under Patient Instructions for exercises provided during therapy sessions    ASSESSMENT     Patient tolerated new exercises without increasing back pain. Patient reports improvements with pain with ADLs.     Sparkle Is progressing well towards her goals.   Pt prognosis is Good.     Pt will continue to benefit from skilled outpatient physical therapy to address the deficits listed in the problem list box on initial evaluation, provide pt/family education and to maximize pt's level of independence in the home and community environment.     Pt's spiritual, cultural and educational needs considered and pt agreeable to plan of care and goals.     Anticipated barriers to physical therapy: None    Goals:  Short Term Goals: 3 weeks   - Patient will report 5/10 or better level of pain when performing household ADLs   - Patient will report independence and >80% compliance with HEP met 2/1/23     Long Term Goals: 5 weeks   - Patient will demonstrate functional squat pattern in order to improve independence with household ADLs  - Patient will demonstrate minor loss of motion or better in all three planes of motion of the trunk  - Patient will demonstrate proper lifting mechanics when lifting an object 10 pounds or higher       PLAN     Progress with functional activities and  strengthening to patient tolerance.    Zeferino Betancur, SPT

## 2023-02-15 ENCOUNTER — CLINICAL SUPPORT (OUTPATIENT)
Dept: REHABILITATION | Facility: HOSPITAL | Age: 43
End: 2023-02-15
Payer: COMMERCIAL

## 2023-02-15 DIAGNOSIS — M54.50 LUMBAR PAIN: Primary | ICD-10-CM

## 2023-02-15 DIAGNOSIS — R53.1 WEAKNESS: ICD-10-CM

## 2023-02-15 PROCEDURE — 97530 THERAPEUTIC ACTIVITIES: CPT | Mod: PN | Performed by: PHYSICAL THERAPIST

## 2023-02-15 PROCEDURE — 97112 NEUROMUSCULAR REEDUCATION: CPT | Mod: PN | Performed by: PHYSICAL THERAPIST

## 2023-02-15 PROCEDURE — 97110 THERAPEUTIC EXERCISES: CPT | Mod: PN | Performed by: PHYSICAL THERAPIST

## 2023-02-15 NOTE — PROGRESS NOTES
[facility]   Physical Therapy Treatment Note/Progress note     Name: Sparkle Jose  Clinic Number: [id number]    Therapy Diagnosis:   Encounter Diagnoses   Name Primary?    Lumbar pain Yes    Weakness        Physician: Cassandra Alvarez PA-C    Visit Date: 2/15/2023    Physician Orders: PT Eval and Treat   Medical Diagnosis from Referral: Cervicalgia and Lumbar Back pain  Evaluation Date: 1/18/2023  Authorization Period Expiration: 12/31/23  Plan of Care Expiration: 3/2/23  Progress Note Due: 2/17/23  Visit # / Visits authorized: 6/13  FOTO: 1/3      PTA Visit #: /5     Precautions: Standard     Time In: 11:00 AM  Time Out: 12:00 PM  Total Billable Time: 55 minutes      SUBJECTIVE     Pt reports: Feeling some soreness following Monday and that she feels ok today.   She was compliant with home exercise program.  Response to previous treatment: Tolerated well, some moderate soreness  Functional change: ongoing    Pain: 0/10  Location: left back and buttocks      OBJECTIVE     ROM:    AROM   Comment   Flexion: Minor loss    Extension: WFL pain   Lat Flex R: WFL pain   Lat Flex L: WFL    Rotation R: WFL    Rotation L: WFL Minor pain   *pain    Hip extension: R: 29 lb L: 27 lb (4/5 bilaterally)  Hip abduction: R: 43 L: 29.5 lb (4/5 bilaterally)    Functional Tests (* indicates w/ pain)   Squat: Functional pattern, no pain       CMS Impairment/Limitation/Restriction for FOTO Lumbar Survey     Therapist reviewed FOTO scores for Sparkle Jose on 2/15/2023.   FOTO documents entered into [employer] - see Media section.     Limitation Score: 34%  Category: Mobility              Treatment     Sparkle received the treatments listed below:      therapeutic exercises to develop strength, endurance, ROM, and flexibility for 35  minutes including:    Walking on treadmill, 15:00, 1.8 mph, progressive incline 1%-5%  Seated ball flexion stretch, x2 min  Seated trunk extension over ball, x2 min  Seated  trunk rotation with dowel, x2 min  Open book rotations with band, 20x each side  Psoas march with green hip band, 3x5 each leg    HEP updated and educated on role of HEP    Therapeutic Activities to improve functional independence with daily tasks for 10 minutes including:    Single Arm deadlift, 20#, 2x10  Body weight squat, 10x    Educated on deadlift pattern    manual therapy techniques: Joint mobilizations, Manual traction, Myofacial release, and Soft tissue Mobilization were applied to the:  for  minutes, including:    neuromuscular re-education activities to improve: Balance, Coordination, Proprioception, and Posture for 10 minutes. The following activities were included:  Bird dog, 3x10  Hydrants, 3x10        Patient Education and Home Exercises     Home Exercises Provided and Patient Education Provided     Education provided:       Written Home Exercises Provided: yes. Exercises were reviewed and Sparkle was able to demonstrate them prior to the end of the session.  Sparkle demonstrated good  understanding of the education provided. See EMR under Patient Instructions for exercises provided during therapy sessions    ASSESSMENT     Patient demonstrated improvement in trunk mobility, hip strength, and functional squatting pattern since initial evaluation. Patient continues to have back pain with trunk extension and heavy household activities. Patient will continue to benefit from therapy to further increase functional capacity and reduce pain.      Sparkle Is progressing well towards her goals.   Pt prognosis is Good.     Pt's spiritual, cultural and educational needs considered and pt agreeable to plan of care and goals.     Anticipated barriers to physical therapy: None    Goals:  Short Term Goals: 3 weeks   - Patient will report 5/10 or better level of pain when performing household ADLs   - Patient will report independence and >80% compliance with HEP met 2/1/23     Long Term Goals: 5 weeks   - Patient  will demonstrate functional squat pattern in order to improve independence with household ADLs met 2/15/23  - Patient will demonstrate minor loss of motion or better in all three planes of motion of the trunk met 2/15/23  - Patient will demonstrate proper lifting mechanics when lifting an object 10 pounds or higher       PLAN     Progress with functional activities and strengthening to patient tolerance.    Zeferino Betancur, SPT

## 2023-02-17 ENCOUNTER — OFFICE VISIT (OUTPATIENT)
Dept: PAIN MEDICINE | Facility: CLINIC | Age: 43
End: 2023-02-17
Payer: COMMERCIAL

## 2023-02-17 ENCOUNTER — PATIENT MESSAGE (OUTPATIENT)
Dept: FAMILY MEDICINE | Facility: CLINIC | Age: 43
End: 2023-02-17

## 2023-02-17 ENCOUNTER — PATIENT MESSAGE (OUTPATIENT)
Dept: RHEUMATOLOGY | Facility: CLINIC | Age: 43
End: 2023-02-17
Payer: MEDICARE

## 2023-02-17 ENCOUNTER — E-VISIT (OUTPATIENT)
Dept: FAMILY MEDICINE | Facility: CLINIC | Age: 43
End: 2023-02-17
Payer: COMMERCIAL

## 2023-02-17 ENCOUNTER — TELEPHONE (OUTPATIENT)
Dept: PAIN MEDICINE | Facility: CLINIC | Age: 43
End: 2023-02-17

## 2023-02-17 VITALS
BODY MASS INDEX: 28.67 KG/M2 | WEIGHT: 151.88 LBS | HEART RATE: 77 BPM | HEIGHT: 61 IN | DIASTOLIC BLOOD PRESSURE: 79 MMHG | SYSTOLIC BLOOD PRESSURE: 121 MMHG

## 2023-02-17 DIAGNOSIS — J32.9 CHRONIC SINUSITIS, UNSPECIFIED LOCATION: Primary | ICD-10-CM

## 2023-02-17 DIAGNOSIS — M54.50 LUMBAR BACK PAIN: Primary | ICD-10-CM

## 2023-02-17 DIAGNOSIS — M54.2 CERVICALGIA: ICD-10-CM

## 2023-02-17 PROCEDURE — 99999 PR PBB SHADOW E&M-EST. PATIENT-LVL IV: CPT | Mod: PBBFAC,,, | Performed by: PHYSICIAN ASSISTANT

## 2023-02-17 PROCEDURE — 99999 PR PBB SHADOW E&M-EST. PATIENT-LVL IV: ICD-10-PCS | Mod: PBBFAC,,, | Performed by: PHYSICIAN ASSISTANT

## 2023-02-17 PROCEDURE — 99212 OFFICE O/P EST SF 10 MIN: CPT | Mod: S$GLB,,, | Performed by: PHYSICIAN ASSISTANT

## 2023-02-17 PROCEDURE — 99421 PR E&M, ONLINE DIGIT, EST, < 7 DAYS, 5-10 MINS: ICD-10-PCS | Mod: 95,,, | Performed by: STUDENT IN AN ORGANIZED HEALTH CARE EDUCATION/TRAINING PROGRAM

## 2023-02-17 PROCEDURE — 99214 OFFICE O/P EST MOD 30 MIN: CPT | Mod: PBBFAC,PN | Performed by: PHYSICIAN ASSISTANT

## 2023-02-17 PROCEDURE — 99212 PR OFFICE/OUTPT VISIT, EST, LEVL II, 10-19 MIN: ICD-10-PCS | Mod: S$GLB,,, | Performed by: PHYSICIAN ASSISTANT

## 2023-02-17 PROCEDURE — 99421 OL DIG E/M SVC 5-10 MIN: CPT | Mod: 95,,, | Performed by: STUDENT IN AN ORGANIZED HEALTH CARE EDUCATION/TRAINING PROGRAM

## 2023-02-17 RX ORDER — TIRZEPATIDE 10 MG/.5ML
INJECTION, SOLUTION SUBCUTANEOUS
COMMUNITY
Start: 2023-02-02 | End: 2023-03-07

## 2023-02-17 RX ORDER — AZITHROMYCIN 250 MG/1
TABLET, FILM COATED ORAL
Qty: 6 TABLET | Refills: 0 | Status: SHIPPED | OUTPATIENT
Start: 2023-02-17 | End: 2023-03-07

## 2023-02-17 NOTE — PROGRESS NOTES
Patient ID: Sparkle Jose is a 42 y.o. female.    Chief Complaint: URI    The patient initiated a request through [facility] on 2/17/2023 for evaluation and management with a chief complaint of URI     I evaluated the questionnaire submission on 02/17/2023.    [facility] Peq Evisit Upper Respitatory/Cough Questionnaire    2/17/2023  3:55 PM CST - Filed by Patient   Do you agree to participate in an E-Visit? Yes   If you have any of the following symptoms, please present to your local ER or call 911:  I acknowledge   What is the main issue that you would like for your doctor to address today? Post nasal drip and cough with yellow phlem   Are you able to take your vital signs? No   Are you currently pregnant, could you be pregnant, or are you breast feeding? None of the above   What symptoms do you currently have?  Cough   Have you had a fever? No   When did your symptoms first appear? 2/14/2023   In the last two weeks, have you been in close contact with someone who has COVID-19 or the Flu? No   In the last two weeks, have you worked or volunteered in a healthcare facility or as a ? Healthcare facilities include a hospital, medical or dental clinic, long-term care facility, or nursing home No   Do you live in a long-term care facility, nursing home, group home, or homeless shelter? No   List what you have done or taken to help your symptoms. Otc mucinex   How severe are your symptoms? Moderate   Have you taken an at home Covid test? Yes   What were the results? Negative   Have you taken a Flu test? No   Have you been fully vaccinated for COVID? (2 Pfizer, 2 Moderna or 1 Francisco & Francisco vaccine injections) No   Have you received your first dose of the Pfizer or Moderna COVID vaccine? No   Have you recieved a Flu shot? No   Do you have transportation to get tested for COVID if it is indicated and ordered for you at an [facility] location? Yes   Provide any information you feel is important to your history  not asked above I have frequent sinus and upper respiratory infections due to taking cosyntx which is a biologic that lowers the immune system. Would like antibiotics sent in.   Please attach any relevant images or files           Active Problem List with Overview Notes    Diagnosis Date Noted    Lumbar pain 01/18/2023    Weakness 01/18/2023    Prediabetes 06/15/2022     Lab Results   Component Value Date    HGBA1C 5.8 (H) 06/06/2022     - endo started mounjaro, refills sent   3-6m f/u       Obesity (BMI 30-39.9) 03/15/2022     Wt Readings from Last 3 Encounters:   09/07/22 1027 82.3 kg (181 lb 8.8 oz)   06/15/22 0905 86.2 kg (190 lb)   06/03/22 1058 87 kg (191 lb 12.8 oz)     General weight loss/lifestyle modification strategies discussed: limit sugary drinks, exercise 3-5x per week  Informal exercise measures discussed, e.g. taking stairs instead of elevator.      - mounjaro         Anxiety and depression 03/15/2022     Chronic hx; doing well on wellbutrin 300mg XR  Denies SI/HI; no hallucinations     -patient was educated, advised of side effects, and all questions were answered.  Patient voiced understanding  -patient will follow up routinely and notify us if having any side effects or worsening or persistent symptoms.  ER precautions were given.        Primary insomnia 03/15/2022     -is on prn trazodone chronically (rxd by Dr. Almonte)  -denies adverse effects of medication  -has tried multiple OTC medication with minimal benefit  -discussed importance of good sleep hygiene practices        History of Chiari malformation 03/15/2022     Hx of chiari malformation dxd 2004 s/p brain decompression, follows with neurology q3-4 yrs. Doing well, no concerns      Gastroesophageal reflux disease without esophagitis 03/15/2022     -symptoms controlled with daily PPI  -denies alarm symptoms, such as dysphagia, weight loss or N/V  -continue lifestyle modification with avoidance of acidic foods, caffeine, late night  eating        Thyroid nodule 11/26/2021     Thyroid Nodules; incidental finding via CT 2019 s/p bx (Nov 2021) neg for malignancy   Reports previously taking Cytomel 5 mg daily due to fatigue and weight    Lab Results   Component Value Date    TSH 2.829 10/26/2021     - repeat US Thyroid scheduled for May 2022   - follows with endocrinology Dr. Wagner        Cigarette nicotine dependence without complication 08/11/2019     Reports smoking about 1/2 ppd 25 years. Family smokes. Previously on chantix and quit smoking for about 1 month; however, sister was sick and restarted smoking.    Has cut down to about 5 cigarettes daily     Assistance with smoking cessation was offered, including:  [x]  Medications  [x]  Counseling  []  Printed Information on Smoking Cessation  [x]  Referral to a Smoking Cessation Program    Patient was counseled regarding smoking for 3-10 minutes.          Pulmonary nodule 08/11/2019 8/31/2020 1 year follow up CT chest Unchanged scattered bilateral pulmonary nodules.  No new nodules.  Follow up CT in 1 year, this will be 2 year follow up, August 2021.   Per Fleischner guidelines: For multiple solid nodules with any 6 mm or greater, Fleischner Society guidelines recommend follow up with non-contrast chest CT at 3-6 months (this exam) and 18-24 months after discovery (approximately August 2021).      Thrombocytosis 08/22/2018    Leukocytosis 08/22/2018    Factor 5 Leiden mutation, heterozygous 08/22/2018     Leiden factor 5 deficiency - no previous hx of DVT, PE   Avoid estrogen therapy      Encounter for sterilization 10/12/2017    Psoriatic arthritis 02/19/2015     Chronic hx; follows with rheumatology Dr. Almonte  - prn ketoconazole shampoo and topical steroid  - follows q3-4m with rheum      Ankylosing spondylitis 11/12/2014     Chronic hx; follows with rheumatology Dr. Almonte  Stable on cosentyx 300mg q30d and prn percocet, prednisone, and chlorzoxazone (msk relaxer)  - follows q3-4m with  rheum        Recent Labs Obtained:  No visits with results within 7 Day(s) from this visit.   Latest known visit with results is:   Lab Visit on 2023   Component Date Value Ref Range Status    Sodium 2023 139  136 - 145 mmol/L Final    Potassium 2023 4.5  3.5 - 5.1 mmol/L Final    Chloride 2023 103  95 - 110 mmol/L Final    CO2 2023 25  23 - 29 mmol/L Final    Glucose 2023 88  70 - 110 mg/dL Final    BUN 2023 15  6 - 20 mg/dL Final    Creatinine 2023 0.8  0.5 - 1.4 mg/dL Final    Calcium 2023 10.5  8.7 - 10.5 mg/dL Final    Anion Gap 2023 11  8 - 16 mmol/L Final    eGFR 2023 >60.0  >60 mL/min/1.73 m^2 Final    TSH 2023 0.592  0.400 - 4.000 uIU/mL Final       Encounter Diagnosis   Name Primary?    Chronic sinusitis, unspecified location Yes        No orders of the defined types were placed in this encounter.     Medications Ordered This Encounter   Medications    azithromycin (Z-DEMARCUS) 250 MG tablet     Sig: Take 2 tablets by mouth on day 1; Take 1 tablet by mouth on days 2-5     Dispense:  6 tablet     Refill:  0        E-Visit Time Trackin min

## 2023-02-17 NOTE — PROGRESS NOTES
[facility]      PCP: Chrissie Lancaster MD    CC:   Chief Complaint   Patient presents with    Back Pain     C/o pain in back    Neck Pain     C/o pain in neck       No flowsheet data found.      HPI:   Ms. Villagran returns for follow up of neck and back pain.  She has been working with PT for chronic neck pain into the right greater than left shoulders and interscapular region without radiculopathy and chronic pain in the lowre back to the coccyxgeal region without radiculopahty.  PT is going really well.  It has been focused on the lwoer back and she has significantly less pain.  She is very pleased thus far.  Continues with neck pain, but PT has not started working with her neck yet.     Initial HPI:  Sparkle Jose is a 42 y.o. female smoker with history of ankylosing spondylitis, arthritis, GERD, psoriasis, obesity presents with neck and back pain.  She has years of neck pain into the right greater than left shoulders and interscapular region.  She denies any radicular arm pain, numbness or tingling.  She has 6-8 months of pain in the lowre back to the coccyxgeal region.  Denies any radicular leg pain, numbness or tingling.  She has tried muscle relaxants, nsaids, percocet through the years.  She had PT for the neck and back years ago, but none recent.  Cervical neve blocks  in the past with none recently.  No lumbar injections.      Past and current medications:  Antineuropathics:  NSAIDs: tried  Antidepressants:  Muscle relaxers:  tried  Opioids: percocet (prescribed by Dr. Shea)  Antiplatelets/Anticoagulants:    Physical therapy/ Chiropractic care:  Years ago for neck and lower back  Currently undergoing PT  - addressing lower back more so than the neck at this time.     Pain Intervention History:  Patient reported cervical nerve blocks by Dr. Luna in the past.    Past Spine Surgical History:  none      History:    Current Outpatient Medications:     buPROPion (WELLBUTRIN XL)  300 MG 24 hr tablet, Take 1 tablet (300 mg total) by mouth once daily., Disp: 90 tablet, Rfl: 3    clobetasol 0.05% (TEMOVATE) 0.05 % Oint, 1 application daily as needed. , Disp: , Rfl:     cloNIDine (CATAPRES) 0.1 MG tablet, Take 1 tablet (0.1 mg total) by mouth every evening., Disp: 90 tablet, Rfl: 3    eletriptan (RELPAX) 40 MG tablet, Take 1 tablet (40 mg total) by mouth as needed. may repeat in 2 hours if necessary, Disp: 9 tablet, Rfl: 0    famotidine (PEPCID) 40 MG tablet, Take 1 tablet (40 mg total) by mouth once daily., Disp: 30 tablet, Rfl: 5    ketoconazole (NIZORAL) 2 % shampoo, Wash body with medicated shampoo at least 2x/week - let soak at least 5 minutes prior to rinsing, Disp: 120 mL, Rfl: 5    metFORMIN (GLUCOPHAGE-XR) 500 MG ER 24hr tablet, Take 1 tablet (500 mg total) by mouth every evening., Disp: 90 tablet, Rfl: 3    MOUNJARO 10 mg/0.5 mL PnIj, INJECT 10 mg SUBCUTANEOUSLY EVERY 7 DAYS, Disp: , Rfl:     oxyCODONE-acetaminophen (PERCOCET)  mg per tablet, Take 1 tablet by mouth every 8 (eight) hours as needed for Pain., Disp: 90 tablet, Rfl: 0    oxyCODONE-acetaminophen (PERCOCET)  mg per tablet, Take 1 tablet by mouth every 8 (eight) hours as needed for Pain., Disp: 90 tablet, Rfl: 0    oxyCODONE-acetaminophen (PERCOCET)  mg per tablet, Take 1 tablet by mouth every 8 (eight) hours as needed for Pain., Disp: 90 tablet, Rfl: 0    progesterone (PROMETRIUM) 100 MG capsule, TAKE 1 CAPSULE BY MOUTH EVERY DAY IN THE EVENING, Disp: 90 capsule, Rfl: 3    promethazine (PHENERGAN) 25 MG tablet, Take 1 tablet (25 mg total) by mouth every 6 (six) hours as needed for Nausea., Disp: 45 tablet, Rfl: 5    secukinumab (COSENTYX PEN, 2 PENS,) 150 mg/mL PnIj, Inject 300 mg into the skin every 30 days., Disp: 2 mL, Rfl: 6    semaglutide (OZEMPIC) 2 mg/dose (8 mg/3 mL) PnIj, Inject 2 mg into the skin every 7 days., Disp: 3 pen, Rfl: 5    tiZANidine (ZANAFLEX) 4 MG tablet, TAKE 1 TABLET (4 MG TOTAL) BY  "MOUTH NIGHTLY AS NEEDED (AS NEEDED FOR MUSCLE SPASM/ PAIN)., Disp: 40 tablet, Rfl: 0    traZODone (DESYREL) 100 MG tablet, Take 1 tablet (100 mg total) by mouth every evening., Disp: 90 tablet, Rfl: 1    Past Medical History:   Diagnosis Date    Ankylosing spondylitis     Arthritis     Celiac disease     Depression     Family history of DVT     MGM DVT, mother spleenic    Family history of factor V Leiden mutation     mother and sister    GERD (gastroesophageal reflux disease)     Migraine     Osteoarthritis     Psoriasis     Psoriatic arthritis mutilans        Past Surgical History:   Procedure Laterality Date    brain decompression   2006    BRAIN SURGERY      TUBAL LIGATION         Family History   Problem Relation Age of Onset    Hypertension Mother     Clotting disorder Mother     Depression Sister     Asthma Brother     Clotting disorder Sister     Breast cancer Neg Hx     Ovarian cancer Neg Hx        Social History     Socioeconomic History    Marital status:    Tobacco Use    Smoking status: Every Day     Packs/day: 0.50     Years: 27.00     Pack years: 13.50     Types: Cigarettes     Start date: 1/1/1993    Smokeless tobacco: Never    Tobacco comments:     5-6 CIGS DAILY last quit attempt 8/14/2019   Substance and Sexual Activity    Alcohol use: No     Alcohol/week: 0.0 standard drinks    Drug use: No    Sexual activity: Yes     Birth control/protection: See Surgical Hx       Review of patient's allergies indicates:  No Known Allergies    Review of Systems:  Neck pain.  Low back pain.  Shoulder pain..  Balance of review of systems is negative.    Physical Exam:  Vitals:    02/17/23 1034   BP: 121/79   Pulse: 77   Weight: 68.9 kg (151 lb 14.4 oz)   Height: 5' 1" (1.549 m)   PainSc:   3   PainLoc: Back     Body mass index is 28.7 kg/m².    Gen: NAD  Psych: mood appropriate for given condition  HEENT: eyes anicteric   CV: RRR, 2+ radial pulse  HEENT: anicteric   Respiratory: non-labored, no signs of " respiratory distress  Abd: non-distended  Skin: warm, dry and intact.  Gait: Able to heel walk, toe walk. No antalgic gait.     Coordination:   Romberg: negative  Finger to nose coordination: normal  Heel to shin coordination: normal  Tandem walking coordination: normal    Cervical spine:   ROM is full in flexion, extension and lateral rotation without increased pain.  Spurling's maneuver causes no neck pain to either side.  Myofascial exam: No Tenderness to palpation across cervical paraspinous region bilaterally.    Lumbar spine:   ROM is full with flexion extension and oblique extension with no increased pain.    Prem's test causes no increased pain on either side.    Supine straight leg raise is negative bilaterally.    Internal and external rotation of the hip causes no increased pain on either side.  Myofascial exam: No tenderness to palpation across lumbar paraspinous muscles. No tenderness to palpation over the bilateral greater trochanters and bilateral SI joint    Sensory:  Intact and symmetrical to light touch in C4-T1 dermatomes bilaterally. Intact and symmetrical to light touch in L1-S1 dermatomes bilaterally.    Motor:    Right Left   C4 Shoulder Abduction  5  5   C5 Elbow Flexion    5  5   C6 Wrist Extension  5  5   C7 Elbow Extension   5  5   C8/T1 Hand Intrinsics   5  5        Right Left   L2/3 Iliacus Hip flexion  5  5   L3/4 Qudratus Femoris Knee Extension  5  5   L4/5 Tib Anterior Ankle Dorsiflexion   5  5   L5/S1 Extensor Hallicus Longus Great toe extension  5  5   S1/S2 Gastroc/Soleus Plantar Flexion  5  5      Right Left   Triceps DTR 2+ 2+   Biceps DTR 2+ 2+   Brachioradialis DTR 2+ 2+   Patellar DTR 2+ 2+   Achilles DTR 2+ 2+   Cha Absent  Absent   Clonus Absent Absent   Babinski Absent Absent     Imaging:    MRI cervical spine 12-6-19:  no significant central canal stenosis or foraminal stenosis.  No significant abnormality.    Xray SI joint 11-17-22:  within normal limits    Xray  hips 11-17-22:  no significant abnormality    Xray lumbar psin 12-9-22:  no significant spinal abnormality.  No evidence of ankylosis or briding osteophytes.    Labs:  Lab Results   Component Value Date    HGBA1C 5.8 (H) 06/06/2022       Lab Results   Component Value Date    WBC 14.59 (H) 05/26/2022    HGB 14.2 05/26/2022    HCT 45.6 05/26/2022    MCV 98 05/26/2022     05/26/2022           Assessment:     Ms. Villagran has chronic neck and lower back pain.  Pain can be myofascial, mechanical, influenced by history of ankylosing spondylitis. With no neurologicial deficits, no radicular pain, and no significant abnormality on imaging that has been completed.  I  recommend she continue with PT for the lower back and neck.  If no further improvement then consider imaging and referral to pain management physician to discuss interventional procedures.  Follow up as needed.  She is very pleased with progress thus far.    Problem List Items Addressed This Visit    None  Visit Diagnoses       Lumbar back pain    -  Primary    Cervicalgia                    Follow Up: RTC as neededd    : Not viewed.          Cassandra Alvarez PA-C  [facility]      This note was completed with dictation software and grammatical errors may exist.   show

## 2023-02-17 NOTE — TELEPHONE ENCOUNTER
----- Message from Lupe Pabon sent at 2/17/2023 11:08 AM CST -----  Contact: patient  Type:  Patient Returning Call    Who Called:  patient  Who Left Message for Patient:  steph  Does the patient know what this is regarding?:    Best Call Back Number:  [phone] (home)   Additional Information:

## 2023-02-23 ENCOUNTER — CLINICAL SUPPORT (OUTPATIENT)
Dept: REHABILITATION | Facility: HOSPITAL | Age: 43
End: 2023-02-23
Payer: COMMERCIAL

## 2023-02-23 DIAGNOSIS — R53.1 WEAKNESS: ICD-10-CM

## 2023-02-23 DIAGNOSIS — M54.50 LUMBAR PAIN: Primary | ICD-10-CM

## 2023-02-23 PROCEDURE — 97110 THERAPEUTIC EXERCISES: CPT | Mod: PN | Performed by: PHYSICAL THERAPIST

## 2023-02-23 PROCEDURE — 97112 NEUROMUSCULAR REEDUCATION: CPT | Mod: PN | Performed by: PHYSICAL THERAPIST

## 2023-02-23 NOTE — PROGRESS NOTES
"[facility]   Physical Therapy Treatment Note    Name: Sparkle Jose  Clinic Number: [id number]    Therapy Diagnosis:   Encounter Diagnoses   Name Primary?    Lumbar pain Yes    Weakness          Physician: Cassandra Alvarez PA-C    Visit Date: 2/23/2023    Physician Orders: PT Eval and Treat   Medical Diagnosis from Referral: Cervicalgia and Lumbar Back pain  Evaluation Date: 1/18/2023  Authorization Period Expiration: 12/31/23  Plan of Care Expiration: 3/2/23  Progress Note Due: 3/19/23  Visit # / Visits authorized: 6/13  FOTO: 1/3      PTA Visit #: /5     Precautions: Standard     Time In: 11:00 AM  Time Out: 12:00 PM  Total Billable Time:  30 minutes      SUBJECTIVE     Pt reports: Feeling better following a URTI last week. Patient reports overall satisfaction with therapy for her low back and is interested in working on her neck symptoms.   She was compliant with home exercise program.  Response to previous treatment: Tolerated well, some moderate soreness  Functional change: ongoing    Pain: 5/10  Location: R neck and shoulder      OBJECTIVE     Cervical ROM  Flexion: 55 degrees  Extension: 22 degrees  R Rotation: WFL  L Rotation: Mild loss compared to R  R Sidebending: Moderate loss  L Sidebending: Moderate loss, painful    Neck flexor endurance test: 21  Neck extension endurance test: 20s       Treatment     Sparkle received the treatments listed below:      therapeutic exercises to develop strength, endurance, ROM, and flexibility for 30  minutes including:  Treadmill walking, 8:00  Upper trapezius self stretch, x2 min each side  Testing ROM and endurance activities    HEP updated to include deep neck flexor and extensor endurance and cervical ROM    Therapeutic Activities to improve functional independence with daily tasks for 12 minutes including:  Step up to 12" with 7.5#, 3x6 each LE  Goblet squat with 7.5#, 3x8    manual therapy techniques: Joint mobilizations, Manual " traction, Myofacial release, and Soft tissue Mobilization were applied to the:  for  minutes, including:    neuromuscular re-education activities to improve: Balance, Coordination, Proprioception, and Posture for 13 minutes. The following activities were included:  Chin tuck with lift, flexion, 10x5s  Chin tuck with lift, extension, prone, 10x5s  Chin tuck from prone on elbows, 10x5s  Chin tuck with rotation from elbows, 3x5s each side (increases pain)    HEP       Patient Education and Home Exercises     Home Exercises Provided and Patient Education Provided     Education provided:   - HEP      Written Home Exercises Provided: yes. Exercises were reviewed and Sparkle was able to demonstrate them prior to the end of the session.  Sparkle demonstrated good  understanding of the education provided. See EMR under Patient Instructions for exercises provided during therapy sessions    ASSESSMENT     Patient impaired in deep neck flexor endurance and cervical range of motion. Patient introduced to exercises for cervical endurance and ROM and HEP updated to include progressive cervical strengthening. Patient stated her neck pain increased from 6/10 to 8/10 with cervical rotation exercises and returned to 7/10 after rest.     Sparkle Is progressing well towards her goals.   Pt prognosis is Good.     Pt's spiritual, cultural and educational needs considered and pt agreeable to plan of care and goals.     Anticipated barriers to physical therapy: None    Goals:  Short Term Goals: 3 weeks   - Patient will report 5/10 or better level of pain when performing household ADLs   - Patient will report independence and >80% compliance with HEP met 2/1/23     Long Term Goals: 5 weeks   - Patient will demonstrate functional squat pattern in order to improve independence with household ADLs met 2/15/23  - Patient will demonstrate minor loss of motion or better in all three planes of motion of the trunk met 2/15/23  - Patient will  demonstrate proper lifting mechanics when lifting an object 10 pounds or higher       PLAN     Progress with functional activities and strengthening to patient tolerance.    Zeferino Betancur, SPT

## 2023-02-28 ENCOUNTER — CLINICAL SUPPORT (OUTPATIENT)
Dept: REHABILITATION | Facility: HOSPITAL | Age: 43
End: 2023-02-28
Payer: MEDICARE

## 2023-02-28 DIAGNOSIS — R53.1 WEAKNESS: ICD-10-CM

## 2023-02-28 DIAGNOSIS — M54.50 LUMBAR PAIN: Primary | ICD-10-CM

## 2023-02-28 PROCEDURE — 97110 THERAPEUTIC EXERCISES: CPT | Mod: PN | Performed by: PHYSICAL THERAPIST

## 2023-02-28 PROCEDURE — 97530 THERAPEUTIC ACTIVITIES: CPT | Mod: PN | Performed by: PHYSICAL THERAPIST

## 2023-02-28 PROCEDURE — 97112 NEUROMUSCULAR REEDUCATION: CPT | Mod: PN | Performed by: PHYSICAL THERAPIST

## 2023-02-28 NOTE — PROGRESS NOTES
"[facility]   Physical Therapy Treatment Note    Name: Sparkle Jose  Clinic Number: [id number]    Therapy Diagnosis:   Encounter Diagnoses   Name Primary?    Lumbar pain Yes    Weakness            Physician: Cassandra Alvarez PA-C    Visit Date: 2/28/2023    Physician Orders: PT Eval and Treat   Medical Diagnosis from Referral: Cervicalgia and Lumbar Back pain  Evaluation Date: 1/18/2023  Authorization Period Expiration: 12/31/23  Plan of Care Expiration: 3/2/23  Progress Note Due: 3/19/23  Visit # / Visits authorized: 9/13  FOTO: 1/3      PTA Visit #: /5     Precautions: Standard     Time In: 08:11 AM  Time Out: 9:11 AM  Total Billable Time: 55 minutes      SUBJECTIVE     Pt reports: Feeling good today. She reports that she continues to have neck pain with occasional headaches. Her back pain has completely resolved.   She was compliant with home exercise program.  Response to previous treatment: Tolerated well, some moderate soreness  Functional change: ongoing    Pain: 5/10  Location: R neck and shoulder      OBJECTIVE     See last reassessment      Treatment     Sparkle received the treatments listed below:      therapeutic exercises to develop strength, endurance, ROM, and flexibility for 15 minutes including:  Doorway stretch, x2min  Chin tuck isometric holds on towel, x2min  Thoracic extension over foam roller, x3min  Banded open book rotations, x10 each side    HEP updated to include thoracic extension over foam roller    Therapeutic Activities to improve functional independence with daily tasks for 25 minutes including:  Outdoor walking, 10:00, 0.5mi  Step up to 12" with 7.5#, 3x5 each LE  Kettlebell deadlift, tempo 3/1 slow down fast up, 3x6, 40#    Patient educated on benefits of tempo and power lifting.     manual therapy techniques: Joint mobilizations, Manual traction, Myofacial release, and Soft tissue Mobilization were applied to the:  for  minutes, " including:    neuromuscular re-education activities to improve: Balance, Coordination, Proprioception, and Posture for 15 minutes. The following activities were included:  Serratus slides with green hip band, foam roller on wall, 3x10  Corner retractions, 3x10  Standing full trunk flexion - extension, 20x  Chin tucks from prone on elbows, 5x (patient unable to perform a proper chin tuck due to fatigue in the shoulders)    Patient educated on proper for for each new exercise        Patient Education and Home Exercises     Home Exercises Provided and Patient Education Provided     Education provided:   - HEP      Written Home Exercises Provided: yes. Exercises were reviewed and Sparkle was able to demonstrate them prior to the end of the session.  Sparkle demonstrated good  understanding of the education provided. See EMR under Patient Instructions for exercises provided during therapy sessions    ASSESSMENT     Patient able to progress to power lifting without increase in symptoms. Patient continuing to make progress with neck and thoracic stabilization and coordination exercises.     Sparkle Is progressing well towards her goals.   Pt prognosis is Good.     Pt's spiritual, cultural and educational needs considered and pt agreeable to plan of care and goals.     Anticipated barriers to physical therapy: None    Goals:  Short Term Goals: 3 weeks   - Patient will report 5/10 or better level of pain when performing household ADLs   - Patient will report independence and >80% compliance with HEP met 2/1/23     Long Term Goals: 5 weeks   - Patient will demonstrate functional squat pattern in order to improve independence with household ADLs met 2/15/23  - Patient will demonstrate minor loss of motion or better in all three planes of motion of the trunk met 2/15/23  - Patient will demonstrate proper lifting mechanics when lifting an object 10 pounds or higher met 2/28/23       PLAN     Progress with functional activities  and strengthening to patient tolerance.    Zeferino Betancur, SPT

## 2023-03-02 ENCOUNTER — PES CALL (OUTPATIENT)
Dept: ADMINISTRATIVE | Facility: OTHER | Age: 43
End: 2023-03-02
Payer: MEDICARE

## 2023-03-02 ENCOUNTER — CLINICAL SUPPORT (OUTPATIENT)
Dept: REHABILITATION | Facility: HOSPITAL | Age: 43
End: 2023-03-02
Payer: MEDICARE

## 2023-03-02 DIAGNOSIS — R53.1 WEAKNESS: ICD-10-CM

## 2023-03-02 DIAGNOSIS — M54.50 LUMBAR PAIN: Primary | ICD-10-CM

## 2023-03-02 PROCEDURE — 97530 THERAPEUTIC ACTIVITIES: CPT | Mod: PN | Performed by: PHYSICAL THERAPIST

## 2023-03-02 PROCEDURE — 97112 NEUROMUSCULAR REEDUCATION: CPT | Mod: PN | Performed by: PHYSICAL THERAPIST

## 2023-03-02 NOTE — PROGRESS NOTES
"[facility]   Physical Therapy Treatment Note    Name: Sparkle Jose  Clinic Number: [id number]    Therapy Diagnosis:   Encounter Diagnoses   Name Primary?    Lumbar pain Yes    Weakness            Physician: Cassandra Alvarez PA-C    Visit Date: 3/2/2023    Physician Orders: PT Eval and Treat   Medical Diagnosis from Referral: Cervicalgia and Lumbar Back pain  Evaluation Date: 1/18/2023  Authorization Period Expiration: 12/31/23  Plan of Care Expiration: 3/2/23  Progress Note Due: 3/19/23  Visit # / Visits authorized: 9/13  FOTO: 1/3      PTA Visit #: /5     Precautions: Standard     Time In: 08:00 AM  Time Out: 9:11 AM  Total Billable Time: 55 minutes      SUBJECTIVE     Pt reports: That she was really hurting yesterday, but is a little sore today.   She was compliant with home exercise program.  Response to previous treatment: Tolerated well, some moderate soreness  Functional change: ongoing    Pain: 6/10  Location: R neck and shoulder      OBJECTIVE         Treatment     Sparkle received the treatments listed below:      therapeutic exercises to develop strength, endurance, ROM, and flexibility for 15 minutes including:  Doorway stretch, x2min  Chin tuck isometric holds on towel, x2min  Thoracic extension over foam roller, x3min  Banded open book rotations, x10 each side    HEP updated to include thoracic extension over foam roller    Therapeutic Activities to improve functional independence with daily tasks for 25 minutes including:  Treadmill walking, 10:00, 2.0 mi  Step up to 12" with 7.5#, 3x5 each LE  1/2 kneeling chops 2 x 8 10# cables   1/2 knee lift 1 x 8 7# cables     Patient educated on benefits of tempo and power lifting.     manual therapy techniques: Joint mobilizations, Manual traction, Myofacial release, and Soft tissue Mobilization were applied to the:  for  minutes, including:    neuromuscular re-education activities to improve: Balance, Coordination, " Proprioception, and Posture for 15 minutes. The following activities were included:  Serratus slides with green hip band, foam roller on wall, 3x10  Face pull 3x10 green theraband   Horizontal abduction 3 x 10 red theraband   Standing full trunk flexion - extension, 20x      Patient educated on proper for for each new exercise        Patient Education and Home Exercises     Home Exercises Provided and Patient Education Provided     Education provided:   - HEP      Written Home Exercises Provided: yes. Exercises were reviewed and Sparkle was able to demonstrate them prior to the end of the session.  Sparkle demonstrated good  understanding of the education provided. See EMR under Patient Instructions for exercises provided during therapy sessions    ASSESSMENT     Exercises modified secondary to increased pain following last session. She did well with modifications. Overall, pain reduced since onset of treatment.     Sparkle Is progressing well towards her goals.   Pt prognosis is Good.     Pt's spiritual, cultural and educational needs considered and pt agreeable to plan of care and goals.     Anticipated barriers to physical therapy: None    Goals:  Short Term Goals: 3 weeks   - Patient will report 5/10 or better level of pain when performing household ADLs   - Patient will report independence and >80% compliance with HEP met 2/1/23     Long Term Goals: 5 weeks   - Patient will demonstrate functional squat pattern in order to improve independence with household ADLs met 2/15/23  - Patient will demonstrate minor loss of motion or better in all three planes of motion of the trunk met 2/15/23  - Patient will demonstrate proper lifting mechanics when lifting an object 10 pounds or higher met 2/28/23       PLAN     Progress with functional activities and strengthening to patient tolerance.    Moody Granados, PT, DPT

## 2023-03-03 DIAGNOSIS — M54.2 CERVICALGIA: ICD-10-CM

## 2023-03-03 DIAGNOSIS — M54.50 LUMBAR BACK PAIN: ICD-10-CM

## 2023-03-06 RX ORDER — TIZANIDINE 4 MG/1
TABLET ORAL
Qty: 40 TABLET | Refills: 0 | Status: SHIPPED | OUTPATIENT
Start: 2023-03-06 | End: 2023-07-21 | Stop reason: SDUPTHER

## 2023-03-07 ENCOUNTER — OFFICE VISIT (OUTPATIENT)
Dept: FAMILY MEDICINE | Facility: CLINIC | Age: 43
End: 2023-03-07
Payer: COMMERCIAL

## 2023-03-07 ENCOUNTER — CLINICAL SUPPORT (OUTPATIENT)
Dept: REHABILITATION | Facility: HOSPITAL | Age: 43
End: 2023-03-07
Payer: MEDICARE

## 2023-03-07 VITALS
TEMPERATURE: 98 F | DIASTOLIC BLOOD PRESSURE: 82 MMHG | SYSTOLIC BLOOD PRESSURE: 118 MMHG | BODY MASS INDEX: 28.13 KG/M2 | WEIGHT: 149 LBS | HEIGHT: 61 IN | HEART RATE: 76 BPM

## 2023-03-07 DIAGNOSIS — E66.3 OVERWEIGHT (BMI 25.0-29.9): ICD-10-CM

## 2023-03-07 DIAGNOSIS — G47.00 INSOMNIA, UNSPECIFIED TYPE: ICD-10-CM

## 2023-03-07 DIAGNOSIS — Z12.31 ENCOUNTER FOR SCREENING MAMMOGRAM FOR MALIGNANT NEOPLASM OF BREAST: ICD-10-CM

## 2023-03-07 DIAGNOSIS — F51.01 PRIMARY INSOMNIA: ICD-10-CM

## 2023-03-07 DIAGNOSIS — R73.03 PREDIABETES: ICD-10-CM

## 2023-03-07 DIAGNOSIS — F98.8 ADD (ATTENTION DEFICIT DISORDER) WITHOUT HYPERACTIVITY: ICD-10-CM

## 2023-03-07 DIAGNOSIS — D84.821 IMMUNOCOMPROMISED STATE DUE TO DRUG THERAPY: ICD-10-CM

## 2023-03-07 DIAGNOSIS — Z79.899 IMMUNOCOMPROMISED STATE DUE TO DRUG THERAPY: ICD-10-CM

## 2023-03-07 DIAGNOSIS — E11.69 TYPE 2 DIABETES MELLITUS WITH OTHER SPECIFIED COMPLICATION, UNSPECIFIED WHETHER LONG TERM INSULIN USE: Primary | ICD-10-CM

## 2023-03-07 DIAGNOSIS — D68.51 FACTOR 5 LEIDEN MUTATION, HETEROZYGOUS: ICD-10-CM

## 2023-03-07 DIAGNOSIS — M54.50 LUMBAR PAIN: Primary | ICD-10-CM

## 2023-03-07 DIAGNOSIS — F17.210 CIGARETTE NICOTINE DEPENDENCE WITHOUT COMPLICATION: ICD-10-CM

## 2023-03-07 PROBLEM — R53.1 WEAKNESS: Status: RESOLVED | Noted: 2023-01-18 | Resolved: 2023-03-07

## 2023-03-07 PROCEDURE — 99214 PR OFFICE/OUTPT VISIT, EST, LEVL IV, 30-39 MIN: ICD-10-PCS | Mod: S$GLB,,, | Performed by: STUDENT IN AN ORGANIZED HEALTH CARE EDUCATION/TRAINING PROGRAM

## 2023-03-07 PROCEDURE — 97112 NEUROMUSCULAR REEDUCATION: CPT | Mod: PN | Performed by: PHYSICAL THERAPIST

## 2023-03-07 PROCEDURE — 97530 THERAPEUTIC ACTIVITIES: CPT | Mod: PN | Performed by: PHYSICAL THERAPIST

## 2023-03-07 PROCEDURE — 99999 PR PBB SHADOW E&M-EST. PATIENT-LVL V: CPT | Mod: PBBFAC,,, | Performed by: STUDENT IN AN ORGANIZED HEALTH CARE EDUCATION/TRAINING PROGRAM

## 2023-03-07 PROCEDURE — 99215 OFFICE O/P EST HI 40 MIN: CPT | Mod: PBBFAC,PO | Performed by: STUDENT IN AN ORGANIZED HEALTH CARE EDUCATION/TRAINING PROGRAM

## 2023-03-07 PROCEDURE — 99999 PR PBB SHADOW E&M-EST. PATIENT-LVL V: ICD-10-PCS | Mod: PBBFAC,,, | Performed by: STUDENT IN AN ORGANIZED HEALTH CARE EDUCATION/TRAINING PROGRAM

## 2023-03-07 PROCEDURE — 99214 OFFICE O/P EST MOD 30 MIN: CPT | Mod: S$GLB,,, | Performed by: STUDENT IN AN ORGANIZED HEALTH CARE EDUCATION/TRAINING PROGRAM

## 2023-03-07 RX ORDER — TRAZODONE HYDROCHLORIDE 100 MG/1
100 TABLET ORAL NIGHTLY
Qty: 90 TABLET | Refills: 1
Start: 2023-03-07 | End: 2024-03-06

## 2023-03-07 RX ORDER — SEMAGLUTIDE 2.68 MG/ML
2 INJECTION, SOLUTION SUBCUTANEOUS
Qty: 3 PEN | Refills: 5
Start: 2023-03-07 | End: 2023-05-15 | Stop reason: SDUPTHER

## 2023-03-07 RX ORDER — TIRZEPATIDE 12.5 MG/.5ML
12.5 INJECTION, SOLUTION SUBCUTANEOUS
Start: 2023-03-07 | End: 2023-05-31

## 2023-03-07 RX ORDER — DEXTROAMPHETAMINE SACCHARATE, AMPHETAMINE ASPARTATE, DEXTROAMPHETAMINE SULFATE AND AMPHETAMINE SULFATE 2.5; 2.5; 2.5; 2.5 MG/1; MG/1; MG/1; MG/1
10 TABLET ORAL 2 TIMES DAILY
Qty: 60 TABLET | Refills: 0 | Status: SHIPPED | OUTPATIENT
Start: 2023-03-07 | End: 2023-04-04

## 2023-03-07 NOTE — PROGRESS NOTES
"[facility]   Physical Therapy Treatment Note/POC update     Name: Sparkle Jose  Clinic Number: [id number]    Therapy Diagnosis:   Encounter Diagnoses   Name Primary?    Lumbar pain Yes    Weakness          Physician: Cassandra Alvarez PA-C    Visit Date: 2/23/2023    Physician Orders: PT Eval and Treat   Medical Diagnosis from Referral: Cervicalgia and Lumbar Back pain  Evaluation Date: 1/18/2023  Authorization Period Expiration: 12/31/23  Plan of Care Expiration: 3/23/23  Progress Note Due: 3/19/23  Visit # / Visits authorized: 6/13  FOTO: 1/3      PTA Visit #: /5     Precautions: Standard     Time In: 11:00 AM  Time Out: 12:00 PM  Total Billable Time:  30 minutes      SUBJECTIVE     Pt reports: Feeling better following a URTI last week. Patient reports overall satisfaction with therapy for her low back and is interested in working on her neck symptoms.   She was compliant with home exercise program.  Response to previous treatment: Tolerated well, some moderate soreness  Functional change: ongoing    Pain: 5/10  Location: R neck and shoulder      OBJECTIVE     Cervical ROM  Flexion: 55 degrees  Extension: 22 degrees  R Rotation: WFL  L Rotation: Mild loss compared to R  R Sidebending: Moderate loss  L Sidebending: Moderate loss, painful    Neck flexor endurance test: 21  Neck extension endurance test: 20s       Treatment     Sparkle received the treatments listed below:      therapeutic exercises to develop strength, endurance, ROM, and flexibility for 30  minutes including:  Treadmill walking, 8:00  Upper trapezius self stretch, x2 min each side  Testing ROM and endurance activities    HEP updated to include deep neck flexor and extensor endurance and cervical ROM    Therapeutic Activities to improve functional independence with daily tasks for 12 minutes including:  Step up to 12" with 7.5#, 3x6 each LE  Goblet squat with 7.5#, 3x8    manual therapy techniques: Joint " mobilizations, Manual traction, Myofacial release, and Soft tissue Mobilization were applied to the:  for  minutes, including:    neuromuscular re-education activities to improve: Balance, Coordination, Proprioception, and Posture for 13 minutes. The following activities were included:  Chin tuck with lift, flexion, 10x5s  Chin tuck with lift, extension, prone, 10x5s  Chin tuck from prone on elbows, 10x5s  Chin tuck with rotation from elbows, 3x5s each side (increases pain)    HEP       Patient Education and Home Exercises     Home Exercises Provided and Patient Education Provided     Education provided:   - HEP      Written Home Exercises Provided: yes. Exercises were reviewed and Sparkle was able to demonstrate them prior to the end of the session.  Sparkle demonstrated good  understanding of the education provided. See EMR under Patient Instructions for exercises provided during therapy sessions    ASSESSMENT     Patient impaired in deep neck flexor endurance and cervical range of motion. Patient introduced to exercises for cervical endurance and ROM and HEP updated to include progressive cervical strengthening. Patient stated her neck pain increased from 6/10 to 8/10 with cervical rotation exercises and returned to 7/10 after rest.     Sparkle Is progressing well towards her goals.   Pt prognosis is Good.     Pt's spiritual, cultural and educational needs considered and pt agreeable to plan of care and goals.     Anticipated barriers to physical therapy: None    Goals:  Short Term Goals: 3 weeks   - Patient will report 5/10 or better level of pain when performing household ADLs   - Patient will report independence and >80% compliance with HEP met 2/1/23     Long Term Goals: 5 weeks   - Patient will demonstrate functional squat pattern in order to improve independence with household ADLs met 2/15/23  - Patient will demonstrate minor loss of motion or better in all three planes of motion of the trunk met  2/15/23  - Patient will demonstrate proper lifting mechanics when lifting an object 10 pounds or higher       PLAN     Progress with functional activities and strengthening to patient tolerance.    Extend POC and Continue 2x weekly for 4 weeks    Zeferino Betancur, SPT

## 2023-03-07 NOTE — PROGRESS NOTES
[facility]   Physical Therapy Treatment Note    Name: Sparkle Daniels [facility]  Clinic Number: [id number]    Therapy Diagnosis:   Encounter Diagnosis   Name Primary?    Lumbar pain Yes       Physician: Cassandra Alvarez PA-C    Visit Date: 3/7/2023    Physician Orders: PT Eval and Treat   Medical Diagnosis from Referral: Cervicalgia and Lumbar Back pain  Evaluation Date: 1/18/2023  Authorization Period Expiration: 12/31/23  Plan of Care Expiration: 3/23/23  Progress Note Due: 3/19/23  Visit # / Visits authorized: 9/13  FOTO: 1/3      PTA Visit #: /5     Precautions: Standard     Time In: 10:05 AM  Time Out: 11:05 AM  Total Billable Time: 54 minutes      SUBJECTIVE     Pt reports: That she was really hurting yesterday, but is a little sore today.   She was compliant with home exercise program.  Response to previous treatment: Tolerated well, some moderate soreness  Functional change: ongoing    Pain: 6/10  Location: R neck and shoulder      OBJECTIVE         Treatment     Sparkle received the treatments listed below:      therapeutic exercises to develop strength, endurance, ROM, and flexibility for 10 minutes including:  Doorway stretch, x2min  Chin tuck isometric holds on towel, x2min  Thoracic extension over foam roller, x2min  Banded open book rotations, x10 each side    HEP updated to include thoracic extension over foam roller    Therapeutic Activities to improve functional independence with daily tasks for 16 minutes including:  Outdoor walking, 11:00,  0.5 mi  Goblet squat with 15#, 3x6  Patient educated on breathing with lifting    manual therapy techniques: Joint mobilizations, Manual traction, Myofacial release, and Soft tissue Mobilization were applied to the:  for 8 minutes, including:  Theragun to Trapezius, Thoracic paraspinal and scapular muscles  Manual trigger point release to Trapezius, Levator Scapulae    neuromuscular re-education activities to improve: Balance, Coordination,  Proprioception, and Posture for 20 minutes. The following activities were included:  Scapular clocks, red tband, 2x6  Horizontal abduction 3 x 10 red theraband   Scaption AROM with scapular assistance, 10x  Patient educated on scapulohumeral rhythm  HEP updated to include scapular clocks and horizontal abduction    Patient educated on proper for for each new exercise        Patient Education and Home Exercises     Home Exercises Provided and Patient Education Provided     Education provided:   - HEP      Written Home Exercises Provided: yes. Exercises were reviewed and Sparkle was able to demonstrate them prior to the end of the session.  Sparkle demonstrated good  understanding of the education provided. See EMR under Patient Instructions for exercises provided during therapy sessions    ASSESSMENT     Patient tolerated new scapular exercises fair with increase in upper trapezius pain from 4/10 to 7/10. Patient demonstrated improvement in shoulder elevation pain upon manual scapular assistance with upward rotation. Patient's HEP updated to include scapular exercises.     Sparkle Is progressing well towards her goals.   Pt prognosis is Good.     Pt's spiritual, cultural and educational needs considered and pt agreeable to plan of care and goals.     Anticipated barriers to physical therapy: None    Goals:  Short Term Goals: 3 weeks   - Patient will report 5/10 or better level of pain when performing household ADLs   - Patient will report independence and >80% compliance with HEP met 2/1/23     Long Term Goals: 5 weeks   - Patient will demonstrate functional squat pattern in order to improve independence with household ADLs met 2/15/23  - Patient will demonstrate minor loss of motion or better in all three planes of motion of the trunk met 2/15/23  - Patient will demonstrate proper lifting mechanics when lifting an object 10 pounds or higher met 2/28/23       PLAN     Progress with functional activities and  strengthening to patient tolerance.    Zeferino Betancur, SPT

## 2023-03-07 NOTE — PROGRESS NOTES
Problem List Items Addressed This Visit          Psychiatric    ADD (attention deficit disorder) without hyperactivity    Overview     Chronic hx; start adderall 10mg BID     Denies mood instability, irritability, aggression, palpitations, chest pain, weight loss, decreased appetite, disordered sleep  Pt is demonstrating no behaviors to suggest inappropriate use of prescribed medications.    [place] Board of Pharmacy Controlled Prescription Drug Monitoring database was queried and showed no activity to suggest abuse, diversion, or other inappropriate use of prescription medications.  #30 tabs, 0 refills   1m VV             Relevant Medications    dextroamphetamine-amphetamine (ADDERALL) 10 mg Tab       Immunology/Multi System    Immunocompromised state due to drug therapy    Overview     Follows with rheum, stable on cosentyx             Hematology    Factor 5 Leiden mutation, heterozygous    Overview     Leiden factor 5 deficiency - no previous hx of DVT, PE   Avoid estrogen therapy            Endocrine    Overweight (BMI 25.0-29.9)    Overview     Wt Readings from Last 3 Encounters:   03/07/23 0811 67.6 kg (149 lb)   02/17/23 1034 68.9 kg (151 lb 14.4 oz)   01/06/23 1533 72.3 kg (159 lb 6.3 oz)     Diabetes Medications               metFORMIN (GLUCOPHAGE-XR) 500 MG ER 24hr tablet Take 1 tablet (500 mg total) by mouth every evening.    semaglutide (OZEMPIC) 2 mg/dose (8 mg/3 mL) PnIj Inject 2 mg into the skin every 7 days.    tirzepatide (MOUNJARO) 12.5 mg/0.5 mL PnIj Inject 12.5 mg into the skin every 7 days.   On mounjaro, has ozempic as back up if mounjaro is out of stock   General weight loss/lifestyle modification strategies discussed: limit sugary drinks, exercise 3-5x per week  Informal exercise measures discussed, e.g. taking stairs instead of elevator.                   Prediabetes    Overview     Lab Results   Component Value Date    HGBA1C 5.8 (H) 06/06/2022   Doing well; follows with endocrine   On  GLP1 RA     On mounjaro, has ozempic as back up if mounjaro is out of stock          RESOLVED: Type 2 diabetes mellitus with other specified complication, unspecified whether long term insulin use - Primary    Relevant Medications    semaglutide (OZEMPIC) 2 mg/dose (8 mg/3 mL) PnIj       Other    Cigarette nicotine dependence without complication    Overview     Reports smoking about 1/2 ppd 25 years. Family smokes. Previously on chantix and quit smoking for about 1 month; however, sister was sick and restarted smoking.    Has cut down to about 5 cigarettes daily   Wellbutrin has helped     Assistance with smoking cessation was offered, including:  [x]  Medications  [x]  Counseling  []  Printed Information on Smoking Cessation  [x]  Referral to a Smoking Cessation Program    Patient was counseled regarding smoking for 3-10 minutes.             Relevant Orders    Ambulatory referral/consult to Smoking Cessation Program    Primary insomnia    Overview     -is on prn trazodone chronically (rxd by Dr. Almonte)  -denies adverse effects of medication  -has tried multiple OTC medication with minimal benefit  -discussed importance of good sleep hygiene practices            Other Visit Diagnoses       Encounter for screening mammogram for malignant neoplasm of breast        Relevant Orders    Mammo Digital Screening Bilat w/ [facility]    Insomnia, unspecified type        Relevant Medications    traZODone (DESYREL) 100 MG tablet                Follow up in about 4 weeks (around 4/4/2023) for Virtual Visit.    Chrissie Lancaster MD  _________________________________________________________________________      Patient ID: Sparkle Jose is a 42 y.o. female.    Chief Complaint: add    Pt diagnosed with ADD/ADHD several years ago. Has tried adderall. Last on adderall few years ago as she left previous provider . Denies mood instability, irritability, aggression, palpitations, chest pain, weight loss, decreased appetite, disordered  sleep. Reports she has been trouble focusing.     Doing well on wellbutrin and mounjaro.      Denies SI/HI; no hallucinations. Denies fevers, chills, chest pain, SOB, fatigue, abdominal pain, nausea, vomiting, dysuria, hematuria, hematochezia, or melena.     Also working to quit smoking. Currently smoking 5 cigarettes.     Past medical histories reviewed, including past medical, surgical, family and social histories.      Current Outpatient Medications on File Prior to Visit   Medication Sig Dispense Refill    buPROPion (WELLBUTRIN XL) 300 MG 24 hr tablet Take 1 tablet (300 mg total) by mouth once daily. 90 tablet 3    clobetasol 0.05% (TEMOVATE) 0.05 % Oint 1 application daily as needed.       cloNIDine (CATAPRES) 0.1 MG tablet Take 1 tablet (0.1 mg total) by mouth every evening. 90 tablet 3    eletriptan (RELPAX) 40 MG tablet Take 1 tablet (40 mg total) by mouth as needed. may repeat in 2 hours if necessary 9 tablet 0    famotidine (PEPCID) 40 MG tablet Take 1 tablet (40 mg total) by mouth once daily. 30 tablet 5    metFORMIN (GLUCOPHAGE-XR) 500 MG ER 24hr tablet Take 1 tablet (500 mg total) by mouth every evening. 90 tablet 3    oxyCODONE-acetaminophen (PERCOCET)  mg per tablet Take 1 tablet by mouth every 8 (eight) hours as needed for Pain. 90 tablet 0    oxyCODONE-acetaminophen (PERCOCET)  mg per tablet Take 1 tablet by mouth every 8 (eight) hours as needed for Pain. 90 tablet 0    oxyCODONE-acetaminophen (PERCOCET)  mg per tablet Take 1 tablet by mouth every 8 (eight) hours as needed for Pain. 90 tablet 0    progesterone (PROMETRIUM) 100 MG capsule TAKE 1 CAPSULE BY MOUTH EVERY DAY IN THE EVENING 90 capsule 3    promethazine (PHENERGAN) 25 MG tablet Take 1 tablet (25 mg total) by mouth every 6 (six) hours as needed for Nausea. 45 tablet 5    secukinumab (COSENTYX PEN, 2 PENS,) 150 mg/mL PnIj Inject 300 mg into the skin every 30 days. 2 mL 6    tiZANidine (ZANAFLEX) 4 MG tablet TAKE 1 TABLET  (4 MG TOTAL) BY MOUTH NIGHTLY AS NEEDED FOR MUSCLE SPASM/ PAIN. 40 tablet 0    [DISCONTINUED] MOUNJARO 10 mg/0.5 mL PnIj INJECT 10 mg SUBCUTANEOUSLY EVERY 7 DAYS      [DISCONTINUED] azithromycin (Z-DEMARCUS) 250 MG tablet Take 2 tablets by mouth on day 1; Take 1 tablet by mouth on days 2-5 (Patient not taking: Reported on 3/7/2023) 6 tablet 0    [DISCONTINUED] ketoconazole (NIZORAL) 2 % shampoo Wash body with medicated shampoo at least 2x/week - let soak at least 5 minutes prior to rinsing (Patient not taking: Reported on 3/7/2023) 120 mL 5    [DISCONTINUED] semaglutide (OZEMPIC) 2 mg/dose (8 mg/3 mL) PnIj Inject 2 mg into the skin every 7 days. (Patient not taking: Reported on 3/7/2023) 3 pen 5    [DISCONTINUED] traZODone (DESYREL) 100 MG tablet Take 1 tablet (100 mg total) by mouth every evening. (Patient not taking: Reported on 3/7/2023) 90 tablet 1     No current facility-administered medications on file prior to visit.       Review of Systems   12 point review of systems negative except for listed in HPI.     Objective:    Nursing note and vitals reviewed.  Vitals:    03/07/23 0811   BP: 118/82   Pulse: 76   Temp: 97.6 °F (36.4 °C)     Body mass index is 28.15 kg/m².     Physical Exam   Constitutional: oriented to person, place, and time. well-developed and well-nourished. No distress.   HENT: WNL  Head: Normocephalic and atraumatic.   Eyes: EOM are normal.   Neck: Normal range of motion. Neck supple.   Cardiovascular: Normal rate  Pulmonary/Chest: Effort normal. No respiratory distress.   Musculoskeletal: Normal range of motion. no edema.   Neurological: CN II-XII intact  Skin: warm and dry.   Psychiatric: normal mood and affect. behavior is normal.           We Offer Telehealth & Same Day Appointments!   Book your Telehealth appointment with me through my nurse or   Clinic appointments on [employer]!  [phone]     To Schedule appointments online, go to [employer]:  [email or web address]

## 2023-03-07 NOTE — PROGRESS NOTES
[facility]   Physical Therapy Treatment Note    Name: Sparkle Daniels [facility]  Clinic Number: [id number]    Therapy Diagnosis:   Encounter Diagnosis   Name Primary?    Lumbar pain Yes       Physician: Cassandra Alvarez PA-C    Visit Date: 3/7/2023    Physician Orders: PT Eval and Treat   Medical Diagnosis from Referral: Cervicalgia and Lumbar Back pain  Evaluation Date: 1/18/2023  Authorization Period Expiration: 12/31/23  Plan of Care Expiration: 3/2/23  Progress Note Due: 3/19/23  Visit # / Visits authorized: 9/13  FOTO: 1/3      PTA Visit #: /5     Precautions: Standard     Time In: 10:05 AM  Time Out: 11:05 AM  Total Billable Time: 54 minutes      SUBJECTIVE     Pt reports: That she was really hurting yesterday, but is a little sore today.   She was compliant with home exercise program.  Response to previous treatment: Tolerated well, some moderate soreness  Functional change: ongoing    Pain: 6/10  Location: R neck and shoulder      OBJECTIVE         Treatment     Sparkle received the treatments listed below:      therapeutic exercises to develop strength, endurance, ROM, and flexibility for 10 minutes including:  Doorway stretch, x2min  Chin tuck isometric holds on towel, x2min  Thoracic extension over foam roller, x2min  Banded open book rotations, x10 each side    HEP updated to include thoracic extension over foam roller    Therapeutic Activities to improve functional independence with daily tasks for 16 minutes including:  Outdoor walking, 11:00,  0.5 mi  Goblet squat with 15#, 3x6  Patient educated on breathing with lifting    manual therapy techniques: Joint mobilizations, Manual traction, Myofacial release, and Soft tissue Mobilization were applied to the:  for 8 minutes, including:  Theragun to Trapezius, Thoracic paraspinal and scapular muscles  Manual trigger point release to Trapezius, Levator Scapulae    neuromuscular re-education activities to improve: Balance, Coordination,  Proprioception, and Posture for 20 minutes. The following activities were included:  Scapular clocks, red tband, 2x6  Horizontal abduction 3 x 10 red theraband   Scaption AROM with scapular assistance, 10x  Patient educated on scapulohumeral rhythm  HEP updated to include scapular clocks and horizontal abduction    Patient educated on proper for for each new exercise        Patient Education and Home Exercises     Home Exercises Provided and Patient Education Provided     Education provided:   - HEP      Written Home Exercises Provided: yes. Exercises were reviewed and Sparkle was able to demonstrate them prior to the end of the session.  Sparkle demonstrated good  understanding of the education provided. See EMR under Patient Instructions for exercises provided during therapy sessions    ASSESSMENT     Patient tolerated new scapular exercises fair with increase in upper trapezius pain from 4/10 to 7/10. Patient demonstrated improvement in shoulder elevation pain upon manual scapular assistance with upward rotation. Patient's HEP updated to include scapular exercises.     Sparkle Is progressing well towards her goals.   Pt prognosis is Good.     Pt's spiritual, cultural and educational needs considered and pt agreeable to plan of care and goals.     Anticipated barriers to physical therapy: None    Goals:  Short Term Goals: 3 weeks   - Patient will report 5/10 or better level of pain when performing household ADLs   - Patient will report independence and >80% compliance with HEP met 2/1/23     Long Term Goals: 5 weeks   - Patient will demonstrate functional squat pattern in order to improve independence with household ADLs met 2/15/23  - Patient will demonstrate minor loss of motion or better in all three planes of motion of the trunk met 2/15/23  - Patient will demonstrate proper lifting mechanics when lifting an object 10 pounds or higher met 2/28/23       PLAN     Progress with functional activities and  strengthening to patient tolerance.    Zeferino Betancur, SPT

## 2023-03-09 ENCOUNTER — CLINICAL SUPPORT (OUTPATIENT)
Dept: REHABILITATION | Facility: HOSPITAL | Age: 43
End: 2023-03-09
Payer: COMMERCIAL

## 2023-03-09 DIAGNOSIS — M54.50 LUMBAR PAIN: Primary | ICD-10-CM

## 2023-03-09 PROCEDURE — 97530 THERAPEUTIC ACTIVITIES: CPT | Mod: PN | Performed by: PHYSICAL THERAPIST

## 2023-03-09 PROCEDURE — 97110 THERAPEUTIC EXERCISES: CPT | Mod: PN | Performed by: PHYSICAL THERAPIST

## 2023-03-09 PROCEDURE — 97112 NEUROMUSCULAR REEDUCATION: CPT | Mod: PN | Performed by: PHYSICAL THERAPIST

## 2023-03-09 NOTE — PROGRESS NOTES
[facility]   Physical Therapy Treatment Note    Name: Sparkle Daniels [facility]  Clinic Number: [id number]    Therapy Diagnosis:   Encounter Diagnosis   Name Primary?    Lumbar pain Yes         Physician: Cassandra Alvarez PA-C    Visit Date: 3/9/2023    Physician Orders: PT Eval and Treat   Medical Diagnosis from Referral: Cervicalgia and Lumbar Back pain  Evaluation Date: 1/18/2023  Authorization Period Expiration: 12/31/23  Plan of Care Expiration: 3/23/23  Progress Note Due: 3/19/23  Visit # / Visits authorized: 12/13  FOTO: 1/3      PTA Visit #: /5     Precautions: Standard     Time In: 08:03 AM  Time Out: 09:03  AM  Total Billable Time: 55 minutes      SUBJECTIVE     Pt reports: That her shoulder and neck pain had increased following last session, but subsided within 1.5 days which is sooner than previous sessions that incorporated scapular and neck exercises. Patient reports her back pain has completely resolved and she is able to life more than she previously could. She still struggled with overhead lifting.   She was compliant with home exercise program.  Response to previous treatment: Tolerated well, some moderate soreness  Functional change: ongoing    Pain: 5/10  Location: R neck and shoulder      OBJECTIVE         Treatment     Sparkle received the treatments listed below:      therapeutic exercises to develop strength, endurance, ROM, and flexibility for 10 minutes including:  Doorway stretch, x2min  Banded open book rotations, x10 each side  Scaption with isometric hold, BUE, 2#, 2x5 each side  Shoulder rolls, 10x forward, 10x back  Patient educated on basic healthy habits to support muscle recovery    Therapeutic Activities to improve functional independence with daily tasks for 35 minutes including:  Treadmill Walk, 10:00  Single arm KB deadlift, 20#, 3x8  Step up with single arm overhead hold, 2#  Goblet squat with 20#, 3x6  Patient educated on breathing with lifting  Patient  educated on home activity modification    manual therapy techniques: Joint mobilizations, Manual traction, Myofacial release, and Soft tissue Mobilization were applied to the:  for  minutes, including:    neuromuscular re-education activities to improve: Balance, Coordination, Proprioception, and Posture for 10 minutes. The following activities were included:  Scapular clocks, red tband, 2x6 rounds  Horizontal abduction 3 x 8 red theraband   Chin tuck isometric holds on towel, x2min    Patient educated on proper for for each new exercise        Patient Education and Home Exercises     Home Exercises Provided and Patient Education Provided     Education provided:   - HEP      Written Home Exercises Provided: yes. Exercises were reviewed and Sparkle was able to demonstrate them prior to the end of the session.  Sparkle demonstrated good  understanding of the education provided. See EMR under Patient Instructions for exercises provided during therapy sessions    ASSESSMENT     Patient reports continued difficulty with overhead reaching exacerbating shoulder and neck pain with household ADLs,     Sparkle Is progressing well towards her goals.   Pt prognosis is Good.     Pt's spiritual, cultural and educational needs considered and pt agreeable to plan of care and goals.     Anticipated barriers to physical therapy: None    Goals:  Short Term Goals: 3 weeks   - Patient will report 5/10 or better level of pain when performing household ADLs not met 3/9/23  - Patient will report independence and >80% compliance with HEP met 2/1/23     Long Term Goals: 5 weeks   - Patient will demonstrate functional squat pattern in order to improve independence with household ADLs met 2/15/23  - Patient will demonstrate minor loss of motion or better in all three planes of motion of the trunk met 2/15/23  - Patient will demonstrate proper lifting mechanics when lifting an object 10 pounds or higher met 2/28/23     Updated Goals  In 3  weeks:  - Patient will report 5/10 or better level of pain when performing household ADLs   - Patient will lift 10# overhead with each UE  - Patient will demonstrate scapular strength of 4/5 or better in order to tolerate overhead household activity      PLAN     Progress with functional activities and strengthening to patient tolerance.    Zeferino Betancur, SPT

## 2023-03-12 ENCOUNTER — PATIENT MESSAGE (OUTPATIENT)
Dept: RHEUMATOLOGY | Facility: CLINIC | Age: 43
End: 2023-03-12
Payer: MEDICARE

## 2023-03-12 DIAGNOSIS — R11.2 NAUSEA AND VOMITING, UNSPECIFIED VOMITING TYPE: ICD-10-CM

## 2023-03-12 DIAGNOSIS — G89.4 CHRONIC PAIN SYNDROME: ICD-10-CM

## 2023-03-12 DIAGNOSIS — M53.3 CHRONIC LEFT SI JOINT PAIN: ICD-10-CM

## 2023-03-12 DIAGNOSIS — M45.9 ANKYLOSING SPONDYLITIS, UNSPECIFIED SITE OF SPINE: ICD-10-CM

## 2023-03-12 DIAGNOSIS — G89.29 CHRONIC LEFT SI JOINT PAIN: ICD-10-CM

## 2023-03-12 DIAGNOSIS — E11.69 TYPE 2 DIABETES MELLITUS WITH OTHER SPECIFIED COMPLICATION, UNSPECIFIED WHETHER LONG TERM INSULIN USE: ICD-10-CM

## 2023-03-13 ENCOUNTER — PATIENT MESSAGE (OUTPATIENT)
Dept: RHEUMATOLOGY | Facility: CLINIC | Age: 43
End: 2023-03-13
Payer: MEDICARE

## 2023-03-14 ENCOUNTER — CLINICAL SUPPORT (OUTPATIENT)
Dept: REHABILITATION | Facility: HOSPITAL | Age: 43
End: 2023-03-14
Payer: MEDICARE

## 2023-03-14 DIAGNOSIS — M54.50 LUMBAR PAIN: Primary | ICD-10-CM

## 2023-03-14 PROCEDURE — 97112 NEUROMUSCULAR REEDUCATION: CPT | Mod: PN | Performed by: PHYSICAL THERAPIST

## 2023-03-14 PROCEDURE — 97530 THERAPEUTIC ACTIVITIES: CPT | Mod: PN | Performed by: PHYSICAL THERAPIST

## 2023-03-14 NOTE — PROGRESS NOTES
[facility]   Physical Therapy Treatment Note    Name: Sparkle Daniels [facility]  Clinic Number: [id number]    Therapy Diagnosis:   Encounter Diagnosis   Name Primary?    Lumbar pain Yes         Physician: Cassandra Alvarez PA-C    Visit Date: 3/14/2023    Physician Orders: PT Eval and Treat   Medical Diagnosis from Referral: Cervicalgia and Lumbar Back pain  Evaluation Date: 1/18/2023  Authorization Period Expiration: 12/31/23  Plan of Care Expiration: 3/23/23  Progress Note Due: 3/19/23  Visit # / Visits authorized: 12/13  FOTO: 1/3      PTA Visit #: /5     Precautions: Standard     Time In: 09:00 AM  Time Out: 09:55  AM  Total Billable Time: 30 minutes      SUBJECTIVE     Pt reports: That her back continues to do well. Some neck pain   She was compliant with home exercise program.  Response to previous treatment: Tolerated well, some moderate soreness  Functional change: ongoing    Pain: 5/10  Location: R neck and shoulder      OBJECTIVE         Treatment     Sparkle received the treatments listed below:      therapeutic exercises to develop strength, endurance, ROM, and flexibility for 10 minutes including:  Doorway stretch, x2min  Banded open book rotations, x10 each side  UT stretch 2 min 15 sec ea   LS stretch 2 min 15 sec ea       Therapeutic Activities to improve functional independence with daily tasks for 20 minutes including:    Single arm KB deadlift, 20#, 3x8  Step up with single arm overhead hold, 2#  Goblet squat with 20#, 3x6      manual therapy techniques: Joint mobilizations, Manual traction, Myofacial release, and Soft tissue Mobilization were applied to the:  for  minutes, including:    neuromuscular re-education activities to improve: Balance, Coordination, Proprioception, and Posture for 25 minutes. The following activities were included:\  UBE 3'/3'  Scapular clocks, red tband, 2x6 rounds  Horizontal abduction 3 x 8 red theraband   Chin tuck isometric holds on towel,    Standing scapular resets 20x 3 sec   Thoracic extension over foam roll 2 min     Patient educated on proper for for each new exercise        Patient Education and Home Exercises     Home Exercises Provided and Patient Education Provided     Education provided:   - HEP      Written Home Exercises Provided: yes. Exercises were reviewed and Sparkle was able to demonstrate them prior to the end of the session.  Sparkle demonstrated good  understanding of the education provided. See EMR under Patient Instructions for exercises provided during therapy sessions    ASSESSMENT     Reduced neck discomfort with stretching at the end of her. She will benefit from a trial of dry needling to assist in reducing tension in the UT and cervical musculature     Sparkle Is progressing well towards her goals.   Pt prognosis is Good.     Pt's spiritual, cultural and educational needs considered and pt agreeable to plan of care and goals.     Anticipated barriers to physical therapy: None    Goals:  Short Term Goals: 3 weeks   - Patient will report 5/10 or better level of pain when performing household ADLs not met 3/9/23  - Patient will report independence and >80% compliance with HEP met 2/1/23     Long Term Goals: 5 weeks   - Patient will demonstrate functional squat pattern in order to improve independence with household ADLs met 2/15/23  - Patient will demonstrate minor loss of motion or better in all three planes of motion of the trunk met 2/15/23  - Patient will demonstrate proper lifting mechanics when lifting an object 10 pounds or higher met 2/28/23     Updated Goals  In 3 weeks:  - Patient will report 5/10 or better level of pain when performing household ADLs   - Patient will lift 10# overhead with each UE  - Patient will demonstrate scapular strength of 4/5 or better in order to tolerate overhead household activity      PLAN     Progress with functional activities and strengthening to patient tolerance.    Moody Granados, PT,  DPT

## 2023-03-17 ENCOUNTER — CLINICAL SUPPORT (OUTPATIENT)
Dept: REHABILITATION | Facility: HOSPITAL | Age: 43
End: 2023-03-17
Payer: MEDICARE

## 2023-03-17 DIAGNOSIS — M54.50 LUMBAR PAIN: Primary | ICD-10-CM

## 2023-03-17 PROCEDURE — 97530 THERAPEUTIC ACTIVITIES: CPT | Mod: PN | Performed by: PHYSICAL THERAPIST

## 2023-03-17 PROCEDURE — 97140 MANUAL THERAPY 1/> REGIONS: CPT | Mod: PN | Performed by: PHYSICAL THERAPIST

## 2023-03-17 NOTE — PROGRESS NOTES
[facility]   Physical Therapy Treatment Note    Name: Sparkle Jose  Clinic Number: [id number]    Therapy Diagnosis:   Encounter Diagnosis   Name Primary?    Lumbar pain Yes         Physician: Cassandra Alvarez PA-C    Visit Date: 3/17/2023    Physician Orders: PT Eval and Treat   Medical Diagnosis from Referral: Cervicalgia and Lumbar Back pain  Evaluation Date: 1/18/2023  Authorization Period Expiration: 12/31/23  Plan of Care Expiration: 3/23/23  Progress Note Due: 3/19/23  Visit # / Visits authorized: 14/13  FOTO: 1/3      PTA Visit #: /5     Precautions: Standard     Time In: 08:00 AM  Time Out: 08:50  AM  Total Billable Time: 25 minutes      SUBJECTIVE     Pt reports: That she is feeling ok today, some tightness in the neck.  She was compliant with home exercise program.  Response to previous treatment: Tolerated well, some moderate soreness  Functional change: ongoing    Pain: 5/10  Location: R neck and shoulder      OBJECTIVE         Treatment     Sparkle received the treatments listed below:      therapeutic exercises to develop strength, endurance, ROM, and flexibility for 8 minutes including:  Doorway stretch, x2min  Banded open book rotations, x10 each side - NP   UT stretch 2 min 15 sec ea   LS stretch 2 min 15 sec ea       Therapeutic Activities to improve functional independence with daily tasks for 8 minutes including:    Single arm KB deadlift, 20#, 3x8  Step up with single arm overhead hold, 2# - NP   Goblet squat with 20#, 3x6      manual therapy techniques: Dry needling and Soft tissue Mobilization were applied to the: right cervical region  for 10 minutes, including:    Palpation assessment to determine necessity for dry needling. Increased tone noted in right UT, LS. Dry needling with 4 30 mm needles to right UT, LS with pistoning. Left in situ with electrical stimuation x 5 minutes. STM to the area following dry needling with reduced tone noted     neuromuscular  re-education activities to improve: Balance, Coordination, Proprioception, and Posture for 8 minutes. The following activities were included:\  UBE 3'/3' - NP   Scapular clocks, red tband, 2x6 rounds - NP   Horizontal abduction 3 x 8 red theraband - NP  Cervical retractions 2 min   Standing scapular resets 2 min   Thoracic extension over foam roll 2 min     Patient educated on proper for for each new exercise        Patient Education and Home Exercises     Home Exercises Provided and Patient Education Provided     Education provided:   - HEP      Written Home Exercises Provided: yes. Exercises were reviewed and Sparkle was able to demonstrate them prior to the end of the session.  Sparkle demonstrated good  understanding of the education provided. See EMR under Patient Instructions for exercises provided during therapy sessions    ASSESSMENT     Reduced tone in right cervical musculature following dry needling. She will benefit from additional dry needling sessions and frequent home stretching to assist in managing/reducing neck pain.     Sparkle Is progressing well towards her goals.   Pt prognosis is Good.     Pt's spiritual, cultural and educational needs considered and pt agreeable to plan of care and goals.     Anticipated barriers to physical therapy: None    Goals:  Short Term Goals: 3 weeks   - Patient will report 5/10 or better level of pain when performing household ADLs not met 3/9/23  - Patient will report independence and >80% compliance with HEP met 2/1/23     Long Term Goals: 5 weeks   - Patient will demonstrate functional squat pattern in order to improve independence with household ADLs met 2/15/23  - Patient will demonstrate minor loss of motion or better in all three planes of motion of the trunk met 2/15/23  - Patient will demonstrate proper lifting mechanics when lifting an object 10 pounds or higher met 2/28/23     Updated Goals  In 3 weeks:  - Patient will report 5/10 or better level of pain  when performing household ADLs   - Patient will lift 10# overhead with each UE  - Patient will demonstrate scapular strength of 4/5 or better in order to tolerate overhead household activity      PLAN     Progress with functional activities and strengthening to patient tolerance.    Moody Granados, PT, DPT

## 2023-03-22 DIAGNOSIS — E11.9 TYPE 2 DIABETES MELLITUS WITHOUT COMPLICATION: ICD-10-CM

## 2023-03-23 RX ORDER — OXYCODONE AND ACETAMINOPHEN 10; 325 MG/1; MG/1
1 TABLET ORAL EVERY 8 HOURS PRN
Qty: 90 TABLET | Refills: 0 | Status: SHIPPED | OUTPATIENT
Start: 2023-03-23 | End: 2023-07-21 | Stop reason: SDUPTHER

## 2023-03-27 ENCOUNTER — CLINICAL SUPPORT (OUTPATIENT)
Dept: REHABILITATION | Facility: HOSPITAL | Age: 43
End: 2023-03-27
Payer: COMMERCIAL

## 2023-03-27 DIAGNOSIS — M54.50 LUMBAR PAIN: Primary | ICD-10-CM

## 2023-03-27 PROCEDURE — 97110 THERAPEUTIC EXERCISES: CPT | Mod: PN | Performed by: PHYSICAL THERAPIST

## 2023-03-27 PROCEDURE — 97140 MANUAL THERAPY 1/> REGIONS: CPT | Mod: PN | Performed by: PHYSICAL THERAPIST

## 2023-03-27 PROCEDURE — 97530 THERAPEUTIC ACTIVITIES: CPT | Mod: PN | Performed by: PHYSICAL THERAPIST

## 2023-03-27 NOTE — PROGRESS NOTES
[facility]   Physical Therapy Treatment Note/POC update     Name: Sparkle Jose  Clinic Number: [id number]    Therapy Diagnosis:   Encounter Diagnosis   Name Primary?    Lumbar pain Yes         Physician: Cassandra Alvarez PA-C    Visit Date: 3/27/2023    Physician Orders: PT Eval and Treat   Medical Diagnosis from Referral: Cervicalgia and Lumbar Back pain  Evaluation Date: 1/18/2023  Authorization Period Expiration: 12/31/23  Plan of Care Expiration: 4/28/23  Progress Note Due: 3/19/23  Visit # / Visits authorized: 15/25  FOTO: 3/3      PTA Visit #: /5     Precautions: Standard     Time In: 08:03 AM  Time Out: 08:55  AM  Total Billable Time: 25 minutes      SUBJECTIVE     Pt reports: That she is feeling ok today, some tightness in the neck.  She was compliant with home exercise program.  Response to previous treatment: Tolerated well, some moderate soreness  Functional change: ongoing    Pain: 5/10  Location: R neck and shoulder      OBJECTIVE     33% Limited on FOTO    Cervical ROM  Flexion: WNL  Extension: Mild loss  R Rotation: WFL  L Rotation: Mild loss compared to R  R Sidebending: Mild loss  L Sidebending: Mild loss, painful at end ROM    Treatment     Sparkle received the treatments listed below:      therapeutic exercises to develop strength, endurance, ROM, and flexibility for 20 minutes including:  UBE 2'/2'  Doorway stretch, x2min  open book rotations, x10 each side   UT stretch 2 min 15 sec ea   LS stretch 2 min 15 sec ea       Therapeutic Activities to improve functional independence with daily tasks for 8 minutes including:    Single arm KB deadlift, 20#, 3x8  Step up with single arm overhead hold, 2#   Goblet squat with 20#, 3x6      manual therapy techniques: Dry needling and Soft tissue Mobilization were applied to the: right cervical region  for 10 minutes, including:    Palpation assessment to determine necessity for dry needling. Increased tone noted in right UT,  LS. Dry needling with 4 30 mm needles to right UT, LS with pistoning. Left in situ with electrical stimuation x 5 minutes. STM to the area following dry needling with reduced tone noted     neuromuscular re-education activities to improve: Balance, Coordination, Proprioception, and Posture for 6 minutes. The following activities were included:\  Scapular clocks, red tband, 2x6 rounds - NP   Horizontal abduction 3 x 8 red theraband - NP  Cervical retractions 2 min   Standing scapular resets 2 min   Thoracic extension over foam roll 2 min     Patient educated on proper for for each new exercise        Patient Education and Home Exercises     Home Exercises Provided and Patient Education Provided     Education provided:   - HEP      Written Home Exercises Provided: yes. Exercises were reviewed and Sparkle was able to demonstrate them prior to the end of the session.  Sparkle demonstrated good  understanding of the education provided. See EMR under Patient Instructions for exercises provided during therapy sessions    ASSESSMENT     Cervical pain reduced and ROM improved. However, some limitations remain. She will benefit from continued care with inclusion of dry needling and reintroducing gentle periscapular strengthening. Lumbar symptoms mostly resolved.     Sparkle Is progressing well towards her goals.   Pt prognosis is Good.     Pt's spiritual, cultural and educational needs considered and pt agreeable to plan of care and goals.     Anticipated barriers to physical therapy: None    Goals:  Short Term Goals: 3 weeks   - Patient will report 5/10 or better level of pain when performing household ADLs not met 3/9/23  - Patient will report independence and >80% compliance with HEP met 2/1/23     Long Term Goals: 5 weeks   - Patient will demonstrate functional squat pattern in order to improve independence with household ADLs met 2/15/23  - Patient will demonstrate minor loss of motion or better in all three planes of  motion of the trunk met 2/15/23  - Patient will demonstrate proper lifting mechanics when lifting an object 10 pounds or higher met 2/28/23     Updated Goals  In 3 weeks:  - Patient will report 5/10 or better level of pain when performing household ADLs   - Patient will lift 10# overhead with each UE  - Patient will demonstrate scapular strength of 4/5 or better in order to tolerate overhead household activity      PLAN     Progress with functional activities and strengthening to patient tolerance.    Extend POC 2x weekly for 4 weeks until 4/28/23    Moody Granados, PT, DPT

## 2023-03-28 ENCOUNTER — HOSPITAL ENCOUNTER (OUTPATIENT)
Dept: RADIOLOGY | Facility: HOSPITAL | Age: 43
Discharge: HOME | End: 2023-03-28
Attending: STUDENT IN AN ORGANIZED HEALTH CARE EDUCATION/TRAINING PROGRAM
Payer: COMMERCIAL

## 2023-03-28 VITALS — HEIGHT: 61 IN | WEIGHT: 149.06 LBS | BODY MASS INDEX: 28.14 KG/M2

## 2023-03-28 DIAGNOSIS — Z12.31 ENCOUNTER FOR SCREENING MAMMOGRAM FOR MALIGNANT NEOPLASM OF BREAST: ICD-10-CM

## 2023-03-28 PROCEDURE — 77063 BREAST TOMOSYNTHESIS BI: CPT | Mod: 26,,, | Performed by: RADIOLOGY

## 2023-03-28 PROCEDURE — 77067 MAMMO DIGITAL SCREENING BILAT WITH TOMO: ICD-10-PCS | Mod: 26,,, | Performed by: RADIOLOGY

## 2023-03-28 PROCEDURE — 77067 SCR MAMMO BI INCL CAD: CPT | Mod: 26,,, | Performed by: RADIOLOGY

## 2023-03-28 PROCEDURE — 77063 MAMMO DIGITAL SCREENING BILAT WITH TOMO: ICD-10-PCS | Mod: 26,,, | Performed by: RADIOLOGY

## 2023-03-28 PROCEDURE — 77067 SCR MAMMO BI INCL CAD: CPT | Mod: TC,PO

## 2023-04-03 ENCOUNTER — CLINICAL SUPPORT (OUTPATIENT)
Dept: REHABILITATION | Facility: HOSPITAL | Age: 43
End: 2023-04-03
Payer: COMMERCIAL

## 2023-04-03 DIAGNOSIS — M54.50 LUMBAR PAIN: Primary | ICD-10-CM

## 2023-04-03 PROCEDURE — 97110 THERAPEUTIC EXERCISES: CPT | Mod: PN | Performed by: PHYSICAL THERAPIST

## 2023-04-03 PROCEDURE — 97140 MANUAL THERAPY 1/> REGIONS: CPT | Mod: PN | Performed by: PHYSICAL THERAPIST

## 2023-04-03 PROCEDURE — 97112 NEUROMUSCULAR REEDUCATION: CPT | Mod: PN | Performed by: PHYSICAL THERAPIST

## 2023-04-03 PROCEDURE — 97530 THERAPEUTIC ACTIVITIES: CPT | Mod: PN | Performed by: PHYSICAL THERAPIST

## 2023-04-03 NOTE — PROGRESS NOTES
[facility]   Physical Therapy Treatment Note    Name: Sparkle Jose  Clinic Number: [id number]    Therapy Diagnosis:   Encounter Diagnosis   Name Primary?    Lumbar pain Yes         Physician: Cassandra Alvarez PA-C    Visit Date: 4/3/2023    Physician Orders: PT Eval and Treat   Medical Diagnosis from Referral: Cervicalgia and Lumbar Back pain  Evaluation Date: 1/18/2023  Authorization Period Expiration: 12/31/23  Plan of Care Expiration: 4/28/23  Progress Note Due: 3/19/23  Visit # / Visits authorized: 15/25  FOTO: 3/3      PTA Visit #: /5     Precautions: Standard     Time In: 08:00 AM  Time Out: 08:56  AM  Total Billable Time: 53 minutes      SUBJECTIVE     Pt reports: That her back continues to do well, but she still reports some tightness and pain in her neck. It is better than it was   She was compliant with home exercise program.  Response to previous treatment: Tolerated well, some moderate soreness  Functional change: ongoing    Pain: 4/10  Location: R neck and shoulder      OBJECTIVE         Treatment     Sparkle received the treatments listed below:      therapeutic exercises to develop strength, endurance, ROM, and flexibility for 18 minutes including:  Doorway stretch, x2min  open book rotations, x10 each side   UT stretch 2 min 15 sec ea   LS stretch 2 min 15 sec ea     HEP modification with instructions     Therapeutic Activities to improve functional independence with daily tasks for 8 minutes including:    Single arm KB deadlift, 20#, 3x8  Goblet squat with 20#, 3x6      manual therapy techniques: Dry needling and Soft tissue Mobilization were applied to the: right cervical region  for 10 minutes, including:    Palpation assessment to determine necessity for dry needling. Increased tone noted in right UT, LS. Dry needling with 6 30 mm needles to right UT, LS and right rhomboid with pistoning. Left in situ with electrical stimuation x 5 minutes. STM to the area  following dry needling with reduced tone noted     neuromuscular re-education activities to improve: Balance, Coordination, Proprioception, and Posture for  17. The following activities were included:\  Scapular clocks, yellow tband, 10x   Supine Horizontal abduction 2 x 8 yellow  theraband   Face pulls 2 x 8 yellow theraband   Cervical retractions 2 min   Thoracic extension over foam roll 2 min   Lateral band walk 5x red power loop     Patient educated on proper for for each new exercise        Patient Education and Home Exercises     Home Exercises Provided and Patient Education Provided     Education provided:   - HEP      Written Home Exercises Provided: yes. Exercises were reviewed and Sparkle was able to demonstrate them prior to the end of the session.  Sparkle demonstrated good  understanding of the education provided. See EMR under Patient Instructions for exercises provided during therapy sessions    ASSESSMENT     Cervical pain continues to remain reduced. Tone reduced in upper traps and levator since starting dry needling. She did well with resuming strengthening activities and they were modified to reduced band tension and position. She will benefit from continuing these as part of her HEP     Sparkle Is progressing well towards her goals.   Pt prognosis is Good.     Pt's spiritual, cultural and educational needs considered and pt agreeable to plan of care and goals.     Anticipated barriers to physical therapy: None    Goals:  Short Term Goals: 3 weeks   - Patient will report 5/10 or better level of pain when performing household ADLs not met 3/9/23  - Patient will report independence and >80% compliance with HEP met 2/1/23     Long Term Goals: 5 weeks   - Patient will demonstrate functional squat pattern in order to improve independence with household ADLs met 2/15/23  - Patient will demonstrate minor loss of motion or better in all three planes of motion of the trunk met 2/15/23  - Patient will  demonstrate proper lifting mechanics when lifting an object 10 pounds or higher met 2/28/23     Updated Goals  In 3 weeks:  - Patient will report 5/10 or better level of pain when performing household ADLs   - Patient will lift 10# overhead with each UE  - Patient will demonstrate scapular strength of 4/5 or better in order to tolerate overhead household activity      PLAN     Progress with functional activities and strengthening to patient tolerance.    Extend POC 2x weekly for 4 weeks until 4/28/23    Moody Granados, PT, DPT

## 2023-04-04 ENCOUNTER — OFFICE VISIT (OUTPATIENT)
Dept: FAMILY MEDICINE | Facility: CLINIC | Age: 43
End: 2023-04-04
Payer: MEDICARE

## 2023-04-04 DIAGNOSIS — F98.8 ADD (ATTENTION DEFICIT DISORDER) WITHOUT HYPERACTIVITY: ICD-10-CM

## 2023-04-04 PROCEDURE — 99213 OFFICE O/P EST LOW 20 MIN: CPT | Mod: 95,,, | Performed by: STUDENT IN AN ORGANIZED HEALTH CARE EDUCATION/TRAINING PROGRAM

## 2023-04-04 PROCEDURE — 99213 PR OFFICE/OUTPT VISIT, EST, LEVL III, 20-29 MIN: ICD-10-PCS | Mod: 95,,, | Performed by: STUDENT IN AN ORGANIZED HEALTH CARE EDUCATION/TRAINING PROGRAM

## 2023-04-04 RX ORDER — DEXTROAMPHETAMINE SACCHARATE, AMPHETAMINE ASPARTATE MONOHYDRATE, DEXTROAMPHETAMINE SULFATE AND AMPHETAMINE SULFATE 3.75; 3.75; 3.75; 3.75 MG/1; MG/1; MG/1; MG/1
15 CAPSULE, EXTENDED RELEASE ORAL EVERY MORNING
Qty: 30 CAPSULE | Refills: 0 | Status: SHIPPED | OUTPATIENT
Start: 2023-04-04 | End: 2023-04-17

## 2023-04-04 NOTE — PROGRESS NOTES
Problem List Items Addressed This Visit          Psychiatric    ADD (attention deficit disorder) without hyperactivity    Overview     Chronic hx; start adderall 15mg XR     Denies mood instability, irritability, aggression, palpitations, chest pain, weight loss, decreased appetite, disordered sleep  Pt is demonstrating no behaviors to suggest inappropriate use of prescribed medications.    [place] Board of Pharmacy Controlled Prescription Drug Monitoring database was queried and showed no activity to suggest abuse, diversion, or other inappropriate use of prescription medications.  #30 tabs, 0 refills   - she will send message in [employer]                      Follow up in about 3 months (around 7/4/2023) for Virtual Visit.    Chrissie Lancaster MD  _________________________________________________________________________      Patient ID: Sparkle Jose is a 42 y.o. female.    Chief Complaint: add    Pt diagnosed with ADD/ADHD several years ago. Has tried adderall. Last on adderall few years ago as she left previous provider . Denies mood instability, irritability, aggression, palpitations, chest pain, weight loss, decreased appetite, disordered sleep. Reports she has been trouble focusing with 10mg adderall and would like to increase to 15mg XR.     Also working to quit smoking. Currently smoking 5 cigarettes.     Past medical histories reviewed, including past medical, surgical, family and social histories.      Current Outpatient Medications on File Prior to Visit   Medication Sig Dispense Refill    buPROPion (WELLBUTRIN XL) 300 MG 24 hr tablet Take 1 tablet (300 mg total) by mouth once daily. 90 tablet 3    clobetasol 0.05% (TEMOVATE) 0.05 % Oint 1 application daily as needed.       cloNIDine (CATAPRES) 0.1 MG tablet Take 1 tablet (0.1 mg total) by mouth every evening. 90 tablet 3    eletriptan (RELPAX) 40 MG tablet Take 1 tablet (40 mg total) by mouth as needed. may repeat in 2 hours if necessary  9 tablet 0    famotidine (PEPCID) 40 MG tablet Take 1 tablet (40 mg total) by mouth once daily. 30 tablet 5    metFORMIN (GLUCOPHAGE-XR) 500 MG ER 24hr tablet Take 1 tablet (500 mg total) by mouth every evening. 90 tablet 3    oxyCODONE-acetaminophen (PERCOCET)  mg per tablet Take 1 tablet by mouth every 8 (eight) hours as needed for Pain. 90 tablet 0    progesterone (PROMETRIUM) 100 MG capsule TAKE 1 CAPSULE BY MOUTH EVERY DAY IN THE EVENING 90 capsule 3    promethazine (PHENERGAN) 25 MG tablet Take 1 tablet (25 mg total) by mouth every 6 (six) hours as needed for Nausea. 45 tablet 5    secukinumab (COSENTYX PEN, 2 PENS,) 150 mg/mL PnIj Inject 300 mg into the skin every 30 days. 2 mL 6    semaglutide (OZEMPIC) 2 mg/dose (8 mg/3 mL) PnIj Inject 2 mg into the skin every 7 days. 3 pen 5    tirzepatide (MOUNJARO) 12.5 mg/0.5 mL PnIj Inject 12.5 mg into the skin every 7 days.      tiZANidine (ZANAFLEX) 4 MG tablet TAKE 1 TABLET (4 MG TOTAL) BY MOUTH NIGHTLY AS NEEDED FOR MUSCLE SPASM/ PAIN. 40 tablet 0    traZODone (DESYREL) 100 MG tablet Take 1 tablet (100 mg total) by mouth every evening. 90 tablet 1    [DISCONTINUED] dextroamphetamine-amphetamine (ADDERALL) 10 mg Tab Take 1 tablet (10 mg total) by mouth 2 (two) times a day. 60 tablet 0     No current facility-administered medications on file prior to visit.       Review of Systems   12 point review of systems negative except for listed in HPI.     Objective:    Nursing note and vitals reviewed.  There were no vitals filed for this visit.    There is no height or weight on file to calculate BMI.     No vitals or full physical exam obtained as this is a virtual visit  Gen: no distress, comfortable          We Offer Telehealth & Same Day Appointments!   Book your Telehealth appointment with me through my nurse or   Clinic appointments on [employer]!  [phone]     To Schedule appointments online, go to MyChart:  [email or web address]     Answers submitted by the patient for this visit:  Review of Systems Questionnaire (Submitted on 4/4/2023)  activity change: No  unexpected weight change: No  neck pain: Yes  hearing loss: No  rhinorrhea: No  trouble swallowing: No  eye discharge: No  visual disturbance: No  chest tightness: No  wheezing: No  chest pain: No  palpitations: No  blood in stool: No  constipation: No  vomiting: No  diarrhea: No  polydipsia: No  polyuria: No  difficulty urinating: No  hematuria: No  menstrual problem: No  dysuria: No  joint swelling: Yes  arthralgias: Yes  headaches: Yes  weakness: No  confusion: No  dysphoric mood: No

## 2023-04-06 ENCOUNTER — PES CALL (OUTPATIENT)
Dept: ADMINISTRATIVE | Facility: CLINIC | Age: 43
End: 2023-04-06
Payer: MEDICARE

## 2023-04-11 ENCOUNTER — TELEPHONE (OUTPATIENT)
Dept: ADMINISTRATIVE | Facility: CLINIC | Age: 43
End: 2023-04-11
Payer: MEDICARE

## 2023-04-11 NOTE — TELEPHONE ENCOUNTER
Called pt, no answer; left message informing pt I was calling to remind pt of her in office EAWV on 4/13/23 and to return my call if she had any questions; left my name and number

## 2023-04-13 ENCOUNTER — PATIENT MESSAGE (OUTPATIENT)
Dept: RHEUMATOLOGY | Facility: CLINIC | Age: 43
End: 2023-04-13
Payer: MEDICARE

## 2023-04-13 ENCOUNTER — LAB VISIT (OUTPATIENT)
Dept: LAB | Facility: HOSPITAL | Age: 43
End: 2023-04-13
Attending: STUDENT IN AN ORGANIZED HEALTH CARE EDUCATION/TRAINING PROGRAM
Payer: MEDICARE

## 2023-04-13 ENCOUNTER — OFFICE VISIT (OUTPATIENT)
Dept: FAMILY MEDICINE | Facility: CLINIC | Age: 43
End: 2023-04-13
Payer: COMMERCIAL

## 2023-04-13 VITALS
OXYGEN SATURATION: 95 % | HEIGHT: 60 IN | DIASTOLIC BLOOD PRESSURE: 68 MMHG | SYSTOLIC BLOOD PRESSURE: 114 MMHG | HEART RATE: 78 BPM | WEIGHT: 147 LBS | BODY MASS INDEX: 28.86 KG/M2

## 2023-04-13 DIAGNOSIS — D75.839 THROMBOCYTOSIS: ICD-10-CM

## 2023-04-13 DIAGNOSIS — K21.9 GASTROESOPHAGEAL REFLUX DISEASE WITHOUT ESOPHAGITIS: ICD-10-CM

## 2023-04-13 DIAGNOSIS — R73.03 PREDIABETES: ICD-10-CM

## 2023-04-13 DIAGNOSIS — M45.9 ANKYLOSING SPONDYLITIS, UNSPECIFIED SITE OF SPINE: ICD-10-CM

## 2023-04-13 DIAGNOSIS — F17.210 CIGARETTE NICOTINE DEPENDENCE WITHOUT COMPLICATION: ICD-10-CM

## 2023-04-13 DIAGNOSIS — Z86.69 HISTORY OF CHIARI MALFORMATION: ICD-10-CM

## 2023-04-13 DIAGNOSIS — E66.3 OVERWEIGHT (BMI 25.0-29.9): ICD-10-CM

## 2023-04-13 DIAGNOSIS — R91.1 PULMONARY NODULE: ICD-10-CM

## 2023-04-13 DIAGNOSIS — D84.821 IMMUNOCOMPROMISED STATE DUE TO DRUG THERAPY: ICD-10-CM

## 2023-04-13 DIAGNOSIS — L40.50 PSORIATIC ARTHRITIS: ICD-10-CM

## 2023-04-13 DIAGNOSIS — F32.A ANXIETY AND DEPRESSION: ICD-10-CM

## 2023-04-13 DIAGNOSIS — F41.9 ANXIETY AND DEPRESSION: ICD-10-CM

## 2023-04-13 DIAGNOSIS — E11.9 TYPE 2 DIABETES MELLITUS WITHOUT COMPLICATION: ICD-10-CM

## 2023-04-13 DIAGNOSIS — D72.829 LEUKOCYTOSIS, UNSPECIFIED TYPE: ICD-10-CM

## 2023-04-13 DIAGNOSIS — Z00.00 ENCOUNTER FOR MEDICARE ANNUAL WELLNESS EXAM: Primary | ICD-10-CM

## 2023-04-13 DIAGNOSIS — M54.50 LUMBAR PAIN: ICD-10-CM

## 2023-04-13 DIAGNOSIS — D68.51 FACTOR 5 LEIDEN MUTATION, HETEROZYGOUS: ICD-10-CM

## 2023-04-13 DIAGNOSIS — F51.01 PRIMARY INSOMNIA: ICD-10-CM

## 2023-04-13 DIAGNOSIS — F98.8 ADD (ATTENTION DEFICIT DISORDER) WITHOUT HYPERACTIVITY: ICD-10-CM

## 2023-04-13 DIAGNOSIS — E04.1 THYROID NODULE: ICD-10-CM

## 2023-04-13 DIAGNOSIS — Z79.899 IMMUNOCOMPROMISED STATE DUE TO DRUG THERAPY: ICD-10-CM

## 2023-04-13 PROBLEM — Z30.2 ENCOUNTER FOR STERILIZATION: Status: RESOLVED | Noted: 2017-10-12 | Resolved: 2023-04-13

## 2023-04-13 PROCEDURE — 99214 OFFICE O/P EST MOD 30 MIN: CPT | Mod: PBBFAC,PO | Performed by: NURSE PRACTITIONER

## 2023-04-13 PROCEDURE — 99999 PR PBB SHADOW E&M-EST. PATIENT-LVL IV: ICD-10-PCS | Mod: PBBFAC,,, | Performed by: NURSE PRACTITIONER

## 2023-04-13 PROCEDURE — G0439 PPPS, SUBSEQ VISIT: HCPCS | Mod: S$GLB,,, | Performed by: NURSE PRACTITIONER

## 2023-04-13 PROCEDURE — 80061 LIPID PANEL: CPT | Performed by: STUDENT IN AN ORGANIZED HEALTH CARE EDUCATION/TRAINING PROGRAM

## 2023-04-13 PROCEDURE — 36415 COLL VENOUS BLD VENIPUNCTURE: CPT | Mod: PO | Performed by: STUDENT IN AN ORGANIZED HEALTH CARE EDUCATION/TRAINING PROGRAM

## 2023-04-13 PROCEDURE — 99999 PR PBB SHADOW E&M-EST. PATIENT-LVL IV: CPT | Mod: PBBFAC,,, | Performed by: NURSE PRACTITIONER

## 2023-04-13 PROCEDURE — G0439 PR MEDICARE ANNUAL WELLNESS SUBSEQUENT VISIT: ICD-10-PCS | Mod: S$GLB,,, | Performed by: NURSE PRACTITIONER

## 2023-04-13 NOTE — PROGRESS NOTES
Sparkle Jose presented for a  Medicare AWV and comprehensive Health Risk Assessment today. The following components were reviewed and updated:    Medical history  Family History  Social history  Allergies and Current Medications  Health Risk Assessment  Health Maintenance  Care Team     ** See Completed Assessments for Annual Wellness Visit within the encounter summary.**     The following assessments were completed:  Living Situation  CAGE  Depression Screening  Timed Get Up and Go  Whisper Test  Cognitive Function Screening- 3 word recall and clock drawing completed correctly   Nutrition Screening  ADL Screening  PAQ Screening  Opioid screening:   - Reviewed any potential opioid use disorder (OUD) risk factors  - Evaluated their pain severity and current treatment plan  - Provided non-opioid treatment options information  - Consider referral to a specialist, as appropriate    Vitals:    04/13/23 1102   BP: 114/68   BP Location: Right arm   Pulse: 78   SpO2: 95%   Weight: 66.7 kg (147 lb)   Height: 5' (1.524 m)     Body mass index is 28.71 kg/m².  Physical Exam  Vitals reviewed.   Constitutional:       General: She is not in acute distress.     Appearance: Normal appearance. She is not ill-appearing.   HENT:      Head: Normocephalic and atraumatic.      Right Ear: External ear normal.      Left Ear: External ear normal.      Nose: Nose normal.   Eyes:      Extraocular Movements: Extraocular movements intact.      Conjunctiva/sclera: Conjunctivae normal.   Cardiovascular:      Rate and Rhythm: Normal rate.      Heart sounds: Normal heart sounds.   Pulmonary:      Effort: Pulmonary effort is normal. No respiratory distress.      Breath sounds: Normal breath sounds.   Abdominal:      General: Abdomen is flat. There is no distension.   Musculoskeletal:         General: Normal range of motion.      Cervical back: Normal range of motion.   Skin:     General: Skin is warm and dry.      Capillary Refill: Capillary  refill takes less than 2 seconds.      Coloration: Skin is not pale.      Findings: No rash.   Neurological:      General: No focal deficit present.      Mental Status: She is alert and oriented to person, place, and time. Mental status is at baseline.   Psychiatric:         Mood and Affect: Mood normal.         Speech: Speech normal. Speech is not rapid and pressured, delayed or slurred.         Behavior: Behavior normal. Behavior is not agitated, slowed, aggressive, withdrawn, hyperactive or combative. Behavior is cooperative.         Thought Content: Thought content normal.         Judgment: Judgment normal.         Diagnoses and health risks identified today and associated recommendations/orders:    1. Encounter for Medicare annual wellness exam  Complete history and physical was completed today.  Complete and thorough medication reconciliation was performed.  Discussed risks and benefits of medications.  Advised patient on orders and health maintenance.  We discussed old records and old labs if available.  Will request any records not available through epic.  Continue current medications listed on your summary sheet.    2. History of Chiari malformation  Hx of chiari malformation dxd 2004 s/p brain decompression, follows with neurology  Continue current medications and plan of care per PCP and specialists     3. Anxiety and depression  Chronic. Stable.  Controlled on Wellbutrin  Continue current medications and plan of care per PCP and specialists     4. ADD (attention deficit disorder) without hyperactivity  Chronic. Stable.   Controlled on Adderall XR  Continue current medications and plan of care per PCP and specialists     5. Pulmonary nodule  Chronic. Stable.  Monitoring with routine CT; stable 2021  Following with pulmonology    6. Immunocompromised state due to drug therapy  Chronic. Stable.  Follows with rheum, stable on cosentyx     7. Factor 5 Leiden mutation, heterozygous  Chronic. Stable.  Continue  current medications and plan of care per PCP and specialists  Consider heme onc referral     8. Thrombocytosis  Chronic. Stable.  Continue current medications and plan of care per PCP and specialists   Consider heme onc referral   Lab Results   Component Value Date    WBC 14.59 (H) 05/26/2022    HGB 14.2 05/26/2022    HCT 45.6 05/26/2022    MCV 98 05/26/2022     05/26/2022     9. Leukocytosis, unspecified type  Chronic. Stable.  Continue current medications and plan of care per PCP and specialists   Consider heme onc referral   Lab Results   Component Value Date    WBC 14.59 (H) 05/26/2022    HGB 14.2 05/26/2022    HCT 45.6 05/26/2022    MCV 98 05/26/2022     05/26/2022     10. Thyroid nodule  Thyroid Nodules; incidental finding via CT 2019 s/p bx (Nov 2021) neg for malignancy   Continue current medications and plan of care per PCP and specialists   Lab Results   Component Value Date    TSH 0.592 01/30/2023     - follows with endocrinology Dr. Wagner; monitoring with routine US    11. Overweight (BMI 25.0-29.9)  Encourage increased physical activity, healthy diet choices, and weight loss for prevention of progression of comorbid conditions.   Wt Readings from Last 3 Encounters:   04/13/23 1102 66.7 kg (147 lb)   03/28/23 0853 67.6 kg (149 lb 0.5 oz)   03/07/23 0811 67.6 kg (149 lb)     12. Prediabetes  Chronic. Stable.  -see diabetic health maintenance listed below  -counseling provided on importance of diabetic diet and medication compliance in order to treat diabetes  -discussed diabetes disease course and potential complications  -following with endocrinology  -Continue current medications and plan of care per PCP and specialists   Lab Results   Component Value Date    HGBA1C 5.8 (H) 06/06/2022     13. Gastroesophageal reflux disease without esophagitis  Chronic. Stable.  Controlled on Pepcid  -symptoms controlled with daily PPI  -denies alarm symptoms, such as dysphagia, weight loss, chest pain,  melena, hematemesis, or N/V  -continue lifestyle modification with avoidance of acidic foods, caffeine, late night eating, spicy foods, and NSAIDs  -Continue current medications and plan of care per PCP and specialists     14. Psoriatic arthritis  Chronic. Stable  Following with rheumatology     15. Lumbar pain  Chronic, Stable.  On chronic pain medication  Used to follow with pain management. Now managed by rheumatology.     16. Cigarette nicotine dependence without complication  -the patient was counseled on the dangers of tobacco use  -reviewed strategies to maximize success, including counseling, nicotine replacement therapy and pharmacologic option.   -tobacco cessation class offered    17. Primary insomnia  Chronic. Stable.   Controlled on trazodone  Recommend adequate sleep hygiene  Continue current medications and plan of care per PCP and specialists     18. Ankylosing spondylitis, unspecified site of spine  Chronic hx; follows with rheumatology Dr. Almonet  Stable on cosentyx 300mg q30d and prn percocet, prednisone, and chlorzoxazone (msk relaxer)  Continue current medications and plan of care per PCP and specialists   Managed by rheumatology     I offered to discuss end of life issues, including information on how to make advance directives that the patient could use to name someone who would make medical decisions on their behalf if they became too ill to make themselves.    ___Patient declined - already done.    _x__Patient is interested, I provided paperwork and offered to discuss.    Provided Sparkle with a 5-10 year written screening schedule and personal prevention plan. Recommendations were developed using the USPSTF age appropriate recommendations. Education, counseling, and referrals were provided as needed. After Visit Summary printed and given to patient which includes a list of additional screenings\tests needed.    Follow up in about 6 months (around 10/13/2023), or if symptoms worsen or fail  to improve, for follow up with PCP.    Taisha Zhao NP

## 2023-04-14 LAB
CHOLEST SERPL-MCNC: 176 MG/DL (ref 120–199)
CHOLEST/HDLC SERPL: 3.2 {RATIO} (ref 2–5)
HDLC SERPL-MCNC: 55 MG/DL (ref 40–75)
HDLC SERPL: 31.3 % (ref 20–50)
LDLC SERPL CALC-MCNC: 93.2 MG/DL (ref 63–159)
NONHDLC SERPL-MCNC: 121 MG/DL
TRIGL SERPL-MCNC: 139 MG/DL (ref 30–150)

## 2023-04-14 NOTE — PROGRESS NOTES
I have sent a msg to patient with the following interpretation (see below):    Your cholesterol is normal. Continue to limit fatty foods; participate in regular exercise.     Please do not hesitate to call or message with any questions or concerns    Chrissie Lancaster MD

## 2023-04-16 ENCOUNTER — PATIENT MESSAGE (OUTPATIENT)
Dept: FAMILY MEDICINE | Facility: CLINIC | Age: 43
End: 2023-04-16
Payer: MEDICARE

## 2023-04-17 ENCOUNTER — PATIENT MESSAGE (OUTPATIENT)
Dept: FAMILY MEDICINE | Facility: CLINIC | Age: 43
End: 2023-04-17
Payer: MEDICARE

## 2023-04-17 RX ORDER — ATOMOXETINE 40 MG/1
40 CAPSULE ORAL DAILY
Qty: 30 CAPSULE | Refills: 0 | Status: SHIPPED | OUTPATIENT
Start: 2023-04-17 | End: 2023-05-11

## 2023-04-17 NOTE — TELEPHONE ENCOUNTER
No orders of the defined types were placed in this encounter.      Medications Ordered This Encounter   Medications    atomoxetine (STRATTERA) 40 MG capsule     Sig: Take 1 capsule (40 mg total) by mouth once daily.     Dispense:  30 capsule     Refill:  0     4w VV

## 2023-04-18 ENCOUNTER — CLINICAL SUPPORT (OUTPATIENT)
Dept: REHABILITATION | Facility: HOSPITAL | Age: 43
End: 2023-04-18
Payer: COMMERCIAL

## 2023-04-18 DIAGNOSIS — M54.50 LUMBAR PAIN: Primary | ICD-10-CM

## 2023-04-18 PROCEDURE — 97112 NEUROMUSCULAR REEDUCATION: CPT | Mod: PN | Performed by: PHYSICAL THERAPIST

## 2023-04-18 PROCEDURE — 97140 MANUAL THERAPY 1/> REGIONS: CPT | Mod: PN | Performed by: PHYSICAL THERAPIST

## 2023-04-18 NOTE — PROGRESS NOTES
[facility]   Physical Therapy Treatment Note/Discharge summary     Name: Sparkle [facility]  Clinic Number: [id number]    Therapy Diagnosis:   Encounter Diagnosis   Name Primary?    Lumbar pain Yes         Physician: Cassandra Alvarez PA-C    Visit Date: 4/18/2023    Physician Orders: PT Eval and Treat   Medical Diagnosis from Referral: Cervicalgia and Lumbar Back pain  Evaluation Date: 1/18/2023  Authorization Period Expiration: 12/31/23  Plan of Care Expiration: 4/28/23  Progress Note Due: 3/19/23  Visit # / Visits authorized: 15/25  FOTO: 3/3      PTA Visit #: /5     Precautions: Standard     Time In: 09:03 AM  Time Out: 09:55  AM  Total Billable Time: 30 minutes      SUBJECTIVE     Pt reports: her symptoms are significantly improved since starting dry needling.  Overall, much improved with physical therapy treatment   She was compliant with home exercise program.  Response to previous treatment: Tolerated well, some moderate soreness  Functional change: ongoing    Pain: 3/10  Location: R neck and shoulder      OBJECTIVE   33% Limited on FOTO     Cervical ROM  Flexion: WNL  Extension: Mild loss  R Rotation: WFL  L Rotation: Mild loss compared to R  R Sidebending: WNL  L Sidebending: WNL      Treatment     Sparkle received the treatments listed below:      therapeutic exercises to develop strength, endurance, ROM, and flexibility for 10 minutes including:  Doorway stretch, x2min  open book rotations w red power band x15 each side   UT stretch 2 min 15 sec ea   LS stretch 2 min 15 sec ea       Therapeutic Activities to improve functional independence with daily tasks for 8 minutes including:    Single arm KB deadlift, 20#, 3x8  Goblet squat with 20#, 3x8      manual therapy techniques: Dry needling and Soft tissue Mobilization were applied to the: right cervical region  for 10 minutes, including:    Palpation assessment to determine necessity for dry needling. Increased tone noted in  right UT, LS. Dry needling with 6 30 mm needles to right UT, LS and right rhomboid with pistoning. Left in situ with electrical stimuation x 5 minutes. STM to the area following dry needling with reduced tone noted     neuromuscular re-education activities to improve: Balance, Coordination, Proprioception, and Posture for 20 minutes. The following activities were included:  Supine Horizontal abduction 2 x 8 red  theraband   Face pulls 2 x 8 yellow theraband   Cervical retractions 20x   Thoracic extension over foam roll 2 min   Lateral band walk 5x red power loop     Patient educated on proper for for each new exercise        Patient Education and Home Exercises     Home Exercises Provided and Patient Education Provided     Education provided:   - HEP      Written Home Exercises Provided: yes. Exercises were reviewed and Sparkle was able to demonstrate them prior to the end of the session.  Sparkle demonstrated good  understanding of the education provided. See EMR under Patient Instructions for exercises provided during therapy sessions    ASSESSMENT     Pain levels greatly reduced, which has led to a significant improvement in functional abilities and ADL performance. She has met her established goals and will benefit from continuing her established HEP to maintain the gains.     Sparkle Is progressing well towards her goals.   Pt prognosis is Good.     Pt's spiritual, cultural and educational needs considered and pt agreeable to plan of care and goals.     Anticipated barriers to physical therapy: None    Goals:  Short Term Goals: 3 weeks   - Patient will report 5/10 or better level of pain when performing household ADLs not met 3/9/23  - Patient will report independence and >80% compliance with HEP met 2/1/23     Long Term Goals: 5 weeks   - Patient will demonstrate functional squat pattern in order to improve independence with household ADLs met 2/15/23  - Patient will demonstrate minor loss of motion or better  in all three planes of motion of the trunk met 2/15/23  - Patient will demonstrate proper lifting mechanics when lifting an object 10 pounds or higher met 2/28/23     Updated Goals  In 3 weeks:  - Patient will report 5/10 or better level of pain when performing household ADLs - met 04/18/23  - Patient will lift 10# overhead with each UE - met 04/18/23  - Patient will demonstrate scapular strength of 4/5 or better in order to tolerate overhead household activity - met 04/18/23      PLAN     Discharge today secondary to goals met with instructions to continue her HEP    Moody Granados, PT, DPT

## 2023-04-24 ENCOUNTER — PATIENT MESSAGE (OUTPATIENT)
Dept: FAMILY MEDICINE | Facility: CLINIC | Age: 43
End: 2023-04-24
Payer: MEDICARE

## 2023-04-24 DIAGNOSIS — R91.1 SOLITARY PULMONARY NODULE: Primary | ICD-10-CM

## 2023-04-24 NOTE — TELEPHONE ENCOUNTER
Orders Placed This Encounter   Procedures    CT Chest With Contrast     Standing Status:   Future     Standing Expiration Date:   4/24/2024     Order Specific Question:   Is the patient allergic to iodine contrast?     Answer:   No     Order Specific Question:   Is the patient taking metformin or a metformin combination medication?  If so, the patient should hold the medication for 2 days following contrast.     Answer:   Yes     Order Specific Question:   Does this patient have impaired renal function?     Answer:   No     Order Specific Question:   May the Radiologist modify the order per protocol to meet the clinical needs of the patient?     Answer:   Yes

## 2023-05-01 ENCOUNTER — HOSPITAL ENCOUNTER (OUTPATIENT)
Dept: RADIOLOGY | Facility: HOSPITAL | Age: 43
Discharge: HOME | End: 2023-05-01
Attending: STUDENT IN AN ORGANIZED HEALTH CARE EDUCATION/TRAINING PROGRAM
Payer: COMMERCIAL

## 2023-05-01 DIAGNOSIS — R91.1 SOLITARY PULMONARY NODULE: ICD-10-CM

## 2023-05-01 PROCEDURE — 71260 CT THORAX DX C+: CPT | Mod: 26,,, | Performed by: RADIOLOGY

## 2023-05-01 PROCEDURE — 71260 CT THORAX DX C+: CPT | Mod: TC,PO

## 2023-05-01 PROCEDURE — 25500020 PHARM REV CODE 255: Mod: PO | Performed by: STUDENT IN AN ORGANIZED HEALTH CARE EDUCATION/TRAINING PROGRAM

## 2023-05-01 PROCEDURE — 71260 CT CHEST WITH CONTRAST: ICD-10-PCS | Mod: 26,,, | Performed by: RADIOLOGY

## 2023-05-01 RX ADMIN — IOHEXOL 75 ML: 350 INJECTION, SOLUTION INTRAVENOUS at 03:05

## 2023-05-05 ENCOUNTER — TELEPHONE (OUTPATIENT)
Dept: FAMILY MEDICINE | Facility: CLINIC | Age: 43
End: 2023-05-05
Payer: MEDICARE

## 2023-05-05 NOTE — PROGRESS NOTES
I have sent a msg to patient with the following interpretation (see below):    CT chest- pulmonary nodules remain stable     Please do not hesitate to call or message with any questions or concerns    Chrissie Lancaster MD

## 2023-05-05 NOTE — TELEPHONE ENCOUNTER
----- Message from Amber Singh sent at 5/5/2023 11:20 AM CDT -----  Contact: Sparkle  Type:  Patient Returning Call    Who Called:Sparkle  Who Left Message for Patient:Nurse  Does the patient know what this is regarding?:Test results  Would the patient rather a call back or a response via [facility]? Call back  Best Call Back Number:[phone]  Additional Information: Sparkle is calling she stated she missed a call for test results    Thanks  EVA

## 2023-05-09 ENCOUNTER — TELEPHONE (OUTPATIENT)
Dept: PAIN MEDICINE | Facility: CLINIC | Age: 43
End: 2023-05-09
Payer: MEDICARE

## 2023-05-09 NOTE — TELEPHONE ENCOUNTER
----- Message from Roman Mahoney sent at 5/9/2023 12:06 PM CDT -----  Type: Needs Medical Advice  Who Called:  Bonnie from [employer]  Symptoms (please be specific):  said she need a tax ID for the provider--please call and advise  Best Call Back Number: [phone]  Additional Information: thank you

## 2023-05-09 NOTE — TELEPHONE ENCOUNTER
I spoke with Bonnie @[employer] [phone] and gave her the tax ID #for Cassandra Alvarez, she indicated understanding.

## 2023-05-11 RX ORDER — ATOMOXETINE 40 MG/1
CAPSULE ORAL
Qty: 30 CAPSULE | Refills: 0 | Status: SHIPPED | OUTPATIENT
Start: 2023-05-11 | End: 2023-05-12

## 2023-05-11 NOTE — TELEPHONE ENCOUNTER
Care Due:                  Date            Visit Type   Department     Provider  --------------------------------------------------------------------------------                                ESTABLISHED                              PATIENT -    [facility]  Last Visit: 04-      [facility]       Chrissie Lancaster                              ESTABLISHED                              PATIENT -    [facility]  Next Visit: 05-      VIRTUAL      MEDICINE       Chrissie KIMBERLY [facility]  Test          Frequency    Reason                     Performed    Due Date  --------------------------------------------------------------------------------    HBA1C.......  6 months...  metFORMIN, semaglutide...  06- 12-    [facility] Embedded Care Due Messages. Reference number: [id number].   5/11/2023 8:31:43 AM CDT

## 2023-05-12 ENCOUNTER — OFFICE VISIT (OUTPATIENT)
Dept: FAMILY MEDICINE | Facility: CLINIC | Age: 43
End: 2023-05-12
Payer: MEDICARE

## 2023-05-12 DIAGNOSIS — F98.8 ADD (ATTENTION DEFICIT DISORDER) WITHOUT HYPERACTIVITY: Primary | ICD-10-CM

## 2023-05-12 PROCEDURE — 99213 PR OFFICE/OUTPT VISIT, EST, LEVL III, 20-29 MIN: ICD-10-PCS | Mod: 95,,, | Performed by: STUDENT IN AN ORGANIZED HEALTH CARE EDUCATION/TRAINING PROGRAM

## 2023-05-12 PROCEDURE — 99213 OFFICE O/P EST LOW 20 MIN: CPT | Mod: 95,,, | Performed by: STUDENT IN AN ORGANIZED HEALTH CARE EDUCATION/TRAINING PROGRAM

## 2023-05-12 RX ORDER — DEXTROAMPHETAMINE SACCHARATE, AMPHETAMINE ASPARTATE, DEXTROAMPHETAMINE SULFATE AND AMPHETAMINE SULFATE 3.75; 3.75; 3.75; 3.75 MG/1; MG/1; MG/1; MG/1
15 TABLET ORAL 2 TIMES DAILY
Qty: 60 TABLET | Refills: 0 | Status: SHIPPED | OUTPATIENT
Start: 2023-05-12 | End: 2023-06-07

## 2023-05-12 NOTE — PROGRESS NOTES
Problem List Items Addressed This Visit          Psychiatric    ADD (attention deficit disorder) without hyperactivity - Primary    Overview     Chronic hx; switch to adderall 10 BID    Pt is demonstrating no behaviors to suggest inappropriate use of prescribed medications.    [place] Board of Pharmacy Controlled Prescription Drug Monitoring database was queried and showed no activity to suggest abuse, diversion, or other inappropriate use of prescription medications.  #30 tabs, 0 refills   - she will send message in [employer]             Relevant Medications    dextroamphetamine-amphetamine (ADDERALL) 15 mg tablet         Follow up in about 3 months (around 8/12/2023) for Virtual Visit.    Chrissie Lancaster MD  _________________________________________________________________________      Patient ID: Sparkle Joes is a 42 y.o. female.    Chief Complaint: add    Pt diagnosed with ADD/ADHD several years ago. Denies mood instability, irritability, aggression, chest pain, decreased appetite, disordered sleep.     Adderall 15 XR caused palpitations. Switched to Strattera 40mg. She would like to switch back to adderall IR 15mg BID.     Also working to quit smoking. Currently smoking 5 cigarettes.     Past medical histories reviewed, including past medical, surgical, family and social histories.      Current Outpatient Medications on File Prior to Visit   Medication Sig Dispense Refill    buPROPion (WELLBUTRIN XL) 300 MG 24 hr tablet Take 1 tablet (300 mg total) by mouth once daily. 90 tablet 3    clobetasol 0.05% (TEMOVATE) 0.05 % Oint 1 application daily as needed.       cloNIDine (CATAPRES) 0.1 MG tablet Take 1 tablet (0.1 mg total) by mouth every evening. 90 tablet 3    eletriptan (RELPAX) 40 MG tablet Take 1 tablet (40 mg total) by mouth as needed. may repeat in 2 hours if necessary 9 tablet 0    famotidine (PEPCID) 40 MG tablet Take 1 tablet (40 mg total) by mouth once daily. 30 tablet 5    metFORMIN  (GLUCOPHAGE-XR) 500 MG ER 24hr tablet Take 1 tablet (500 mg total) by mouth every evening. 90 tablet 3    oxyCODONE-acetaminophen (PERCOCET)  mg per tablet Take 1 tablet by mouth every 8 (eight) hours as needed for Pain. 90 tablet 0    progesterone (PROMETRIUM) 100 MG capsule TAKE 1 CAPSULE BY MOUTH EVERY DAY IN THE EVENING 90 capsule 3    promethazine (PHENERGAN) 25 MG tablet Take 1 tablet (25 mg total) by mouth every 6 (six) hours as needed for Nausea. 45 tablet 5    secukinumab (COSENTYX PEN, 2 PENS,) 150 mg/mL PnIj Inject 300 mg into the skin every 30 days. 2 mL 6    semaglutide (OZEMPIC) 2 mg/dose (8 mg/3 mL) PnIj Inject 2 mg into the skin every 7 days. 3 pen 5    tirzepatide (MOUNJARO) 12.5 mg/0.5 mL PnIj Inject 12.5 mg into the skin every 7 days. (Patient not taking: Reported on 4/13/2023)      tiZANidine (ZANAFLEX) 4 MG tablet TAKE 1 TABLET (4 MG TOTAL) BY MOUTH NIGHTLY AS NEEDED FOR MUSCLE SPASM/ PAIN. 40 tablet 0    traZODone (DESYREL) 100 MG tablet Take 1 tablet (100 mg total) by mouth every evening. 90 tablet 1    [DISCONTINUED] atomoxetine (STRATTERA) 40 MG capsule TAKE 1 CAPSULE BY MOUTH EVERY DAY 30 capsule 0     No current facility-administered medications on file prior to visit.       Review of Systems   12 point review of systems negative except for listed in HPI.     Objective:    Nursing note and vitals reviewed.  There were no vitals filed for this visit.    There is no height or weight on file to calculate BMI.     No vitals or full physical exam obtained as this is a virtual visit  Gen: no distress, comfortable          We Offer Telehealth & Same Day Appointments!   Book your Telehealth appointment with me through my nurse or   Clinic appointments on [employer]!  [phone]     To Schedule appointments online, go to [employer]: [email or web address]     Answers submitted by the patient for this visit:  Review of Systems Questionnaire (Submitted on  5/12/2023)  activity change: No  unexpected weight change: No  neck pain: Yes  hearing loss: No  rhinorrhea: No  trouble swallowing: No  eye discharge: No  visual disturbance: No  chest tightness: No  wheezing: No  chest pain: No  palpitations: No  blood in stool: No  constipation: No  vomiting: No  diarrhea: No  polydipsia: No  polyuria: No  difficulty urinating: No  hematuria: No  menstrual problem: No  dysuria: No  joint swelling: Yes  arthralgias: Yes  headaches: Yes  weakness: No  confusion: No  dysphoric mood: No  The patient location is: [place]  The chief complaint leading to consultation is: add    Visit type: audiovisual    Time with patient: 10 minutes   15 minutes of total time spent on the encounter, which includes face to face time and non-face to face time preparing to see the patient (eg, review of tests), Obtaining and/or reviewing separately obtained history, Documenting clinical information in the electronic or other health record, Independently interpreting results (not separately reported) and communicating results to the patient/family/caregiver, or Care coordination (not separately reported).         Each patient to whom he or she provides medical services by telemedicine is:  (1) informed of the relationship between the physician and patient and the respective role of any other health care provider with respect to management of the patient; and (2) notified that he or she may decline to receive medical services by telemedicine and may withdraw from such care at any time.

## 2023-05-15 DIAGNOSIS — E11.69 TYPE 2 DIABETES MELLITUS WITH OTHER SPECIFIED COMPLICATION, UNSPECIFIED WHETHER LONG TERM INSULIN USE: ICD-10-CM

## 2023-05-15 DIAGNOSIS — R73.03 PREDIABETES: ICD-10-CM

## 2023-05-15 RX ORDER — METFORMIN HYDROCHLORIDE 500 MG/1
500 TABLET, EXTENDED RELEASE ORAL NIGHTLY
Qty: 90 TABLET | Refills: 3 | Status: SHIPPED | OUTPATIENT
Start: 2023-05-15 | End: 2023-08-18

## 2023-05-15 RX ORDER — SEMAGLUTIDE 2.68 MG/ML
2 INJECTION, SOLUTION SUBCUTANEOUS
Qty: 3 EACH | Refills: 5
Start: 2023-05-15 | End: 2023-05-31

## 2023-05-15 NOTE — TELEPHONE ENCOUNTER
Refill Routing Note   Medication(s) are not appropriate for processing by [facility] for the following reason(s):      Required labs outdated    [facility] action(s):  Defer None identified          Appointments  past 12m or future 3m with PCP    Date Provider   Last Visit   5/12/2023 Chrissie Lancaster MD   Next Visit   8/11/2023 Chrissie Lancaster MD   ED visits in past 90 days: 0        Note composed:10:37 AM 05/15/2023

## 2023-05-15 NOTE — TELEPHONE ENCOUNTER
No care due was identified.  [facility] Embedded Care Due Messages. Reference number: [id number].   5/15/2023 9:16:43 AM CDT

## 2023-05-25 ENCOUNTER — PATIENT MESSAGE (OUTPATIENT)
Dept: RHEUMATOLOGY | Facility: CLINIC | Age: 43
End: 2023-05-25
Payer: MEDICARE

## 2023-05-29 ENCOUNTER — PATIENT MESSAGE (OUTPATIENT)
Dept: RHEUMATOLOGY | Facility: CLINIC | Age: 43
End: 2023-05-29
Payer: MEDICARE

## 2023-05-29 DIAGNOSIS — E11.69 TYPE 2 DIABETES MELLITUS WITH OTHER SPECIFIED COMPLICATION, UNSPECIFIED WHETHER LONG TERM INSULIN USE: Primary | ICD-10-CM

## 2023-05-31 ENCOUNTER — PATIENT MESSAGE (OUTPATIENT)
Dept: RHEUMATOLOGY | Facility: CLINIC | Age: 43
End: 2023-05-31
Payer: MEDICARE

## 2023-05-31 ENCOUNTER — PATIENT MESSAGE (OUTPATIENT)
Dept: FAMILY MEDICINE | Facility: CLINIC | Age: 43
End: 2023-05-31
Payer: MEDICARE

## 2023-05-31 DIAGNOSIS — E88.810 METABOLIC SYNDROME: ICD-10-CM

## 2023-05-31 DIAGNOSIS — R73.03 PREDIABETES: Primary | ICD-10-CM

## 2023-05-31 DIAGNOSIS — E78.2 MIXED HYPERLIPIDEMIA: ICD-10-CM

## 2023-05-31 RX ORDER — TIRZEPATIDE 15 MG/.5ML
INJECTION, SOLUTION SUBCUTANEOUS
COMMUNITY
Start: 2023-05-20 | End: 2023-05-31 | Stop reason: SDUPTHER

## 2023-05-31 RX ORDER — TIRZEPATIDE 15 MG/.5ML
15 INJECTION, SOLUTION SUBCUTANEOUS WEEKLY
Qty: 12 PEN | Refills: 0 | Status: SHIPPED | OUTPATIENT
Start: 2023-05-31 | End: 2023-07-07

## 2023-05-31 NOTE — TELEPHONE ENCOUNTER
Appeal letter ok to be faxed to insurance (see previous msg)       No orders of the defined types were placed in this encounter.      Medications Ordered This Encounter   Medications    MOUNJARO 15 mg/0.5 mL PnIj     Sig: Inject 15 mg into the skin once a week.     Dispense:  12 pen     Refill:  0

## 2023-06-01 ENCOUNTER — TELEPHONE (OUTPATIENT)
Dept: FAMILY MEDICINE | Facility: CLINIC | Age: 43
End: 2023-06-01
Payer: MEDICARE

## 2023-06-01 ENCOUNTER — PATIENT MESSAGE (OUTPATIENT)
Dept: RHEUMATOLOGY | Facility: CLINIC | Age: 43
End: 2023-06-01
Payer: MEDICARE

## 2023-06-01 ENCOUNTER — PATIENT MESSAGE (OUTPATIENT)
Dept: FAMILY MEDICINE | Facility: CLINIC | Age: 43
End: 2023-06-01
Payer: MEDICARE

## 2023-06-01 NOTE — TELEPHONE ENCOUNTER
----- Message from Abbi Ortiz sent at 6/1/2023 12:15 PM CDT -----  Contact: Heidi/[facility]  Type:  Pharmacy Calling to Clarify an RX    Name of Caller:Heidi  Pharmacy Name:[facility]  Prescription Name:MOUNJARO 15 mg/0.5 mL PnIj  What do they need to clarify?:Prior authorization  Best Call Back Number:[phone]  Additional Information: Pharmacy request to receive prior authorization for prescription. Please give pharmacy a call back to assist.  Thank you,  GH

## 2023-06-02 ENCOUNTER — TELEPHONE (OUTPATIENT)
Dept: FAMILY MEDICINE | Facility: CLINIC | Age: 43
End: 2023-06-02
Payer: MEDICARE

## 2023-06-02 ENCOUNTER — PATIENT MESSAGE (OUTPATIENT)
Dept: FAMILY MEDICINE | Facility: CLINIC | Age: 43
End: 2023-06-02
Payer: MEDICARE

## 2023-06-02 NOTE — TELEPHONE ENCOUNTER
----- Message from Neena Zhao sent at 6/2/2023 10:13 AM CDT -----  Contact: LADONNA Paulino from [facility] regarding authorization. Reports needing verbal authorization for pt MOUNJARO 15 mg/0.5 mL PnIj. Reports pt is completely out and pharmacy has been trying for over a week to contact provider. Please call number listed to do verbal authorization.     PRIOR AUTHORIZATION NUMBER: [phone]    Please also give pt a call back at [phone]. Thanks OLEKSANDR.

## 2023-06-02 NOTE — TELEPHONE ENCOUNTER
----- Message from Mariann Ibarra sent at 6/2/2023  9:39 AM CDT -----  I do not have a pa in cover my meds for mounjaro and I do not have any paperwork for one.  I will try to push it through manually

## 2023-06-05 ENCOUNTER — PATIENT MESSAGE (OUTPATIENT)
Dept: FAMILY MEDICINE | Facility: CLINIC | Age: 43
End: 2023-06-05
Payer: MEDICARE

## 2023-06-07 DIAGNOSIS — F98.8 ADD (ATTENTION DEFICIT DISORDER) WITHOUT HYPERACTIVITY: ICD-10-CM

## 2023-06-07 RX ORDER — DEXTROAMPHETAMINE SACCHARATE, AMPHETAMINE ASPARTATE, DEXTROAMPHETAMINE SULFATE AND AMPHETAMINE SULFATE 3.75; 3.75; 3.75; 3.75 MG/1; MG/1; MG/1; MG/1
15 TABLET ORAL 2 TIMES DAILY
Qty: 60 TABLET | Refills: 0 | Status: SHIPPED | OUTPATIENT
Start: 2023-08-06 | End: 2023-08-11

## 2023-06-07 RX ORDER — DEXTROAMPHETAMINE SACCHARATE, AMPHETAMINE ASPARTATE, DEXTROAMPHETAMINE SULFATE AND AMPHETAMINE SULFATE 3.75; 3.75; 3.75; 3.75 MG/1; MG/1; MG/1; MG/1
15 TABLET ORAL 2 TIMES DAILY
Qty: 60 TABLET | Refills: 0 | Status: SHIPPED | OUTPATIENT
Start: 2023-07-07 | End: 2023-06-26

## 2023-06-07 RX ORDER — DEXTROAMPHETAMINE SACCHARATE, AMPHETAMINE ASPARTATE, DEXTROAMPHETAMINE SULFATE AND AMPHETAMINE SULFATE 3.75; 3.75; 3.75; 3.75 MG/1; MG/1; MG/1; MG/1
15 TABLET ORAL 2 TIMES DAILY
Qty: 60 TABLET | Refills: 0 | Status: SHIPPED | OUTPATIENT
Start: 2023-06-07 | End: 2023-06-26 | Stop reason: SDUPTHER

## 2023-06-07 RX ORDER — DEXTROAMPHETAMINE SACCHARATE, AMPHETAMINE ASPARTATE, DEXTROAMPHETAMINE SULFATE AND AMPHETAMINE SULFATE 3.75; 3.75; 3.75; 3.75 MG/1; MG/1; MG/1; MG/1
15 TABLET ORAL 2 TIMES DAILY
Qty: 60 TABLET | Refills: 0 | Status: CANCELLED | OUTPATIENT
Start: 2023-06-07 | End: 2023-07-07

## 2023-06-07 NOTE — TELEPHONE ENCOUNTER
No care due was identified.  [facility] Embedded Care Due Messages. Reference number: [id number].   6/07/2023 3:00:26 PM CDT

## 2023-06-07 NOTE — TELEPHONE ENCOUNTER
No orders of the defined types were placed in this encounter.      Medications Ordered This Encounter   Medications    dextroamphetamine-amphetamine (ADDERALL) 15 mg tablet     Sig: Take 1 tablet (15 mg total) by mouth 2 (two) times daily.     Dispense:  60 tablet     Refill:  0    dextroamphetamine-amphetamine (ADDERALL) 15 mg tablet     Sig: Take 1 tablet (15 mg total) by mouth 2 (two) times daily.     Dispense:  60 tablet     Refill:  0    dextroamphetamine-amphetamine (ADDERALL) 15 mg tablet     Sig: Take 1 tablet (15 mg total) by mouth 2 (two) times daily.     Dispense:  60 tablet     Refill:  0

## 2023-06-13 ENCOUNTER — PATIENT MESSAGE (OUTPATIENT)
Dept: RHEUMATOLOGY | Facility: CLINIC | Age: 43
End: 2023-06-13
Payer: MEDICARE

## 2023-06-13 ENCOUNTER — PATIENT MESSAGE (OUTPATIENT)
Dept: FAMILY MEDICINE | Facility: CLINIC | Age: 43
End: 2023-06-13
Payer: MEDICARE

## 2023-06-14 ENCOUNTER — PATIENT OUTREACH (OUTPATIENT)
Dept: ADMINISTRATIVE | Facility: HOSPITAL | Age: 43
End: 2023-06-14
Payer: MEDICARE

## 2023-06-14 ENCOUNTER — CLINICAL SUPPORT (OUTPATIENT)
Dept: RHEUMATOLOGY | Facility: CLINIC | Age: 43
End: 2023-06-14
Payer: COMMERCIAL

## 2023-06-14 VITALS — SYSTOLIC BLOOD PRESSURE: 111 MMHG | HEART RATE: 98 BPM | DIASTOLIC BLOOD PRESSURE: 77 MMHG

## 2023-06-14 DIAGNOSIS — L40.50 PSORIATIC ARTHRITIS: Primary | ICD-10-CM

## 2023-06-14 DIAGNOSIS — M45.9 ANKYLOSING SPONDYLITIS, UNSPECIFIED SITE OF SPINE: ICD-10-CM

## 2023-06-14 DIAGNOSIS — M47.812 CERVICAL ARTHRITIS: ICD-10-CM

## 2023-06-14 PROCEDURE — 96372 PR INJECTION,THERAP/PROPH/DIAG2ST, IM OR SUBCUT: ICD-10-PCS | Mod: PN,S$GLB,, | Performed by: INTERNAL MEDICINE

## 2023-06-14 PROCEDURE — 96372 THER/PROPH/DIAG INJ SC/IM: CPT | Mod: PN,S$GLB,, | Performed by: INTERNAL MEDICINE

## 2023-06-14 PROCEDURE — 99213 OFFICE O/P EST LOW 20 MIN: CPT | Mod: PBBFAC,PN

## 2023-06-14 PROCEDURE — 96372 THER/PROPH/DIAG INJ SC/IM: CPT | Mod: PBBFAC,PN

## 2023-06-14 PROCEDURE — 99999 PR PBB SHADOW E&M-EST. PATIENT-LVL III: CPT | Mod: PBBFAC,,,

## 2023-06-14 PROCEDURE — 99999 PR PBB SHADOW E&M-EST. PATIENT-LVL III: ICD-10-PCS | Mod: PBBFAC,,,

## 2023-06-14 RX ORDER — KETOROLAC TROMETHAMINE 30 MG/ML
60 INJECTION, SOLUTION INTRAMUSCULAR; INTRAVENOUS
Status: COMPLETED | OUTPATIENT
Start: 2023-06-14 | End: 2023-06-14

## 2023-06-14 RX ORDER — METHYLPREDNISOLONE ACETATE 80 MG/ML
80 INJECTION, SUSPENSION INTRA-ARTICULAR; INTRALESIONAL; INTRAMUSCULAR; SOFT TISSUE
Status: COMPLETED | OUTPATIENT
Start: 2023-06-14 | End: 2023-06-14

## 2023-06-14 RX ADMIN — METHYLPREDNISOLONE ACETATE 80 MG: 80 INJECTION, SUSPENSION INTRA-ARTICULAR; INTRALESIONAL; INTRAMUSCULAR; SOFT TISSUE at 01:06

## 2023-06-14 RX ADMIN — KETOROLAC TROMETHAMINE 60 MG: 60 INJECTION, SOLUTION INTRAMUSCULAR at 01:06

## 2023-06-14 NOTE — PROGRESS NOTES
DM A1c Report: per chart review pt is overdue for Ha1c, HM has several screenings discontinued.  Attempted to contact pt, no ans, lvm.

## 2023-06-14 NOTE — PROGRESS NOTES
Patient presented to clinic needing nurse injections. Complaining of 9 out of 10 pain to lower back. Discussed symptoms with Dr Shea, labs reviewed and injections ordered.    2 pt identifiers used  Allergies reviewed      Administered 2 cc ( 30 mg/ml ) of toradol to the left upper outer gluteal. Patient tolerated injections well. Informed of s/s to report verbalized understanding. No adverse reactions noted. Left facility in stable condition.    Administered 1 cc ( 80 mg/ml ) of depomedrol to the right upper outer gluteal. Informed of s/s to report verbalized understanding. No adverse reactions noted.

## 2023-06-20 ENCOUNTER — PATIENT MESSAGE (OUTPATIENT)
Dept: FAMILY MEDICINE | Facility: CLINIC | Age: 43
End: 2023-06-20
Payer: MEDICARE

## 2023-06-22 ENCOUNTER — OFFICE VISIT (OUTPATIENT)
Dept: FAMILY MEDICINE | Facility: CLINIC | Age: 43
End: 2023-06-22
Payer: COMMERCIAL

## 2023-06-22 ENCOUNTER — PATIENT MESSAGE (OUTPATIENT)
Dept: FAMILY MEDICINE | Facility: CLINIC | Age: 43
End: 2023-06-22

## 2023-06-22 DIAGNOSIS — R73.03 PREDIABETES: Primary | ICD-10-CM

## 2023-06-22 DIAGNOSIS — E66.3 OVERWEIGHT (BMI 25.0-29.9): Primary | ICD-10-CM

## 2023-06-22 DIAGNOSIS — R73.03 PREDIABETES: ICD-10-CM

## 2023-06-22 PROCEDURE — 99213 PR OFFICE/OUTPT VISIT, EST, LEVL III, 20-29 MIN: ICD-10-PCS | Mod: 95,,, | Performed by: STUDENT IN AN ORGANIZED HEALTH CARE EDUCATION/TRAINING PROGRAM

## 2023-06-22 PROCEDURE — 99213 OFFICE O/P EST LOW 20 MIN: CPT | Mod: 95,,, | Performed by: STUDENT IN AN ORGANIZED HEALTH CARE EDUCATION/TRAINING PROGRAM

## 2023-06-22 NOTE — PROGRESS NOTES
The patient location is: [place]  The chief complaint leading to consultation is: letter    Visit type: audiovisual    Time with patient: 5 minutes  10 minutes of total time spent on the encounter, which includes face to face time and non-face to face time preparing to see the patient (eg, review of tests), Obtaining and/or reviewing separately obtained history, Documenting clinical information in the electronic or other health record, Independently interpreting results (not separately reported) and communicating results to the patient/family/caregiver, or Care coordination (not separately reported).     Each patient to whom he or she provides medical services by telemedicine is:  (1) informed of the relationship between the physician and patient and the respective role of any other health care provider with respect to management of the patient; and (2) notified that he or she may decline to receive medical services by telemedicine and may withdraw from such care at any time.       Problem List Items Addressed This Visit          Endocrine    Overweight (BMI 25.0-29.9) - Primary    Overview     Wt Readings from Last 3 Encounters:   04/13/23 1102 66.7 kg (147 lb)   03/28/23 0853 67.6 kg (149 lb 0.5 oz)   03/07/23 0811 67.6 kg (149 lb)     Diabetes Medications               metFORMIN (GLUCOPHAGE-XR) 500 MG ER 24hr tablet Take 1 tablet (500 mg total) by mouth every evening.         tirzepatide (MOUNJARO) 15 mg/0.5 mL PnIj Inject 15 mg into the skin every 7 days.   On mounjaro, has ozempic as back up if mounjaro is out of stock     General weight loss/lifestyle modification strategies discussed: limit sugary drinks, exercise 3-5x per week    Informal exercise measures discussed, e.g. taking stairs instead of elevator.  Pt reports she was previously on rybelsus, ozempic, trulicity with adverse effects: n/v, MOTT  Mounjaro is best tolerated. Insurance wont cover, needs an appeal letter                 Prediabetes    Overview      Lab Results   Component Value Date    HGBA1C 5.8 (H) 06/06/2022   Doing well; follows with endocrine   On GLP1 RA     Diabetes Medications               metFORMIN (GLUCOPHAGE-XR) 500 MG ER 24hr tablet Take 1 tablet (500 mg total) by mouth every evening.    MOUNJARO 15 mg/0.5 mL PnIj Inject 15 mg into the skin once a week.                   Follow up if symptoms worsen or fail to improve.    Chrissie Lancaster MD  _________________________________________________________________________      Patient ID: Sparkle Jose is a 42 y.o. female.    Pt reports she was previously on rybelsus, ozempic, trulicity with adverse effects: n/v, HA. She needs letter for appeal to insurance.    Reports she has been tolerating mounjaro since summer '22. Insurance had not covered. She has been using coupon cards.    Past medical histories reviewed, including past medical, surgical, family and social histories.      Current Outpatient Medications on File Prior to Visit   Medication Sig Dispense Refill    buPROPion (WELLBUTRIN XL) 300 MG 24 hr tablet Take 1 tablet (300 mg total) by mouth once daily. 90 tablet 3    clobetasol 0.05% (TEMOVATE) 0.05 % Oint 1 application daily as needed.       dextroamphetamine-amphetamine (ADDERALL) 15 mg tablet Take 1 tablet (15 mg total) by mouth 2 (two) times daily. 60 tablet 0    [START ON 7/7/2023] dextroamphetamine-amphetamine (ADDERALL) 15 mg tablet Take 1 tablet (15 mg total) by mouth 2 (two) times daily. 60 tablet 0    [START ON 8/6/2023] dextroamphetamine-amphetamine (ADDERALL) 15 mg tablet Take 1 tablet (15 mg total) by mouth 2 (two) times daily. 60 tablet 0    eletriptan (RELPAX) 40 MG tablet Take 1 tablet (40 mg total) by mouth as needed. may repeat in 2 hours if necessary 9 tablet 0    famotidine (PEPCID) 40 MG tablet Take 1 tablet (40 mg total) by mouth once daily. 30 tablet 5    metFORMIN (GLUCOPHAGE-XR) 500 MG ER 24hr tablet Take 1 tablet (500 mg total) by mouth every evening. 90 tablet 3     MOUNJARO 15 mg/0.5 mL PnIj Inject 15 mg into the skin once a week. 12 pen 0    oxyCODONE-acetaminophen (PERCOCET)  mg per tablet Take 1 tablet by mouth every 8 (eight) hours as needed for Pain. 90 tablet 0    progesterone (PROMETRIUM) 100 MG capsule TAKE 1 CAPSULE BY MOUTH EVERY DAY IN THE EVENING 90 capsule 3    promethazine (PHENERGAN) 25 MG tablet Take 1 tablet (25 mg total) by mouth every 6 (six) hours as needed for Nausea. 45 tablet 5    secukinumab (COSENTYX PEN, 2 PENS,) 150 mg/mL PnIj Inject 300 mg into the skin every 30 days. 2 mL 6    tiZANidine (ZANAFLEX) 4 MG tablet TAKE 1 TABLET (4 MG TOTAL) BY MOUTH NIGHTLY AS NEEDED FOR MUSCLE SPASM/ PAIN. 40 tablet 0    traZODone (DESYREL) 100 MG tablet Take 1 tablet (100 mg total) by mouth every evening. 90 tablet 1    [DISCONTINUED] cloNIDine (CATAPRES) 0.1 MG tablet Take 1 tablet (0.1 mg total) by mouth every evening. 90 tablet 3     No current facility-administered medications on file prior to visit.       Review of Systems   12 point review of systems negative except for listed in HPI.     Objective:    Nursing note and vitals reviewed.  There were no vitals filed for this visit.  There is no height or weight on file to calculate BMI.     Physical Exam   No vitals or full physical exam obtained as this is a virtual visit  Gen: no distress, comfortable          We Offer Telehealth & Same Day Appointments!   Book your Telehealth appointment with me through my nurse or   Clinic appointments on [employer]!  [phone]     To Schedule appointments online, go to [employer]: [email or web address]       Answers submitted by the patient for this visit:  Review of Systems Questionnaire (Submitted on 6/22/2023)  activity change: No  unexpected weight change: No  neck pain: No  hearing loss: No  rhinorrhea: No  trouble swallowing: No  eye discharge: No  visual disturbance: No  chest tightness: No  wheezing: No  chest pain:  No  palpitations: No  blood in stool: No  constipation: No  vomiting: No  diarrhea: No  polydipsia: No  polyuria: No  difficulty urinating: No  hematuria: No  menstrual problem: No  dysuria: No  joint swelling: Yes  arthralgias: Yes  headaches: Yes  weakness: No  confusion: No  dysphoric mood: No

## 2023-06-22 NOTE — LETTER
June 22, 2023    Sparkle Daniels Rebeca  [address]  [place] [address]             [facility]  [address] [place] [address]  Phone: [phone]  Fax: [phone] To Whom It May Concern:    I am submitting this letter of appeal to the insurance company concerning that you must approve MOUNJARO 15 mg/0.5 mL PnIj for my patient Sparkle. This patient has tried and/or failed multiple formulary alternatives including but not limited to Trulicity, Ozempic and Rybelsus to treat metabolic syndrome. I feel that is it medically necessary for my patient to be on MOUNJARO 15 mg/0.5 mL PnIj . Patient has a family history of diabetes with recent increase in glucose readings. Patient currently has metabolic syndrome that will worsen into diabetes and/or other health complications if patient does not get approved for the requested medication.      If you have any further questions or need any additional information, please do not hesitate to contact me or my office directly.      Sincerely,       Chrissie Lancaster MD

## 2023-06-26 ENCOUNTER — TELEPHONE (OUTPATIENT)
Dept: FAMILY MEDICINE | Facility: CLINIC | Age: 43
End: 2023-06-26
Payer: COMMERCIAL

## 2023-06-26 ENCOUNTER — PATIENT MESSAGE (OUTPATIENT)
Dept: FAMILY MEDICINE | Facility: CLINIC | Age: 43
End: 2023-06-26
Payer: COMMERCIAL

## 2023-06-26 ENCOUNTER — LAB VISIT (OUTPATIENT)
Dept: LAB | Facility: HOSPITAL | Age: 43
End: 2023-06-26
Attending: STUDENT IN AN ORGANIZED HEALTH CARE EDUCATION/TRAINING PROGRAM
Payer: COMMERCIAL

## 2023-06-26 DIAGNOSIS — R73.03 PREDIABETES: ICD-10-CM

## 2023-06-26 DIAGNOSIS — F98.8 ADD (ATTENTION DEFICIT DISORDER) WITHOUT HYPERACTIVITY: Primary | ICD-10-CM

## 2023-06-26 LAB
ESTIMATED AVG GLUCOSE: 100 MG/DL (ref 68–131)
HBA1C MFR BLD: 5.1 % (ref 4–5.6)

## 2023-06-26 PROCEDURE — 36415 COLL VENOUS BLD VENIPUNCTURE: CPT | Mod: PO | Performed by: STUDENT IN AN ORGANIZED HEALTH CARE EDUCATION/TRAINING PROGRAM

## 2023-06-26 PROCEDURE — 83036 HEMOGLOBIN GLYCOSYLATED A1C: CPT | Performed by: STUDENT IN AN ORGANIZED HEALTH CARE EDUCATION/TRAINING PROGRAM

## 2023-06-26 RX ORDER — DEXTROAMPHETAMINE SACCHARATE, AMPHETAMINE ASPARTATE, DEXTROAMPHETAMINE SULFATE AND AMPHETAMINE SULFATE 3.75; 3.75; 3.75; 3.75 MG/1; MG/1; MG/1; MG/1
15 TABLET ORAL 2 TIMES DAILY
Qty: 60 TABLET | Refills: 0 | Status: SHIPPED | OUTPATIENT
Start: 2023-06-26 | End: 2023-06-27 | Stop reason: SDUPTHER

## 2023-06-26 NOTE — TELEPHONE ENCOUNTER
No orders of the defined types were placed in this encounter.      Medications Ordered This Encounter   Medications    dextroamphetamine-amphetamine (ADDERALL) 15 mg tablet     Sig: Take 1 tablet (15 mg total) by mouth 2 (two) times daily.     Dispense:  60 tablet     Refill:  0

## 2023-06-27 ENCOUNTER — PATIENT MESSAGE (OUTPATIENT)
Dept: FAMILY MEDICINE | Facility: CLINIC | Age: 43
End: 2023-06-27
Payer: COMMERCIAL

## 2023-06-27 ENCOUNTER — TELEPHONE (OUTPATIENT)
Dept: FAMILY MEDICINE | Facility: CLINIC | Age: 43
End: 2023-06-27
Payer: COMMERCIAL

## 2023-06-27 DIAGNOSIS — E11.69 TYPE 2 DIABETES MELLITUS WITH OTHER SPECIFIED COMPLICATION, UNSPECIFIED WHETHER LONG TERM INSULIN USE: Primary | ICD-10-CM

## 2023-06-27 DIAGNOSIS — F98.8 ADD (ATTENTION DEFICIT DISORDER) WITHOUT HYPERACTIVITY: ICD-10-CM

## 2023-06-27 RX ORDER — DEXTROAMPHETAMINE SACCHARATE, AMPHETAMINE ASPARTATE, DEXTROAMPHETAMINE SULFATE AND AMPHETAMINE SULFATE 3.75; 3.75; 3.75; 3.75 MG/1; MG/1; MG/1; MG/1
15 TABLET ORAL 2 TIMES DAILY
Qty: 60 TABLET | Refills: 0 | Status: SHIPPED | OUTPATIENT
Start: 2023-06-27 | End: 2023-07-27

## 2023-06-27 NOTE — TELEPHONE ENCOUNTER
----- Message from Chrissie Lancaster MD sent at 6/27/2023  8:00 AM CDT -----      I have sent a msg to patient with the following interpretation (see below):    Your A1c is at goal (<7) !! This is excellent! Continue medications as prescribed; limiting sugary drinks (juice, soda, sports drinks) and sweets; Also, ensure daily exercise. We will repeat in 6 months.      Please do not hesitate to call or message with any questions or concerns    Chrissie Lancaster MD

## 2023-06-27 NOTE — TELEPHONE ENCOUNTER
No care due was identified.  [facility] Embedded Care Due Messages. Reference number: [id number].   6/27/2023 9:48:05 AM CDT

## 2023-06-27 NOTE — PROGRESS NOTES
I have sent a msg to patient with the following interpretation (see below):    Your A1c is at goal (<7) !! This is excellent! Continue medications as prescribed; limiting sugary drinks (juice, soda, sports drinks) and sweets; Also, ensure daily exercise. We will repeat in 6 months.      Please do not hesitate to call or message with any questions or concerns    Chrissie Lancaster MD

## 2023-07-05 ENCOUNTER — PATIENT MESSAGE (OUTPATIENT)
Dept: FAMILY MEDICINE | Facility: CLINIC | Age: 43
End: 2023-07-05
Payer: COMMERCIAL

## 2023-07-05 DIAGNOSIS — R73.03 PREDIABETES: Primary | ICD-10-CM

## 2023-07-05 DIAGNOSIS — E88.810 METABOLIC SYNDROME: ICD-10-CM

## 2023-07-05 NOTE — TELEPHONE ENCOUNTER
----- Message from Jenny Guerrero sent at 7/5/2023  1:57 PM CDT -----  Pt is requesting a call back concerning the medication she was prescribed. Call back at [phone]

## 2023-07-05 NOTE — TELEPHONE ENCOUNTER
I spoke with the patient about this. Pt informed provider is currently with patients and will address message when she become available. Pt verbalized understanding.

## 2023-07-07 RX ORDER — TIRZEPATIDE 5 MG/.5ML
5 INJECTION, SOLUTION SUBCUTANEOUS
Qty: 1 PEN | Refills: 11 | Status: SHIPPED | OUTPATIENT
Start: 2023-07-07 | End: 2024-01-03

## 2023-07-07 NOTE — TELEPHONE ENCOUNTER
No orders of the defined types were placed in this encounter.      Medications Ordered This Encounter   Medications    tirzepatide (MOUNJARO) 5 mg/0.5 mL PnIj     Sig: Inject 5 mg into the skin every 7 days.     Dispense:  1 pen      Refill:  11

## 2023-07-21 ENCOUNTER — LAB VISIT (OUTPATIENT)
Dept: LAB | Facility: HOSPITAL | Age: 43
End: 2023-07-21
Attending: PHYSICIAN ASSISTANT
Payer: COMMERCIAL

## 2023-07-21 ENCOUNTER — OFFICE VISIT (OUTPATIENT)
Dept: RHEUMATOLOGY | Facility: CLINIC | Age: 43
End: 2023-07-21
Payer: COMMERCIAL

## 2023-07-21 VITALS
HEIGHT: 60 IN | BODY MASS INDEX: 25.52 KG/M2 | HEART RATE: 123 BPM | WEIGHT: 130 LBS | DIASTOLIC BLOOD PRESSURE: 73 MMHG | SYSTOLIC BLOOD PRESSURE: 112 MMHG

## 2023-07-21 DIAGNOSIS — G89.4 CHRONIC PAIN SYNDROME: ICD-10-CM

## 2023-07-21 DIAGNOSIS — M45.9 ANKYLOSING SPONDYLITIS, UNSPECIFIED SITE OF SPINE: ICD-10-CM

## 2023-07-21 DIAGNOSIS — M54.2 CERVICALGIA: ICD-10-CM

## 2023-07-21 DIAGNOSIS — R11.2 NAUSEA AND VOMITING, UNSPECIFIED VOMITING TYPE: ICD-10-CM

## 2023-07-21 DIAGNOSIS — D84.821 IMMUNOCOMPROMISED STATE DUE TO DRUG THERAPY: ICD-10-CM

## 2023-07-21 DIAGNOSIS — L40.50 PSORIATIC ARTHRITIS: ICD-10-CM

## 2023-07-21 DIAGNOSIS — L40.0 PLAQUE PSORIASIS: ICD-10-CM

## 2023-07-21 DIAGNOSIS — M45.9 ANKYLOSING SPONDYLITIS, UNSPECIFIED SITE OF SPINE: Primary | ICD-10-CM

## 2023-07-21 DIAGNOSIS — M53.3 CHRONIC LEFT SI JOINT PAIN: ICD-10-CM

## 2023-07-21 DIAGNOSIS — G89.29 CHRONIC LEFT SI JOINT PAIN: ICD-10-CM

## 2023-07-21 DIAGNOSIS — Z79.899 IMMUNOCOMPROMISED STATE DUE TO DRUG THERAPY: ICD-10-CM

## 2023-07-21 DIAGNOSIS — R11.0 NAUSEA: ICD-10-CM

## 2023-07-21 DIAGNOSIS — E11.69 TYPE 2 DIABETES MELLITUS WITH OTHER SPECIFIED COMPLICATION, UNSPECIFIED WHETHER LONG TERM INSULIN USE: ICD-10-CM

## 2023-07-21 PROCEDURE — 99215 OFFICE O/P EST HI 40 MIN: CPT | Mod: PBBFAC,PN | Performed by: PHYSICIAN ASSISTANT

## 2023-07-21 PROCEDURE — 99214 PR OFFICE/OUTPT VISIT, EST, LEVL IV, 30-39 MIN: ICD-10-PCS | Mod: 25,S$GLB,, | Performed by: PHYSICIAN ASSISTANT

## 2023-07-21 PROCEDURE — 99999 PR PBB SHADOW E&M-EST. PATIENT-LVL V: CPT | Mod: PBBFAC,,, | Performed by: PHYSICIAN ASSISTANT

## 2023-07-21 PROCEDURE — 86480 TB TEST CELL IMMUN MEASURE: CPT | Performed by: PHYSICIAN ASSISTANT

## 2023-07-21 PROCEDURE — 99999 PR PBB SHADOW E&M-EST. PATIENT-LVL V: ICD-10-PCS | Mod: PBBFAC,,, | Performed by: PHYSICIAN ASSISTANT

## 2023-07-21 PROCEDURE — 99214 OFFICE O/P EST MOD 30 MIN: CPT | Mod: 25,S$GLB,, | Performed by: PHYSICIAN ASSISTANT

## 2023-07-21 RX ORDER — TIZANIDINE 4 MG/1
4 TABLET ORAL NIGHTLY PRN
Qty: 90 TABLET | Refills: 1 | Status: SHIPPED | OUTPATIENT
Start: 2023-07-21

## 2023-07-21 RX ORDER — PROMETHAZINE HYDROCHLORIDE 25 MG/1
25 TABLET ORAL EVERY 6 HOURS PRN
Qty: 45 TABLET | Refills: 5 | Status: SHIPPED | OUTPATIENT
Start: 2023-07-21

## 2023-07-21 RX ORDER — IXEKIZUMAB 80 MG/ML
80 INJECTION, SOLUTION SUBCUTANEOUS
Qty: 1 ML | Refills: 11 | Status: ACTIVE | OUTPATIENT
Start: 2023-07-21 | End: 2024-02-12 | Stop reason: SDUPTHER

## 2023-07-21 RX ORDER — IXEKIZUMAB 80 MG/ML
INJECTION, SOLUTION SUBCUTANEOUS
Qty: 3 ML | Refills: 0 | Status: ACTIVE | OUTPATIENT
Start: 2023-07-21 | End: 2023-12-21

## 2023-07-21 RX ORDER — IXEKIZUMAB 80 MG/ML
80 INJECTION, SOLUTION SUBCUTANEOUS
Qty: 2 ML | Refills: 1 | Status: ACTIVE | OUTPATIENT
Start: 2023-07-21

## 2023-07-21 RX ORDER — KETOROLAC TROMETHAMINE 10 MG/1
10 TABLET, FILM COATED ORAL EVERY 6 HOURS
Qty: 20 TABLET | Refills: 1 | Status: SHIPPED | OUTPATIENT
Start: 2023-07-21 | End: 2023-07-26

## 2023-07-21 ASSESSMENT — ROUTINE ASSESSMENT OF PATIENT INDEX DATA (RAPID3)
TOTAL RAPID3 SCORE: 5.61
MDHAQ FUNCTION SCORE: 1.6
PATIENT GLOBAL ASSESSMENT SCORE: 6
FATIGUE SCORE: 2.2
PSYCHOLOGICAL DISTRESS SCORE: 1.1
PAIN SCORE: 5.5

## 2023-07-21 NOTE — PROGRESS NOTES
Subjective:       Patient ID: Sparkle Jose is a 42 y.o. female.    Chief Complaint: Disease Management    Mrs. Jose is a 42 year old female who presents to clinic for follow up on AS. She has been on cosentyx since 2016. She previously failed Humira, Cimzia, Simponi, and had DIL w/remicade. She reports more frequent SI joint flares in the last 6 months. Toradol PO helps with her recent flare. She denies serious infections since her last visit. Plaque psoriasis remains active on her feet.  She continues to have pain in her elbows, knees, neck, and low back. Pain is constant and aching.     She is taking percocet as needed for severe pain.     She has lost 76 lbs on mounjaro. GERD has resolved.     We reviewed recent labs.     Current tx:  1. cosentyx 300 mg      Prior tx:  1. Humira  2. Remicade (drug induced lupus)    Review of Systems   Constitutional:  Positive for activity change. Negative for appetite change, chills, fatigue and fever.   Eyes:  Negative for visual disturbance.   Respiratory:  Negative for cough and shortness of breath.    Cardiovascular:  Negative for chest pain, palpitations and leg swelling.   Gastrointestinal:  Negative for abdominal pain, constipation, diarrhea, nausea and vomiting.   Musculoskeletal:  Positive for arthralgias, back pain, joint swelling and myalgias.   Allergic/Immunologic: Positive for immunocompromised state.   Neurological:  Negative for dizziness, weakness, light-headedness and headaches.       Objective:     Vitals:    07/21/23 1201   BP: 112/73   Pulse: (!) 123         Past Medical History:   Diagnosis Date    Ankylosing spondylitis     Arthritis     Celiac disease     Depression     Family history of DVT     MGM DVT, mother spleenic    Family history of factor V Leiden mutation     mother and sister    GERD (gastroesophageal reflux disease)     Migraine     Osteoarthritis     Psoriasis     Psoriatic arthritis mutilans      Past Surgical History:   Procedure  Laterality Date    brain decompression   2006    BRAIN SURGERY      TUBAL LIGATION            Physical Exam   Constitutional: She is oriented to person, place, and time.   Eyes: Right conjunctiva is not injected. Left conjunctiva is not injected.   Neck: No JVD present. No thyromegaly present.   Cardiovascular: Normal rate and regular rhythm. Exam reveals no decreased pulses.   Pulmonary/Chest: She has no wheezes. She has no rhonchi. She has no rales.   Musculoskeletal:      Right shoulder: Normal.      Left shoulder: Normal.      Right elbow: Normal.      Left elbow: Normal.      Right wrist: Normal.      Left wrist: Normal.      Right knee: Swelling present. Tenderness present.      Left knee: Swelling present. Tenderness present.   Lymphadenopathy:     She has no cervical adenopathy.   Neurological: She is alert and oriented to person, place, and time. Gait normal.   Skin: Rash (psoriasis plaques on her feet) noted.   Psychiatric: Mood and affect normal.       Right Side Rheumatological Exam     Examination finds the shoulder, elbow, wrist, 1st PIP, 1st MCP, 2nd PIP, 2nd MCP, 3rd PIP, 3rd MCP, 4th PIP, 4th MCP, 5th PIP and 5th MCP normal.    The patient is tender to palpation of the knee    She has swelling of the knee    Left Side Rheumatological Exam     Examination finds the shoulder, elbow, wrist, 1st PIP, 1st MCP, 2nd PIP, 2nd MCP, 3rd PIP, 3rd MCP, 4th PIP, 4th MCP, 5th PIP and 5th MCP normal.    The patient is tender to palpation of the knee.    She has swelling of the knee        Labs reviewed:  Component      Latest Ref Rng & Units 6/26/2023 5/1/2023 4/13/2023 1/30/2023   Sodium      136 - 145 mmol/L    139   Potassium      3.5 - 5.1 mmol/L    4.5   Chloride      95 - 110 mmol/L    103   CO2      23 - 29 mmol/L    25   Glucose      70 - 110 mg/dL    88   BUN      6 - 20 mg/dL    15   Creatinine      0.5 - 1.4 mg/dL  0.8  0.8   Calcium      8.7 - 10.5 mg/dL    10.5   Anion Gap      8 - 16 mmol/L    11    eGFR      >60 mL/min/1.73 m:2  >60  >60.0   Cholesterol      120 - 199 mg/dL   176    Triglycerides      30 - 150 mg/dL   139    HDL      40 - 75 mg/dL   55    LDL Cholesterol External      63.0 - 159.0 mg/dL   93.2    HDL/Cholesterol Ratio      20.0 - 50.0 %   31.3    Total Cholesterol/HDL Ratio      2.0 - 5.0   3.2    Non-HDL Cholesterol      mg/dL   121    Hemoglobin A1C External      4.0 - 5.6 % 5.1      Estimated Avg Glucose      68 - 131 mg/dL 100      TSH      0.400 - 4.000 uIU/mL    0.592       Assessment:       1. Ankylosing spondylitis, unspecified site of spine    2. Psoriatic arthritis    3. Nausea    4. Cervicalgia    5. Immunocompromised state due to drug therapy    6. Chronic pain syndrome    7. Plaque psoriasis              Plan:       Ankylosing spondylitis, unspecified site of spine  -     CBC Auto Differential; Future; Expected date: 07/21/2023  -     Comprehensive Metabolic Panel; Future; Expected date: 07/21/2023  -     C-Reactive Protein; Future; Expected date: 07/21/2023  -     Sedimentation rate; Future; Expected date: 07/21/2023  -     ketorolac (TORADOL) 10 mg tablet; Take 1 tablet (10 mg total) by mouth every 6 (six) hours. for 5 days  Dispense: 20 tablet; Refill: 1  -     Ambulatory referral/consult to Physical/Occupational Therapy; Future; Expected date: 07/28/2023  -     HEPATITIS B SURFACE ANTIBODY; Future; Expected date: 07/21/2023  -     Hepatitis B Surface Antigen; Future; Expected date: 07/21/2023  -     Hepatitis B Core Antibody, Total; Future; Expected date: 07/21/2023  -     Hepatitis C Antibody; Future; Expected date: 07/21/2023  -     Quantiferon Gold TB; Future; Expected date: 07/21/2023  -     tiZANidine (ZANAFLEX) 4 MG tablet; Take 1 tablet (4 mg total) by mouth nightly as needed (muscle spasm).  Dispense: 90 tablet; Refill: 1  -     ixekizumab (TALTZ AUTOINJECTOR, 2 PACK,) 80 mg/mL AtIn; Inject 80 mg into the skin every 14 (fourteen) days. For induction dose week 4-10   Dispense: 2 mL; Refill: 1  -     ixekizumab (TALTZ AUTOINJECTOR, 3 PACK,) 80 mg/mL AtIn; Inject 160 mg (2 pens) into the skin at week 0 then inject 80 mg (1 pen) into the skin at week 2  Dispense: 3 mL; Refill: 0  -     ixekizumab (TALTZ AUTOINJECTOR) 80 mg/mL AtIn; Inject 80 mg into the skin every 28 days.  Dispense: 1 mL; Refill: 11    Psoriatic arthritis  -     CBC Auto Differential; Future; Expected date: 07/21/2023  -     Comprehensive Metabolic Panel; Future; Expected date: 07/21/2023  -     C-Reactive Protein; Future; Expected date: 07/21/2023  -     Sedimentation rate; Future; Expected date: 07/21/2023  -     ketorolac (TORADOL) 10 mg tablet; Take 1 tablet (10 mg total) by mouth every 6 (six) hours. for 5 days  Dispense: 20 tablet; Refill: 1  -     Ambulatory referral/consult to Physical/Occupational Therapy; Future; Expected date: 07/28/2023  -     HEPATITIS B SURFACE ANTIBODY; Future; Expected date: 07/21/2023  -     Hepatitis B Surface Antigen; Future; Expected date: 07/21/2023  -     Hepatitis B Core Antibody, Total; Future; Expected date: 07/21/2023  -     Hepatitis C Antibody; Future; Expected date: 07/21/2023  -     Quantiferon Gold TB; Future; Expected date: 07/21/2023  -     tiZANidine (ZANAFLEX) 4 MG tablet; Take 1 tablet (4 mg total) by mouth nightly as needed (muscle spasm).  Dispense: 90 tablet; Refill: 1  -     ixekizumab (TALTZ AUTOINJECTOR, 2 PACK,) 80 mg/mL AtIn; Inject 80 mg into the skin every 14 (fourteen) days. For induction dose week 4-10  Dispense: 2 mL; Refill: 1  -     ixekizumab (TALTZ AUTOINJECTOR, 3 PACK,) 80 mg/mL AtIn; Inject 160 mg (2 pens) into the skin at week 0 then inject 80 mg (1 pen) into the skin at week 2  Dispense: 3 mL; Refill: 0  -     ixekizumab (TALTZ AUTOINJECTOR) 80 mg/mL AtIn; Inject 80 mg into the skin every 28 days.  Dispense: 1 mL; Refill: 11    Nausea  -     promethazine (PHENERGAN) 25 MG tablet; Take 1 tablet (25 mg total) by mouth every 6 (six) hours as  needed for Nausea.  Dispense: 45 tablet; Refill: 5    Cervicalgia  -     Ambulatory referral/consult to Physical/Occupational Therapy; Future; Expected date: 07/28/2023    Immunocompromised state due to drug therapy    Chronic pain syndrome    Plaque psoriasis  -     ixekizumab (TALTZ AUTOINJECTOR, 2 PACK,) 80 mg/mL AtIn; Inject 80 mg into the skin every 14 (fourteen) days. For induction dose week 4-10  Dispense: 2 mL; Refill: 1  -     ixekizumab (TALTZ AUTOINJECTOR, 3 PACK,) 80 mg/mL AtIn; Inject 160 mg (2 pens) into the skin at week 0 then inject 80 mg (1 pen) into the skin at week 2  Dispense: 3 mL; Refill: 0  -     ixekizumab (TALTZ AUTOINJECTOR) 80 mg/mL AtIn; Inject 80 mg into the skin every 28 days.  Dispense: 1 mL; Refill: 11          Assessment:  42 year old female with  Psoriatic arthritis, Ankylosing spondylitis on cosentyx 300 mg  --chronic insomnia on trazodone  --anxiety on prozac  --hx of brain surgery  --persistent leukocytosis and thrombocytosis, hematology work up unrevealing  --thyroid nodule  --pulmonary nodule  --chronic pain syndrome followed by pain management    Plan:  1. D/C cosentyx. Start Taltz pso dosing  2. Toradol PRN flares  3. Tizanidine nightly prn  4. PT referral for dry needling  5. Cont percocet per MD.  I have checked [place] prescription monitoring program site and no unusual or abnormal behavior has occurred pt understand the risk and benefits of taking opioid medications and has decided to continue the medication.  6. Check labs now    Follow up:  4 mo Dr. Shea

## 2023-07-24 LAB
GAMMA INTERFERON BACKGROUND BLD IA-ACNC: 0.03 IU/ML
M TB IFN-G CD4+ BCKGRND COR BLD-ACNC: 0.01 IU/ML
M TB IFN-G CD4+ BCKGRND COR BLD-ACNC: 0.04 IU/ML
MITOGEN IGNF BCKGRD COR BLD-ACNC: 9.97 IU/ML
TB GOLD PLUS: NEGATIVE

## 2023-07-24 RX ORDER — OXYCODONE AND ACETAMINOPHEN 10; 325 MG/1; MG/1
1 TABLET ORAL EVERY 8 HOURS PRN
Qty: 90 TABLET | Refills: 0 | Status: SHIPPED | OUTPATIENT
Start: 2023-08-20 | End: 2023-11-14

## 2023-07-24 RX ORDER — OXYCODONE AND ACETAMINOPHEN 10; 325 MG/1; MG/1
1 TABLET ORAL EVERY 8 HOURS PRN
Qty: 90 TABLET | Refills: 0 | Status: SHIPPED | OUTPATIENT
Start: 2023-09-19 | End: 2023-11-13 | Stop reason: SDUPTHER

## 2023-07-24 RX ORDER — OXYCODONE AND ACETAMINOPHEN 10; 325 MG/1; MG/1
1 TABLET ORAL EVERY 8 HOURS PRN
Qty: 90 TABLET | Refills: 0 | Status: SHIPPED | OUTPATIENT
Start: 2023-07-24 | End: 2023-11-14

## 2023-07-25 ENCOUNTER — TELEPHONE (OUTPATIENT)
Dept: PHARMACY | Facility: CLINIC | Age: 43
End: 2023-07-25
Payer: COMMERCIAL

## 2023-08-01 ENCOUNTER — TELEPHONE (OUTPATIENT)
Dept: RHEUMATOLOGY | Facility: CLINIC | Age: 43
End: 2023-08-01

## 2023-08-01 NOTE — TELEPHONE ENCOUNTER
Rxs received are appropriate for Taltz psoriasis dosing. Pt's diagnosis of psoriasis has been noted.  Rxs look good for PA submission. I am working on this.    Thanks!  Meka Leo, PharmD  [facility]  [phone]

## 2023-08-01 NOTE — TELEPHONE ENCOUNTER
----- Message from Candace Hope, PharmD sent at 7/25/2023 12:09 PM CDT -----  Regarding: Miguel  Good afternoon MARLENA Khan received the orders for Ms. Lou Rivera.  The dosing received is for psoriasis dosing.  The dosing for AS is 160 mg SC once followed by 80 mg every 4 weeks.  If appropriate, can you resend the order for the loading dose?    Thank you,  Candace Hope, PharmD  Clinical Pharmacist    [facility]  [address] [place] [id number]  P [phone]  F [phone]

## 2023-08-01 NOTE — TELEPHONE ENCOUNTER
I sent 3 prescriptions.  Starter 160 x 1 then 80 mg wk 0 and wk 2  80 mg every 14 days weeks 4-10  80 mg every 28 days wk 12 on    Is this not correct?

## 2023-08-02 ENCOUNTER — CLINICAL SUPPORT (OUTPATIENT)
Dept: REHABILITATION | Facility: HOSPITAL | Age: 43
End: 2023-08-02
Payer: COMMERCIAL

## 2023-08-02 ENCOUNTER — LAB VISIT (OUTPATIENT)
Dept: LAB | Facility: HOSPITAL | Age: 43
End: 2023-08-02
Attending: INTERNAL MEDICINE
Payer: COMMERCIAL

## 2023-08-02 DIAGNOSIS — M54.50 LUMBAR PAIN: Primary | ICD-10-CM

## 2023-08-02 DIAGNOSIS — R73.03 PREDIABETES: ICD-10-CM

## 2023-08-02 DIAGNOSIS — M45.9 ANKYLOSING SPONDYLITIS, UNSPECIFIED SITE OF SPINE: ICD-10-CM

## 2023-08-02 DIAGNOSIS — R29.898 DECREASED ROM OF NECK: ICD-10-CM

## 2023-08-02 DIAGNOSIS — M54.2 CERVICALGIA: ICD-10-CM

## 2023-08-02 DIAGNOSIS — E66.3 SEVERELY OVERWEIGHT: Primary | ICD-10-CM

## 2023-08-02 DIAGNOSIS — R29.3 POSTURAL IMBALANCE: ICD-10-CM

## 2023-08-02 DIAGNOSIS — L40.50 PSORIATIC ARTHRITIS: ICD-10-CM

## 2023-08-02 LAB
ANION GAP SERPL CALC-SCNC: 9 MMOL/L (ref 8–16)
BUN SERPL-MCNC: 9 MG/DL (ref 6–20)
CALCIUM SERPL-MCNC: 9.7 MG/DL (ref 8.7–10.5)
CHLORIDE SERPL-SCNC: 105 MMOL/L (ref 95–110)
CO2 SERPL-SCNC: 25 MMOL/L (ref 23–29)
CREAT SERPL-MCNC: 0.6 MG/DL (ref 0.5–1.4)
EST. GFR  (NO RACE VARIABLE): >60 ML/MIN/1.73 M^2
ESTIMATED AVG GLUCOSE: 97 MG/DL (ref 68–131)
GLUCOSE SERPL-MCNC: 83 MG/DL (ref 70–110)
HBA1C MFR BLD: 5 % (ref 4–5.6)
POTASSIUM SERPL-SCNC: 4 MMOL/L (ref 3.5–5.1)
SODIUM SERPL-SCNC: 139 MMOL/L (ref 136–145)
T4 FREE SERPL-MCNC: 1.07 NG/DL (ref 0.71–1.51)
TSH SERPL DL<=0.005 MIU/L-ACNC: 0.77 UIU/ML (ref 0.4–4)

## 2023-08-02 PROCEDURE — 36415 COLL VENOUS BLD VENIPUNCTURE: CPT | Mod: PO | Performed by: INTERNAL MEDICINE

## 2023-08-02 PROCEDURE — 84443 ASSAY THYROID STIM HORMONE: CPT | Performed by: INTERNAL MEDICINE

## 2023-08-02 PROCEDURE — 83036 HEMOGLOBIN GLYCOSYLATED A1C: CPT | Performed by: INTERNAL MEDICINE

## 2023-08-02 PROCEDURE — 97110 THERAPEUTIC EXERCISES: CPT | Mod: PN

## 2023-08-02 PROCEDURE — 97140 MANUAL THERAPY 1/> REGIONS: CPT | Mod: PN

## 2023-08-02 PROCEDURE — 97161 PT EVAL LOW COMPLEX 20 MIN: CPT | Mod: PN

## 2023-08-02 PROCEDURE — 84439 ASSAY OF FREE THYROXINE: CPT | Performed by: INTERNAL MEDICINE

## 2023-08-02 PROCEDURE — 80048 BASIC METABOLIC PNL TOTAL CA: CPT | Performed by: INTERNAL MEDICINE

## 2023-08-02 NOTE — PLAN OF CARE
[facility]   Physical Therapy Initial Evaluation      Date: 8/2/2023   Name: Sparkle Jose  Clinic Number: [id number]    Therapy Diagnosis:   Encounter Diagnoses   Name Primary?    Ankylosing spondylitis, unspecified site of spine     Psoriatic arthritis     Cervicalgia     Lumbar pain Yes    Decreased ROM of neck     Postural imbalance      Physician: Erin Velasquez PA-C    Physician Orders: PT Eval and Treat  Medical Diagnosis from Referral: Ankylosing spondylitis, unspecified site of spine [M45.9]   Psoriatic arthritis [L40.50]   Cervicalgia [M54.2]  Evaluation Date: 8/2/2023  Authorization Period Expiration: 7/20/2024  Plan of Care Expiration: 9/27/2023  Progress Note Due: 9/2/2023  Visit # / Visits authorized: 1/ 1   FOTO: 1/3    Precautions: Standard, prior brain surgery, ankylosing spondylitis    Time In: 0835 am  Time Out: 0920 am  Total Appointment Time (timed & untimed codes): 45 minutes    SUBJECTIVE     Date of onset: neck and shoulders began to bother her over the couple months.     History of current condition - Sparkle reports: chronic pain in the neck, shoulder, upper back, in between shoulder blades. Symptoms have exacerbated in past few months. She is still having numbness and tingling in both arms, right arm more affected. Usually right side neck and shoulder is worse. She participated in PT earlier this year and had success with dry needling but the main focus was lower back/SI. She states that neck and upper back feels very stiff and compressed. She had previous brain surgery to correct Chiari malformation.    Falls: NA    Imaging, NA    Prior Therapy: yes  Social History: lives with their family  Occupation: NA  Prior Level of Function: independent  Current Level of Function: lifting over head, reaching    Pain:  Current 4/10, worst 8/10, best 0/10   Location: bilateral neck, shoulders, back (right side worse)  Description: Aching, Dull, Tight, and  Numb  Aggravating Factors: Night Time and Lifting  Easing Factors: massage and heating pad    Patients goals: pain relief     Medical History:   Past Medical History:   Diagnosis Date    Ankylosing spondylitis     Arthritis     Celiac disease     Depression     Family history of DVT     MGM DVT, mother spleenic    Family history of factor V Leiden mutation     mother and sister    GERD (gastroesophageal reflux disease)     Migraine     Osteoarthritis     Psoriasis     Psoriatic arthritis mutilans      Surgical History:   Sparkle Jose  has a past surgical history that includes Brain surgery; brain decompression  (2006); and Tubal ligation.    Medications:   Sparkle has a current medication list which includes the following prescription(s): bupropion, clobetasol 0.05%, [START ON 8/6/2023] dextroamphetamine-amphetamine, eletriptan, taltz autoinjector, taltz autoinjector (2 pack), taltz autoinjector (3 pack), metformin, [START ON 9/19/2023] oxycodone-acetaminophen, [START ON 8/20/2023] oxycodone-acetaminophen, oxycodone-acetaminophen, progesterone, promethazine, mounjaro, tizanidine, and trazodone.    Allergies:   Review of patient's allergies indicates:  No Known Allergies     OBJECTIVE     Limitation/Restriction for FOTO Neck Survey    Therapist reviewed FOTO scores for Sparkle Jose on 8/2/2023.   FOTO documents entered into [id number] - see Media section.    Limitation Score: 56%       Posture: forward head, forward shoulders, guarded, decreased cervical lordosis, increased thoracic kyphosis    Cervical Range of Motion:    Degrees Pain   Flexion 40 Stretch     Extension 35 pain     Right Rotation 65 min     Left Rotation 50 Y right     Right Side Bending 40 Y right   Left Side Bending 20  Y right      Shoulder Range of Motion: WNL    Strength:  Cervical MMT   Flexion 5   Extension 5   Right Side Bend 5   Left Side Bend 5     Upper Extremity Strength  (R) UE  (L) UE    Shoulder flexion: 4/5 Shoulder  flexion: 5/5   Shoulder Abduction: 4/5 Shoulder abduction: 5/5   Shoulder ER 4/5 Shoulder ER 5/5   Shoulder IR 5/5 Shoulder IR 5/5   Lower Trap 3+/5 Lower Trap 4+/5   Middle Trap 3+/5 Middle Trap 4+/5   Rhomboids 3+/5 Rhomboids 4+/5     Special Tests:  Distraction relief   Compression -   Spurlings -   Sharp-Polly -     Joint Mobility: hypomobility with pain/ROS in cervical and upper thoracic spine, assessed in PA and lateral glides. Limited thoracic extension    Palpation: moderate to severe TTP, cervical paraspinals, suboccipitals, bilateral trapezius mm, levator scap, scapular mm, cervical and upper thoracic spinous processes      Sensation: WNL    Flexibility: decreased upper trap     TREATMENT     Total Treatment time (time-based codes) separate from Evaluation: (25) minutes      Sparkle received the treatments listed below:      therapeutic exercises to develop strength, endurance, ROM, flexibility, posture, and core stabilization for (10) minutes including:  HEP review and patient education  See patient instructions for attached HEP      Pt received Manual Therapy techniques in the form of Dry Needling for (15) minutes. This was applied to the right upper trap. Dry needling was performed to decrease inflammation, increase circulation, decrease pain and restore homeostasis.     Electrical Stimulation was applied while needles were in situ for (5) minutes. Intensity increased to patient tolerance. No adverse reactions observed.      (4) 30 mm needles with 0.25 mm gauge were used for all insertion points. Pt in seated position.     Patient gave Verbal and Written consent to undergo dry needling. Written consent can be found in the media section in pts chart. All needles were removed and changes in signs and symptoms were assessed. No adverse reactions noted at the conclusion of the treatment.     Possible next visit: periscapular/right shoulder strengthening, manual techniques, cervical/thoracic mobility, dry  needling    PATIENT EDUCATION AND HOME EXERCISES     Education provided:   - HEP provided and reviewed  - Anatomy and Physiology pertaining to current condition  - Possible response to exercise  - Importance of PT compliance    Written Home Exercises Provided: yes. Exercises were reviewed and Sparkle was able to demonstrate them prior to the end of the session.  Sparkle demonstrated good  understanding of the education provided. See EMR under Patient Instructions for exercises provided during therapy sessions.    ASSESSMENT     Sparkle is a 42 y.o. female referred to outpatient Physical Therapy with a medical diagnosis of Ankylosing spondylitis, unspecified site of spine [M45.9] Psoriatic arthritis [L40.50] Cervicalgia [M54.2]. Patient presents with decreased cervical ROM, weakness in right shoulder, weakness in periscapular mm, impaired posture, stiffness in thoracic spine, possible soft tissue dysfunction, and decreased activity tolerance. Signs and symptoms are consistent with diagnosis. Due to these symptoms presented and functional limitations, she would benefit from continued skilled PT. Dry needling performed today after obtaining written and verbal consent. She had no adverse symptoms and reports reduced tension to right shoulder/neck. HEP was provided.     Patient prognosis is Good.   Patient will benefit from skilled outpatient Physical Therapy to address the deficits stated above and in the chart below, provide patient /family education, and to maximize patientt's level of independence.     Plan of care discussed with patient: Yes  Patient's spiritual, cultural and educational needs considered and patient is agreeable to the plan of care and goals as stated below:     Anticipated Barriers for therapy: none    Medical Necessity is demonstrated by the following  History  Co-morbidities and personal factors that may impact the plan of care [] LOW: no personal factors / co-morbidities  [x] MODERATE: 1-2  personal factors / co-morbidities  [] HIGH: 3+ personal factors / co-morbidities    Moderate / High Support Documentation: co-morbidities, PMH     Examination  Body Structures and Functions, activity limitations and participation restrictions that may impact the plan of care [x] LOW: addressing 1-2 elements  [] MODERATE: 3+ elements  [] HIGH: 4+ elements (please support below)    Moderate / High Support Documentation:      Clinical Presentation [x] LOW: stable  [] MODERATE: Evolving  [] HIGH: Unstable     Decision Making/ Complexity Score: low       Goals:  Short Term Goals: In 4 weeks   1.Pt to be educated on HEP.  2.Patient to increase cervical ROM by 5 degrees to improve functional mobility.  3.Patient to increase strength by 1/2 grade to improve functional tasks.  4.Patient to have pain less than 6/10 at all times to improve quality of movement.   5.Patient to improve score on the FOTO by 5%.  6. Pt to be educated on postural and body mechanics awareness.    Long Term Goals: In 8 weeks  1. Patient to report little or minimal TTP in shoulder and neck to reduce tension and headaches.   2. Patient to demo increase in periscapular strength to 4+/5 to improve functional tasks.  3. Patient to have decreased pain to 2/10 with activity to improve quality of movement.   4. Patient to demo increase cervical ROM to WNL/WFL without pain to improve functional mobility.  5. Patient to perform daily activities including lifting, overhead activities without limitation.    PLAN     Plan of care Certification: 8/2/2023 to 9/27/2023.    Outpatient Physical Therapy 2 times weekly for 8 weeks to include the following interventions: Cervical/Lumbar Traction, Electrical Stimulation IFC/TENS, Manual Therapy, Moist Heat/ Ice, Neuromuscular Re-ed, Patient Education, Self Care, Therapeutic Activities, Therapeutic Exercise, and Dry Needling.     Phoenix Alejandro, PT DPT    I CERTIFY THE NEED FOR THESE SERVICES FURNISHED UNDER THIS PLAN OF  TREATMENT AND WHILE UNDER MY CARE   Physician's comments:     Physician's Signature: ___________________________________________________

## 2023-08-04 NOTE — TELEPHONE ENCOUNTER
[facility] denied the PA because Taltz PsO dosing requested and plan requires Rx to be written by or in consult with Dermatology for approval of this dose.  [employer] is submitting urgent appeal.  I will keep you and patient updated on the appeal status.    Meka Leo, PharmD  [facility]  [phone]

## 2023-08-07 ENCOUNTER — PATIENT MESSAGE (OUTPATIENT)
Dept: RHEUMATOLOGY | Facility: CLINIC | Age: 43
End: 2023-08-07
Payer: COMMERCIAL

## 2023-08-08 NOTE — PROGRESS NOTES
[facility]   Physical Therapy Treatment Note      Name: Sparkle Daniels \[facility]\""  Clinic Number: [id number]    Therapy Diagnosis:   Encounter Diagnoses   Name Primary?    Decreased ROM of neck Yes    Postural imbalance      Physician: Erin Velasquez PA-C    Visit Date: 8/9/2023    Physician Orders: PT Eval and Treat  Medical Diagnosis from Referral: Ankylosing spondylitis, unspecified site of spine [M45.9]   Psoriatic arthritis [L40.50]   Cervicalgia [M54.2]  Evaluation Date: 8/2/2023  Authorization Period Expiration: 12/31/2023  Plan of Care Expiration: 9/27/2023  Progress Note Due: 9/2/2023  Visit # / Visits authorized: 1/20 auth (1/1 eval)  FOTO: 1/3     Precautions: Standard, prior brain surgery, ankylosing spondylitis     Time In: 0830 am  Time Out: 0925 am  Total Appointment Time (timed & untimed codes): 55 minutes    PTA Visit #: 0/5     Subjective     Pt reports: felt better after dry needling but she was very sore. Symptoms began to return after a couple days and now when she stretches it intensifies her headaches.     She was compliant with home exercise program.  Response to previous treatment: initial evaluation  Functional change: TBD    Pain: 5/10  Location: bilateral neck shoulders    Objective      Objective Measures updated at progress report unless specified.     Treatment     Sparkle received the treatments listed below:      therapeutic exercises to develop strength, endurance, ROM, flexibility, posture, and core stabilization for (10) minutes including:  UBE forward, back 2/2 4 min total  Cervical snag rotation x10 reps B  Cervical snag extension x15 reps    neuromuscular re-education activities to improve: Balance, Coordination, Kinesthetic, Sense, Proprioception, and Posture for (25) minutes. The following activities were included:  Bilateral ER x20 reps YTB  Bent over single arm rows 10# KB x20 reps  Single arm rows x15 reps red/green tube x15 reps  Resisted ER RUE red  tube 2x5 reps  Resisted flexion RUE YTB x15 reps  Resisted extension RUE YTB x15 reps  Supine chin tucks into towel with iso hold x20 reps  Supine horizontal abduction x20 reps YTB    Sparkle received the following manual therapy techniques: applied to the: cervical spine for (10) minutes, including:  Suboccipital release  Manual STM  Manual traction    Pt received Manual Therapy techniques in the form of Dry Needling for (10) minutes. This was applied to the right suboccipital. Dry needling was performed to decrease inflammation, increase circulation, decrease pain and restore homeostasis.     Electrical Stimulation was applied while needles were in situ for (4) minutes. Intensity increased to patient tolerance. No adverse reactions observed.      (2) 30 mm needles with 0.25 mm gauge were used for all insertion points. Pt in prone position.     Patient gave Verbal and Written consent to undergo dry needling. Written consent can be found in the media section in pts chart. All needles were removed and changes in signs and symptoms were assessed. No adverse reactions noted at the conclusion of the treatment.     Patient Education and Home Exercises       Education provided:   - HEP provided and reviewed  - Anatomy and Physiology pertaining to current condition  - Possible response to exercise  - Importance of PT compliance    Written Home Exercises Provided: Patient instructed to cont prior HEP. Exercises were reviewed and Sparkle was able to demonstrate them prior to the end of the session.  Sparkle demonstrated good  understanding of the education provided. See EMR under Patient Instructions for exercises provided during therapy sessions    Assessment     Strengthening exercises prescribed for improving RUE and periscapular weakness to assist with reducing neck/right shoulder pain. She responds well to manual techniques including manual traction. Hypersensitivity still present in right side paraspinals and  suboccipitals with ROS upon palpation. Dry needling performed to right side suboccipitals in prone with estim application. Overall reduced symptoms upon leaving clinic.     Sparkle Is progressing well towards her goals.   Pt prognosis is Good.     Pt will continue to benefit from skilled outpatient physical therapy to address the deficits listed in the problem list box on initial evaluation, provide pt/family education and to maximize pt's level of independence in the home and community environment.     Pt's spiritual, cultural and educational needs considered and pt agreeable to plan of care and goals.     Anticipated barriers to physical therapy: none    Goals:  Short Term Goals: In 4 weeks   1.Pt to be educated on HEP. (met)  2.Patient to increase cervical ROM by 5 degrees to improve functional mobility. (progressing, not met)  3.Patient to increase strength by 1/2 grade to improve functional tasks. (progressing, not met)  4.Patient to have pain less than 6/10 at all times to improve quality of movement. (progressing, not met)  5.Patient to improve score on the FOTO by 5%. (progressing, not met)  6. Pt to be educated on postural and body mechanics awareness. (progressing, not met)     Long Term Goals: In 8 weeks  1. Patient to report little or minimal TTP in shoulder and neck to reduce tension and headaches. (progressing, not met)  2. Patient to demo increase in periscapular strength to 4+/5 to improve functional tasks. (progressing, not met)  3. Patient to have decreased pain to 2/10 with activity to improve quality of movement. (progressing, not met)  4. Patient to demo increase cervical ROM to WNL/WFL without pain to improve functional mobility. (progressing, not met)  5. Patient to perform daily activities including lifting, overhead activities without limitation. (progressing, not met)    Plan     Continue with PT POC to address functional limitations.     Phoenix Alejandro, PT DPT

## 2023-08-09 ENCOUNTER — CLINICAL SUPPORT (OUTPATIENT)
Dept: REHABILITATION | Facility: HOSPITAL | Age: 43
End: 2023-08-09
Payer: COMMERCIAL

## 2023-08-09 DIAGNOSIS — R29.898 DECREASED ROM OF NECK: Primary | ICD-10-CM

## 2023-08-09 DIAGNOSIS — R29.3 POSTURAL IMBALANCE: ICD-10-CM

## 2023-08-09 PROCEDURE — 97110 THERAPEUTIC EXERCISES: CPT | Mod: PN

## 2023-08-09 PROCEDURE — 97140 MANUAL THERAPY 1/> REGIONS: CPT | Mod: PN

## 2023-08-09 PROCEDURE — 97112 NEUROMUSCULAR REEDUCATION: CPT | Mod: PN

## 2023-08-10 ENCOUNTER — TELEPHONE (OUTPATIENT)
Dept: RHEUMATOLOGY | Facility: CLINIC | Age: 43
End: 2023-08-10
Payer: COMMERCIAL

## 2023-08-10 NOTE — TELEPHONE ENCOUNTER
----- Message from Sandeep Maldonado sent at 8/9/2023 11:50 AM CDT -----  Type: Needs Medical Advice  Who Called:  Dr. Annette Gonzalez Call Back Number: [phone]  Additional Information: calling in regards to a rx that pt is on renvoq wants to confirm why and if pt cannot take rx please call back to advise asap, thanks!

## 2023-08-11 ENCOUNTER — PATIENT MESSAGE (OUTPATIENT)
Dept: PHARMACY | Facility: CLINIC | Age: 43
End: 2023-08-11
Payer: COMMERCIAL

## 2023-08-11 ENCOUNTER — SPECIALTY PHARMACY (OUTPATIENT)
Dept: PHARMACY | Facility: CLINIC | Age: 43
End: 2023-08-11
Payer: COMMERCIAL

## 2023-08-11 ENCOUNTER — OFFICE VISIT (OUTPATIENT)
Dept: FAMILY MEDICINE | Facility: CLINIC | Age: 43
End: 2023-08-11
Payer: COMMERCIAL

## 2023-08-11 VITALS — HEART RATE: 84 BPM

## 2023-08-11 DIAGNOSIS — F98.8 ADD (ATTENTION DEFICIT DISORDER) WITHOUT HYPERACTIVITY: Primary | ICD-10-CM

## 2023-08-11 PROCEDURE — 99213 OFFICE O/P EST LOW 20 MIN: CPT | Mod: 95,,, | Performed by: STUDENT IN AN ORGANIZED HEALTH CARE EDUCATION/TRAINING PROGRAM

## 2023-08-11 PROCEDURE — 99213 PR OFFICE/OUTPT VISIT, EST, LEVL III, 20-29 MIN: ICD-10-PCS | Mod: 95,,, | Performed by: STUDENT IN AN ORGANIZED HEALTH CARE EDUCATION/TRAINING PROGRAM

## 2023-08-11 RX ORDER — DEXTROAMPHETAMINE SACCHARATE, AMPHETAMINE ASPARTATE, DEXTROAMPHETAMINE SULFATE AND AMPHETAMINE SULFATE 3.75; 3.75; 3.75; 3.75 MG/1; MG/1; MG/1; MG/1
15 TABLET ORAL 2 TIMES DAILY
Qty: 60 TABLET | Refills: 0 | Status: SHIPPED | OUTPATIENT
Start: 2023-09-10 | End: 2023-10-10

## 2023-08-11 RX ORDER — DEXTROAMPHETAMINE SACCHARATE, AMPHETAMINE ASPARTATE, DEXTROAMPHETAMINE SULFATE AND AMPHETAMINE SULFATE 3.75; 3.75; 3.75; 3.75 MG/1; MG/1; MG/1; MG/1
15 TABLET ORAL 2 TIMES DAILY
Qty: 60 TABLET | Refills: 0 | Status: SHIPPED | OUTPATIENT
Start: 2023-10-10 | End: 2023-10-23

## 2023-08-11 RX ORDER — DEXTROAMPHETAMINE SACCHARATE, AMPHETAMINE ASPARTATE, DEXTROAMPHETAMINE SULFATE AND AMPHETAMINE SULFATE 3.75; 3.75; 3.75; 3.75 MG/1; MG/1; MG/1; MG/1
15 TABLET ORAL 2 TIMES DAILY
Qty: 60 TABLET | Refills: 0 | Status: SHIPPED | OUTPATIENT
Start: 2023-08-11 | End: 2023-09-10

## 2023-08-11 NOTE — TELEPHONE ENCOUNTER
[employer] appeal approved 7/11/23-8/11/24  Loading dose PA is active through 11/3/23.    MD/MDO notified of approval. Called pt- M.  Detailed MyChart sent.      Pt locked into [facility].  Rxs routed.

## 2023-08-11 NOTE — PROGRESS NOTES
Problem List Items Addressed This Visit          Psychiatric    ADD (attention deficit disorder) without hyperactivity - Primary    Overview     Chronic hx;  adderall 15 BID    Pt is demonstrating no behaviors to suggest inappropriate use of prescribed medications.    [place] Board of Pharmacy Controlled Prescription Drug Monitoring database was queried and showed no activity to suggest abuse, diversion, or other inappropriate use of prescription medications.  #30 tabs, 2 refills    3m VV             Relevant Medications    dextroamphetamine-amphetamine (ADDERALL) 15 mg tablet    dextroamphetamine-amphetamine (ADDERALL) 15 mg tablet (Start on 9/10/2023)    dextroamphetamine-amphetamine (ADDERALL) 15 mg tablet (Start on 10/10/2023)           Follow up in about 3 months (around 11/11/2023) for Virtual Visit.    Chrissie Lancaster MD  _________________________________________________________________________      Patient ID: Sparkle Jose is a 42 y.o. female.    Chief Complaint: add    Pt diagnosed with ADD/ADHD several years ago. Denies mood instability, irritability, aggression, chest pain, decreased appetite, disordered sleep.     Adderall 15 XR caused palpitations. Switched to Strattera 40mg. She is doing better with adderall IR 15mg BID.     Also working to quit smoking. Currently smoking 5 cigarettes.     Past medical histories reviewed, including past medical, surgical, family and social histories.      Current Outpatient Medications on File Prior to Visit   Medication Sig Dispense Refill    buPROPion (WELLBUTRIN XL) 300 MG 24 hr tablet Take 1 tablet (300 mg total) by mouth once daily. 90 tablet 3    clobetasol 0.05% (TEMOVATE) 0.05 % Oint 1 application daily as needed.       eletriptan (RELPAX) 40 MG tablet Take 1 tablet (40 mg total) by mouth as needed. may repeat in 2 hours if necessary 9 tablet 0    ixekizumab (TALTZ AUTOINJECTOR) 80 mg/mL AtIn Inject 80 mg into the skin every 28 days. 1 mL 11     ixekizumab (TALTZ AUTOINJECTOR, 2 PACK,) 80 mg/mL AtIn Inject 80 mg into the skin every 14 (fourteen) days. For induction dose week 4-10 2 mL 1    ixekizumab (TALTZ AUTOINJECTOR, 3 PACK,) 80 mg/mL AtIn Inject 160 mg (2 pens) into the skin at week 0 then inject 80 mg (1 pen) into the skin at week 2 3 mL 0    metFORMIN (GLUCOPHAGE-XR) 500 MG ER 24hr tablet Take 1 tablet (500 mg total) by mouth every evening. 90 tablet 3    [START ON 9/19/2023] oxyCODONE-acetaminophen (PERCOCET)  mg per tablet Take 1 tablet by mouth every 8 (eight) hours as needed for Pain. 90 tablet 0    [START ON 8/20/2023] oxyCODONE-acetaminophen (PERCOCET)  mg per tablet Take 1 tablet by mouth every 8 (eight) hours as needed for Pain. 90 tablet 0    oxyCODONE-acetaminophen (PERCOCET)  mg per tablet Take 1 tablet by mouth every 8 (eight) hours as needed for Pain. 90 tablet 0    progesterone (PROMETRIUM) 100 MG capsule TAKE 1 CAPSULE BY MOUTH EVERY DAY IN THE EVENING 90 capsule 3    promethazine (PHENERGAN) 25 MG tablet Take 1 tablet (25 mg total) by mouth every 6 (six) hours as needed for Nausea. 45 tablet 5    tirzepatide (MOUNJARO) 5 mg/0.5 mL PnIj Inject 5 mg into the skin every 7 days. 1 pen 11    tiZANidine (ZANAFLEX) 4 MG tablet Take 1 tablet (4 mg total) by mouth nightly as needed (muscle spasm). 90 tablet 1    traZODone (DESYREL) 100 MG tablet Take 1 tablet (100 mg total) by mouth every evening. 90 tablet 1    [DISCONTINUED] dextroamphetamine-amphetamine (ADDERALL) 15 mg tablet Take 1 tablet (15 mg total) by mouth 2 (two) times daily. 60 tablet 0     No current facility-administered medications on file prior to visit.       Review of Systems   12 point review of systems negative except for listed in HPI.     Objective:    Nursing note and vitals reviewed.  Vitals:    08/11/23 1139   Pulse: 84       There is no height or weight on file to calculate BMI.     No vitals or full physical exam obtained as this is a virtual  visit  Gen: no distress, comfortable          We Offer Telehealth & Same Day Appointments!   Book your Telehealth appointment with me through my nurse or   Clinic appointments on [employer]!  Nuxeue-[phone]     To Schedule appointments online, go to [employer]: [email or web address]     Answers submitted by the patient for this visit:  Review of Systems Questionnaire (Submitted on 8/11/2023)  activity change: No  unexpected weight change: No  neck pain: Yes  hearing loss: No  rhinorrhea: No  trouble swallowing: No  eye discharge: No  visual disturbance: No  chest tightness: No  wheezing: No  chest pain: No  palpitations: No  blood in stool: No  constipation: No  vomiting: No  diarrhea: No  polydipsia: No  polyuria: No  difficulty urinating: No  hematuria: No  menstrual problem: No  dysuria: No  joint swelling: Yes  arthralgias: Yes  headaches: Yes  weakness: No  confusion: No  dysphoric mood: No        The patient location is: [place]  The chief complaint leading to consultation is: add    Visit type: audiovisual    Time with patient: 10 minutes   15 minutes of total time spent on the encounter, which includes face to face time and non-face to face time preparing to see the patient (eg, review of tests), Obtaining and/or reviewing separately obtained history, Documenting clinical information in the electronic or other health record, Independently interpreting results (not separately reported) and communicating results to the patient/family/caregiver, or Care coordination (not separately reported).         Each patient to whom he or she provides medical services by telemedicine is:  (1) informed of the relationship between the physician and patient and the respective role of any other health care provider with respect to management of the patient; and (2) notified that he or she may decline to receive medical services by telemedicine and may withdraw from such care at any time.

## 2023-08-11 NOTE — TELEPHONE ENCOUNTER
[employer] appeal is approved through 8/11/24.  Pt locked into [facility].  I have reached out to her to notify her of the approval.    Meka Leo, PharmD  [facility]  [phone]

## 2023-08-15 ENCOUNTER — CLINICAL SUPPORT (OUTPATIENT)
Dept: REHABILITATION | Facility: HOSPITAL | Age: 43
End: 2023-08-15
Payer: COMMERCIAL

## 2023-08-15 DIAGNOSIS — R29.3 POSTURAL IMBALANCE: ICD-10-CM

## 2023-08-15 DIAGNOSIS — R29.898 DECREASED ROM OF NECK: Primary | ICD-10-CM

## 2023-08-15 PROCEDURE — 97112 NEUROMUSCULAR REEDUCATION: CPT | Mod: PN

## 2023-08-15 PROCEDURE — 97110 THERAPEUTIC EXERCISES: CPT | Mod: PN

## 2023-08-15 PROCEDURE — 97140 MANUAL THERAPY 1/> REGIONS: CPT | Mod: PN

## 2023-08-15 NOTE — PROGRESS NOTES
[facility]   Physical Therapy Treatment Note      Name: Sparkle Daniels [facility]  Clinic Number: [id number]    Therapy Diagnosis:   Encounter Diagnoses   Name Primary?    Decreased ROM of neck Yes    Postural imbalance      Physician: Erin Velasquez PA-C    Visit Date: 8/15/2023    Physician Orders: PT Eval and Treat  Medical Diagnosis from Referral: Ankylosing spondylitis, unspecified site of spine [M45.9]   Psoriatic arthritis [L40.50]   Cervicalgia [M54.2]  Evaluation Date: 8/2/2023  Authorization Period Expiration: 12/31/2023  Plan of Care Expiration: 9/27/2023  Progress Note Due: 9/2/2023  Visit # / Visits authorized: 2/20 auth (1/1 eval)  FOTO: 1/3     Precautions: Standard, prior brain surgery, ankylosing spondylitis     Time In: 0750 am  Time Out: 0835 am  Total Appointment Time (timed & untimed codes): 45 minutes    PTA Visit #: 0/5     Subjective     Pt reports: had significant relief after last dry needling to suboccipitals. That was the first time she has ever had relief in that area since her surgery. She began to become stiff last night.    She was compliant with home exercise program.  Response to previous treatment: relief  Functional change: improved mobility     Pain: 4/10  Location: bilateral neck shoulders    Objective      Objective Measures updated at progress report unless specified.     Treatment     Sparkle received the treatments listed below:      therapeutic exercises to develop strength, endurance, ROM, flexibility, posture, and core stabilization for (10) minutes including:  UBE forward, back 2/2 4 min total  Cervical snag rotation x10 reps B  Cervical snag extension x15 reps    neuromuscular re-education activities to improve: Balance, Coordination, Kinesthetic, Sense, Proprioception, and Posture for (25) minutes. The following activities were included:  Bilateral ER x20 reps YTB  Bent over single arm rows 15# KB x15 reps  Single arm rows x15 reps red/green tube  x15 reps  Resisted ER RUE red tube 2x5 reps  Resisted flexion RUE YTB x15 reps  Resisted extension RUE YTB x15 reps  Supine chin tucks into towel with iso hold x20 reps  Supine horizontal abduction x20 reps YTB    Sparkle received the following manual therapy techniques: applied to the: cervical spine for (00) minutes, including:    Pt received Manual Therapy techniques in the form of Dry Needling for (10) minutes. This was applied to the right upper trap. Dry needling was performed to decrease inflammation, increase circulation, decrease pain and restore homeostasis.     Electrical Stimulation was applied while needles were in situ for (5) minutes. Intensity increased to patient tolerance. No adverse reactions observed.      (4) 30 mm needles with 0.25 mm gauge were used for all insertion points. Pt in seated position.      Patient gave Verbal and Written consent to undergo dry needling. Written consent can be found in the media section in pts chart. All needles were removed and changes in signs and symptoms were assessed. No adverse reactions noted at the conclusion of the treatment.     Patient Education and Home Exercises       Education provided:   - HEP provided and reviewed  - Anatomy and Physiology pertaining to current condition  - Possible response to exercise  - Importance of PT compliance    Written Home Exercises Provided: Patient instructed to cont prior HEP. Exercises were reviewed and Sparkle was able to demonstrate them prior to the end of the session.  Sparkle demonstrated good  understanding of the education provided. See EMR under Patient Instructions for exercises provided during therapy sessions    Assessment     Pt continues to tolerate strengthening and posture exercises. Dry needling performed to right upper trap today due to increased tightness and discomfort. Continue with PT.    Sparkle Is progressing well towards her goals.   Pt prognosis is Good.     Pt will continue to benefit from  skilled outpatient physical therapy to address the deficits listed in the problem list box on initial evaluation, provide pt/family education and to maximize pt's level of independence in the home and community environment.     Pt's spiritual, cultural and educational needs considered and pt agreeable to plan of care and goals.     Anticipated barriers to physical therapy: none    Goals:  Short Term Goals: In 4 weeks   1.Pt to be educated on HEP. (met)  2.Patient to increase cervical ROM by 5 degrees to improve functional mobility. (progressing, not met)  3.Patient to increase strength by 1/2 grade to improve functional tasks. (progressing, not met)  4.Patient to have pain less than 6/10 at all times to improve quality of movement. (progressing, not met)  5.Patient to improve score on the FOTO by 5%. (progressing, not met)  6. Pt to be educated on postural and body mechanics awareness. (progressing, not met)     Long Term Goals: In 8 weeks  1. Patient to report little or minimal TTP in shoulder and neck to reduce tension and headaches. (progressing, not met)  2. Patient to demo increase in periscapular strength to 4+/5 to improve functional tasks. (progressing, not met)  3. Patient to have decreased pain to 2/10 with activity to improve quality of movement. (progressing, not met)  4. Patient to demo increase cervical ROM to WNL/WFL without pain to improve functional mobility. (progressing, not met)  5. Patient to perform daily activities including lifting, overhead activities without limitation. (progressing, not met)    Plan     Continue with PT POC to address functional limitations.     Phoenix Alejandro, PT DPT

## 2023-08-16 ENCOUNTER — PATIENT MESSAGE (OUTPATIENT)
Dept: FAMILY MEDICINE | Facility: CLINIC | Age: 43
End: 2023-08-16
Payer: COMMERCIAL

## 2023-08-17 ENCOUNTER — CLINICAL SUPPORT (OUTPATIENT)
Dept: REHABILITATION | Facility: HOSPITAL | Age: 43
End: 2023-08-17
Payer: COMMERCIAL

## 2023-08-17 ENCOUNTER — TELEPHONE (OUTPATIENT)
Dept: PHARMACY | Facility: CLINIC | Age: 43
End: 2023-08-17
Payer: COMMERCIAL

## 2023-08-17 DIAGNOSIS — R29.898 DECREASED ROM OF NECK: Primary | ICD-10-CM

## 2023-08-17 DIAGNOSIS — R29.3 POSTURAL IMBALANCE: ICD-10-CM

## 2023-08-17 PROCEDURE — 97110 THERAPEUTIC EXERCISES: CPT | Mod: PN

## 2023-08-17 PROCEDURE — 97112 NEUROMUSCULAR REEDUCATION: CPT | Mod: PN

## 2023-08-17 PROCEDURE — 97140 MANUAL THERAPY 1/> REGIONS: CPT | Mod: PN

## 2023-08-17 NOTE — TELEPHONE ENCOUNTER
Patient called to inquire if we had information about a [employer] copay card. [facility] told her that they needed that information even though her copay was $0. Informed patient that we did not have a copay card on file for her but she can sign up for one on the [employer] website.

## 2023-08-17 NOTE — PROGRESS NOTES
[facility]   Physical Therapy Treatment Note      Name: Sparkle Daniels [facility]  Clinic Number: [id number]    Therapy Diagnosis:   Encounter Diagnoses   Name Primary?    Decreased ROM of neck Yes    Postural imbalance      Physician: Erin Velasquez PA-C    Visit Date: 8/17/2023    Physician Orders: PT Eval and Treat  Medical Diagnosis from Referral: Ankylosing spondylitis, unspecified site of spine [M45.9]   Psoriatic arthritis [L40.50]   Cervicalgia [M54.2]  Evaluation Date: 8/2/2023  Authorization Period Expiration: 12/31/2023  Plan of Care Expiration: 9/27/2023  Progress Note Due: 9/2/2023  Visit # / Visits authorized: 3/20 auth (1/1 eval)  FOTO: 1/3     Precautions: Standard, prior brain surgery, ankylosing spondylitis     Time In: 0700 am  Time Out: 0740 am  Total Appointment Time (timed & untimed codes): 40 minutes    PTA Visit #: 0/5     Subjective     Pt reports: dry needling helped with right upper trap tightness and only minimal soreness.     She was compliant with home exercise program.  Response to previous treatment: relief  Functional change: improved mobility     Pain: 4/10  Location: bilateral neck shoulders    Objective      Objective Measures updated at progress report unless specified.     Treatment     Sparkle received the treatments listed below:      therapeutic exercises to develop strength, endurance, ROM, flexibility, posture, and core stabilization for (10) minutes including:  UBE forward, back 2/2 4 min total  Cervical snag rotation x10 reps B  Cervical snag extension x15 reps    neuromuscular re-education activities to improve: Balance, Coordination, Kinesthetic, Sense, Proprioception, and Posture for (15) minutes. The following activities were included:  Bilateral ER x20 reps YTB  Bent over single arm rows 15# KB x15 reps  Single arm rows x15 reps red/green tube x15 reps  Resisted ER RUE RTB x20 reps  Resisted flexion RUE YTB x15 reps  Resisted extension RUE YTB  x15 reps  Supine chin tucks into towel with iso hold x20 reps  Supine horizontal abduction x20 reps YTB    Sparkle received the following manual therapy techniques: applied to the: cervical spine for (00) minutes, including:    Pt received Manual Therapy techniques in the form of Dry Needling for (10) minutes. This was applied to the right suboccipitals. Dry needling was performed to decrease inflammation, increase circulation, decrease pain and restore homeostasis.     Electrical Stimulation was applied while needles were in situ for (5) minutes. Intensity increased to patient tolerance. No adverse reactions observed.      (2) 30 mm needles with 0.25 mm gauge were used for all insertion points. Pt in seated position.      Patient gave Verbal and Written consent to undergo dry needling. Written consent can be found in the media section in pts chart. All needles were removed and changes in signs and symptoms were assessed. No adverse reactions noted at the conclusion of the treatment.     Patient Education and Home Exercises       Education provided:   - HEP provided and reviewed  - Anatomy and Physiology pertaining to current condition  - Possible response to exercise  - Importance of PT compliance    Written Home Exercises Provided: Patient instructed to cont prior HEP. Exercises were reviewed and Sparkle was able to demonstrate them prior to the end of the session.  Sparkle demonstrated good  understanding of the education provided. See EMR under Patient Instructions for exercises provided during therapy sessions    Assessment     Positive response to prescribed exercises and dry needling to suboccipitals. Will continue to monitor symptoms and progress further as tolerated.     Sparkle Is progressing well towards her goals.   Pt prognosis is Good.     Pt will continue to benefit from skilled outpatient physical therapy to address the deficits listed in the problem list box on initial evaluation, provide pt/family  education and to maximize pt's level of independence in the home and community environment.     Pt's spiritual, cultural and educational needs considered and pt agreeable to plan of care and goals.     Anticipated barriers to physical therapy: none    Goals:  Short Term Goals: In 4 weeks   1.Pt to be educated on HEP. (met)  2.Patient to increase cervical ROM by 5 degrees to improve functional mobility. (progressing, not met)  3.Patient to increase strength by 1/2 grade to improve functional tasks. (progressing, not met)  4.Patient to have pain less than 6/10 at all times to improve quality of movement. (progressing, not met)  5.Patient to improve score on the FOTO by 5%. (progressing, not met)  6. Pt to be educated on postural and body mechanics awareness. (progressing, not met)     Long Term Goals: In 8 weeks  1. Patient to report little or minimal TTP in shoulder and neck to reduce tension and headaches. (progressing, not met)  2. Patient to demo increase in periscapular strength to 4+/5 to improve functional tasks. (progressing, not met)  3. Patient to have decreased pain to 2/10 with activity to improve quality of movement. (progressing, not met)  4. Patient to demo increase cervical ROM to WNL/WFL without pain to improve functional mobility. (progressing, not met)  5. Patient to perform daily activities including lifting, overhead activities without limitation. (progressing, not met)    Plan     Continue with PT POC to address functional limitations.     Phoenix Alejandro, PT DPT

## 2023-08-18 ENCOUNTER — OFFICE VISIT (OUTPATIENT)
Dept: FAMILY MEDICINE | Facility: CLINIC | Age: 43
End: 2023-08-18
Payer: COMMERCIAL

## 2023-08-18 VITALS
WEIGHT: 123 LBS | TEMPERATURE: 98 F | HEART RATE: 92 BPM | BODY MASS INDEX: 24.15 KG/M2 | SYSTOLIC BLOOD PRESSURE: 97 MMHG | DIASTOLIC BLOOD PRESSURE: 68 MMHG | HEIGHT: 60 IN

## 2023-08-18 DIAGNOSIS — Z00.00 ANNUAL PHYSICAL EXAM: Primary | ICD-10-CM

## 2023-08-18 DIAGNOSIS — R73.03 PREDIABETES: ICD-10-CM

## 2023-08-18 DIAGNOSIS — E88.810 METABOLIC SYNDROME: ICD-10-CM

## 2023-08-18 DIAGNOSIS — F41.9 ANXIETY AND DEPRESSION: ICD-10-CM

## 2023-08-18 DIAGNOSIS — F17.210 CIGARETTE NICOTINE DEPENDENCE WITHOUT COMPLICATION: ICD-10-CM

## 2023-08-18 DIAGNOSIS — F32.A ANXIETY AND DEPRESSION: ICD-10-CM

## 2023-08-18 DIAGNOSIS — R29.898 DECREASED ROM OF NECK: ICD-10-CM

## 2023-08-18 PROBLEM — R29.3 POSTURAL IMBALANCE: Status: RESOLVED | Noted: 2023-08-02 | Resolved: 2023-08-18

## 2023-08-18 PROBLEM — K21.9 GASTROESOPHAGEAL REFLUX DISEASE WITHOUT ESOPHAGITIS: Status: RESOLVED | Noted: 2022-03-15 | Resolved: 2023-08-18

## 2023-08-18 PROBLEM — E66.3 OVERWEIGHT (BMI 25.0-29.9): Status: RESOLVED | Noted: 2022-03-15 | Resolved: 2023-08-18

## 2023-08-18 PROCEDURE — 99406 BEHAV CHNG SMOKING 3-10 MIN: CPT | Mod: S$GLB,,, | Performed by: STUDENT IN AN ORGANIZED HEALTH CARE EDUCATION/TRAINING PROGRAM

## 2023-08-18 PROCEDURE — 99406 PR TOBACCO USE CESSATION INTERMEDIATE 3-10 MINUTES: ICD-10-PCS | Mod: S$GLB,,, | Performed by: STUDENT IN AN ORGANIZED HEALTH CARE EDUCATION/TRAINING PROGRAM

## 2023-08-18 PROCEDURE — 99999 PR PBB SHADOW E&M-EST. PATIENT-LVL IV: ICD-10-PCS | Mod: PBBFAC,,, | Performed by: STUDENT IN AN ORGANIZED HEALTH CARE EDUCATION/TRAINING PROGRAM

## 2023-08-18 PROCEDURE — 99396 PREV VISIT EST AGE 40-64: CPT | Mod: S$GLB,,, | Performed by: STUDENT IN AN ORGANIZED HEALTH CARE EDUCATION/TRAINING PROGRAM

## 2023-08-18 PROCEDURE — 99396 PR PREVENTIVE VISIT,EST,40-64: ICD-10-PCS | Mod: S$GLB,,, | Performed by: STUDENT IN AN ORGANIZED HEALTH CARE EDUCATION/TRAINING PROGRAM

## 2023-08-18 PROCEDURE — 99999 PR PBB SHADOW E&M-EST. PATIENT-LVL IV: CPT | Mod: PBBFAC,,, | Performed by: STUDENT IN AN ORGANIZED HEALTH CARE EDUCATION/TRAINING PROGRAM

## 2023-08-18 NOTE — PROGRESS NOTES
Assessment/Plan:    ANNUAL EXAM   patient here for annual exam.    - Labs ordered. Health maintenance was reviewed and ordered. Medications were reviewed and reconciled.  - All questions were answered. Advised of Wellness plan.   - Follow up in 1 year for ANNUAL WELLNESS EXAM      Problem List Items Addressed This Visit          Psychiatric    Anxiety and depression    Overview     Chronic hx; no longer on wellbutrin. Reports anxiety improved with ADD treatment   Denies SI/HI; no hallucinations     -patient was educated, advised of side effects, and all questions were answered.  Patient voiced understanding  -patient will follow up routinely and notify us if having any side effects or worsening or persistent symptoms.  ER precautions were given.              Endocrine    Prediabetes    Overview     Lab Results   Component Value Date    HGBA1C 5.0 08/02/2023   Doing well; follows with endocrine   On GLP1 RA     Diabetes Medications                    MOUNJARO 15 mg/0.5 mL PnIj Inject 15 mg into the skin once a week.            Metabolic syndrome    Overview     On glp1            Orthopedic    Decreased ROM of neck    Overview     Chronic hx; currently in PT   Follows with rheum             Other    Cigarette nicotine dependence without complication    Overview     Reports smoking about 1/2 ppd 25 years. Family smokes. Previously on chantix and quit smoking for about 1 month; however, sister was sick and restarted smoking.    Has cut down to about 5 cigarettes daily     Assistance with smoking cessation was offered, including:  [x]  Medications  [x]  Counseling  []  Printed Information on Smoking Cessation  [x]  Referral to a Smoking Cessation Program    Patient was counseled regarding smoking for 3-10 minutes.             Annual physical exam - Primary    Overview     Complete history and physical was completed today.    Complete and thorough medication reconciliation was performed. Discussed risks and benefits of  medications.    Advised patient on orders and health maintenance.    Continue current medications listed on your summary sheet.    All questions were answered.   Follow up as planned or prn                   Follow up in about 3 months (around 11/18/2023), or if symptoms worsen or fail to improve, for Virtual Visit.    Chrissie Lancaster MD  _________________________________________________________________________      Patient ID: Sparkle Jose is a 42 y.o. female.    Chief Complaint:  Encounter for annual exam    HPI: Patient here for a comprehensive physical exam.  Chronic hx anxiety. no longer on wellbutrin. Reports anxiety improved with ADD treatment     Reports feeling well. No concerns. Taking all meds as prescribed. Denies fevers, chills, chest pain, SOB, fatigue, abdominal pain, nausea, vomiting, dysuria, hematuria, hematochezia, or melena.     Health Maintenance Topics with due status: Not Due       Topic Last Completion Date    Cervical Cancer Screening 09/23/2022    Mammogram 03/28/2023    Lipid Panel 04/13/2023    Hemoglobin A1c 08/02/2023    Influenza Vaccine Not Due        ==============================================  History reviewed.   There are no preventive care reminders to display for this patient.      Past Medical History:  Past Medical History:   Diagnosis Date    Ankylosing spondylitis     Arthritis     Celiac disease     Depression     Family history of DVT     MGM DVT, mother spleenic    Family history of factor V Leiden mutation     mother and sister    GERD (gastroesophageal reflux disease)     Migraine     Osteoarthritis     Psoriasis     Psoriatic arthritis mutilans      Past Surgical History:   Procedure Laterality Date    brain decompression   2006    BRAIN SURGERY      TUBAL LIGATION       Review of patient's allergies indicates:  No Known Allergies  Current Outpatient Medications on File Prior to Visit   Medication Sig Dispense Refill    clobetasol 0.05% (TEMOVATE) 0.05 % Oint  1 application daily as needed.       dextroamphetamine-amphetamine (ADDERALL) 15 mg tablet Take 1 tablet (15 mg total) by mouth 2 (two) times daily. 60 tablet 0    [START ON 9/10/2023] dextroamphetamine-amphetamine (ADDERALL) 15 mg tablet Take 1 tablet (15 mg total) by mouth 2 (two) times daily. 60 tablet 0    [START ON 10/10/2023] dextroamphetamine-amphetamine (ADDERALL) 15 mg tablet Take 1 tablet (15 mg total) by mouth 2 (two) times daily. 60 tablet 0    eletriptan (RELPAX) 40 MG tablet Take 1 tablet (40 mg total) by mouth as needed. may repeat in 2 hours if necessary 9 tablet 0    [START ON 9/19/2023] oxyCODONE-acetaminophen (PERCOCET)  mg per tablet Take 1 tablet by mouth every 8 (eight) hours as needed for Pain. 90 tablet 0    [START ON 8/20/2023] oxyCODONE-acetaminophen (PERCOCET)  mg per tablet Take 1 tablet by mouth every 8 (eight) hours as needed for Pain. 90 tablet 0    oxyCODONE-acetaminophen (PERCOCET)  mg per tablet Take 1 tablet by mouth every 8 (eight) hours as needed for Pain. 90 tablet 0    promethazine (PHENERGAN) 25 MG tablet Take 1 tablet (25 mg total) by mouth every 6 (six) hours as needed for Nausea. 45 tablet 5    tirzepatide (MOUNJARO) 5 mg/0.5 mL PnIj Inject 5 mg into the skin every 7 days. 1 pen 11    tiZANidine (ZANAFLEX) 4 MG tablet Take 1 tablet (4 mg total) by mouth nightly as needed (muscle spasm). 90 tablet 1    traZODone (DESYREL) 100 MG tablet Take 1 tablet (100 mg total) by mouth every evening. 90 tablet 1    [DISCONTINUED] metFORMIN (GLUCOPHAGE-XR) 500 MG ER 24hr tablet Take 1 tablet (500 mg total) by mouth every evening. 90 tablet 3    ixekizumab (TALTZ AUTOINJECTOR) 80 mg/mL AtIn Inject 80 mg into the skin every 28 days. (Patient not taking: Reported on 8/18/2023) 1 mL 11    ixekizumab (TALTZ AUTOINJECTOR, 2 PACK,) 80 mg/mL AtIn Inject 80 mg into the skin every 14 (fourteen) days. For induction dose week 4-10 (Patient not taking: Reported on 8/18/2023) 2 mL 1     ixekizumab (TALTZ AUTOINJECTOR, 3 PACK,) 80 mg/mL AtIn Inject 160 mg (2 pens) into the skin at week 0 then inject 80 mg (1 pen) into the skin at week 2 (Patient not taking: Reported on 8/18/2023) 3 mL 0    [DISCONTINUED] buPROPion (WELLBUTRIN XL) 300 MG 24 hr tablet Take 1 tablet (300 mg total) by mouth once daily. (Patient not taking: Reported on 8/18/2023) 90 tablet 3    [DISCONTINUED] progesterone (PROMETRIUM) 100 MG capsule TAKE 1 CAPSULE BY MOUTH EVERY DAY IN THE EVENING (Patient not taking: Reported on 8/18/2023) 90 capsule 3     No current facility-administered medications on file prior to visit.     Social History     Socioeconomic History    Marital status:    Tobacco Use    Smoking status: Every Day     Current packs/day: 0.50     Average packs/day: 0.5 packs/day for 30.6 years (15.3 ttl pk-yrs)     Types: Cigarettes     Start date: 1/1/1993    Smokeless tobacco: Never    Tobacco comments:     5-6 CIGS DAILY last quit attempt 8/14/2019   Substance and Sexual Activity    Alcohol use: No    Drug use: Never    Sexual activity: Yes     Partners: Male     Birth control/protection: See Surgical Hx     Social Determinants of Health     Financial Resource Strain: Low Risk  (4/13/2023)    Overall Financial Resource Strain (CARDIA)     Difficulty of Paying Living Expenses: Not hard at all   Food Insecurity: No Food Insecurity (4/13/2023)    Hunger Vital Sign     Worried About Running Out of Food in the Last Year: Never true     Ran Out of Food in the Last Year: Never true   Transportation Needs: No Transportation Needs (4/13/2023)    PRAPARE - Transportation     Lack of Transportation (Medical): No     Lack of Transportation (Non-Medical): No   Physical Activity: Insufficiently Active (4/13/2023)    Exercise Vital Sign     Days of Exercise per Week: 3 days     Minutes of Exercise per Session: 30 min   Stress: Stress Concern Present (4/13/2023)    [employer] [place] [employer] - Occupational  Stress Questionnaire     Feeling of Stress : Rather much   Social Connections: Moderately Integrated (4/13/2023)    Social Connection and Isolation Panel [NHANES]     Frequency of Communication with Friends and Family: More than three times a week     Frequency of Social Gatherings with Friends and Family: Once a week     Attends Jewish Services: More than 4 times per year     Active Member of Clubs or Organizations: No     Attends Club or Organization Meetings: Never     Marital Status:    Housing Stability: Low Risk  (4/13/2023)    Housing Stability Vital Sign     Unable to Pay for Housing in the Last Year: No     Number of Places Lived in the Last Year: 1     Unstable Housing in the Last Year: No     Family History   Problem Relation Age of Onset    Hypertension Mother     Clotting disorder Mother     Depression Sister     Asthma Brother     Learning disabilities Brother     Clotting disorder Sister     Breast cancer Neg Hx     Ovarian cancer Neg Hx                Objective:    Nursing note and vitals reviewed.  Vitals:    08/18/23 0802   BP: 97/68   Pulse: 92   Temp: 98.3 °F (36.8 °C)     Body mass index is 24.02 kg/m².     Physical Exam   Constitutional: oriented to person, place, and time. well-developed and well-nourished. No distress.   HENT: WNL  Head: Normocephalic and atraumatic.   Eyes: Pupils are equal, round, and reactive to light. EOM are normal.   Neck: Normal range of motion. Neck supple.   Cardiovascular: Normal rate, regular rhythm, normal heart sounds and intact distal pulses. No murmur heard.  Pulmonary/Chest: Effort normal and breath sounds normal. No respiratory distress. no wheezes.   GI: soft, no ttp, non-distended   Musculoskeletal: Normal range of motion. no edema.   Neurological: CN II-XII intact  Skin: warm and dry. Capillary refill takes less than 2 seconds.   Psychiatric: normal mood and affect. behavior is normal.           We Offer Telehealth & Same Day Appointments!   Book  your Telehealth appointment with me through my nurse or   Clinic appointments on [facility]!  [phone]     To Schedule appointments online, go to [employer]: [email or web address]

## 2023-08-22 ENCOUNTER — CLINICAL SUPPORT (OUTPATIENT)
Dept: REHABILITATION | Facility: HOSPITAL | Age: 43
End: 2023-08-22
Payer: COMMERCIAL

## 2023-08-22 DIAGNOSIS — R29.898 DECREASED ROM OF NECK: Primary | ICD-10-CM

## 2023-08-22 PROCEDURE — 97140 MANUAL THERAPY 1/> REGIONS: CPT | Mod: PN

## 2023-08-22 PROCEDURE — 97112 NEUROMUSCULAR REEDUCATION: CPT | Mod: PN

## 2023-08-22 PROCEDURE — 97110 THERAPEUTIC EXERCISES: CPT | Mod: PN

## 2023-08-22 NOTE — PROGRESS NOTES
[facility]   Physical Therapy Treatment Note      Name: Sparkle Daniels [facility]  Clinic Number: [id number]    Therapy Diagnosis:   Encounter Diagnosis   Name Primary?    Decreased ROM of neck Yes     Physician: Erin Velasquez PA-C    Visit Date: 8/22/2023    Physician Orders: PT Eval and Treat  Medical Diagnosis from Referral: Ankylosing spondylitis, unspecified site of spine [M45.9]   Psoriatic arthritis [L40.50]   Cervicalgia [M54.2]  Evaluation Date: 8/2/2023  Authorization Period Expiration: 12/31/2023  Plan of Care Expiration: 9/27/2023  Progress Note Due: 9/2/2023  Visit # / Visits authorized: 4/20 auth (1/1 eval)  FOTO: 1/3     Precautions: Standard, prior brain surgery, ankylosing spondylitis     Time In: 0747 am  Time Out: 0835 am  Total Appointment Time (timed & untimed codes): 48 minutes    PTA Visit #: 0/5     Subjective     Pt reports: discomfort in right trap and middle of back. She can feel increased pressure going up into base of head.    She was compliant with home exercise program.  Response to previous treatment: relief  Functional change: improved mobility     Pain: 7/10  Location: bilateral neck shoulders    Objective      Objective Measures updated at progress report unless specified.     Treatment     Sparkle received the treatments listed below:      therapeutic exercises to develop strength, endurance, ROM, flexibility, posture, and core stabilization for (13) minutes including:  UBE forward, back 2/2 4 min total  Cervical snag rotation x10 reps B  Cervical snag extension x15 reps  Rhomboid stretch 10x10 sec  Paraspinals stretch, nose to chest 10x10 sec    neuromuscular re-education activities to improve: Balance, Coordination, Kinesthetic, Sense, Proprioception, and Posture for (20) minutes. The following activities were included:  Bilateral ER x20 reps YTB  Single arm rows x15 reps red/green tube x15 reps  Resisted ER RUE RTB x20 reps  Resisted flexion RUE YTB x15  reps  Resisted extension RUE YTB x15 reps  Supine horizontal abduction x20 reps YTB  Open book rotations x10 reps each    Sparkle received the following manual therapy techniques: applied to the: cervical spine for (5) minutes, including:  Percussion gun STM    Pt received Manual Therapy techniques in the form of Dry Needling for (10) minutes. This was applied to the right upper trap. Dry needling was performed to decrease inflammation, increase circulation, decrease pain and restore homeostasis.     Electrical Stimulation was applied while needles were in situ for (5) minutes. Intensity increased to patient tolerance. No adverse reactions observed.      (4) 40 mm needles with 0.30 mm gauge were used for all insertion points. Pt in seated position.      Patient gave Verbal and Written consent to undergo dry needling. Written consent can be found in the media section in pts chart. All needles were removed and changes in signs and symptoms were assessed. No adverse reactions noted at the conclusion of the treatment.     Patient Education and Home Exercises       Education provided:   - HEP provided and reviewed  - Anatomy and Physiology pertaining to current condition  - Possible response to exercise  - Importance of PT compliance    Written Home Exercises Provided: Patient instructed to cont prior HEP. Exercises were reviewed and Sparkle was able to demonstrate them prior to the end of the session.  Sparkle demonstrated good  understanding of the education provided. See EMR under Patient Instructions for exercises provided during therapy sessions    Assessment     Headache still present following PT session and dry needling but tension is reduced. Open books and rhomboid stretch added to reduce pain in mid back. She will try to complete these stretches at home on days off to see if it assists with headaches.     Sparkle Is progressing well towards her goals.   Pt prognosis is Good.     Pt will continue to benefit  from skilled outpatient physical therapy to address the deficits listed in the problem list box on initial evaluation, provide pt/family education and to maximize pt's level of independence in the home and community environment.     Pt's spiritual, cultural and educational needs considered and pt agreeable to plan of care and goals.     Anticipated barriers to physical therapy: none    Goals:  Short Term Goals: In 4 weeks   1.Pt to be educated on HEP. (met)  2.Patient to increase cervical ROM by 5 degrees to improve functional mobility. (progressing, not met)  3.Patient to increase strength by 1/2 grade to improve functional tasks. (progressing, not met)  4.Patient to have pain less than 6/10 at all times to improve quality of movement. (progressing, not met)  5.Patient to improve score on the FOTO by 5%. (progressing, not met)  6. Pt to be educated on postural and body mechanics awareness. (progressing, not met)     Long Term Goals: In 8 weeks  1. Patient to report little or minimal TTP in shoulder and neck to reduce tension and headaches. (progressing, not met)  2. Patient to demo increase in periscapular strength to 4+/5 to improve functional tasks. (progressing, not met)  3. Patient to have decreased pain to 2/10 with activity to improve quality of movement. (progressing, not met)  4. Patient to demo increase cervical ROM to WNL/WFL without pain to improve functional mobility. (progressing, not met)  5. Patient to perform daily activities including lifting, overhead activities without limitation. (progressing, not met)    Plan     Continue with PT POC to address functional limitations.     Phoenix Alejandro, PT DPT

## 2023-08-24 ENCOUNTER — CLINICAL SUPPORT (OUTPATIENT)
Dept: REHABILITATION | Facility: HOSPITAL | Age: 43
End: 2023-08-24
Payer: COMMERCIAL

## 2023-08-24 DIAGNOSIS — R29.898 DECREASED ROM OF NECK: Primary | ICD-10-CM

## 2023-08-24 PROCEDURE — 97112 NEUROMUSCULAR REEDUCATION: CPT | Mod: PN

## 2023-08-24 PROCEDURE — 97110 THERAPEUTIC EXERCISES: CPT | Mod: PN

## 2023-08-24 PROCEDURE — 97140 MANUAL THERAPY 1/> REGIONS: CPT | Mod: PN

## 2023-08-24 NOTE — PROGRESS NOTES
[facility] OUTPATIENT THERAPY AND WELLNESS   Physical Therapy Treatment Note      Name: Sparkle Daniels [facility]  Clinic Number: [id number]    Therapy Diagnosis:   Encounter Diagnosis   Name Primary?    Decreased ROM of neck Yes     Physician: Erin Velasquez PA-C    Visit Date: 8/24/2023    Physician Orders: PT Eval and Treat  Medical Diagnosis from Referral: Ankylosing spondylitis, unspecified site of spine [M45.9]   Psoriatic arthritis [L40.50]   Cervicalgia [M54.2]  Evaluation Date: 8/2/2023  Authorization Period Expiration: 12/31/2023  Plan of Care Expiration: 9/27/2023  Progress Note Due: 9/2/2023  Visit # / Visits authorized: 5/20 auth (1/1 eval)  FOTO: 1/3     Precautions: Standard, prior brain surgery, ankylosing spondylitis     Time In: 0705 am  Time Out: 0750 am  Total Appointment Time (timed & untimed codes): 45 minutes    PTA Visit #: 0/5     Subjective     Pt reports: she was very sore after last dry needling session in the right upper trap but this was followed by significant relief.     She was compliant with home exercise program.  Response to previous treatment: relief  Functional change: improved mobility     Pain: 4/10  Location: bilateral neck shoulders    Objective      Objective Measures updated at progress report unless specified.     Treatment     Sparkle received the treatments listed below:      therapeutic exercises to develop strength, endurance, ROM, flexibility, posture, and core stabilization for (15) minutes including:  UBE forward, back 2/2 4 min total  Cervical snag rotation x10 reps B  Cervical snag extension x15 reps  Rhomboid stretch 10x10 sec  Paraspinals stretch, nose to chest 10x10 sec    neuromuscular re-education activities to improve: Balance, Coordination, Kinesthetic, Sense, Proprioception, and Posture for (20) minutes. The following activities were included:  Bilateral ER x20 reps RTB  Single arm rows x15 reps red/green tube x15 reps  Resisted ER RUE RTB x20 reps  Resisted  flexion RUE RTB x15 reps  Resisted extension RUE RTB x15 reps  Supine horizontal abduction x20 reps RTB  Open book rotations x10 reps each    Pt received Manual Therapy techniques in the form of Dry Needling for (10) minutes. This was applied to the right upper trap. Dry needling was performed to decrease inflammation, increase circulation, decrease pain and restore homeostasis.     Electrical Stimulation was applied while needles were in situ for (5) minutes. Intensity increased to patient tolerance. No adverse reactions observed.      (2) 30 mm needles with 0.25 mm gauge were used for right upper trap points. Pt in seated position.      Patient gave Verbal and Written consent to undergo dry needling. Written consent can be found in the media section in pts chart. All needles were removed and changes in signs and symptoms were assessed. No adverse reactions noted at the conclusion of the treatment.     Patient Education and Home Exercises       Education provided:   - HEP provided and reviewed  - Anatomy and Physiology pertaining to current condition  - Possible response to exercise  - Importance of PT compliance    Written Home Exercises Provided: Patient instructed to cont prior HEP. Exercises were reviewed and Sparkle was able to demonstrate them prior to the end of the session.  Sparkle demonstrated good  understanding of the education provided. See EMR under Patient Instructions for exercises provided during therapy sessions    Assessment     Pt tolerates increased theraband resistance with strengthening exercises. Pt notes immediate relief after dry needling to right suboccipitals.     Sparkle Is progressing well towards her goals.   Pt prognosis is Good.     Pt will continue to benefit from skilled outpatient physical therapy to address the deficits listed in the problem list box on initial evaluation, provide pt/family education and to maximize pt's level of independence in the home and community  environment.     Pt's spiritual, cultural and educational needs considered and pt agreeable to plan of care and goals.     Anticipated barriers to physical therapy: none    Goals:  Short Term Goals: In 4 weeks   1.Pt to be educated on HEP. (met)  2.Patient to increase cervical ROM by 5 degrees to improve functional mobility. (met)  3.Patient to increase strength by 1/2 grade to improve functional tasks. (met)  4.Patient to have pain less than 6/10 at all times to improve quality of movement. (progressing, not met)  5.Patient to improve score on the FOTO by 5%. (progressing, not met)  6. Pt to be educated on postural and body mechanics awareness. (met)     Long Term Goals: In 8 weeks  1. Patient to report little or minimal TTP in shoulder and neck to reduce tension and headaches. (progressing, not met)  2. Patient to demo increase in periscapular strength to 4+/5 to improve functional tasks. (progressing, not met)  3. Patient to have decreased pain to 2/10 with activity to improve quality of movement. (progressing, not met)  4. Patient to demo increase cervical ROM to WNL/WFL without pain to improve functional mobility. (progressing, not met)  5. Patient to perform daily activities including lifting, overhead activities without limitation. (progressing, not met)    Plan     Continue with PT POC to address functional limitations.     Phoenix Alejandro, PT DPT

## 2023-08-28 ENCOUNTER — CLINICAL SUPPORT (OUTPATIENT)
Dept: REHABILITATION | Facility: HOSPITAL | Age: 43
End: 2023-08-28
Payer: COMMERCIAL

## 2023-08-28 DIAGNOSIS — R29.898 DECREASED ROM OF NECK: Primary | ICD-10-CM

## 2023-08-28 PROCEDURE — 97140 MANUAL THERAPY 1/> REGIONS: CPT | Mod: PN

## 2023-08-28 PROCEDURE — 97110 THERAPEUTIC EXERCISES: CPT | Mod: PN

## 2023-08-28 PROCEDURE — 97112 NEUROMUSCULAR REEDUCATION: CPT | Mod: PN

## 2023-08-28 NOTE — PROGRESS NOTES
[facility]   Physical Therapy Treatment Note      Name: Sparkle [facility]  Clinic Number: [id number]    Therapy Diagnosis:   Encounter Diagnosis   Name Primary?    Decreased ROM of neck Yes     Physician: Erin Velasquez PA-C    Visit Date: 8/28/2023    Physician Orders: PT Eval and Treat  Medical Diagnosis from Referral: Ankylosing spondylitis, unspecified site of spine [M45.9]   Psoriatic arthritis [L40.50]   Cervicalgia [M54.2]  Evaluation Date: 8/2/2023  Authorization Period Expiration: 12/31/2023  Plan of Care Expiration: 9/27/2023  Progress Note Due: 9/2/2023  Visit # / Visits authorized: 6/20 auth (1/1 eval)  FOTO: 1/3     Precautions: Standard, prior brain surgery, ankylosing spondylitis     Time In: 0705 am  Time Out: 0750 am  Total Appointment Time (timed & untimed codes): 45 minutes    PTA Visit #: 0/5     Subjective     Pt reports: right upper trap bothersome and aching this morning. Some days are better than others.     She was compliant with home exercise program.  Response to previous treatment: relief  Functional change: improved mobility     Pain: 4/10  Location: bilateral neck shoulders, right trap    Objective      Objective Measures updated at progress report unless specified.     Treatment     Sparkle received the treatments listed below:      therapeutic exercises to develop strength, endurance, ROM, flexibility, posture, and core stabilization for (12) minutes including:  UBE forward, back 2/2 4 min total  Cervical snag rotation x10 reps B  Cervical snag extension x15 reps  Rhomboid stretch 10x10 sec  Paraspinals stretch, nose to chest 10x10 sec    neuromuscular re-education activities to improve: Balance, Coordination, Kinesthetic, Sense, Proprioception, and Posture for (25) minutes. The following activities were included:  Bilateral ER x20 reps RTB  Single arm rows x15 reps red/green tube x15 reps  Resisted ER RUE RTB x20 reps  Resisted flexion RUE RTB x15  reps  Resisted extension RUE RTB x15 reps  Supine horizontal abduction x20 reps RTB  Open book rotations x10 reps each    Sparkle received the following manual therapy techniques: applied to the: right upper trap for (8) minutes, including:  Cupping STM, right shoulder upper trap    Patient Education and Home Exercises       Education provided:   - HEP provided and reviewed  - Anatomy and Physiology pertaining to current condition  - Possible response to exercise  - Importance of PT compliance    Written Home Exercises Provided: Patient instructed to cont prior HEP. Exercises were reviewed and Sparkle was able to demonstrate them prior to the end of the session.  Sparkle demonstrated good  understanding of the education provided. See EMR under Patient Instructions for exercises provided during therapy sessions    Assessment     Pt had no adverse responses to cupping but does report sensitivity in certain spots, likely trigger points. Pt demonstrates improved tolerance to prescribed exercises and increased neck/shoulder strength. Continue with PT POC at this time.     Sparkle Is progressing well towards her goals.   Pt prognosis is Good.     Pt will continue to benefit from skilled outpatient physical therapy to address the deficits listed in the problem list box on initial evaluation, provide pt/family education and to maximize pt's level of independence in the home and community environment.     Pt's spiritual, cultural and educational needs considered and pt agreeable to plan of care and goals.     Anticipated barriers to physical therapy: none    Goals:  Short Term Goals: In 4 weeks   1.Pt to be educated on HEP. (met)  2.Patient to increase cervical ROM by 5 degrees to improve functional mobility. (met)  3.Patient to increase strength by 1/2 grade to improve functional tasks. (met)  4.Patient to have pain less than 6/10 at all times to improve quality of movement. (progressing, not met)  5.Patient to improve  score on the FOTO by 5%. (progressing, not met)  6. Pt to be educated on postural and body mechanics awareness. (met)     Long Term Goals: In 8 weeks  1. Patient to report little or minimal TTP in shoulder and neck to reduce tension and headaches. (progressing, not met)  2. Patient to demo increase in periscapular strength to 4+/5 to improve functional tasks. (progressing, not met)  3. Patient to have decreased pain to 2/10 with activity to improve quality of movement. (progressing, not met)  4. Patient to demo increase cervical ROM to WNL/WFL without pain to improve functional mobility. (progressing, not met)  5. Patient to perform daily activities including lifting, overhead activities without limitation. (progressing, not met)    Plan     Continue with PT POC to address functional limitations.     Phoenix Alejandro, PT DPT

## 2023-08-30 ENCOUNTER — CLINICAL SUPPORT (OUTPATIENT)
Dept: REHABILITATION | Facility: HOSPITAL | Age: 43
End: 2023-08-30
Payer: COMMERCIAL

## 2023-08-30 DIAGNOSIS — R29.898 DECREASED ROM OF NECK: Primary | ICD-10-CM

## 2023-08-30 PROCEDURE — 97112 NEUROMUSCULAR REEDUCATION: CPT | Mod: PN

## 2023-08-30 PROCEDURE — 97110 THERAPEUTIC EXERCISES: CPT | Mod: PN

## 2023-08-30 PROCEDURE — 97140 MANUAL THERAPY 1/> REGIONS: CPT | Mod: PN

## 2023-08-30 NOTE — PROGRESS NOTES
[facility]   Physical Therapy Treatment Note      Name: Sparkle [facility]  Clinic Number: [id number]    Therapy Diagnosis:   Encounter Diagnosis   Name Primary?    Decreased ROM of neck Yes     Physician: Erin Velasquez PA-C    Visit Date: 8/30/2023    Physician Orders: PT Eval and Treat  Medical Diagnosis from Referral: Ankylosing spondylitis, unspecified site of spine [M45.9]   Psoriatic arthritis [L40.50]   Cervicalgia [M54.2]  Evaluation Date: 8/2/2023  Authorization Period Expiration: 12/31/2023  Plan of Care Expiration: 9/27/2023  Progress Note Due: 9/2/2023  Visit # / Visits authorized: 7/20 auth (1/1 eval)  FOTO: 2/3     Precautions: Standard, prior brain surgery, ankylosing spondylitis     Time In: 0831 am  Time Out: 0916 am  Total Appointment Time (timed & untimed codes): 45 minutes    PTA Visit #: 0/5     Subjective     Pt reports: overall better than when we started PT but discomfort in right upper trap to neck is noted this morning.     She was compliant with home exercise program.  Response to previous treatment: relief  Functional change: improved mobility     Pain: 4/10  Location: bilateral neck shoulders, right trap    Objective      Objective Measures updated at progress report unless specified.     Treatment     Sparkle received the treatments listed below:      therapeutic exercises to develop strength, endurance, ROM, flexibility, posture, and core stabilization for (8) minutes including:  UBE forward, back 2/2 4 min total  Cervical snag rotation x10 reps B  Cervical snag extension x15 reps    neuromuscular re-education activities to improve: Balance, Coordination, Kinesthetic, Sense, Proprioception, and Posture for (23) minutes. The following activities were included:  Bilateral ER x20 reps RTB  Horizontal abduction x20 reps RTB  Bent over single arm rows 15# KB x15 reps  Single arm rows x15 reps black cord  Resisted ER RUE RTB x20 reps  Resisted flexion RUE RTB  x15 reps  Resisted extension RUE RTB x15 reps  Open book rotations x10 reps each    Pt received Manual Therapy techniques in the form of Dry Needling for (10) minutes. This was applied to the right suboccipitals. Dry needling was performed to decrease inflammation, increase circulation, decrease pain and restore homeostasis.   Electrical Stimulation was applied while needles were in situ for (5) minutes. Intensity increased to patient tolerance. No adverse reactions observed.   (2) 30 mm needles with 0.25 mm gauge were used for right suboccipital points. Pt in seated position.  Patient gave Verbal and Written consent to undergo dry needling. Written consent can be found in the media section in pts chart. All needles were removed and changes in signs and symptoms were assessed. No adverse reactions noted at the conclusion of the treatment.     Sparkle received the following manual therapy techniques: applied to the: right upper trap for (4) minutes, including:  Manual STM  Suboccipital release  Manual traction     Patient Education and Home Exercises       Education provided:   - HEP provided and reviewed  - Anatomy and Physiology pertaining to current condition  - Possible response to exercise  - Importance of PT compliance    Written Home Exercises Provided: Patient instructed to cont prior HEP. Exercises were reviewed and Sparkle was able to demonstrate them prior to the end of the session.  Sparkle demonstrated good  understanding of the education provided. See EMR under Patient Instructions for exercises provided during therapy sessions    Assessment     Pt notes considerable relief after dry needling to suboccipitals followed by manual techniques including manual traction. She is also able to perform strengthening activities with less fatigue. Continue with PT POC.    Sparkle Is progressing well towards her goals.   Pt prognosis is Good.     Pt will continue to benefit from skilled outpatient physical therapy  to address the deficits listed in the problem list box on initial evaluation, provide pt/family education and to maximize pt's level of independence in the home and community environment.     Pt's spiritual, cultural and educational needs considered and pt agreeable to plan of care and goals.     Anticipated barriers to physical therapy: none    Goals:  Short Term Goals: In 4 weeks   1.Pt to be educated on HEP. (met)  2.Patient to increase cervical ROM by 5 degrees to improve functional mobility. (met)  3.Patient to increase strength by 1/2 grade to improve functional tasks. (met)  4.Patient to have pain less than 6/10 at all times to improve quality of movement. (met)  5.Patient to improve score on the FOTO by 5%. (progressing, not met)  6. Pt to be educated on postural and body mechanics awareness. (met)     Long Term Goals: In 8 weeks  1. Patient to report little or minimal TTP in shoulder and neck to reduce tension and headaches. (progressing, not met)  2. Patient to demo increase in periscapular strength to 4+/5 to improve functional tasks. (progressing, not met)  3. Patient to have decreased pain to 2/10 with activity to improve quality of movement. (progressing, not met)  4. Patient to demo increase cervical ROM to WNL/WFL without pain to improve functional mobility. (progressing, not met)  5. Patient to perform daily activities including lifting, overhead activities without limitation. (progressing, not met)    Plan     Continue with PT POC to address functional limitations.     Phoenix Alejandro, PT DPT

## 2023-09-18 ENCOUNTER — CLINICAL SUPPORT (OUTPATIENT)
Dept: REHABILITATION | Facility: HOSPITAL | Age: 43
End: 2023-09-18
Payer: COMMERCIAL

## 2023-09-18 DIAGNOSIS — R29.898 DECREASED ROM OF NECK: Primary | ICD-10-CM

## 2023-09-18 PROCEDURE — 97140 MANUAL THERAPY 1/> REGIONS: CPT | Mod: PN

## 2023-09-18 PROCEDURE — 97112 NEUROMUSCULAR REEDUCATION: CPT | Mod: PN

## 2023-09-18 PROCEDURE — 97110 THERAPEUTIC EXERCISES: CPT | Mod: PN

## 2023-09-18 NOTE — PROGRESS NOTES
[facility]   Physical Therapy Treatment Note      Name: Sparkle [facility]  Clinic Number: [id number]    Therapy Diagnosis:   Encounter Diagnosis   Name Primary?    Decreased ROM of neck Yes     Physician: Erin Velasquez PA-C    Visit Date: 9/18/2023    Physician Orders: PT Eval and Treat  Medical Diagnosis from Referral: Ankylosing spondylitis, unspecified site of spine [M45.9]   Psoriatic arthritis [L40.50]   Cervicalgia [M54.2]  Evaluation Date: 8/2/2023  Authorization Period Expiration: 12/31/2023  Plan of Care Expiration: 9/27/2023  Progress Note Due: 9/2/2023  Visit # / Visits authorized: 8/20 auth (1/1 eval)  FOTO: 2/3     Precautions: Standard, prior brain surgery, ankylosing spondylitis     Time In: 1000 am  Time Out: 1045 am  Total Appointment Time (timed & untimed codes): 45 minutes    PTA Visit #: 0/5     Subjective     Pt reports: been off from PT fr the last 2 weeks. Her cruise went well without much neck pain. Coming back from the cruise she began to feel bad and found out it was the flu. Her neck has been hurting worse since she has been sick.     She was compliant with home exercise program.  Response to previous treatment: relief  Functional change: improved mobility     Pain: 8/10  Location: bilateral neck shoulders    Objective      Objective Measures updated at progress report unless specified.     Treatment     Sparkle received the treatments listed below:      therapeutic exercises to develop strength, endurance, ROM, flexibility, posture, and core stabilization for (10) minutes including:  UBE forward, back 2/2 4 min total  Cervical snag rotation x10 reps B  Cervical snag extension x15 reps  Paraspinals stretch, nose to chest 10x10 sec    neuromuscular re-education activities to improve: Balance, Coordination, Kinesthetic, Sense, Proprioception, and Posture for (23) minutes. The following activities were included:  Bilateral ER x20 reps RTB  Horizontal abduction  x20 reps RTB  Resisted ER RUE RTB x20 reps  Resisted IR RUE RTB x20 reps  Resisted flexion RUE RTB x15 reps  Resisted extension RUE RTB x15 reps  Seated chin tucks x20 reps  Open book rotations x10 reps each    Pt received Manual Therapy techniques in the form of Dry Needling for (12) minutes. This was applied to the right suboccipitals. Dry needling was performed to decrease inflammation, increase circulation, decrease pain and restore homeostasis.   Electrical Stimulation was applied while needles were in situ for (5) minutes. Intensity increased to patient tolerance. No adverse reactions observed.   (2) 30 mm needles with 0.25 mm gauge were used for right suboccipital points. Pt in seated position.  Patient gave Verbal and Written consent to undergo dry needling. Written consent can be found in the media section in pts chart. All needles were removed and changes in signs and symptoms were assessed. No adverse reactions noted at the conclusion of the treatment.     Sparkle received the following manual therapy techniques: applied to the: right upper trap for (00) minutes, including:    Sparkle received hot pack for (5) minutes to suboccipitals.      Patient Education and Home Exercises       Education provided:   - HEP provided and reviewed  - Anatomy and Physiology pertaining to current condition  - Possible response to exercise  - Importance of PT compliance    Written Home Exercises Provided: Patient instructed to cont prior HEP. Exercises were reviewed and Sparkle was able to demonstrate them prior to the end of the session.  Sparkle demonstrated good  understanding of the education provided. See EMR under Patient Instructions for exercises provided during therapy sessions    Assessment     Pt with increased tension and sensitivity today since she has not been to PT in 2 weeks. Pt does respond well to dry needling and moist heat at end of session. Reassessment not performed today due to severity of symptoms  prior to treatment.     Sparkle Is progressing well towards her goals.   Pt prognosis is Good.     Pt will continue to benefit from skilled outpatient physical therapy to address the deficits listed in the problem list box on initial evaluation, provide pt/family education and to maximize pt's level of independence in the home and community environment.     Pt's spiritual, cultural and educational needs considered and pt agreeable to plan of care and goals.     Anticipated barriers to physical therapy: none    Goals:  Short Term Goals: In 4 weeks   1.Pt to be educated on HEP. (met)  2.Patient to increase cervical ROM by 5 degrees to improve functional mobility. (met)  3.Patient to increase strength by 1/2 grade to improve functional tasks. (met)  4.Patient to have pain less than 6/10 at all times to improve quality of movement. (met)  5.Patient to improve score on the FOTO by 5%. (progressing, not met)  6. Pt to be educated on postural and body mechanics awareness. (met)     Long Term Goals: In 8 weeks  1. Patient to report little or minimal TTP in shoulder and neck to reduce tension and headaches. (progressing, not met)  2. Patient to demo increase in periscapular strength to 4+/5 to improve functional tasks. (progressing, not met)  3. Patient to have decreased pain to 2/10 with activity to improve quality of movement. (progressing, not met)  4. Patient to demo increase cervical ROM to WNL/WFL without pain to improve functional mobility. (progressing, not met)  5. Patient to perform daily activities including lifting, overhead activities without limitation. (progressing, not met)    Plan     Continue with PT POC to address functional limitations.     Phoenix Alejandro, PT DPT

## 2023-09-19 ENCOUNTER — PATIENT MESSAGE (OUTPATIENT)
Dept: FAMILY MEDICINE | Facility: CLINIC | Age: 43
End: 2023-09-19
Payer: MEDICARE

## 2023-09-20 ENCOUNTER — CLINICAL SUPPORT (OUTPATIENT)
Dept: REHABILITATION | Facility: HOSPITAL | Age: 43
End: 2023-09-20
Payer: COMMERCIAL

## 2023-09-20 DIAGNOSIS — R29.898 DECREASED ROM OF NECK: Primary | ICD-10-CM

## 2023-09-20 PROCEDURE — 97012 MECHANICAL TRACTION THERAPY: CPT | Mod: PN

## 2023-09-20 PROCEDURE — 97140 MANUAL THERAPY 1/> REGIONS: CPT | Mod: PN

## 2023-09-20 PROCEDURE — 97110 THERAPEUTIC EXERCISES: CPT | Mod: PN

## 2023-09-20 PROCEDURE — 97112 NEUROMUSCULAR REEDUCATION: CPT | Mod: PN

## 2023-09-20 NOTE — PROGRESS NOTES
[facility]   Physical Therapy Treatment Note, Reassessment, POC Update     Name: Sparkle Jose  Clinic Number: [id number]    Therapy Diagnosis:   Encounter Diagnosis   Name Primary?    Decreased ROM of neck Yes     Physician: Erin Velasquez PA-C    Visit Date: 9/20/2023    Physician Orders: PT Eval and Treat  Medical Diagnosis from Referral: Ankylosing spondylitis, unspecified site of spine [M45.9]   Psoriatic arthritis [L40.50]   Cervicalgia [M54.2]  Evaluation Date: 8/2/2023  Authorization Period Expiration: 12/31/2023  Plan of Care Expiration: 11/1/2023 (Updated) 5 weeks  Progress Note Due: 10/20/2023  Visit # / Visits authorized: 9/20 auth (1/1 eval)  FOTO: 3/3     Precautions: Standard, prior brain surgery, ankylosing spondylitis     Time In: 1247 pm  Time Out: 0138 pm  Total Appointment Time (timed & untimed codes): 51 minutes    PTA Visit #: 0/5     Subjective     Pt reports: symptoms are much better after last session but feeling of tightness and sensitivity in neck and shoulder area remains. Overall symptoms are improved since beginning PT.    She was compliant with home exercise program.  Response to previous treatment: relief  Functional change: improved mobility     Pain: 5/10  Location: bilateral neck shoulders    Objective      Limitation/Restriction for FOTO Neck Survey     Therapist reviewed FOTO scores for Sparkle Jose on 9/20/2023.   FOTO documents entered into [employer] - see Media section.     Limitation Score: 51%         Posture: improved postural awareness, guarded when symptoms are exacerbated     Cervical Range of Motion:     Degrees Pain   Flexion 45 Stretch      Extension 35 pain      Right Rotation 65 min      Left Rotation 55 Y       Right Side Bending 40 Y    Left Side Bending 35 Y       Shoulder Range of Motion: WNL     Strength:  Cervical MMT   Flexion 5   Extension 5   Right Side Bend 5   Left Side Bend 5      Upper Extremity Strength  (R) UE    (L) UE     Shoulder flexion: 5/5 Shoulder flexion: 5/5   Shoulder Abduction: 5/5 Shoulder abduction: 5/5   Shoulder ER 4+/5 Shoulder ER 5/5   Shoulder IR 5/5 Shoulder IR 5/5   Lower Trap 4/5 Lower Trap 4+/5   Middle Trap 4/5 Middle Trap 4+/5   Rhomboids 4/5 Rhomboids 4+/5      Special Tests:  Distraction relief   Compression -   Spurlings -   Sharp-Polly -      Joint Mobility: hypomobility with pain/ROS in cervical and upper thoracic spine, assessed in PA and lateral glides. Limited thoracic extension     Palpation: moderate to severe TTP, cervical paraspinals, suboccipitals, bilateral trapezius mm, levator scap, scapular mm, cervical and upper thoracic spinous processes       Sensation: WNL     Flexibility: decreased upper trap     Objective Measures updated at progress report unless specified.     Treatment     Sparkle received the treatments listed below:      therapeutic exercises to develop strength, endurance, ROM, flexibility, posture, and core stabilization for (12) minutes including:  UBE forward, back 2/2 4 min total  Cervical snag rotation x10 reps B  Cervical snag extension x15 reps  Paraspinals stretch, towel prop 2 min    neuromuscular re-education activities to improve: Balance, Coordination, Kinesthetic, Sense, Proprioception, and Posture for (19) minutes. The following activities were included:  (Remaining time used for reassessment, HEP/education, posture)  Prone W, bilateral x10 reps  Prone Y, RUE x10 reps  Prone row, RUE 2x10 reps 4#  Prone extension, RUE 2x10 reps 4#    Sparkle received the following manual therapy techniques: applied to the: right upper trap for (10) minutes, including:  Manual STM  Suboccipital release  Manual traction     Sparkle received the following supervised modalities after being cleared for contradictions: Mechanical Traction:  Sparkle received intermittent mechanical traction to the cervical spine at a force of 16 pounds for a total of 10 minutes. Hold time of 30 sec  and rest time for 10 sec. Patient tolerated treatment well without any adverse effects.    Patient Education and Home Exercises       Education provided:   - HEP provided and reviewed  - Anatomy and Physiology pertaining to current condition  - Possible response to exercise  - Importance of PT compliance    Written Home Exercises Provided: Patient instructed to cont prior HEP. Exercises were reviewed and Sparkle was able to demonstrate them prior to the end of the session.  Sparkle demonstrated good  understanding of the education provided. See EMR under Patient Instructions for exercises provided during therapy sessions    Assessment     Upon assessment, pt demonstrates improved RUE and right scapula strength and increased cervical ROM since beginning PT. She is still limited by muscle tightness, tension in neck and head, and hypersensitivity. She has responded well to techniques such as traction and dry needling. She would benefit from continuance at this time.     Sparkle Is progressing well towards her goals.   Pt prognosis is Good.     Pt will continue to benefit from skilled outpatient physical therapy to address the deficits listed in the problem list box on initial evaluation, provide pt/family education and to maximize pt's level of independence in the home and community environment.     Pt's spiritual, cultural and educational needs considered and pt agreeable to plan of care and goals.     Anticipated barriers to physical therapy: none    Goals:  Short Term Goals: In 4 weeks   1.Pt to be educated on HEP. (met)  2.Patient to increase cervical ROM by 5 degrees to improve functional mobility. (met)  3.Patient to increase strength by 1/2 grade to improve functional tasks. (met)  4.Patient to have pain less than 6/10 at all times to improve quality of movement. (met)  5.Patient to improve score on the FOTO by 5%. (met)  6. Pt to be educated on postural and body mechanics awareness. (met)     Long Term Goals:  In 8 weeks  1. Patient to report little or minimal TTP in shoulder and neck to reduce tension and headaches. (progressing, not met)  2. Patient to demo increase in periscapular strength to 4+/5 to improve functional tasks. (progressing, not met)  3. Patient to have decreased pain to 2/10 with activity to improve quality of movement. (progressing, not met)  4. Patient to demo increase cervical ROM to WNL/WFL without pain to improve functional mobility. (progressing, not met)  5. Patient to perform daily activities including lifting, overhead activities without limitation. (progressing, not met)    Plan     Continue with updated PT POC to address functional limitations.     Plan of Care Expiration: 11/1/2023 (Updated) 5 weeks    Phoenix Alejandro, PT DPT

## 2023-09-20 NOTE — PLAN OF CARE
[facility]   Physical Therapy Treatment Note, Reassessment, POC Update     Name: Sparkle Jose  Clinic Number: [id number]    Therapy Diagnosis:   Encounter Diagnosis   Name Primary?    Decreased ROM of neck Yes     Physician: Erin Velasquez PA-C    Visit Date: 9/20/2023    Physician Orders: PT Eval and Treat  Medical Diagnosis from Referral: Ankylosing spondylitis, unspecified site of spine [M45.9]   Psoriatic arthritis [L40.50]   Cervicalgia [M54.2]  Evaluation Date: 8/2/2023  Authorization Period Expiration: 12/31/2023  Plan of Care Expiration: 11/1/2023 (Updated) 5 weeks  Progress Note Due: 10/20/2023  Visit # / Visits authorized: 9/20 auth (1/1 eval)  FOTO: 3/3     Precautions: Standard, prior brain surgery, ankylosing spondylitis     Time In: 1247 pm  Time Out: 0138 pm  Total Appointment Time (timed & untimed codes): 51 minutes    PTA Visit #: 0/5     Subjective     Pt reports: symptoms are much better after last session but feeling of tightness and sensitivity in neck and shoulder area remains. Overall symptoms are improved since beginning PT.    She was compliant with home exercise program.  Response to previous treatment: relief  Functional change: improved mobility     Pain: 5/10  Location: bilateral neck shoulders    Objective      Limitation/Restriction for FOTO Neck Survey     Therapist reviewed FOTO scores for Sparkle Jose on 9/20/2023.   FOTO documents entered into [employer] - see Media section.     Limitation Score: 51%         Posture: improved postural awareness, guarded when symptoms are exacerbated     Cervical Range of Motion:     Degrees Pain   Flexion 45 Stretch      Extension 35 pain      Right Rotation 65 min      Left Rotation 55 Y       Right Side Bending 40 Y    Left Side Bending 35 Y       Shoulder Range of Motion: WNL     Strength:  Cervical MMT   Flexion 5   Extension 5   Right Side Bend 5   Left Side Bend 5      Upper Extremity Strength  (R) UE    (L) UE     Shoulder flexion: 5/5 Shoulder flexion: 5/5   Shoulder Abduction: 5/5 Shoulder abduction: 5/5   Shoulder ER 4+/5 Shoulder ER 5/5   Shoulder IR 5/5 Shoulder IR 5/5   Lower Trap 4/5 Lower Trap 4+/5   Middle Trap 4/5 Middle Trap 4+/5   Rhomboids 4/5 Rhomboids 4+/5      Special Tests:  Distraction relief   Compression -   Spurlings -   Sharp-Polly -      Joint Mobility: hypomobility with pain/ROS in cervical and upper thoracic spine, assessed in PA and lateral glides. Limited thoracic extension     Palpation: moderate to severe TTP, cervical paraspinals, suboccipitals, bilateral trapezius mm, levator scap, scapular mm, cervical and upper thoracic spinous processes       Sensation: WNL     Flexibility: decreased upper trap     Objective Measures updated at progress report unless specified.     Treatment     Sparkle received the treatments listed below:      therapeutic exercises to develop strength, endurance, ROM, flexibility, posture, and core stabilization for (12) minutes including:  UBE forward, back 2/2 4 min total  Cervical snag rotation x10 reps B  Cervical snag extension x15 reps  Paraspinals stretch, towel prop 2 min    neuromuscular re-education activities to improve: Balance, Coordination, Kinesthetic, Sense, Proprioception, and Posture for (19) minutes. The following activities were included:  (Remaining time used for reassessment, HEP/education, posture)  Prone W, bilateral x10 reps  Prone Y, RUE x10 reps  Prone row, RUE 2x10 reps 4#  Prone extension, RUE 2x10 reps 4#    Sparkle received the following manual therapy techniques: applied to the: right upper trap for (10) minutes, including:  Manual STM  Suboccipital release  Manual traction     Sparkle received the following supervised modalities after being cleared for contradictions: Mechanical Traction:  Sparkle received intermittent mechanical traction to the cervical spine at a force of 16 pounds for a total of 10 minutes. Hold time of 30 sec  and rest time for 10 sec. Patient tolerated treatment well without any adverse effects.    Patient Education and Home Exercises       Education provided:   - HEP provided and reviewed  - Anatomy and Physiology pertaining to current condition  - Possible response to exercise  - Importance of PT compliance    Written Home Exercises Provided: Patient instructed to cont prior HEP. Exercises were reviewed and Sparkle was able to demonstrate them prior to the end of the session.  Sparkle demonstrated good  understanding of the education provided. See EMR under Patient Instructions for exercises provided during therapy sessions    Assessment     Upon assessment, pt demonstrates improved RUE and right scapula strength and increased cervical ROM since beginning PT. She is still limited by muscle tightness, tension in neck and head, and hypersensitivity. She has responded well to techniques such as traction and dry needling. She would benefit from continuance at this time.     Sparkle Is progressing well towards her goals.   Pt prognosis is Good.     Pt will continue to benefit from skilled outpatient physical therapy to address the deficits listed in the problem list box on initial evaluation, provide pt/family education and to maximize pt's level of independence in the home and community environment.     Pt's spiritual, cultural and educational needs considered and pt agreeable to plan of care and goals.     Anticipated barriers to physical therapy: none    Goals:  Short Term Goals: In 4 weeks   1.Pt to be educated on HEP. (met)  2.Patient to increase cervical ROM by 5 degrees to improve functional mobility. (met)  3.Patient to increase strength by 1/2 grade to improve functional tasks. (met)  4.Patient to have pain less than 6/10 at all times to improve quality of movement. (met)  5.Patient to improve score on the FOTO by 5%. (met)  6. Pt to be educated on postural and body mechanics awareness. (met)     Long Term Goals:  In 8 weeks  1. Patient to report little or minimal TTP in shoulder and neck to reduce tension and headaches. (progressing, not met)  2. Patient to demo increase in periscapular strength to 4+/5 to improve functional tasks. (progressing, not met)  3. Patient to have decreased pain to 2/10 with activity to improve quality of movement. (progressing, not met)  4. Patient to demo increase cervical ROM to WNL/WFL without pain to improve functional mobility. (progressing, not met)  5. Patient to perform daily activities including lifting, overhead activities without limitation. (progressing, not met)    Plan     Continue with updated PT POC to address functional limitations.     Plan of Care Expiration: 11/1/2023 (Updated) 5 weeks    Phoenix Alejandro, PT DPT

## 2023-09-21 ENCOUNTER — PATIENT MESSAGE (OUTPATIENT)
Dept: RHEUMATOLOGY | Facility: CLINIC | Age: 43
End: 2023-09-21
Payer: MEDICARE

## 2023-09-25 ENCOUNTER — CLINICAL SUPPORT (OUTPATIENT)
Dept: REHABILITATION | Facility: HOSPITAL | Age: 43
End: 2023-09-25
Payer: COMMERCIAL

## 2023-09-25 DIAGNOSIS — R29.898 DECREASED ROM OF NECK: Primary | ICD-10-CM

## 2023-09-25 DIAGNOSIS — L40.9 PSORIASIS: Primary | ICD-10-CM

## 2023-09-25 PROCEDURE — 97112 NEUROMUSCULAR REEDUCATION: CPT | Mod: PN

## 2023-09-25 PROCEDURE — 97012 MECHANICAL TRACTION THERAPY: CPT | Mod: PN

## 2023-09-25 PROCEDURE — 97140 MANUAL THERAPY 1/> REGIONS: CPT | Mod: PN

## 2023-09-25 PROCEDURE — 97110 THERAPEUTIC EXERCISES: CPT | Mod: PN

## 2023-09-25 RX ORDER — CLOBETASOL PROPIONATE 0.5 MG/G
OINTMENT TOPICAL 2 TIMES DAILY
Qty: 30 G | Refills: 1 | Status: SHIPPED | OUTPATIENT
Start: 2023-09-25

## 2023-09-25 NOTE — PROGRESS NOTES
[facility]   Physical Therapy Treatment Note, Reassessment, POC Update     Name: Sparkle Jose  Clinic Number: [id number]    Therapy Diagnosis:   Encounter Diagnosis   Name Primary?    Decreased ROM of neck Yes     Physician: Erin Velasquez PA-C    Visit Date: 9/25/2023    Physician Orders: PT Eval and Treat  Medical Diagnosis from Referral: Ankylosing spondylitis, unspecified site of spine [M45.9]   Psoriatic arthritis [L40.50]   Cervicalgia [M54.2]  Evaluation Date: 8/2/2023  Authorization Period Expiration: 12/31/2023  Plan of Care Expiration: 11/1/2023 (Updated) 5 weeks  Progress Note Due: 10/20/2023  Visit # / Visits authorized: 10/20 auth (1/1 eval)  FOTO: 3/3     Precautions: Standard, prior brain surgery, ankylosing spondylitis     Time In: 1005 am  Time Out: 1055 am  Total Appointment Time (timed & untimed codes): 50 minutes    PTA Visit #: 0/5     Subjective     Pt reports: symptoms were much improved after last session when we added mechanical cervical traction. There was a noticeable decrease in hypersensitivity to right side of neck.    She was compliant with home exercise program.  Response to previous treatment: relief  Functional change: improved mobility     Pain: 4/10  Location: bilateral neck shoulders    Objective      Objective Measures updated at progress report unless specified.     Treatment     Sparkle received the treatments listed below:      therapeutic exercises to develop strength, endurance, ROM, flexibility, posture, and core stabilization for (14) minutes including:  UBE forward, back 2/2 4 min total  Cervical snag rotation x10 reps B  Cervical snag extension x15 reps  Paraspinals stretch, towel prop 2 min  Seated thoracic extension 2 min    neuromuscular re-education activities to improve: Balance, Coordination, Kinesthetic, Sense, Proprioception, and Posture for (16) minutes. The following activities were included:  Prone W, bilateral 2x10  reps  Prone Y, RUE 2x10 reps  Prone row, RUE 2x10 reps 4#  Prone extension, RUE 2x10 reps 4#    Pt received Manual Therapy techniques in the form of Dry Needling for (10) minutes. This was applied to the right suboccipitals. Dry needling was performed to decrease inflammation, increase circulation, decrease pain and restore homeostasis.   Electrical Stimulation was applied while needles were in situ for (5) minutes. Intensity increased to patient tolerance. No adverse reactions observed.   (2) 30 mm needles with 0.25 mm gauge were used for right suboccipital points. Pt in seated position.  Patient gave Verbal and Written consent to undergo dry needling. Written consent can be found in the media section in pts chart. All needles were removed and changes in signs and symptoms were assessed. No adverse reactions noted at the conclusion of the treatment.     Sparkle received the following supervised modalities after being cleared for contradictions: Mechanical Traction:  Sparkle received intermittent mechanical traction to the cervical spine at a force of 16 pounds for a total of 10 minutes. Hold time of 30 sec and rest time for 10 sec. Patient tolerated treatment well without any adverse effects.    Patient Education and Home Exercises       Education provided:   - HEP provided and reviewed  - Anatomy and Physiology pertaining to current condition  - Possible response to exercise  - Importance of PT compliance    Written Home Exercises Provided: Patient instructed to cont prior HEP. Exercises were reviewed and Sparkle was able to demonstrate them prior to the end of the session.  Sparkle demonstrated good  understanding of the education provided. See EMR under Patient Instructions for exercises provided during therapy sessions    Assessment     Positive response following dry needling and cervical traction. Plan to progress strengthening activities at next visit.     Sparkle Is progressing well towards her goals.   Pt  prognosis is Good.     Pt will continue to benefit from skilled outpatient physical therapy to address the deficits listed in the problem list box on initial evaluation, provide pt/family education and to maximize pt's level of independence in the home and community environment.     Pt's spiritual, cultural and educational needs considered and pt agreeable to plan of care and goals.     Anticipated barriers to physical therapy: none    Goals:  Short Term Goals: In 4 weeks   1.Pt to be educated on HEP. (met)  2.Patient to increase cervical ROM by 5 degrees to improve functional mobility. (met)  3.Patient to increase strength by 1/2 grade to improve functional tasks. (met)  4.Patient to have pain less than 6/10 at all times to improve quality of movement. (met)  5.Patient to improve score on the FOTO by 5%. (met)  6. Pt to be educated on postural and body mechanics awareness. (met)     Long Term Goals: In 8 weeks  1. Patient to report little or minimal TTP in shoulder and neck to reduce tension and headaches. (progressing, not met)  2. Patient to demo increase in periscapular strength to 4+/5 to improve functional tasks. (progressing, not met)  3. Patient to have decreased pain to 2/10 with activity to improve quality of movement. (progressing, not met)  4. Patient to demo increase cervical ROM to WNL/WFL without pain to improve functional mobility. (progressing, not met)  5. Patient to perform daily activities including lifting, overhead activities without limitation. (progressing, not met)    Plan     Continue with updated PT POC to address functional limitations.     Plan of Care Expiration: 11/1/2023 (Updated) 5 weeks    Phoenix Alejandro, PT DPT

## 2023-09-27 ENCOUNTER — CLINICAL SUPPORT (OUTPATIENT)
Dept: REHABILITATION | Facility: HOSPITAL | Age: 43
End: 2023-09-27
Payer: COMMERCIAL

## 2023-09-27 DIAGNOSIS — R29.898 DECREASED ROM OF NECK: Primary | ICD-10-CM

## 2023-09-27 PROCEDURE — 97112 NEUROMUSCULAR REEDUCATION: CPT | Mod: PN

## 2023-09-27 PROCEDURE — 97140 MANUAL THERAPY 1/> REGIONS: CPT | Mod: PN

## 2023-09-27 PROCEDURE — 97110 THERAPEUTIC EXERCISES: CPT | Mod: PN

## 2023-09-27 NOTE — PROGRESS NOTES
[facility]   Physical Therapy Treatment Note, Reassessment, POC Update     [facility]  Clinic Number: [id number]    Therapy Diagnosis:   Encounter Diagnosis   Name Primary?    Decreased ROM of neck Yes     Physician: Erin Velasquez PA-C    Visit Date: 9/27/2023    Physician Orders: PT Eval and Treat  Medical Diagnosis from Referral: Ankylosing spondylitis, unspecified site of spine [M45.9]   Psoriatic arthritis [L40.50]   Cervicalgia [M54.2]  Evaluation Date: 8/2/2023  Authorization Period Expiration: 12/31/2023  Plan of Care Expiration: 11/1/2023 (Updated) 5 weeks  Progress Note Due: 10/20/2023  Visit # / Visits authorized: 10/20 auth (1/1 eval)  FOTO: 3/3     Precautions: Standard, prior brain surgery, ankylosing spondylitis     Time In: 1000 am  Time Out: 1043 am  Total Appointment Time (timed & untimed codes): 43 minutes    PTA Visit #: 0/5     Subjective     Pt reports: mild tightness in bilateral upper traps but the head and neck area has been much better in recent days.     She was compliant with home exercise program.  Response to previous treatment: relief  Functional change: improved mobility     Pain: 4/10  Location: bilateral neck shoulders    Objective      Objective Measures updated at progress report unless specified.     Treatment     Sparkle received the treatments listed below:      therapeutic exercises to develop strength, endurance, ROM, flexibility, posture, and core stabilization for (10) minutes including:  UBE forward, back 2/2 4 min total  Cervical snag rotation x10 reps B  Cervical snag extension x15 reps    neuromuscular re-education activities to improve: Balance, Coordination, Kinesthetic, Sense, Proprioception, and Posture for (23) minutes. The following activities were included:  Resisted cable rows x20 reps 13#  Prone W, B 2x10 reps  Prone Y, B 2x10 reps  Prone I, B 2x10 reps  Open book rotations x10 reps each  Foam roller extensions x20  reps    Pt received Manual Therapy techniques in the form of Dry Needling for (10) minutes. This was applied to the bilateral upper traps. Dry needling was performed to decrease inflammation, increase circulation, decrease pain and restore homeostasis.   Electrical Stimulation was applied while needles were in situ for (5) minutes. Intensity increased to patient tolerance. No adverse reactions observed.   (8) 30 mm needles with 0.30 mm gauge were used for bilateral upper trap points. Pt in seated position.  Patient gave Verbal and Written consent to undergo dry needling. Written consent can be found in the media section in pts chart. All needles were removed and changes in signs and symptoms were assessed. No adverse reactions noted at the conclusion of the treatment.       Patient Education and Home Exercises       Education provided:   - HEP provided and reviewed  - Anatomy and Physiology pertaining to current condition  - Possible response to exercise  - Importance of PT compliance    Written Home Exercises Provided: Patient instructed to cont prior HEP. Exercises were reviewed and Sparkle was able to demonstrate them prior to the end of the session.  Sparkle demonstrated good  understanding of the education provided. See EMR under Patient Instructions for exercises provided during therapy sessions    Assessment     Pt notes immediate tension relief after treatment. She was also able to tolerate additional posture exercises without pain.    Sparkle Is progressing well towards her goals.   Pt prognosis is Good.     Pt will continue to benefit from skilled outpatient physical therapy to address the deficits listed in the problem list box on initial evaluation, provide pt/family education and to maximize pt's level of independence in the home and community environment.     Pt's spiritual, cultural and educational needs considered and pt agreeable to plan of care and goals.     Anticipated barriers to physical  therapy: none    Goals:  Short Term Goals: In 4 weeks   1.Pt to be educated on HEP. (met)  2.Patient to increase cervical ROM by 5 degrees to improve functional mobility. (met)  3.Patient to increase strength by 1/2 grade to improve functional tasks. (met)  4.Patient to have pain less than 6/10 at all times to improve quality of movement. (met)  5.Patient to improve score on the FOTO by 5%. (met)  6. Pt to be educated on postural and body mechanics awareness. (met)     Long Term Goals: In 8 weeks  1. Patient to report little or minimal TTP in shoulder and neck to reduce tension and headaches. (progressing, not met)  2. Patient to demo increase in periscapular strength to 4+/5 to improve functional tasks. (progressing, not met)  3. Patient to have decreased pain to 2/10 with activity to improve quality of movement. (progressing, not met)  4. Patient to demo increase cervical ROM to WNL/WFL without pain to improve functional mobility. (progressing, not met)  5. Patient to perform daily activities including lifting, overhead activities without limitation. (progressing, not met)    Plan     Continue with updated PT POC to address functional limitations.     Plan of Care Expiration: 11/1/2023 (Updated) 5 weeks    Phoenix Alejandro, PT DPT

## 2023-10-02 ENCOUNTER — PATIENT MESSAGE (OUTPATIENT)
Dept: FAMILY MEDICINE | Facility: CLINIC | Age: 43
End: 2023-10-02
Payer: MEDICARE

## 2023-10-02 DIAGNOSIS — R73.03 PREDIABETES: ICD-10-CM

## 2023-10-02 DIAGNOSIS — E78.2 MIXED HYPERLIPIDEMIA: ICD-10-CM

## 2023-10-02 DIAGNOSIS — E88.810 METABOLIC SYNDROME: ICD-10-CM

## 2023-10-02 RX ORDER — TIRZEPATIDE 15 MG/.5ML
15 INJECTION, SOLUTION SUBCUTANEOUS WEEKLY
Qty: 12 PEN | Refills: 2 | Status: SHIPPED | OUTPATIENT
Start: 2023-10-02 | End: 2023-12-31

## 2023-10-02 NOTE — TELEPHONE ENCOUNTER
No orders of the defined types were placed in this encounter.      Medications Ordered This Encounter   Medications    MOUNJARO 15 mg/0.5 mL PnIj     Sig: Inject 15 mg into the skin once a week.     Dispense:  12 pen      Refill:  2

## 2023-10-03 ENCOUNTER — CLINICAL SUPPORT (OUTPATIENT)
Dept: REHABILITATION | Facility: HOSPITAL | Age: 43
End: 2023-10-03
Payer: COMMERCIAL

## 2023-10-03 DIAGNOSIS — R29.898 DECREASED ROM OF NECK: Primary | ICD-10-CM

## 2023-10-03 PROCEDURE — 97112 NEUROMUSCULAR REEDUCATION: CPT | Mod: PN

## 2023-10-03 PROCEDURE — 97012 MECHANICAL TRACTION THERAPY: CPT | Mod: PN

## 2023-10-03 PROCEDURE — 97110 THERAPEUTIC EXERCISES: CPT | Mod: PN

## 2023-10-03 PROCEDURE — 97140 MANUAL THERAPY 1/> REGIONS: CPT | Mod: PN

## 2023-10-03 NOTE — PROGRESS NOTES
[facility]   Physical Therapy Treatment Note, Reassessment, POC Update     Name: Sparkle Jose  Clinic Number: [id number]    Therapy Diagnosis:   Encounter Diagnosis   Name Primary?    Decreased ROM of neck Yes     Physician: Erin Velasquez PA-C    Visit Date: 10/3/2023    Physician Orders: PT Eval and Treat  Medical Diagnosis from Referral: Ankylosing spondylitis, unspecified site of spine [M45.9]   Psoriatic arthritis [L40.50]   Cervicalgia [M54.2]  Evaluation Date: 8/2/2023  Authorization Period Expiration: 12/31/2023  Plan of Care Expiration: 11/1/2023 (Updated) 5 weeks  Progress Note Due: 10/20/2023  Visit # / Visits authorized: 12/20 auth (1/1 eval)  FOTO: 3/3     Precautions: Standard, prior brain surgery, ankylosing spondylitis     Time In: 1000 am  Time Out: 1043 am  Total Appointment Time (timed & untimed codes): 43 minutes    PTA Visit #: 0/5     Subjective     Pt reports: more discomfort in mid back this morning. Neck tension is only minimal.     She was compliant with home exercise program.  Response to previous treatment: relief  Functional change: improved mobility     Pain: 3/10 neck, 5/10 mid back  Location: bilateral neck shoulders    Objective      Objective Measures updated at progress report unless specified.     Treatment     Sparkle received the treatments listed below:      therapeutic exercises to develop strength, endurance, ROM, flexibility, posture, and core stabilization for (15) minutes including:  UBE forward, back 2/2 4 min total  Cervical snag rotation x10 reps B  Cervical snag extension x15 reps  Levator stretch, arm behind back x10 reps each    neuromuscular re-education activities to improve: Balance, Coordination, Kinesthetic, Sense, Proprioception, and Posture for (15) minutes. The following activities were included:  Open book rotations x15 reps each  Foam roller extensions x20 reps  Prone scapular retractions 2x10 reps  Swimmers 2x10  sharon Villagran received the following manual therapy techniques: applied to the: thoracic spine for (8) minutes, including:  3x grade V thrust manipulation, pistol  in supine    Sparkle received the following supervised modalities after being cleared for contradictions: Mechanical Traction:  Sparkle received intermittent mechanical traction to the cervical spine at a force of 16 pounds for a total of 10 minutes. Hold time of 30 sec and rest time for 10 sec. Patient tolerated treatment well without any adverse effects.    Patient Education and Home Exercises       Education provided:   - HEP provided and reviewed  - Anatomy and Physiology pertaining to current condition  - Possible response to exercise  - Importance of PT compliance    Written Home Exercises Provided: Patient instructed to cont prior HEP. Exercises were reviewed and Sparkle was able to demonstrate them prior to the end of the session.  Sparkle demonstrated good  understanding of the education provided. See EMR under Patient Instructions for exercises provided during therapy sessions    Assessment     Pt notes relief in mid back pain following PT session. Additional scapula strengthening activities were performed in prone position.    Sparkle Is progressing well towards her goals.   Pt prognosis is Good.     Pt will continue to benefit from skilled outpatient physical therapy to address the deficits listed in the problem list box on initial evaluation, provide pt/family education and to maximize pt's level of independence in the home and community environment.     Pt's spiritual, cultural and educational needs considered and pt agreeable to plan of care and goals.     Anticipated barriers to physical therapy: none    Goals:  Short Term Goals: In 4 weeks   1.Pt to be educated on HEP. (met)  2.Patient to increase cervical ROM by 5 degrees to improve functional mobility. (met)  3.Patient to increase strength by 1/2 grade to improve functional  tasks. (met)  4.Patient to have pain less than 6/10 at all times to improve quality of movement. (met)  5.Patient to improve score on the FOTO by 5%. (met)  6. Pt to be educated on postural and body mechanics awareness. (met)     Long Term Goals: In 8 weeks  1. Patient to report little or minimal TTP in shoulder and neck to reduce tension and headaches. (progressing, not met)  2. Patient to demo increase in periscapular strength to 4+/5 to improve functional tasks. (progressing, not met)  3. Patient to have decreased pain to 2/10 with activity to improve quality of movement. (progressing, not met)  4. Patient to demo increase cervical ROM to WNL/WFL without pain to improve functional mobility. (progressing, not met)  5. Patient to perform daily activities including lifting, overhead activities without limitation. (progressing, not met)    Plan     Continue with updated PT POC to address functional limitations.     Plan of Care Expiration: 11/1/2023 (Updated) 5 weeks    Phoenix Alejandro, PT DPT

## 2023-10-05 ENCOUNTER — CLINICAL SUPPORT (OUTPATIENT)
Dept: REHABILITATION | Facility: HOSPITAL | Age: 43
End: 2023-10-05
Payer: COMMERCIAL

## 2023-10-05 DIAGNOSIS — R29.898 DECREASED ROM OF NECK: Primary | ICD-10-CM

## 2023-10-05 PROCEDURE — 97110 THERAPEUTIC EXERCISES: CPT | Mod: PN

## 2023-10-05 PROCEDURE — 97012 MECHANICAL TRACTION THERAPY: CPT | Mod: PN

## 2023-10-05 PROCEDURE — 97112 NEUROMUSCULAR REEDUCATION: CPT | Mod: PN

## 2023-10-05 PROCEDURE — 97140 MANUAL THERAPY 1/> REGIONS: CPT | Mod: PN,59

## 2023-10-05 NOTE — PROGRESS NOTES
[facility]   Physical Therapy Treatment Note     Name: Sparkle Daniels [facility]  Clinic Number: [id number]    Therapy Diagnosis:   Encounter Diagnosis   Name Primary?    Decreased ROM of neck Yes     Physician: Erin Velasquez PA-C    Visit Date: 10/5/2023    Physician Orders: PT Eval and Treat  Medical Diagnosis from Referral: Ankylosing spondylitis, unspecified site of spine [M45.9]   Psoriatic arthritis [L40.50]   Cervicalgia [M54.2]  Evaluation Date: 8/2/2023  Authorization Period Expiration: 12/31/2023  Plan of Care Expiration: 11/1/2023 (Updated) 5 weeks  Progress Note Due: 10/20/2023  Visit # / Visits authorized: 13/20 auth (1/1 eval)  FOTO: 3/3     Precautions: Standard, prior brain surgery, ankylosing spondylitis     Time In: 0830 am  Time Out: 0915 am  Total Appointment Time (timed & untimed codes): 43 minutes    PTA Visit #: 0/5     Subjective     Pt reports: mid back was improved after last session but she still has tightness and discomfort on the left side near the border of scapula.     She was compliant with home exercise program.  Response to previous treatment: relief  Functional change: improved mobility     Pain: 3/10 neck, 5/10 mid back  Location: bilateral neck shoulders    Objective      Objective Measures updated at progress report unless specified.     Treatment     Sparkle received the treatments listed below:      therapeutic exercises to develop strength, endurance, ROM, flexibility, posture, and core stabilization for (15) minutes including:  UBE forward, back 2/2 4 min total  Cervical snag rotation x10 reps B  Cervical snag extension x15 reps  Levator stretch, arm behind back x10 reps each  Self STM using ball against wall, L shoulder blade    neuromuscular re-education activities to improve: Balance, Coordination, Kinesthetic, Sense, Proprioception, and Posture for (10) minutes. The following activities were included:  Prone scapular retractions with iso hold 2x10  reps   Swimmers 2x10 reps        Sparkle received the following manual therapy techniques: applied to the: thoracic spine for (10) minutes, including:  3x grade V thrust manipulation, pistol  in supine  L Scapula PROM, mobilizations  Percussion gun STM, seated thoracic paraspinals, periscapular    Sparkle received the following supervised modalities after being cleared for contradictions: Mechanical Traction: Sparkle received intermittent mechanical traction to the cervical spine at a force of 16 pounds for a total of (10) minutes. Hold time of 30 sec and rest time for 10 sec. Patient tolerated treatment well without any adverse effects.    Patient Education and Home Exercises       Education provided:   - HEP provided and reviewed  - Anatomy and Physiology pertaining to current condition  - Possible response to exercise  - Importance of PT compliance    Written Home Exercises Provided: Patient instructed to cont prior HEP. Exercises were reviewed and Sparkle was able to demonstrate them prior to the end of the session.  Sparkle demonstrated good  understanding of the education provided. See EMR under Patient Instructions for exercises provided during therapy sessions    Assessment     Prone scapula and back strengthening was difficult for her but she was able to complete all reps. She will attempt to do self STM using ball at home to help manage tightness. She notes reduced pain after session including traction, thoracic manipulation, and scapula PROM.     Sparkle Is progressing well towards her goals.   Pt prognosis is Good.     Pt will continue to benefit from skilled outpatient physical therapy to address the deficits listed in the problem list box on initial evaluation, provide pt/family education and to maximize pt's level of independence in the home and community environment.     Pt's spiritual, cultural and educational needs considered and pt agreeable to plan of care and goals.     Anticipated barriers  to physical therapy: none    Goals:  Short Term Goals: In 4 weeks   1.Pt to be educated on HEP. (met)  2.Patient to increase cervical ROM by 5 degrees to improve functional mobility. (met)  3.Patient to increase strength by 1/2 grade to improve functional tasks. (met)  4.Patient to have pain less than 6/10 at all times to improve quality of movement. (met)  5.Patient to improve score on the FOTO by 5%. (met)  6. Pt to be educated on postural and body mechanics awareness. (met)     Long Term Goals: In 8 weeks  1. Patient to report little or minimal TTP in shoulder and neck to reduce tension and headaches. (progressing, not met)  2. Patient to demo increase in periscapular strength to 4+/5 to improve functional tasks. (progressing, not met)  3. Patient to have decreased pain to 2/10 with activity to improve quality of movement. (progressing, not met)  4. Patient to demo increase cervical ROM to WNL/WFL without pain to improve functional mobility. (progressing, not met)  5. Patient to perform daily activities including lifting, overhead activities without limitation. (progressing, not met)    Plan     Continue with updated PT POC to address functional limitations.     Plan of Care Expiration: 11/1/2023 (Updated) 5 weeks    Phoenix Alejandro, PT DPT

## 2023-10-10 ENCOUNTER — CLINICAL SUPPORT (OUTPATIENT)
Dept: REHABILITATION | Facility: HOSPITAL | Age: 43
End: 2023-10-10
Payer: COMMERCIAL

## 2023-10-10 DIAGNOSIS — R29.898 DECREASED ROM OF NECK: Primary | ICD-10-CM

## 2023-10-10 PROCEDURE — 97112 NEUROMUSCULAR REEDUCATION: CPT | Mod: PN

## 2023-10-10 PROCEDURE — 97140 MANUAL THERAPY 1/> REGIONS: CPT | Mod: PN

## 2023-10-10 PROCEDURE — 97110 THERAPEUTIC EXERCISES: CPT | Mod: PN

## 2023-10-10 NOTE — PROGRESS NOTES
[facility]   Physical Therapy Treatment Note     Name: Sparkle Daniels [facility]  Clinic Number: [id number]    Therapy Diagnosis:   Encounter Diagnosis   Name Primary?    Decreased ROM of neck Yes       Physician: Erin Velasquez PA-C    Visit Date: 10/10/2023    Physician Orders: PT Eval and Treat  Medical Diagnosis from Referral: Ankylosing spondylitis, unspecified site of spine [M45.9]   Psoriatic arthritis [L40.50]   Cervicalgia [M54.2]  Evaluation Date: 8/2/2023  Authorization Period Expiration: 12/31/2023  Plan of Care Expiration: 11/1/2023 (Updated) 5 weeks  Progress Note Due: 10/20/2023  Visit # / Visits authorized: 14/20 auth (1/1 eval)  FOTO: 3/3     Precautions: Standard, prior brain surgery, ankylosing spondylitis     Time In: 0830 am  Time Out: 0915 am  Total Appointment Time (timed & untimed codes): 43 minutes    PTA Visit #: 0/5     Subjective     Pt reports: mid back was improved after last session but she still has tightness and discomfort on the left side near the border of scapula.     She was compliant with home exercise program.  Response to previous treatment: relief  Functional change: improved mobility     Pain: 3/10 neck, 5/10 mid back  Location: bilateral neck shoulders    Objective      Objective Measures updated at progress report unless specified.     Treatment     Sparkle received the treatments listed below:      therapeutic exercises to develop strength, endurance, ROM, flexibility, posture, and core stabilization for (15) minutes including:  UBE forward, back 2/2 4 min total  Cervical snag rotation x10 reps B  Cervical snag extension x15 reps  Levator stretch, arm behind back x10 reps each    neuromuscular re-education activities to improve: Balance, Coordination, Kinesthetic, Sense, Proprioception, and Posture for (15) minutes. The following activities were included:  Open book rotations x15 reps each  Quadruped power band thoracic rotation x10 reps each  red  Foam roller CT junction extensions x20 reps          Pt received Manual Therapy techniques in the form of Dry Needling for (10) minutes. This was applied to the both upper traps. Dry needling was performed to decrease inflammation, increase circulation, decrease pain and restore homeostasis.     Electrical Stimulation was applied while needles were in situ for (3) minutes each. Intensity increased to patient tolerance. No adverse reactions observed.      (4) 40 mm needles with 0.30 mm gauge were used for all insertion points. Pt in seated position.     Patient gave Verbal and Written consent to undergo dry needling. Written consent can be found in the media section in pts chart. All needles were removed and changes in signs and symptoms were assessed. No adverse reactions noted at the conclusion of the treatment.         Patient Education and Home Exercises       Education provided:   - HEP provided and reviewed  - Anatomy and Physiology pertaining to current condition  - Possible response to exercise  - Importance of PT compliance    Written Home Exercises Provided: Patient instructed to cont prior HEP. Exercises were reviewed and Sparkle was able to demonstrate them prior to the end of the session.  Sparkle demonstrated good  understanding of the education provided. See EMR under Patient Instructions for exercises provided during therapy sessions    Assessment     Pt able to complete additional thoracic/CT mobility exercises and banded thoracic rotation stretch without pain. Dry needling performed to both upper traps due to increased tightness in recent days. She notes positive response immediately following session.     Sparkle Is progressing well towards her goals.   Pt prognosis is Good.     Pt will continue to benefit from skilled outpatient physical therapy to address the deficits listed in the problem list box on initial evaluation, provide pt/family education and to maximize pt's level of independence  in the home and community environment.     Pt's spiritual, cultural and educational needs considered and pt agreeable to plan of care and goals.     Anticipated barriers to physical therapy: none    Goals:  Short Term Goals: In 4 weeks   1.Pt to be educated on HEP. (met)  2.Patient to increase cervical ROM by 5 degrees to improve functional mobility. (met)  3.Patient to increase strength by 1/2 grade to improve functional tasks. (met)  4.Patient to have pain less than 6/10 at all times to improve quality of movement. (met)  5.Patient to improve score on the FOTO by 5%. (met)  6. Pt to be educated on postural and body mechanics awareness. (met)     Long Term Goals: In 8 weeks  1. Patient to report little or minimal TTP in shoulder and neck to reduce tension and headaches. (progressing, not met)  2. Patient to demo increase in periscapular strength to 4+/5 to improve functional tasks. (progressing, not met)  3. Patient to have decreased pain to 2/10 with activity to improve quality of movement. (progressing, not met)  4. Patient to demo increase cervical ROM to WNL/WFL without pain to improve functional mobility. (progressing, not met)  5. Patient to perform daily activities including lifting, overhead activities without limitation. (progressing, not met)    Plan     Continue with updated PT POC to address functional limitations.     Plan of Care Expiration: 11/1/2023 (Updated) 5 weeks    Phoenix Alejandro, PT DPT

## 2023-10-12 ENCOUNTER — CLINICAL SUPPORT (OUTPATIENT)
Dept: REHABILITATION | Facility: HOSPITAL | Age: 43
End: 2023-10-12
Payer: COMMERCIAL

## 2023-10-12 DIAGNOSIS — R29.898 DECREASED ROM OF NECK: Primary | ICD-10-CM

## 2023-10-12 PROCEDURE — 97110 THERAPEUTIC EXERCISES: CPT | Mod: PN

## 2023-10-12 PROCEDURE — 97012 MECHANICAL TRACTION THERAPY: CPT | Mod: PN

## 2023-10-12 PROCEDURE — 97112 NEUROMUSCULAR REEDUCATION: CPT | Mod: PN

## 2023-10-12 NOTE — PROGRESS NOTES
[facility]   Physical Therapy Treatment Note     Name: Sparkle Daniels \[facility]\""  Clinic Number: [id number]    Therapy Diagnosis:   Encounter Diagnosis   Name Primary?    Decreased ROM of neck Yes     Physician: Erin Velasquez PA-C    Visit Date: 10/12/2023    Physician Orders: PT Eval and Treat  Medical Diagnosis from Referral: Ankylosing spondylitis, unspecified site of spine [M45.9]   Psoriatic arthritis [L40.50]   Cervicalgia [M54.2]  Evaluation Date: 8/2/2023  Authorization Period Expiration: 12/31/2023  Plan of Care Expiration: 11/1/2023 (Updated) 5 weeks  Progress Note Due: 10/20/2023  Visit # / Visits authorized: 15/20 auth (1/1 eval)  FOTO: 3/3     Precautions: Standard, prior brain surgery, ankylosing spondylitis     Time In: 0830 am  Time Out: 0910 am  Total Appointment Time (timed & untimed codes): 40 minutes    PTA Visit #: 0/5     Subjective     Pt reports: mid back was improved after last session but she still has tightness and discomfort on the left side near the border of scapula.     She was compliant with home exercise program.  Response to previous treatment: relief  Functional change: improved mobility     Pain: 3/10 neck  Location: bilateral neck shoulders    Objective      Objective Measures updated at progress report unless specified.     Treatment     Sparkle received the treatments listed below:      therapeutic exercises to develop strength, endurance, ROM, flexibility, posture, and core stabilization for (15) minutes including:  UBE forward, back 2/2 4 min total  Cervical snag rotation x10 reps B  Cervical snag extension x15 reps  Levator stretch, arm behind back x10 reps each    neuromuscular re-education activities to improve: Balance, Coordination, Kinesthetic, Sense, Proprioception, and Posture for (15) minutes. The following activities were included:  Open book rotations x15 reps each  Quadruped power band thoracic rotation x10 reps each black band  Foam roller  CT junction extensions x20 reps          Sparkle received the following supervised modalities after being cleared for contradictions: Mechanical Traction: Sparkle received intermittent mechanical traction to the cervical spine at a force of 20 pounds for a total of (10) minutes. Hold time of 30 sec and rest time for 10 sec. Patient tolerated treatment well without any adverse effects.    Patient Education and Home Exercises       Education provided:   - HEP provided and reviewed  - Anatomy and Physiology pertaining to current condition  - Possible response to exercise  - Importance of PT compliance    Written Home Exercises Provided: Patient instructed to cont prior HEP. Exercises were reviewed and Sparkle was able to demonstrate them prior to the end of the session.  Sparkle demonstrated good  understanding of the education provided. See EMR under Patient Instructions for exercises provided during therapy sessions    Assessment     Pt has improved tolerance to cervical/thoracic mobility exercises today compared to previous treatment. Pt notes tension relief following mechanical traction with increased force to 20 lbs.     Sparkle Is progressing well towards her goals.   Pt prognosis is Good.     Pt will continue to benefit from skilled outpatient physical therapy to address the deficits listed in the problem list box on initial evaluation, provide pt/family education and to maximize pt's level of independence in the home and community environment.     Pt's spiritual, cultural and educational needs considered and pt agreeable to plan of care and goals.     Anticipated barriers to physical therapy: none    Goals:  Short Term Goals: In 4 weeks   1.Pt to be educated on HEP. (met)  2.Patient to increase cervical ROM by 5 degrees to improve functional mobility. (met)  3.Patient to increase strength by 1/2 grade to improve functional tasks. (met)  4.Patient to have pain less than 6/10 at all times to improve quality of  movement. (met)  5.Patient to improve score on the FOTO by 5%. (met)  6. Pt to be educated on postural and body mechanics awareness. (met)     Long Term Goals: In 8 weeks  1. Patient to report little or minimal TTP in shoulder and neck to reduce tension and headaches. (progressing, not met)  2. Patient to demo increase in periscapular strength to 4+/5 to improve functional tasks. (progressing, not met)  3. Patient to have decreased pain to 2/10 with activity to improve quality of movement. (progressing, not met)  4. Patient to demo increase cervical ROM to WNL/WFL without pain to improve functional mobility. (progressing, not met)  5. Patient to perform daily activities including lifting, overhead activities without limitation. (progressing, not met)    Plan     Continue with updated PT POC to address functional limitations.     Plan of Care Expiration: 11/1/2023 (Updated) 5 weeks    Phoenix Alejandro, PT DPT

## 2023-10-17 ENCOUNTER — CLINICAL SUPPORT (OUTPATIENT)
Dept: REHABILITATION | Facility: HOSPITAL | Age: 43
End: 2023-10-17
Payer: COMMERCIAL

## 2023-10-17 DIAGNOSIS — R29.898 DECREASED ROM OF NECK: Primary | ICD-10-CM

## 2023-10-17 PROCEDURE — 97112 NEUROMUSCULAR REEDUCATION: CPT | Mod: PN

## 2023-10-17 PROCEDURE — 97110 THERAPEUTIC EXERCISES: CPT | Mod: PN

## 2023-10-17 PROCEDURE — 97012 MECHANICAL TRACTION THERAPY: CPT | Mod: PN

## 2023-10-17 NOTE — PROGRESS NOTES
[facility]   Physical Therapy Treatment Note     Name: Sparkle Daniels [facility]  Clinic Number: [id number]    Therapy Diagnosis:   Encounter Diagnosis   Name Primary?    Decreased ROM of neck Yes     Physician: Erin Velasquez PA-C    Visit Date: 10/17/2023    Physician Orders: PT Eval and Treat  Medical Diagnosis from Referral: Ankylosing spondylitis, unspecified site of spine [M45.9]   Psoriatic arthritis [L40.50]   Cervicalgia [M54.2]  Evaluation Date: 8/2/2023  Authorization Period Expiration: 12/31/2023  Plan of Care Expiration: 11/1/2023 (Updated) 5 weeks  Progress Note Due: 10/20/2023  Visit # / Visits authorized: 16/20 auth (1/1 eval)  FOTO: 3/3     Precautions: Standard, prior brain surgery, ankylosing spondylitis     Time In: 0830 am  Time Out: 0915 am  Total Appointment Time (timed & untimed codes): 42 minutes    PTA Visit #: 0/5     Subjective     Pt reports: only mild tightness in right side cervical paraspinals this morning.     She was compliant with home exercise program.  Response to previous treatment: relief  Functional change: improved mobility     Pain: 2/10 neck  Location: bilateral neck shoulders    Objective      Objective Measures updated at progress report unless specified.     Treatment     Sparkle received the treatments listed below:      therapeutic exercises to develop strength, endurance, ROM, flexibility, posture, and core stabilization for (16) minutes including:  UBE forward, back 2/2 4 min total  Cervical snag rotation x10 reps B  Cervical snag extension x20 reps  Levator stretch, arm behind back x10 reps each  Paraspinals stretch, towel prop 2 min    neuromuscular re-education activities to improve: Balance, Coordination, Kinesthetic, Sense, Proprioception, and Posture for (16) minutes. The following activities were included:  Open book rotations x15 reps each  Quadruped power band thoracic rotation x10 reps each black band  Cervical isometrics with ball x15  reps 4 directions (flex, ext, R/L SB)        Sparkle received the following supervised modalities after being cleared for contradictions: Mechanical Traction: Sparkle received intermittent mechanical traction to the cervical spine at a force of 20 pounds for a total of (10) minutes. Hold time of 30 sec and rest time for 10 sec. Patient tolerated treatment well without any adverse effects.    Patient Education and Home Exercises       Education provided:   - HEP provided and reviewed  - Anatomy and Physiology pertaining to current condition  - Possible response to exercise  - Importance of PT compliance    Written Home Exercises Provided: Patient instructed to cont prior HEP. Exercises were reviewed and Sparkle was able to demonstrate them prior to the end of the session.  Sparkle demonstrated good  understanding of the education provided. See EMR under Patient Instructions for exercises provided during therapy sessions    Assessment     Pt notes improvement with suboccipital prop stretch and is able to increase intensity of banded thoracic rotation stretch. Cervical isometrics with ball added to strengthening neck in all directions. Cervical mechanical traction continues to provide relief at end of session. Continue with scheduled visits.     Sparkle Is progressing well towards her goals.   Pt prognosis is Good.     Pt will continue to benefit from skilled outpatient physical therapy to address the deficits listed in the problem list box on initial evaluation, provide pt/family education and to maximize pt's level of independence in the home and community environment.     Pt's spiritual, cultural and educational needs considered and pt agreeable to plan of care and goals.     Anticipated barriers to physical therapy: none    Goals:  Short Term Goals: In 4 weeks   1.Pt to be educated on HEP. (met)  2.Patient to increase cervical ROM by 5 degrees to improve functional mobility. (met)  3.Patient to increase strength by  1/2 grade to improve functional tasks. (met)  4.Patient to have pain less than 6/10 at all times to improve quality of movement. (met)  5.Patient to improve score on the FOTO by 5%. (met)  6. Pt to be educated on postural and body mechanics awareness. (met)     Long Term Goals: In 8 weeks  1. Patient to report little or minimal TTP in shoulder and neck to reduce tension and headaches. (progressing, not met)  2. Patient to demo increase in periscapular strength to 4+/5 to improve functional tasks. (progressing, not met)  3. Patient to have decreased pain to 2/10 with activity to improve quality of movement. (progressing, not met)  4. Patient to demo increase cervical ROM to WNL/WFL without pain to improve functional mobility. (progressing, not met)  5. Patient to perform daily activities including lifting, overhead activities without limitation. (progressing, not met)    Plan     Continue with updated PT POC to address functional limitations.     Plan of Care Expiration: 11/1/2023 (Updated) 5 weeks    Phoenix Alejandro, PT DPT

## 2023-10-18 ENCOUNTER — PATIENT MESSAGE (OUTPATIENT)
Dept: FAMILY MEDICINE | Facility: CLINIC | Age: 43
End: 2023-10-18
Payer: MEDICARE

## 2023-10-19 ENCOUNTER — CLINICAL SUPPORT (OUTPATIENT)
Dept: REHABILITATION | Facility: HOSPITAL | Age: 43
End: 2023-10-19
Payer: COMMERCIAL

## 2023-10-19 DIAGNOSIS — R29.898 DECREASED ROM OF NECK: Primary | ICD-10-CM

## 2023-10-19 PROCEDURE — 97012 MECHANICAL TRACTION THERAPY: CPT | Mod: PN

## 2023-10-19 PROCEDURE — 97110 THERAPEUTIC EXERCISES: CPT | Mod: PN

## 2023-10-19 PROCEDURE — 97112 NEUROMUSCULAR REEDUCATION: CPT | Mod: PN

## 2023-10-19 NOTE — PROGRESS NOTES
[facility]   Physical Therapy Treatment Note     Name: Sparkle Daniels [facility]  Clinic Number: [id number]    Therapy Diagnosis:   Encounter Diagnosis   Name Primary?    Decreased ROM of neck Yes     Physician: Erin Velasquez PA-C    Visit Date: 10/19/2023    Physician Orders: PT Eval and Treat  Medical Diagnosis from Referral: Ankylosing spondylitis, unspecified site of spine [M45.9]   Psoriatic arthritis [L40.50]   Cervicalgia [M54.2]  Evaluation Date: 8/2/2023  Authorization Period Expiration: 12/31/2023  Plan of Care Expiration: 11/1/2023 (Updated) 5 weeks  Progress Note Due: 10/20/2023  Visit # / Visits authorized: 17/20 auth (1/1 eval)  FOTO: 3/3     Precautions: Standard, prior brain surgery, ankylosing spondylitis     Time In: 0835 am  Time Out: 0925 am  Total Appointment Time (timed & untimed codes): 50 minutes    PTA Visit #: 0/5     Subjective     Pt reports: no complaints or additional symptoms since her last visit.     She was compliant with home exercise program.  Response to previous treatment: relief  Functional change: improved mobility     Pain: 2/10 neck  Location: bilateral neck shoulders    Objective      Objective Measures updated at progress report unless specified.     Treatment     Sparkle received the treatments listed below:      therapeutic exercises to develop strength, endurance, ROM, flexibility, posture, and core stabilization for (15) minutes including:  UBE forward, back 2/2 4 min total  Cervical snag rotation x10 reps B  Cervical snag extension x20 reps  Levator stretch, arm behind back x10 reps each    neuromuscular re-education activities to improve: Balance, Coordination, Kinesthetic, Sense, Proprioception, and Posture for (25) minutes. The following activities were included:  Resisted cervical rotation x20 reps each YTB  Resisted cervical retraction x20 reps  Open book rotations with YTB x15 reps each  Quadruped power band thoracic rotation x10 reps each  black band  Cervical isometrics with ball x15 reps 4 directions (flex, ext, R/L SB)        Sparkle received the following supervised modalities after being cleared for contradictions: Mechanical Traction: Sparkle received intermittent mechanical traction to the cervical spine at a force of 20 pounds for a total of (10) minutes. Hold time of 30 sec and rest time for 10 sec. Patient tolerated treatment well without any adverse effects.    Patient Education and Home Exercises       Education provided:   - HEP provided and reviewed  - Anatomy and Physiology pertaining to current condition  - Possible response to exercise  - Importance of PT compliance    Written Home Exercises Provided: Patient instructed to cont prior HEP. Exercises were reviewed and Sparkle was able to demonstrate them prior to the end of the session.  Sparkle demonstrated good  understanding of the education provided. See EMR under Patient Instructions for exercises provided during therapy sessions    Assessment     Additional banded neck strengthening exercises performed today without adverse symptoms. She will plan to complete her last 3 scheduled appointments with a transition to HEP.    Sparkle Is progressing well towards her goals.   Pt prognosis is Good.     Pt will continue to benefit from skilled outpatient physical therapy to address the deficits listed in the problem list box on initial evaluation, provide pt/family education and to maximize pt's level of independence in the home and community environment.     Pt's spiritual, cultural and educational needs considered and pt agreeable to plan of care and goals.     Anticipated barriers to physical therapy: none    Goals:  Short Term Goals: In 4 weeks   1.Pt to be educated on HEP. (met)  2.Patient to increase cervical ROM by 5 degrees to improve functional mobility. (met)  3.Patient to increase strength by 1/2 grade to improve functional tasks. (met)  4.Patient to have pain less than 6/10 at  all times to improve quality of movement. (met)  5.Patient to improve score on the FOTO by 5%. (met)  6. Pt to be educated on postural and body mechanics awareness. (met)     Long Term Goals: In 8 weeks  1. Patient to report little or minimal TTP in shoulder and neck to reduce tension and headaches. (progressing, not met)  2. Patient to demo increase in periscapular strength to 4+/5 to improve functional tasks. (progressing, not met)  3. Patient to have decreased pain to 2/10 with activity to improve quality of movement. (progressing, not met)  4. Patient to demo increase cervical ROM to WNL/WFL without pain to improve functional mobility. (progressing, not met)  5. Patient to perform daily activities including lifting, overhead activities without limitation. (progressing, not met)    Plan     Continue with updated PT POC to address functional limitations.     Plan of Care Expiration: 11/1/2023 (Updated) 5 weeks    Phoenix Alejandro, PT DPT

## 2023-10-20 ENCOUNTER — PATIENT MESSAGE (OUTPATIENT)
Dept: RHEUMATOLOGY | Facility: CLINIC | Age: 43
End: 2023-10-20
Payer: MEDICARE

## 2023-10-23 ENCOUNTER — OFFICE VISIT (OUTPATIENT)
Dept: FAMILY MEDICINE | Facility: CLINIC | Age: 43
End: 2023-10-23
Payer: COMMERCIAL

## 2023-10-23 DIAGNOSIS — Z13.6 ENCOUNTER FOR SCREENING FOR CARDIOVASCULAR DISORDERS: ICD-10-CM

## 2023-10-23 DIAGNOSIS — F98.8 ADD (ATTENTION DEFICIT DISORDER) WITHOUT HYPERACTIVITY: Primary | ICD-10-CM

## 2023-10-23 DIAGNOSIS — Z82.49 FAMILY HISTORY OF EARLY CAD: ICD-10-CM

## 2023-10-23 PROCEDURE — 99214 PR OFFICE/OUTPT VISIT, EST, LEVL IV, 30-39 MIN: ICD-10-PCS | Mod: 95,,, | Performed by: STUDENT IN AN ORGANIZED HEALTH CARE EDUCATION/TRAINING PROGRAM

## 2023-10-23 PROCEDURE — 99214 OFFICE O/P EST MOD 30 MIN: CPT | Mod: 95,,, | Performed by: STUDENT IN AN ORGANIZED HEALTH CARE EDUCATION/TRAINING PROGRAM

## 2023-10-23 RX ORDER — ATOMOXETINE 40 MG/1
40 CAPSULE ORAL DAILY
Qty: 30 CAPSULE | Refills: 0 | Status: SHIPPED | OUTPATIENT
Start: 2023-10-23 | End: 2023-11-22

## 2023-10-23 NOTE — PROGRESS NOTES
Problem List Items Addressed This Visit          Psychiatric    ADD (attention deficit disorder) without hyperactivity - Primary    Overview     Chronic hx;  adderall 15 BID caused palpitations.  Switched to Strattera 40mg (no palpitations).     Pt is demonstrating no behaviors to suggest inappropriate use of prescribed medications.    [place] Board of Pharmacy Controlled Prescription Drug Monitoring database was queried and showed no activity to suggest abuse, diversion, or other inappropriate use of prescription medications.  #30 tabs, 0 refills    3m VV             Relevant Medications    atomoxetine (STRATTERA) 40 MG capsule     Other Visit Diagnoses       Encounter for screening for cardiovascular disorders        Relevant Orders    CT Cardiac Scoring    Family history of early CAD        Relevant Orders    CT Cardiac Scoring                  Follow up in about 4 weeks (around 11/20/2023).    Chrissie Lancaster MD  _________________________________________________________________________      Patient ID: Sparkle Jose is a 42 y.o. female.    Chief Complaint: add    Pt diagnosed with ADD/ADHD several years ago. Denies mood instability, irritability, aggression, chest pain, decreased appetite, disordered sleep.     Adderall 15 XR caused palpitations. Switched to Strattera 40mg (no palpitations).   Focus is better with with adderall ir; however, now with palpitations for past few weeks.    Also working to quit smoking. Currently smoking 5 cigarettes.     Reports sister with MI in her 30s, sister has hx of drug abuse. Sister was also on OCP, smoked, and adderall.     Past medical histories reviewed, including past medical, surgical, family and social histories.      Current Outpatient Medications on File Prior to Visit   Medication Sig Dispense Refill    clobetasol 0.05% (TEMOVATE) 0.05 % Oint 1 application daily as needed.       clobetasol 0.05% (TEMOVATE) 0.05 % Oint Apply topically 2 (two) times daily.  30 g 1    eletriptan (RELPAX) 40 MG tablet Take 1 tablet (40 mg total) by mouth as needed. may repeat in 2 hours if necessary 9 tablet 0    ixekizumab (TALTZ AUTOINJECTOR) 80 mg/mL AtIn Inject 80 mg into the skin every 28 days. (Patient not taking: Reported on 8/18/2023) 1 mL 11    ixekizumab (TALTZ AUTOINJECTOR, 2 PACK,) 80 mg/mL AtIn Inject 80 mg into the skin every 14 (fourteen) days. For induction dose week 4-10 (Patient not taking: Reported on 8/18/2023) 2 mL 1    ixekizumab (TALTZ AUTOINJECTOR, 3 PACK,) 80 mg/mL AtIn Inject 160 mg (2 pens) into the skin at week 0 then inject 80 mg (1 pen) into the skin at week 2 (Patient not taking: Reported on 8/18/2023) 3 mL 0    MOUNJARO 15 mg/0.5 mL PnIj Inject 15 mg into the skin once a week. 12 pen 2    oxyCODONE-acetaminophen (PERCOCET)  mg per tablet Take 1 tablet by mouth every 8 (eight) hours as needed for Pain. 90 tablet 0    oxyCODONE-acetaminophen (PERCOCET)  mg per tablet Take 1 tablet by mouth every 8 (eight) hours as needed for Pain. 90 tablet 0    oxyCODONE-acetaminophen (PERCOCET)  mg per tablet Take 1 tablet by mouth every 8 (eight) hours as needed for Pain. 90 tablet 0    promethazine (PHENERGAN) 25 MG tablet Take 1 tablet (25 mg total) by mouth every 6 (six) hours as needed for Nausea. 45 tablet 5    tirzepatide (MOUNJARO) 5 mg/0.5 mL PnIj Inject 5 mg into the skin every 7 days. 1 pen 11    tiZANidine (ZANAFLEX) 4 MG tablet Take 1 tablet (4 mg total) by mouth nightly as needed (muscle spasm). 90 tablet 1    traZODone (DESYREL) 100 MG tablet Take 1 tablet (100 mg total) by mouth every evening. 90 tablet 1     No current facility-administered medications on file prior to visit.       Review of Systems   12 point review of systems negative except for listed in HPI.     Objective:    Nursing note and vitals reviewed.  There were no vitals filed for this visit.      There is no height or weight on file to calculate BMI.     No vitals or full  physical exam obtained as this is a virtual visit  Gen: no distress, comfortable          We Offer Telehealth & Same Day Appointments!   Book your Telehealth appointment with me through my nurse or   Clinic appointments on [facility]!  [phone]     To Schedule appointments online, go to [employer]: [email or web address]             The patient location is: [place]  The chief complaint leading to consultation is: add    Visit type: audiovisual    Time with patient: 10 minutes   15 minutes of total time spent on the encounter, which includes face to face time and non-face to face time preparing to see the patient (eg, review of tests), Obtaining and/or reviewing separately obtained history, Documenting clinical information in the electronic or other health record, Independently interpreting results (not separately reported) and communicating results to the patient/family/caregiver, or Care coordination (not separately reported).         Each patient to whom he or she provides medical services by telemedicine is:  (1) informed of the relationship between the physician and patient and the respective role of any other health care provider with respect to management of the patient; and (2) notified that he or she may decline to receive medical services by telemedicine and may withdraw from such care at any time.      Answers submitted by the patient for this visit:  Review of Systems Questionnaire (Submitted on 10/23/2023)  activity change: No  unexpected weight change: No  neck pain: Yes  hearing loss: No  rhinorrhea: No  trouble swallowing: No  eye discharge: No  visual disturbance: No  chest tightness: No  wheezing: No  chest pain: No  palpitations: Yes  blood in stool: No  constipation: No  vomiting: No  diarrhea: No  polydipsia: No  polyuria: No  difficulty urinating: No  hematuria: No  menstrual problem: No  dysuria: No  joint swelling: Yes  arthralgias: Yes  headaches: Yes  weakness:  No  confusion: No  dysphoric mood: No

## 2023-10-24 ENCOUNTER — CLINICAL SUPPORT (OUTPATIENT)
Dept: REHABILITATION | Facility: HOSPITAL | Age: 43
End: 2023-10-24
Payer: COMMERCIAL

## 2023-10-24 DIAGNOSIS — R29.898 DECREASED ROM OF NECK: Primary | ICD-10-CM

## 2023-10-24 PROCEDURE — 97112 NEUROMUSCULAR REEDUCATION: CPT | Mod: PN

## 2023-10-24 PROCEDURE — 97012 MECHANICAL TRACTION THERAPY: CPT | Mod: PN

## 2023-10-24 PROCEDURE — 97110 THERAPEUTIC EXERCISES: CPT | Mod: PN

## 2023-10-24 NOTE — PROGRESS NOTES
[facility]   Physical Therapy Treatment Note     Name: Sparkle Daniels [facility]  Clinic Number: [id number]    Therapy Diagnosis:   No diagnosis found.    Physician: Erin Velasquez PA-C    Visit Date: 10/24/2023    Physician Orders: PT Eval and Treat  Medical Diagnosis from Referral: Ankylosing spondylitis, unspecified site of spine [M45.9]   Psoriatic arthritis [L40.50]   Cervicalgia [M54.2]  Evaluation Date: 8/2/2023  Authorization Period Expiration: 12/31/2023  Plan of Care Expiration: 11/1/2023 (Updated) 5 weeks  Progress Note Due: 10/20/2023  Visit # / Visits authorized: 17/20 auth (1/1 eval)  FOTO: 3/3     Precautions: Standard, prior brain surgery, ankylosing spondylitis     Time In: 0830 am  Time Out: 0903 am  Total Appointment Time (timed & untimed codes): 33 minutes    PTA Visit #: 0/5     Subjective     Pt reports: she is doing well at home and symptoms have improved overall.      She was compliant with home exercise program.  Response to previous treatment: relief  Functional change: improved mobility     Pain: 2/10 neck  Location: bilateral neck shoulders    Objective      Objective Measures updated at progress report unless specified.     Treatment     Sparkle received the treatments listed below:      therapeutic exercises to develop strength, endurance, ROM, flexibility, posture, and core stabilization for (15) minutes including:  UBE forward, back 2/2 4 min total  Cervical snag rotation x10 reps B  Cervical snag extension x20 reps    neuromuscular re-education activities to improve: Balance, Coordination, Kinesthetic, Sense, Proprioception, and Posture for (8) minutes. The following activities were included:  Resisted cervical rotation x20 reps each RTB  Resisted cervical retraction in quadruped x20 reps RTB        Sparkle received the following supervised modalities after being cleared for contradictions: Mechanical Traction: Sparkle received intermittent mechanical traction  to the cervical spine at a force of 20 pounds for a total of (10) minutes. Hold time of 30 sec and rest time for 10 sec. Patient tolerated treatment well without any adverse effects.    Patient Education and Home Exercises       Education provided:   - HEP provided and reviewed  - Anatomy and Physiology pertaining to current condition  - Possible response to exercise  - Importance of PT compliance    Written Home Exercises Provided: Patient instructed to cont prior HEP. Exercises were reviewed and Sparkle was able to demonstrate them prior to the end of the session.  Sparkle demonstrated good  understanding of the education provided. See EMR under Patient Instructions for exercises provided during therapy sessions    Assessment     She is able to perform all exercises without pain. Continues to respond well to cervical traction. She will DC after next visit.    Sparkle Is progressing well towards her goals.   Pt prognosis is Good.     Pt will continue to benefit from skilled outpatient physical therapy to address the deficits listed in the problem list box on initial evaluation, provide pt/family education and to maximize pt's level of independence in the home and community environment.     Pt's spiritual, cultural and educational needs considered and pt agreeable to plan of care and goals.     Anticipated barriers to physical therapy: none    Goals:  Short Term Goals: In 4 weeks   1.Pt to be educated on HEP. (met)  2.Patient to increase cervical ROM by 5 degrees to improve functional mobility. (met)  3.Patient to increase strength by 1/2 grade to improve functional tasks. (met)  4.Patient to have pain less than 6/10 at all times to improve quality of movement. (met)  5.Patient to improve score on the FOTO by 5%. (met)  6. Pt to be educated on postural and body mechanics awareness. (met)     Long Term Goals: In 8 weeks  1. Patient to report little or minimal TTP in shoulder and neck to reduce tension and headaches.  (met)  2. Patient to demo increase in periscapular strength to 4+/5 to improve functional tasks. (met)  3. Patient to have decreased pain to 2/10 with activity to improve quality of movement. (met)  4. Patient to demo increase cervical ROM to WNL/WFL without pain to improve functional mobility. (progressing, not met)  5. Patient to perform daily activities including lifting, overhead activities without limitation. (progressing, not met)    Plan     DC after next visit.     Plan of Care Expiration: 11/1/2023 (Updated) 5 weeks    Phoenix Alejandro, PT DPT

## 2023-10-25 ENCOUNTER — TELEPHONE (OUTPATIENT)
Dept: FAMILY MEDICINE | Facility: CLINIC | Age: 43
End: 2023-10-25
Payer: MEDICARE

## 2023-10-26 ENCOUNTER — CLINICAL SUPPORT (OUTPATIENT)
Dept: REHABILITATION | Facility: HOSPITAL | Age: 43
End: 2023-10-26
Payer: COMMERCIAL

## 2023-10-26 ENCOUNTER — PATIENT MESSAGE (OUTPATIENT)
Dept: RHEUMATOLOGY | Facility: CLINIC | Age: 43
End: 2023-10-26
Payer: MEDICARE

## 2023-10-26 DIAGNOSIS — R29.898 DECREASED ROM OF NECK: Primary | ICD-10-CM

## 2023-10-26 PROCEDURE — 97110 THERAPEUTIC EXERCISES: CPT | Mod: PN

## 2023-10-26 PROCEDURE — 97112 NEUROMUSCULAR REEDUCATION: CPT | Mod: PN

## 2023-10-26 PROCEDURE — 97012 MECHANICAL TRACTION THERAPY: CPT | Mod: PN

## 2023-10-26 NOTE — PROGRESS NOTES
[facility]   Physical Therapy Treatment Note and DC Summary     Name: Sparkle Daniels [facility]  Clinic Number: [id number]    Therapy Diagnosis:   Encounter Diagnosis   Name Primary?    Decreased ROM of neck Yes     Physician: Erin Velasquez PA-C    Visit Date: 10/26/2023    Physician Orders: PT Eval and Treat  Medical Diagnosis from Referral: Ankylosing spondylitis, unspecified site of spine [M45.9]   Psoriatic arthritis [L40.50]   Cervicalgia [M54.2]  Evaluation Date: 8/2/2023  Authorization Period Expiration: 12/31/2023  Plan of Care Expiration: 11/1/2023 (Updated) 5 weeks  Progress Note Due: 10/20/2023  Visit # / Visits authorized: 19/20 auth (1/1 eval)  FOTO: 3/3     Precautions: Standard, prior brain surgery, ankylosing spondylitis     Time In: 0835 am  Time Out: 0920 am  Total Appointment Time (timed & untimed codes): 45 minutes    PTA Visit #: 0/5    Date of Last visit: 10/26/2023  Total Visits Received: 20    Subjective     Pt reports: no new complaints today. She is confident and compliant with home program.    She was compliant with home exercise program.  Response to previous treatment: relief  Functional change: improved mobility     Pain: 2/10 neck  Location: bilateral neck shoulders    Objective      Objective Measures updated at progress report unless specified.     Treatment     Sparkle received the treatments listed below:      therapeutic exercises to develop strength, endurance, ROM, flexibility, posture, and core stabilization for (10) minutes including:  UBE forward, back 2/2 4 min total  Cervical snag rotation x10 reps B  Cervical snag extension x20 reps    neuromuscular re-education activities to improve: Balance, Coordination, Kinesthetic, Sense, Proprioception, and Posture for (25) minutes. The following activities were included:  Resisted cervical rotation x20 reps each RTB  Resisted cervical retraction in quadruped x20 reps RTB  Open book rotations with RTB x15 reps  each  Quadruped power band thoracic rotation x10 reps each black band  Quadruped cervical retraction with RTB x20 reps  Half foam roll swimmers x20 reps  Half foam roll snow angels x20 reps  Cervical isometrics with ball x15 reps 4 directions (flex, ext, R/L SB)      Sparkle received the following supervised modalities after being cleared for contradictions: Mechanical Traction: Sparkle received intermittent mechanical traction to the cervical spine at a force of 20 pounds for a total of (10) minutes. Hold time of 30 sec and rest time for 10 sec. Patient tolerated treatment well without any adverse effects.    Patient Education and Home Exercises       Education provided:   - HEP provided and reviewed  - Anatomy and Physiology pertaining to current condition  - Possible response to exercise  - Importance of PT compliance    Written Home Exercises Provided: Patient instructed to cont prior HEP. Exercises were reviewed and Sparkle was able to demonstrate them prior to the end of the session.  Sparkle demonstrated good  understanding of the education provided. See EMR under Patient Instructions for exercises provided during therapy sessions    Assessment     Pt has met all PT goals and will DC to HEP at this time.     Sparkle Is progressing well towards her goals.   Pt prognosis is Good.     Discharge reason: Patient has met all of his/her goals    Discharge FOTO Score: 49    Pt's spiritual, cultural and educational needs considered and pt agreeable to plan of care and goals.     Anticipated barriers to physical therapy: none    Goals:  Short Term Goals: In 4 weeks   1.Pt to be educated on HEP. (met)  2.Patient to increase cervical ROM by 5 degrees to improve functional mobility. (met)  3.Patient to increase strength by 1/2 grade to improve functional tasks. (met)  4.Patient to have pain less than 6/10 at all times to improve quality of movement. (met)  5.Patient to improve score on the FOTO by 5%. (met)  6. Pt to be  educated on postural and body mechanics awareness. (met)     Long Term Goals: In 8 weeks  1. Patient to report little or minimal TTP in shoulder and neck to reduce tension and headaches. (met)  2. Patient to demo increase in periscapular strength to 4+/5 to improve functional tasks. (met)  3. Patient to have decreased pain to 2/10 with activity to improve quality of movement. (met)  4. Patient to demo increase cervical ROM to WNL/WFL without pain to improve functional mobility. (met)  5. Patient to perform daily activities including lifting, overhead activities without limitation. (met)    Plan     This patient is discharged from Physical Therapy.    Phoenix lAejandro, PT DPT

## 2023-10-26 NOTE — PLAN OF CARE
[facility]   Physical Therapy Treatment Note and DC Summary     Name: Sparkle Daniels [facility]  Clinic Number: [id number]    Therapy Diagnosis:   Encounter Diagnosis   Name Primary?    Decreased ROM of neck Yes     Physician: Erin Velasquez PA-C    Visit Date: 10/26/2023    Physician Orders: PT Eval and Treat  Medical Diagnosis from Referral: Ankylosing spondylitis, unspecified site of spine [M45.9]   Psoriatic arthritis [L40.50]   Cervicalgia [M54.2]  Evaluation Date: 8/2/2023  Authorization Period Expiration: 12/31/2023  Plan of Care Expiration: 11/1/2023 (Updated) 5 weeks  Progress Note Due: 10/20/2023  Visit # / Visits authorized: 19/20 auth (1/1 eval)  FOTO: 3/3     Precautions: Standard, prior brain surgery, ankylosing spondylitis     Time In: 0835 am  Time Out: 0920 am  Total Appointment Time (timed & untimed codes): 45 minutes    PTA Visit #: 0/5    Date of Last visit: 10/26/2023  Total Visits Received: 20    Subjective     Pt reports: no new complaints today. She is confident and compliant with home program.    She was compliant with home exercise program.  Response to previous treatment: relief  Functional change: improved mobility     Pain: 2/10 neck  Location: bilateral neck shoulders    Objective      Objective Measures updated at progress report unless specified.     Treatment     Sparkle received the treatments listed below:      therapeutic exercises to develop strength, endurance, ROM, flexibility, posture, and core stabilization for (10) minutes including:  UBE forward, back 2/2 4 min total  Cervical snag rotation x10 reps B  Cervical snag extension x20 reps    neuromuscular re-education activities to improve: Balance, Coordination, Kinesthetic, Sense, Proprioception, and Posture for (25) minutes. The following activities were included:  Resisted cervical rotation x20 reps each RTB  Resisted cervical retraction in quadruped x20 reps RTB  Open book rotations with RTB x15 reps  each  Quadruped power band thoracic rotation x10 reps each black band  Quadruped cervical retraction with RTB x20 reps  Half foam roll swimmers x20 reps  Half foam roll snow angels x20 reps  Cervical isometrics with ball x15 reps 4 directions (flex, ext, R/L SB)      Sparkle received the following supervised modalities after being cleared for contradictions: Mechanical Traction: Sparkle received intermittent mechanical traction to the cervical spine at a force of 20 pounds for a total of (10) minutes. Hold time of 30 sec and rest time for 10 sec. Patient tolerated treatment well without any adverse effects.    Patient Education and Home Exercises       Education provided:   - HEP provided and reviewed  - Anatomy and Physiology pertaining to current condition  - Possible response to exercise  - Importance of PT compliance    Written Home Exercises Provided: Patient instructed to cont prior HEP. Exercises were reviewed and Sparkle was able to demonstrate them prior to the end of the session.  Sparkle demonstrated good  understanding of the education provided. See EMR under Patient Instructions for exercises provided during therapy sessions    Assessment     Pt has met all PT goals and will DC to HEP at this time.     Sparkle Is progressing well towards her goals.   Pt prognosis is Good.     Discharge reason: Patient has met all of his/her goals    Discharge FOTO Score: 49    Pt's spiritual, cultural and educational needs considered and pt agreeable to plan of care and goals.     Anticipated barriers to physical therapy: none    Goals:  Short Term Goals: In 4 weeks   1.Pt to be educated on HEP. (met)  2.Patient to increase cervical ROM by 5 degrees to improve functional mobility. (met)  3.Patient to increase strength by 1/2 grade to improve functional tasks. (met)  4.Patient to have pain less than 6/10 at all times to improve quality of movement. (met)  5.Patient to improve score on the FOTO by 5%. (met)  6. Pt to be  educated on postural and body mechanics awareness. (met)     Long Term Goals: In 8 weeks  1. Patient to report little or minimal TTP in shoulder and neck to reduce tension and headaches. (met)  2. Patient to demo increase in periscapular strength to 4+/5 to improve functional tasks. (met)  3. Patient to have decreased pain to 2/10 with activity to improve quality of movement. (met)  4. Patient to demo increase cervical ROM to WNL/WFL without pain to improve functional mobility. (met)  5. Patient to perform daily activities including lifting, overhead activities without limitation. (met)    Plan     This patient is discharged from Physical Therapy.    Phoenix Alejandro, PT DPT

## 2023-11-01 ENCOUNTER — HOSPITAL ENCOUNTER (OUTPATIENT)
Dept: RADIOLOGY | Facility: HOSPITAL | Age: 43
Discharge: HOME | End: 2023-11-01
Attending: STUDENT IN AN ORGANIZED HEALTH CARE EDUCATION/TRAINING PROGRAM
Payer: COMMERCIAL

## 2023-11-01 DIAGNOSIS — Z82.49 FAMILY HISTORY OF EARLY CAD: ICD-10-CM

## 2023-11-01 DIAGNOSIS — Z13.6 ENCOUNTER FOR SCREENING FOR CARDIOVASCULAR DISORDERS: ICD-10-CM

## 2023-11-01 PROCEDURE — 75571 CT HRT W/O DYE W/CA TEST: CPT | Mod: TC,PO

## 2023-11-01 PROCEDURE — 75571 CT HRT W/O DYE W/CA TEST: CPT | Mod: 26,,, | Performed by: RADIOLOGY

## 2023-11-01 PROCEDURE — 75571 CT CALCIUM SCORING CARDIAC: ICD-10-PCS | Mod: 26,,, | Performed by: RADIOLOGY

## 2023-11-01 NOTE — PROGRESS NOTES
I have sent a msg to patient with the following interpretation (see below):    Ct calcium scoring shows: Your total calcium score is 0        Calcium score Guide:     0-10: Very little coronary heart disease risk     11 through 100: Low to moderate coronary heart disease risk     101 through 400: Moderate to high coronary heart disease risk     Greater than 401: High coronary heart disease risk.       Please do not hesitate to call or message with any questions or concerns    Chrissie Lancaster MD

## 2023-11-13 DIAGNOSIS — M53.3 CHRONIC LEFT SI JOINT PAIN: ICD-10-CM

## 2023-11-13 DIAGNOSIS — G89.4 CHRONIC PAIN SYNDROME: ICD-10-CM

## 2023-11-13 DIAGNOSIS — M45.9 ANKYLOSING SPONDYLITIS, UNSPECIFIED SITE OF SPINE: ICD-10-CM

## 2023-11-13 DIAGNOSIS — G89.29 CHRONIC LEFT SI JOINT PAIN: ICD-10-CM

## 2023-11-14 RX ORDER — OXYCODONE AND ACETAMINOPHEN 10; 325 MG/1; MG/1
1 TABLET ORAL EVERY 8 HOURS PRN
Qty: 90 TABLET | Refills: 0 | Status: SHIPPED | OUTPATIENT
Start: 2023-11-14 | End: 2023-12-21 | Stop reason: SDUPTHER

## 2023-11-16 ENCOUNTER — OFFICE VISIT (OUTPATIENT)
Dept: FAMILY MEDICINE | Facility: CLINIC | Age: 43
End: 2023-11-16
Payer: COMMERCIAL

## 2023-11-16 ENCOUNTER — LAB VISIT (OUTPATIENT)
Dept: LAB | Facility: HOSPITAL | Age: 43
End: 2023-11-16
Attending: STUDENT IN AN ORGANIZED HEALTH CARE EDUCATION/TRAINING PROGRAM
Payer: COMMERCIAL

## 2023-11-16 DIAGNOSIS — F98.8 ADD (ATTENTION DEFICIT DISORDER) WITHOUT HYPERACTIVITY: Primary | ICD-10-CM

## 2023-11-16 DIAGNOSIS — R31.9 HEMATURIA, UNSPECIFIED TYPE: ICD-10-CM

## 2023-11-16 DIAGNOSIS — R10.9 ABDOMINAL PAIN, UNSPECIFIED ABDOMINAL LOCATION: ICD-10-CM

## 2023-11-16 LAB
BACTERIA #/AREA URNS HPF: ABNORMAL /HPF
BILIRUB UR QL STRIP: NEGATIVE
CLARITY UR: CLEAR
COLOR UR: YELLOW
GLUCOSE UR QL STRIP: NEGATIVE
HGB UR QL STRIP: ABNORMAL
KETONES UR QL STRIP: NEGATIVE
LEUKOCYTE ESTERASE UR QL STRIP: ABNORMAL
MICROSCOPIC COMMENT: ABNORMAL
NITRITE UR QL STRIP: NEGATIVE
PH UR STRIP: 6.5 [PH] (ref 5–8)
PROT UR QL STRIP: NEGATIVE
RBC #/AREA URNS HPF: 1 /HPF (ref 0–4)
SP GR UR STRIP: 1.01 (ref 1–1.03)
SQUAMOUS #/AREA URNS HPF: 10 /HPF
URN SPEC COLLECT METH UR: ABNORMAL
WBC #/AREA URNS HPF: 15 /HPF (ref 0–5)

## 2023-11-16 PROCEDURE — 87088 URINE BACTERIA CULTURE: CPT | Performed by: STUDENT IN AN ORGANIZED HEALTH CARE EDUCATION/TRAINING PROGRAM

## 2023-11-16 PROCEDURE — 99214 PR OFFICE/OUTPT VISIT, EST, LEVL IV, 30-39 MIN: ICD-10-PCS | Mod: 95,,, | Performed by: STUDENT IN AN ORGANIZED HEALTH CARE EDUCATION/TRAINING PROGRAM

## 2023-11-16 PROCEDURE — 99214 OFFICE O/P EST MOD 30 MIN: CPT | Mod: 95,,, | Performed by: STUDENT IN AN ORGANIZED HEALTH CARE EDUCATION/TRAINING PROGRAM

## 2023-11-16 PROCEDURE — 87186 SC STD MICRODIL/AGAR DIL: CPT | Performed by: STUDENT IN AN ORGANIZED HEALTH CARE EDUCATION/TRAINING PROGRAM

## 2023-11-16 PROCEDURE — 81000 URINALYSIS NONAUTO W/SCOPE: CPT | Mod: PO | Performed by: STUDENT IN AN ORGANIZED HEALTH CARE EDUCATION/TRAINING PROGRAM

## 2023-11-16 PROCEDURE — 87077 CULTURE AEROBIC IDENTIFY: CPT | Performed by: STUDENT IN AN ORGANIZED HEALTH CARE EDUCATION/TRAINING PROGRAM

## 2023-11-16 PROCEDURE — 87086 URINE CULTURE/COLONY COUNT: CPT | Performed by: STUDENT IN AN ORGANIZED HEALTH CARE EDUCATION/TRAINING PROGRAM

## 2023-11-16 RX ORDER — LISDEXAMFETAMINE DIMESYLATE CAPSULES 20 MG/1
20 CAPSULE ORAL EVERY MORNING
Qty: 30 CAPSULE | Refills: 0 | Status: SHIPPED | OUTPATIENT
Start: 2023-12-16 | End: 2023-12-18 | Stop reason: SDUPTHER

## 2023-11-16 RX ORDER — LISDEXAMFETAMINE DIMESYLATE CAPSULES 20 MG/1
20 CAPSULE ORAL EVERY MORNING
Qty: 30 CAPSULE | Refills: 0 | Status: SHIPPED | OUTPATIENT
Start: 2023-11-16 | End: 2023-12-16

## 2023-11-16 RX ORDER — TAMSULOSIN HYDROCHLORIDE 0.4 MG/1
0.4 CAPSULE ORAL DAILY
Qty: 30 CAPSULE | Refills: 11 | Status: SHIPPED | OUTPATIENT
Start: 2023-11-16 | End: 2024-03-21

## 2023-11-16 RX ORDER — CIPROFLOXACIN 500 MG/1
500 TABLET ORAL EVERY 12 HOURS
Qty: 14 TABLET | Refills: 0 | Status: SHIPPED | OUTPATIENT
Start: 2023-11-16 | End: 2023-11-23

## 2023-11-16 RX ORDER — LISDEXAMFETAMINE DIMESYLATE CAPSULES 20 MG/1
20 CAPSULE ORAL EVERY MORNING
Qty: 30 CAPSULE | Refills: 0 | Status: SHIPPED | OUTPATIENT
Start: 2024-01-15 | End: 2023-12-18 | Stop reason: SDUPTHER

## 2023-11-16 NOTE — PROGRESS NOTES
I have sent a msg to patient with the following interpretation (see below):    Ua with UTI. Continue treatment plan as discussed     Please do not hesitate to call or message with any questions or concerns    Chrissie Lancaster MD

## 2023-11-16 NOTE — PROGRESS NOTES
Problem List Items Addressed This Visit          Psychiatric    ADD (attention deficit disorder) without hyperactivity - Primary    Overview     Chronic hx; switch to vyvanse 20mg     Pt is demonstrating no behaviors to suggest inappropriate use of prescribed medications.    [place] Board of Pharmacy Controlled Prescription Drug Monitoring database was queried and showed no activity to suggest abuse, diversion, or other inappropriate use of prescription medications.  #30 tabs, 2 refills    3m VV    Adderall - palpitations  Strattera - no improvement with add             Relevant Medications    lisdexamfetamine (VYVANSE) 20 MG capsule    lisdexamfetamine (VYVANSE) 20 MG capsule (Start on 12/16/2023)    lisdexamfetamine (VYVANSE) 20 MG capsule (Start on 1/15/2024)       Renal/    Hematuria    Overview     Likely kidney stones  - ct stone protocol  - flomax, cipro  - UAR          Relevant Medications    ciprofloxacin HCl (CIPRO) 500 MG tablet    tamsulosin (FLOMAX) 0.4 mg Cap    Other Relevant Orders    CT Renal Stone Study ABD Pelvis WO    Urinalysis, Reflex to Urine Culture Urine, Clean Catch     Other Visit Diagnoses       Abdominal pain, unspecified abdominal location        Relevant Medications    ciprofloxacin HCl (CIPRO) 500 MG tablet    tamsulosin (FLOMAX) 0.4 mg Cap    Other Relevant Orders    CT Renal Stone Study ABD Pelvis WO    Urinalysis, Reflex to Urine Culture Urine, Clean Catch            Follow up if symptoms worsen or fail to improve.    Chrissie Lancaster MD  _________________________________________________________________________      Patient ID: Sparkle Jose is a 42 y.o. female.    Chief Complaint: add    Pt diagnosed with ADD/ADHD several years ago. Denies mood instability, irritability, aggression, chest pain, decreased appetite, disordered sleep.     Adderall 15 XR caused palpitations. Strattera 40mg (no palpitations) no improvement.     Also working to quit smoking. Currently  smoking 5 cigarettes.     reports urinary frequency, urgency and dysuria, hematuria (common with uti). Hx of kidney stones, current sx feel similar to previous episodes of kidney stones, most recently few yrs ago. denies fever, chills, or abnormal vaginal discharge.    Medications Ordered This Encounter   Medications    ciprofloxacin HCl (CIPRO) 500 MG tablet     Sig: Take 1 tablet (500 mg total) by mouth every 12 (twelve) hours. for 7 days     Dispense:  14 tablet     Refill:  0    lisdexamfetamine (VYVANSE) 20 MG capsule     Sig: Take 1 capsule (20 mg total) by mouth every morning.     Dispense:  30 capsule     Refill:  0    lisdexamfetamine (VYVANSE) 20 MG capsule     Sig: Take 1 capsule (20 mg total) by mouth every morning.     Dispense:  30 capsule     Refill:  0    lisdexamfetamine (VYVANSE) 20 MG capsule     Sig: Take 1 capsule (20 mg total) by mouth every morning.     Dispense:  30 capsule     Refill:  0    tamsulosin (FLOMAX) 0.4 mg Cap     Sig: Take 1 capsule (0.4 mg total) by mouth once daily.     Dispense:  30 capsule     Refill:  11       Orders Placed This Encounter   Procedures    CT Renal Stone Study ABD Pelvis WO     Standing Status:   Future     Standing Expiration Date:   11/16/2024     Order Specific Question:   May the Radiologist modify the order per protocol to meet the clinical needs of the patient?     Answer:   Yes    Urinalysis, Reflex to Urine Culture Urine, Clean Catch     Standing Status:   Future     Standing Expiration Date:   1/14/2025     Order Specific Question:   Preferred Collection Type     Answer:   Urine, Clean Catch     Order Specific Question:   Specimen Source     Answer:   Urine         Past medical histories reviewed, including past medical, surgical, family and social histories.      Current Outpatient Medications on File Prior to Visit   Medication Sig Dispense Refill    atomoxetine (STRATTERA) 40 MG capsule Take 1 capsule (40 mg total) by mouth once daily. 30 capsule 0     clobetasol 0.05% (TEMOVATE) 0.05 % Oint 1 application daily as needed.       clobetasol 0.05% (TEMOVATE) 0.05 % Oint Apply topically 2 (two) times daily. 30 g 1    eletriptan (RELPAX) 40 MG tablet Take 1 tablet (40 mg total) by mouth as needed. may repeat in 2 hours if necessary 9 tablet 0    ixekizumab (TALTZ AUTOINJECTOR) 80 mg/mL AtIn Inject 80 mg into the skin every 28 days. (Patient not taking: Reported on 8/18/2023) 1 mL 11    ixekizumab (TALTZ AUTOINJECTOR, 2 PACK,) 80 mg/mL AtIn Inject 80 mg into the skin every 14 (fourteen) days. For induction dose week 4-10 (Patient not taking: Reported on 8/18/2023) 2 mL 1    ixekizumab (TALTZ AUTOINJECTOR, 3 PACK,) 80 mg/mL AtIn Inject 160 mg (2 pens) into the skin at week 0 then inject 80 mg (1 pen) into the skin at week 2 (Patient not taking: Reported on 8/18/2023) 3 mL 0    MOUNJARO 15 mg/0.5 mL PnIj Inject 15 mg into the skin once a week. 12 pen 2    oxyCODONE-acetaminophen (PERCOCET)  mg per tablet Take 1 tablet by mouth every 8 (eight) hours as needed for Pain. 90 tablet 0    promethazine (PHENERGAN) 25 MG tablet Take 1 tablet (25 mg total) by mouth every 6 (six) hours as needed for Nausea. 45 tablet 5    tirzepatide (MOUNJARO) 5 mg/0.5 mL PnIj Inject 5 mg into the skin every 7 days. 1 pen 11    tiZANidine (ZANAFLEX) 4 MG tablet Take 1 tablet (4 mg total) by mouth nightly as needed (muscle spasm). 90 tablet 1    traZODone (DESYREL) 100 MG tablet Take 1 tablet (100 mg total) by mouth every evening. 90 tablet 1     No current facility-administered medications on file prior to visit.       Review of Systems   12 point review of systems negative except for listed in HPI.     Objective:    Nursing note and vitals reviewed.  There were no vitals filed for this visit.      There is no height or weight on file to calculate BMI.     No vitals or full physical exam obtained as this is a virtual visit  Gen: no distress, comfortable          We Offer Telehealth & Same  Day Appointments!   Book your Telehealth appointment with me through my nurse or   Clinic appointments on [employer]!  [phone]     To Schedule appointments online, go to [employer]: [email or web address]       The patient location is: [place]  The chief complaint leading to consultation is: add    Visit type: audiovisual    Time with patient: 10 minutes   15 minutes of total time spent on the encounter, which includes face to face time and non-face to face time preparing to see the patient (eg, review of tests), Obtaining and/or reviewing separately obtained history, Documenting clinical information in the electronic or other health record, Independently interpreting results (not separately reported) and communicating results to the patient/family/caregiver, or Care coordination (not separately reported).         Each patient to whom he or she provides medical services by telemedicine is:  (1) informed of the relationship between the physician and patient and the respective role of any other health care provider with respect to management of the patient; and (2) notified that he or she may decline to receive medical services by telemedicine and may withdraw from such care at any time.    Answers submitted by the patient for this visit:  Review of Systems Questionnaire (Submitted on 11/16/2023)  activity change: No  unexpected weight change: No  neck pain: Yes  hearing loss: No  rhinorrhea: No  trouble swallowing: No  eye discharge: No  visual disturbance: No  chest tightness: No  wheezing: No  chest pain: No  palpitations: No  blood in stool: No  constipation: No  vomiting: No  diarrhea: No  polydipsia: No  polyuria: No  difficulty urinating: Yes  hematuria: Yes  menstrual problem: No  dysuria: Yes  joint swelling: Yes  arthralgias: Yes  headaches: Yes  weakness: No  confusion: No  dysphoric mood: No

## 2023-11-18 LAB — BACTERIA UR CULT: ABNORMAL

## 2023-11-20 PROBLEM — Z00.00 ANNUAL PHYSICAL EXAM: Status: RESOLVED | Noted: 2023-08-18 | Resolved: 2023-11-20

## 2023-11-20 NOTE — PROGRESS NOTES
Results have been released via [employer]. Please verify that these have been viewed by patient. If not, please call patient with results.    I have sent a message to them with the following interpretation (see below).  I have reviewed your recent urine culture.    Bacteria is sensitive to the antibiotic prescribed. Please complete the medication and follow up if symptoms persist.    Please do not hesitate to call or message with any additional questions or concerns.    Melanie Whitlock MD

## 2023-12-14 ENCOUNTER — PATIENT MESSAGE (OUTPATIENT)
Dept: FAMILY MEDICINE | Facility: CLINIC | Age: 43
End: 2023-12-14
Payer: MEDICARE

## 2023-12-18 ENCOUNTER — OFFICE VISIT (OUTPATIENT)
Dept: FAMILY MEDICINE | Facility: CLINIC | Age: 43
End: 2023-12-18
Payer: COMMERCIAL

## 2023-12-18 DIAGNOSIS — F98.8 ADD (ATTENTION DEFICIT DISORDER) WITHOUT HYPERACTIVITY: Primary | ICD-10-CM

## 2023-12-18 PROCEDURE — 99213 PR OFFICE/OUTPT VISIT, EST, LEVL III, 20-29 MIN: ICD-10-PCS | Mod: 95,,, | Performed by: PHYSICIAN ASSISTANT

## 2023-12-18 PROCEDURE — 99213 OFFICE O/P EST LOW 20 MIN: CPT | Mod: 95,,, | Performed by: PHYSICIAN ASSISTANT

## 2023-12-18 RX ORDER — LISDEXAMFETAMINE DIMESYLATE CAPSULES 20 MG/1
20 CAPSULE ORAL EVERY MORNING
Qty: 90 CAPSULE | Refills: 0 | Status: SHIPPED | OUTPATIENT
Start: 2023-12-18 | End: 2023-12-20 | Stop reason: SDUPTHER

## 2023-12-18 NOTE — PROGRESS NOTES
PHYSICIAN NEXT STEPS:  Review Only    CHIEF COMPLAINT:  Chief Complaint/Protocol Used: Cough  Onset: 3/8/2019      ASSESSMENT:  ? Onset: 3/8/2019  ? E To Triager - Respiratory Distress: 3/7/2019  ? Severity: consistently   ? Croup: Wet  ? Child's Appearance: He said he couldn't go to school felt too bad. ? Fever: Low grade fever 101. Tyelenol  ? Cause: Family is sick. -------------------------------------------------------    DISPOSITION:  Disposition Recommendation: See Physician within 4 Hours (or PCP triage)  Questions that led to disposition:  ? [1] Age < 1 year AND [2] continuous (non-stop) coughing keeps from feeding and sleeping AND [3] no improvement using cough treatment per guideline  Patient Directed To: Unspecified  Patient Intended Action: Seek care in the doctor's office       CALL NOTES:  03/11/2019 at 12:01 PM by Alena Ann  ? Appointment made for patient at [facility] with  on 3/11/2019  at 12:40 pm  ? Went to [facility] on friday, told it was a virus. Low grade fever, hurts to cough, body aches and throat hurts. Sees a cardiologist for aortic valve sticks. DISPOSITION OVERRIDE/PROVIDER CONSULT:  Disposition Override: N/A  Override Source: Unspecified  Consulted with PCP: No  Consulted with On-Call Physician: No    CALLER CONTACT INFO:  Phone 5:   [phone]      ENCOUNTER STARTED:  03/11/19 11:52:38 AM  ENCOUNTER ASSIGNED TO/CLOSED BY:  Alena Ann @ 03/11/19 12:02:05 PM      -------------------------------------------------------    CARE ADVICE given per Cough guideline. SEE PHYSICIAN WITHIN 4 HOURS (or PCP triage):  * IF OFFICE WILL BE OPEN: Your child needs to be seen within the next 3 or 4 hours. Call your doctor's office as soon as it opens. * IF OFFICE WILL BE CLOSED AND NO PCP TRIAGE: Your child needs to be seen within the next 3 or 4 hours. A nearby Urgent Care Center is often a good source of care. Another choice is to go to the ER.   Go sooner if your child becomes Primary Care Telemedicine Note  The patient location is: Patient Home   The chief complaint leading to consultation is: Attention Deficit Disorder  Total time spent with patient: 10 minutes    Visit type: Virtual visit with synchronous audio only and video  Each patient to whom he or she provides medical services by telemedicine is: (1) informed of the relationship between the physician and patient and the respective role of any other health care provider with respect to management of the patient; and (2) notified that he or she may decline to receive medical services by telemedicine and may withdraw from such care at any time.      Assessment/Plan:    Problem List Items Addressed This Visit          Psychiatric    ADD (attention deficit disorder) without hyperactivity - Primary    Overview     Chronic hx; recently switched to vyvanse 20mg with improvement of symptoms    Pt is demonstrating no behaviors to suggest inappropriate use of prescribed medications.    [place] Board of Pharmacy Controlled Prescription Drug Monitoring database was queried and showed no activity to suggest abuse, diversion, or other inappropriate use of prescription medications.  #90 tabs, 0 refills (requesting 3 month supply due to insurance issues)  3m VV    Adderall - palpitations  Strattera - no improvement with add             Relevant Medications    lisdexamfetamine (VYVANSE) 20 MG capsule       Follow up in about 3 months (around 3/18/2024), or if symptoms worsen or fail to improve.    Nat Serrano PA-C  _____________________________________________________________________________________________________________________________________________________    CC: Attention Deficit Disorder    HPI: The patient is being seen via telehealth today to discuss the chronic use of attention deficit medications. The patient has been on the medicine for the last month and has been stable on the current dose of medicine during that time. The  patient has been compliant on the medicine and is not receiving narcotics from any other physician. The  for the State of [place] has been viewed. The patient denies any complications from taking the medicine. The patient's weight and appetite has been stable and has not had difficulties with agitation. The patient reports improvement in the symptoms with the current dose. No other complaints today.    Past Medical History:   Diagnosis Date    Ankylosing spondylitis     Arthritis     Celiac disease     Depression     Family history of DVT     MGM DVT, mother spleenic    Family history of factor V Leiden mutation     mother and sister    GERD (gastroesophageal reflux disease)     Migraine     Osteoarthritis     Psoriasis     Psoriatic arthritis mutilans      Past Surgical History:   Procedure Laterality Date    brain decompression   2006    BRAIN SURGERY      TUBAL LIGATION       Review of patient's allergies indicates:  No Known Allergies  Social History     Tobacco Use    Smoking status: Every Day     Current packs/day: 0.50     Average packs/day: 0.5 packs/day for 31.0 years (15.5 ttl pk-yrs)     Types: Cigarettes     Start date: 1/1/1993    Smokeless tobacco: Never    Tobacco comments:     5-6 CIGS DAILY last quit attempt 8/14/2019   Substance Use Topics    Alcohol use: No    Drug use: Never     Family History   Problem Relation Age of Onset    Hypertension Mother     Clotting disorder Mother     Depression Sister     Asthma Brother     Learning disabilities Brother     Clotting disorder Sister     Breast cancer Neg Hx     Ovarian cancer Neg Hx      Current Outpatient Medications on File Prior to Visit   Medication Sig Dispense Refill    atomoxetine (STRATTERA) 40 MG capsule Take 1 capsule (40 mg total) by mouth once daily. 30 capsule 0    clobetasol 0.05% (TEMOVATE) 0.05 % Oint 1 application daily as needed.       clobetasol 0.05% (TEMOVATE) 0.05 % Oint Apply topically 2 (two) times daily. 30 g 1    eletriptan  worse.  * IF OFFICE WILL BE CLOSED AND PCP TRIAGE REQUIRED: Your child may need to be seen. Your doctor will want to talk with you to decide what's best. I'll page him now. If you haven't heard from the on-call doctor within 30 minutes, call again. (Note: If PCP   can't be reached, send to [facility] or ER.); HOMEMADE COUGH MEDICINE:   * AGE: 3 Months to 1 year:  * Give warm clear fluids (e.g., apple juice or lemonade) to thin the mucus and relax the airway. Dosage: 1-3 teaspoons (5-15 ml) four times per day. * Note to Triager: Option to be discussed only if caller complains that nothing else helps: Give a small amount of corn syrup. Dosage: 1/4 teaspoon (1 ml). Can give up to 4 times a day when coughing. Caution: Avoid honey until 3year old (Reason: risk   for botulism). * AGE 1 year and older: Use HONEY 1/2 to 1 tsp (2 to 5 ml) as needed as a homemade cough medicine. It can thin the secretions and loosen the cough. (If not available, can use corn syrup.)  * AGE 6 years and older: Use COUGH DROPS (throat drops) to decrease the tickle in the throat. If not available, can use hard candy. Avoid cough drops before 6 years. Reason: risk of choking.; COUGHING FITS OR SPELLS - WARM MIST AND FLUIDS:   * Breathe warm mist (such as with shower running in a closed bathroom). * Give warm clear fluids to drink. Examples are apple juice and lemonade. Don't use warm fluids before 1months of age. * Amount. If 1- 15months of age, give 1 ounce (30 ml) each time. Limit to 4 times per day. If over 1 year of age, give as much as needed. * Reason: Both relax the airway and loosen up any phlegm. * What to Expect: The coughing fit should stop. But, your child will still have a cough.; HUMIDIFIER:   * If the air is dry, use a humidifier in the bedroom (Reason: dry air makes coughs worse). * Avoid menthol vapors (Reason: makes coughs worse). ; CALL BACK IF:   * Trouble breathing occurs  * Your child becomes worse      UNDERSTANDS CARE (RELPAX) 40 MG tablet Take 1 tablet (40 mg total) by mouth as needed. may repeat in 2 hours if necessary 9 tablet 0    ixekizumab (TALTZ AUTOINJECTOR) 80 mg/mL AtIn Inject 80 mg into the skin every 28 days. (Patient not taking: Reported on 8/18/2023) 1 mL 11    ixekizumab (TALTZ AUTOINJECTOR, 2 PACK,) 80 mg/mL AtIn Inject 80 mg into the skin every 14 (fourteen) days. For induction dose week 4-10 (Patient not taking: Reported on 8/18/2023) 2 mL 1    ixekizumab (TALTZ AUTOINJECTOR, 3 PACK,) 80 mg/mL AtIn Inject 160 mg (2 pens) into the skin at week 0 then inject 80 mg (1 pen) into the skin at week 2 (Patient not taking: Reported on 8/18/2023) 3 mL 0    MOUNJARO 15 mg/0.5 mL PnIj Inject 15 mg into the skin once a week. 12 pen 2    oxyCODONE-acetaminophen (PERCOCET)  mg per tablet Take 1 tablet by mouth every 8 (eight) hours as needed for Pain. 90 tablet 0    promethazine (PHENERGAN) 25 MG tablet Take 1 tablet (25 mg total) by mouth every 6 (six) hours as needed for Nausea. 45 tablet 5    tamsulosin (FLOMAX) 0.4 mg Cap Take 1 capsule (0.4 mg total) by mouth once daily. 30 capsule 11    tirzepatide (MOUNJARO) 5 mg/0.5 mL PnIj Inject 5 mg into the skin every 7 days. 1 pen 11    tiZANidine (ZANAFLEX) 4 MG tablet Take 1 tablet (4 mg total) by mouth nightly as needed (muscle spasm). 90 tablet 1    traZODone (DESYREL) 100 MG tablet Take 1 tablet (100 mg total) by mouth every evening. 90 tablet 1    [DISCONTINUED] lisdexamfetamine (VYVANSE) 20 MG capsule Take 1 capsule (20 mg total) by mouth every morning. 30 capsule 0    [DISCONTINUED] lisdexamfetamine (VYVANSE) 20 MG capsule Take 1 capsule (20 mg total) by mouth every morning. 30 capsule 0     No current facility-administered medications on file prior to visit.       Review of Systems   Constitutional:  Negative for chills, fever and malaise/fatigue.   HENT:  Negative for hearing loss.    Eyes:  Negative for discharge.   Respiratory:  Negative for cough, shortness of  ADVICE: Yes    AGREES WITH CARE ADVICE: Yes    WILL FOLLOW CARE ADVICE: Yes    ------------------------------------------------------- breath and wheezing.    Cardiovascular:  Negative for chest pain, palpitations and leg swelling.   Gastrointestinal:  Negative for abdominal pain, blood in stool, constipation, diarrhea and vomiting.   Genitourinary:  Negative for dysuria, frequency and hematuria.   Musculoskeletal:  Positive for neck pain. Negative for back pain and joint pain.   Neurological:  Positive for headaches. Negative for weakness.   Endo/Heme/Allergies:  Negative for polydipsia.   Psychiatric/Behavioral:  Negative for depression. The patient is not nervous/anxious.        Physical Exam  Constitutional:       General: She is not in acute distress.     Appearance: She is well-developed.   HENT:      Head: Normocephalic and atraumatic.   Pulmonary:      Effort: Pulmonary effort is normal. No respiratory distress.   Abdominal:      General: There is no distension.   Musculoskeletal:         General: Normal range of motion.      Cervical back: Normal range of motion.   Neurological:      Mental Status: She is alert and oriented to person, place, and time.   Psychiatric:         Mood and Affect: Mood normal.         Behavior: Behavior normal.         The patient's Health Maintenance was reviewed and the following appears to be due at this time:  Health Maintenance Due   Topic Date Due    TETANUS VACCINE  Never done    Influenza Vaccine (1) Never done    Colorectal Cancer Screening  Never done

## 2023-12-20 ENCOUNTER — PATIENT MESSAGE (OUTPATIENT)
Dept: FAMILY MEDICINE | Facility: CLINIC | Age: 43
End: 2023-12-20
Payer: MEDICARE

## 2023-12-20 DIAGNOSIS — F98.8 ADD (ATTENTION DEFICIT DISORDER) WITHOUT HYPERACTIVITY: Primary | ICD-10-CM

## 2023-12-20 RX ORDER — LISDEXAMFETAMINE DIMESYLATE CAPSULES 20 MG/1
20 CAPSULE ORAL EVERY MORNING
Qty: 90 CAPSULE | Refills: 0 | Status: SHIPPED | OUTPATIENT
Start: 2023-12-20 | End: 2024-03-21

## 2023-12-20 NOTE — TELEPHONE ENCOUNTER
I have signed for the following orders AND/OR meds.  Please call the patient and ask the patient to schedule the testing AND/OR inform about any medications that were sent. Medications have been sent to pharmacy listed below      No orders of the defined types were placed in this encounter.      Medications Ordered This Encounter   Medications    lisdexamfetamine (VYVANSE) 20 MG capsule     Sig: Take 1 capsule (20 mg total) by mouth every morning.     Dispense:  90 capsule     Refill:  0         [facility] [id number] [facility] [place] - [address] [place] [address]  [address]  [place] [address]  Phone: [phone] Fax: [phone]    [facility] [address] [address]  Phone: [phone] Fax: [phone]    [facility] [place] - [address]  [address]  [place] [address]  Phone: [phone] Fax: [phone]    [facility] [address] [facility]  [address] [facility] [address]  Phone: [phone] Fax: [phone]    [id number] - [place] - [address] [place]  [address]  [place] [address]  Phone: [phone] Fax: [phone]

## 2023-12-21 ENCOUNTER — OFFICE VISIT (OUTPATIENT)
Dept: RHEUMATOLOGY | Facility: CLINIC | Age: 43
End: 2023-12-21
Payer: COMMERCIAL

## 2023-12-21 ENCOUNTER — PATIENT MESSAGE (OUTPATIENT)
Dept: RHEUMATOLOGY | Facility: CLINIC | Age: 43
End: 2023-12-21

## 2023-12-21 VITALS
HEIGHT: 60 IN | BODY MASS INDEX: 22.68 KG/M2 | DIASTOLIC BLOOD PRESSURE: 84 MMHG | SYSTOLIC BLOOD PRESSURE: 120 MMHG | WEIGHT: 115.5 LBS | HEART RATE: 84 BPM

## 2023-12-21 DIAGNOSIS — Z79.899 HIGH RISK MEDICATION USE: ICD-10-CM

## 2023-12-21 DIAGNOSIS — G89.29 CHRONIC LEFT SI JOINT PAIN: ICD-10-CM

## 2023-12-21 DIAGNOSIS — Z79.899 IMMUNOCOMPROMISED STATE DUE TO DRUG THERAPY: ICD-10-CM

## 2023-12-21 DIAGNOSIS — G89.4 CHRONIC PAIN SYNDROME: ICD-10-CM

## 2023-12-21 DIAGNOSIS — M53.3 CHRONIC LEFT SI JOINT PAIN: ICD-10-CM

## 2023-12-21 DIAGNOSIS — M45.9 ANKYLOSING SPONDYLITIS, UNSPECIFIED SITE OF SPINE: ICD-10-CM

## 2023-12-21 DIAGNOSIS — L40.9 PSORIASIS: ICD-10-CM

## 2023-12-21 DIAGNOSIS — D68.51 FACTOR 5 LEIDEN MUTATION, HETEROZYGOUS: ICD-10-CM

## 2023-12-21 DIAGNOSIS — Z86.69 HISTORY OF CHIARI MALFORMATION: ICD-10-CM

## 2023-12-21 DIAGNOSIS — L40.1 PUSTULAR PSORIASIS: Primary | ICD-10-CM

## 2023-12-21 DIAGNOSIS — M47.812 CERVICAL ARTHRITIS: ICD-10-CM

## 2023-12-21 DIAGNOSIS — M54.2 NECK PAIN: ICD-10-CM

## 2023-12-21 DIAGNOSIS — D84.821 IMMUNOCOMPROMISED STATE DUE TO DRUG THERAPY: ICD-10-CM

## 2023-12-21 DIAGNOSIS — R91.1 PULMONARY NODULE: ICD-10-CM

## 2023-12-21 PROCEDURE — 99999 PR PBB SHADOW E&M-EST. PATIENT-LVL IV: ICD-10-PCS | Mod: PBBFAC,,, | Performed by: INTERNAL MEDICINE

## 2023-12-21 PROCEDURE — 99215 PR OFFICE/OUTPT VISIT, EST, LEVL V, 40-54 MIN: ICD-10-PCS | Mod: 25,S$GLB,, | Performed by: INTERNAL MEDICINE

## 2023-12-21 PROCEDURE — 99215 OFFICE O/P EST HI 40 MIN: CPT | Mod: 25,S$GLB,, | Performed by: INTERNAL MEDICINE

## 2023-12-21 PROCEDURE — 96372 PR INJECTION,THERAP/PROPH/DIAG2ST, IM OR SUBCUT: ICD-10-PCS | Mod: S$GLB,,, | Performed by: INTERNAL MEDICINE

## 2023-12-21 PROCEDURE — 99999 PR PBB SHADOW E&M-EST. PATIENT-LVL IV: CPT | Mod: PBBFAC,,, | Performed by: INTERNAL MEDICINE

## 2023-12-21 PROCEDURE — 96372 THER/PROPH/DIAG INJ SC/IM: CPT | Mod: S$GLB,,, | Performed by: INTERNAL MEDICINE

## 2023-12-21 RX ORDER — MUPIROCIN 20 MG/G
OINTMENT TOPICAL 3 TIMES DAILY
Qty: 22 G | Refills: 4 | Status: SHIPPED | OUTPATIENT
Start: 2023-12-21 | End: 2023-12-31

## 2023-12-21 RX ORDER — ROFLUMILAST 3 MG/G
1 CREAM TOPICAL DAILY
Qty: 60 G | Refills: 3 | Status: ACTIVE | OUTPATIENT
Start: 2023-12-21 | End: 2024-03-07 | Stop reason: SDUPTHER

## 2023-12-21 RX ORDER — OXYCODONE AND ACETAMINOPHEN 10; 325 MG/1; MG/1
1 TABLET ORAL EVERY 8 HOURS PRN
Qty: 90 TABLET | Refills: 0 | Status: SHIPPED | OUTPATIENT
Start: 2024-01-17 | End: 2024-02-16

## 2023-12-21 RX ORDER — KETOROLAC TROMETHAMINE 30 MG/ML
60 INJECTION, SOLUTION INTRAMUSCULAR; INTRAVENOUS
Status: COMPLETED | OUTPATIENT
Start: 2023-12-21 | End: 2023-12-21

## 2023-12-21 RX ORDER — OXYCODONE AND ACETAMINOPHEN 10; 325 MG/1; MG/1
1 TABLET ORAL EVERY 8 HOURS PRN
Qty: 90 TABLET | Refills: 0 | Status: SHIPPED | OUTPATIENT
Start: 2023-12-21 | End: 2024-03-07

## 2023-12-21 RX ORDER — OXYCODONE AND ACETAMINOPHEN 10; 325 MG/1; MG/1
1 TABLET ORAL EVERY 8 HOURS PRN
Qty: 90 TABLET | Refills: 0 | Status: SHIPPED | OUTPATIENT
Start: 2024-02-15 | End: 2024-02-15 | Stop reason: SDUPTHER

## 2023-12-21 RX ORDER — CYANOCOBALAMIN 1000 UG/ML
1000 INJECTION, SOLUTION INTRAMUSCULAR; SUBCUTANEOUS
Status: COMPLETED | OUTPATIENT
Start: 2023-12-21 | End: 2023-12-21

## 2023-12-21 RX ORDER — METHYLPREDNISOLONE ACETATE 80 MG/ML
160 INJECTION, SUSPENSION INTRA-ARTICULAR; INTRALESIONAL; INTRAMUSCULAR; SOFT TISSUE
Status: COMPLETED | OUTPATIENT
Start: 2023-12-21 | End: 2023-12-21

## 2023-12-21 RX ADMIN — CYANOCOBALAMIN 1000 MCG: 1000 INJECTION, SOLUTION INTRAMUSCULAR; SUBCUTANEOUS at 10:12

## 2023-12-21 RX ADMIN — KETOROLAC TROMETHAMINE 60 MG: 30 INJECTION, SOLUTION INTRAMUSCULAR; INTRAVENOUS at 10:12

## 2023-12-21 RX ADMIN — METHYLPREDNISOLONE ACETATE 160 MG: 80 INJECTION, SUSPENSION INTRA-ARTICULAR; INTRALESIONAL; INTRAMUSCULAR; SOFT TISSUE at 10:12

## 2023-12-21 ASSESSMENT — ROUTINE ASSESSMENT OF PATIENT INDEX DATA (RAPID3)
MDHAQ FUNCTION SCORE: 1.6
PAIN SCORE: 6
TOTAL RAPID3 SCORE: 5.44
FATIGUE SCORE: 2.2
PATIENT GLOBAL ASSESSMENT SCORE: 5
PSYCHOLOGICAL DISTRESS SCORE: 0

## 2023-12-21 NOTE — PROGRESS NOTES
Subjective:     Patient ID:  Sparkle Jose    Chief Complaint:  Disease Management     History of Present Illness:      Follow up: 43 year old female who presents to clinic for follow up on AS. She is doing fair on cosentyx 300 mg monthly. On treatment she has cervical,  Thoracic and   lumbar. We reviewed her chart and she lost 45 lbs and  on mounjaro . She noticed increased joint pain and stiffness  She has been on for 3 months.. She continues to have pain in her elbows, knees, neck, and low back. Pain is constant and aching. Taking prednisone on rare occasion for flares. She is taking percocet more frequently since she has been off treatment.    GERD has been worse at night.     Current tx:  1. cosentyx 300 mg  2. No longer take oral prednisone  Prior tx:  1. Humira  2. Remicade (drug induced lupus)  Rheumatologic History:   - Diagnosis/es:  - Positive serologies:  - Negative serologies:  - Infectious screening labs:  - Imaging:  - Previous Treatments:  - Current Treatments:     Interval History:   Hospitalization since last office visit: No    Patient Active Problem List    Diagnosis Date Noted    Hematuria 11/16/2023    Metabolic syndrome 08/18/2023    Immunocompromised state due to drug therapy 03/07/2023    ADD (attention deficit disorder) without hyperactivity 03/07/2023    Prediabetes 06/15/2022    Anxiety and depression 03/15/2022    Primary insomnia 03/15/2022    History of Chiari malformation 03/15/2022    Thyroid nodule 11/26/2021    Cigarette nicotine dependence without complication 08/11/2019    Pulmonary nodule 08/11/2019    Thrombocytosis 08/22/2018    Leukocytosis 08/22/2018    Factor 5 Leiden mutation, heterozygous 08/22/2018    Psoriatic arthritis 02/19/2015    Ankylosing spondylitis 11/12/2014     Past Surgical History:   Procedure Laterality Date    brain decompression   2006    BRAIN SURGERY      TUBAL LIGATION       Social History     Tobacco Use    Smoking status: Every Day     Current  packs/day: 0.50     Average packs/day: 0.5 packs/day for 31.0 years (15.5 ttl pk-yrs)     Types: Cigarettes     Start date: 1/1/1993    Smokeless tobacco: Never    Tobacco comments:     5-6 CIGS DAILY last quit attempt 8/14/2019   Substance Use Topics    Alcohol use: No    Drug use: Never     Family History   Problem Relation Age of Onset    Hypertension Mother     Clotting disorder Mother     Depression Sister     Asthma Brother     Learning disabilities Brother     Clotting disorder Sister     Breast cancer Neg Hx     Ovarian cancer Neg Hx      Review of patient's allergies indicates:  No Known Allergies      Influenza: Discuss and recommend annually.   PCV 20: Discuss and recommend 1 dose  Based on share clinical decision making. Either way Pneumonia vaccines are complete. Due now if pt would like to receive  Tetanus (TdaP): Discuss and recommend booster every 10 years. Due now  Shingrix: Discuss and recommend. 2 dose series 2-6 months apart. Due now  Covid: CDC recommends 1 new 6023-7034 dose in immunocompromised pts that has received 2 or more COVID doses. 3 doses are recommended in non-vaccinated immunocompromised patients  RSV: Discuss and recommend 1 dose if 60 years old or older    Review of Systems   Review of Systems   Constitutional:  Negative for activity change, appetite change, chills, diaphoresis, fatigue, fever and unexpected weight change.   HENT:  Negative for congestion, dental problem, ear discharge, ear pain, facial swelling, mouth sores, nosebleeds, postnasal drip, rhinorrhea, sinus pressure, sneezing, sore throat, tinnitus, trouble swallowing and voice change.    Eyes:  Negative for photophobia, pain, discharge, redness and itching.   Respiratory:  Negative for apnea, cough, chest tightness, shortness of breath and wheezing.    Cardiovascular:  Negative for chest pain, palpitations and leg swelling.   Gastrointestinal:  Negative for abdominal distention, abdominal pain, constipation,  diarrhea, nausea and vomiting.   Endocrine: Negative for cold intolerance, heat intolerance, polydipsia and polyuria.   Genitourinary:  Negative for decreased urine volume, difficulty urinating, dysuria, flank pain, frequency, hematuria and urgency.   Musculoskeletal:  Negative for arthralgias, back pain, gait problem, joint swelling, myalgias, neck pain and neck stiffness.   Skin:  Negative for pallor, rash and wound.   Allergic/Immunologic: Negative for immunocompromised state.   Neurological:  Negative for dizziness, tremors, weakness, numbness and headaches.   Hematological:  Negative for adenopathy. Does not bruise/bleed easily.   Psychiatric/Behavioral:  Negative for sleep disturbance. The patient is not nervous/anxious.         Current Medications:  Current Outpatient Medications   Medication Instructions    atomoxetine (STRATTERA) 40 mg, Oral, Daily    clobetasol 0.05% (TEMOVATE) 0.05 % Oint 1 application , Daily PRN    clobetasol 0.05% (TEMOVATE) 0.05 % Oint Topical (Top), 2 times daily    eletriptan (RELPAX) 40 mg, Oral, As needed (PRN), may repeat in 2 hours if necessary     lisdexamfetamine (VYVANSE) 20 mg, Oral, Every morning    MOUNJARO 5 mg, Subcutaneous, Every 7 days    MOUNJARO 15 mg, Subcutaneous, Weekly    mupirocin (BACTROBAN) 2 % ointment Topical (Top), 3 times daily    oxyCODONE-acetaminophen (PERCOCET)  mg per tablet 1 tablet, Oral, Every 8 hours PRN    [START ON 1/17/2024] oxyCODONE-acetaminophen (PERCOCET)  mg per tablet 1 tablet, Oral, Every 8 hours PRN    [START ON 2/15/2024] oxyCODONE-acetaminophen (PERCOCET)  mg per tablet 1 tablet, Oral, Every 8 hours PRN    promethazine (PHENERGAN) 25 mg, Oral, Every 6 hours PRN    roflumilast (ZORYVE) 0.3 % Crea 1 Application, Topical (Top), Daily    TALTZ AUTOINJECTOR (2 PACK) 80 mg, Subcutaneous, Every 14 days, For induction dose week 4-10    TALTZ AUTOINJECTOR 80 mg, Subcutaneous, Every 28 days    tamsulosin (FLOMAX) 0.4 mg, Oral,  Daily    tiZANidine (ZANAFLEX) 4 mg, Oral, Nightly PRN    traZODone (DESYREL) 100 mg, Oral, Nightly         Objective:     Vitals:    12/21/23 0937   BP: 120/84   Pulse: 84   Weight: 52.4 kg (115 lb 8.3 oz)   Height: 5' (1.524 m)   PainSc:   5   PainLoc: Back      Body mass index is 22.56 kg/m².     Physical Examinations:  Physical Exam   Constitutional: She is oriented to person, place, and time.   HENT:   Head: Normocephalic and atraumatic.   Mouth/Throat: Oropharynx is clear and moist.   Eyes: Pupils are equal, round, and reactive to light.   Neck: No thyromegaly present.   Cardiovascular: Normal rate, regular rhythm and normal heart sounds. Exam reveals no gallop and no friction rub.   No murmur heard.  Pulmonary/Chest: Breath sounds normal. She has no wheezes. She has no rales. She exhibits no tenderness.   Abdominal: There is no abdominal tenderness. There is no rebound and no guarding.   Musculoskeletal:         General: Tenderness present.      Right shoulder: Tenderness present.      Left shoulder: Tenderness present.      Right elbow: Normal.      Left elbow: Normal.      Cervical back: Neck supple.      Right knee: Swelling and effusion present. Tenderness present.      Left knee: Effusion present.   Lymphadenopathy:     She has no cervical adenopathy.   Neurological: She is alert and oriented to person, place, and time. She has normal sensation. Gait normal.   Reflex Scores:       Patellar reflexes are 3+ on the right side and 3+ on the left side.  Skin: No rash noted. No erythema. No pallor.   Psychiatric: Mood and affect normal.   Vitals reviewed.      Right Side Rheumatological Exam     Examination finds the elbow, 1st MCP, 2nd MCP, 3rd MCP, 4th MCP and 5th MCP normal.    The patient is tender to palpation of the shoulder and knee    She has swelling of the knee    The patient has an enlarged wrist, 1st PIP, 2nd PIP, 3rd PIP, 4th PIP and 5th PIP    Shoulder Exam   Tenderness Location:  acromion    Range of Motion   Active abduction:  abnormal   Adduction: abnormal  Sensation: normal    Knee Exam   Tenderness Location: medial joint line  Patellofemoral Crepitus: positive  Effusion: positive  Sensation: normal    Hip Exam   Tenderness Location: no tenderness  Sensation: normal    Elbow/Wrist Exam   Tenderness Location: no tenderness  Sensation: normal    Left Side Rheumatological Exam     Examination finds the elbow, 1st MCP, 2nd MCP, 3rd MCP, 4th MCP and 5th MCP normal.    The patient is tender to palpation of the shoulder.    The patient has an enlarged wrist, 1st PIP, 2nd PIP, 3rd PIP, 4th PIP and 5th PIP.    Shoulder Exam   Tenderness Location: acromion    Range of Motion   Active abduction:  abnormal   Sensation: normal    Knee Exam   Tenderness Location: lateral joint line and medial joint line    Patellofemoral Crepitus: positive  Effusion: positive  Sensation: normal    Hip Exam   Tenderness Location: no tenderness  Sensation: normal    Elbow/Wrist Exam   Sensation: normal      Back/Neck Exam   General Inspection   Gait: normal       Tenderness Right paramedian tenderness of the Lower C-Spine and Lower L-Spine.Left paramedian tenderness of the Upper C-Spine and Lower L-Spine.         Disease Assessment Scores:  Patient's Global Assessment of arthritis (0-10): 2  Physician's Global Assessment of arthritis (0-10): 4  Number of Tender Joints (0-28): 7  Number of Swollen Joints (0-28): 9    There is currently no information documented on the homunculus. Go to the Rheumatology activity and complete the homunculus joint exam.         12/8/2022    12:56 PM   Rapid3 Question Responses and Scores   MDHAQ Score 1.4   Psychologic Score 1.1   Pain Score 6   When you awakened in the morning OVER THE LAST WEEK, did you feel stiff? Yes   If Yes, please indicate the number of hours until you are as limber as you will be for the day 2   Fatigue Score 9   Global Health Score 5.5   RAPID3 Score 5.39  "      Monitoring Lab Results:  Lab Results   Component Value Date    WBC 10.52 07/21/2023    RBC 4.58 07/21/2023    HGB 13.9 07/21/2023    HCT 43.0 07/21/2023    MCV 94 07/21/2023    MCH 30.3 07/21/2023    MCHC 32.3 07/21/2023    RDW 14.2 07/21/2023     07/21/2023        Lab Results   Component Value Date     08/02/2023    K 4.0 08/02/2023     08/02/2023    CO2 25 08/02/2023    GLU 83 08/02/2023    BUN 9 08/02/2023    CREATININE 0.6 08/02/2023    CALCIUM 9.7 08/02/2023    PROT 7.1 07/21/2023    ALBUMIN 3.8 07/21/2023    BILITOT 0.3 07/21/2023    ALKPHOS 76 07/21/2023    AST 12 07/21/2023    ALT 10 07/21/2023    ANIONGAP 9 08/02/2023    EGFRNORACEVR >60.0 08/02/2023       Lab Results   Component Value Date    SEDRATE 9 07/21/2023    CRP 2.9 07/21/2023        Lab Results   Component Value Date    MEZRIBAI04KI 43 07/09/2019    RMYHQKAP46 387 01/03/2018        Lab Results   Component Value Date    CHOL 176 04/13/2023    HDL 55 04/13/2023    LDLCALC 93.2 04/13/2023    TRIG 139 04/13/2023       Lab Results   Component Value Date    CCPANTIBODIE 1.1 11/12/2014     Lab Results   Component Value Date    ANASCREEN Negative <1:160 03/09/2018    DSDNA Negative 1:10 09/05/2017     Lab Results   Component Value Date    HLABB27 Negative 11/12/2014       Infectious Disease Screening:  Lab Results   Component Value Date    HEPBSAG Non-reactive 07/21/2023    HEPBCAB Non-reactive 07/21/2023    HEPBSAB <3.00 07/21/2023    HEPBSAB Non-reactive 07/21/2023    HEPBIGM Negative 07/09/2019     Lab Results   Component Value Date    HEPCAB Non-reactive 07/21/2023     Lab Results   Component Value Date    TBGOLDPLUS Negative 07/21/2023     No results found for: "QUANTIFERON", "SVCMT", "QUANTAGVALUE", "QUANTNILVALU", "QUANTMITOGEN", "QFTTBAG", "QINT"     Imaging:  DEXA, Xrays, MRIs, CTs, etc    Current Medication Changes:  Medication List with Changes/Refills   New Medications    MUPIROCIN (BACTROBAN) 2 % OINTMENT    Apply " topically 3 (three) times daily. for 10 days    OXYCODONE-ACETAMINOPHEN (PERCOCET)  MG PER TABLET    Take 1 tablet by mouth every 8 (eight) hours as needed for Pain.    OXYCODONE-ACETAMINOPHEN (PERCOCET)  MG PER TABLET    Take 1 tablet by mouth every 8 (eight) hours as needed for Pain.    ROFLUMILAST (ZORYVE) 0.3 % CREA    Apply 1 Application topically once daily.   Current Medications    ATOMOXETINE (STRATTERA) 40 MG CAPSULE    Take 1 capsule (40 mg total) by mouth once daily.    CLOBETASOL 0.05% (TEMOVATE) 0.05 % OINT    1 application daily as needed.     CLOBETASOL 0.05% (TEMOVATE) 0.05 % OINT    Apply topically 2 (two) times daily.    ELETRIPTAN (RELPAX) 40 MG TABLET    Take 1 tablet (40 mg total) by mouth as needed. may repeat in 2 hours if necessary    IXEKIZUMAB (TALTZ AUTOINJECTOR) 80 MG/ML ATIN    Inject 80 mg into the skin every 28 days.    IXEKIZUMAB (TALTZ AUTOINJECTOR, 2 PACK,) 80 MG/ML ATIN    Inject 80 mg into the skin every 14 (fourteen) days. For induction dose week 4-10    LISDEXAMFETAMINE (VYVANSE) 20 MG CAPSULE    Take 1 capsule (20 mg total) by mouth every morning.    MOUNJARO 15 MG/0.5 ML PNIJ    Inject 15 mg into the skin once a week.    PROMETHAZINE (PHENERGAN) 25 MG TABLET    Take 1 tablet (25 mg total) by mouth every 6 (six) hours as needed for Nausea.    TAMSULOSIN (FLOMAX) 0.4 MG CAP    Take 1 capsule (0.4 mg total) by mouth once daily.    TIRZEPATIDE (MOUNJARO) 5 MG/0.5 ML PNIJ    Inject 5 mg into the skin every 7 days.    TIZANIDINE (ZANAFLEX) 4 MG TABLET    Take 1 tablet (4 mg total) by mouth nightly as needed (muscle spasm).    TRAZODONE (DESYREL) 100 MG TABLET    Take 1 tablet (100 mg total) by mouth every evening.   Changed and/or Refilled Medications    Modified Medication Previous Medication    OXYCODONE-ACETAMINOPHEN (PERCOCET)  MG PER TABLET oxyCODONE-acetaminophen (PERCOCET)  mg per tablet       Take 1 tablet by mouth every 8 (eight) hours as needed for  Pain.    Take 1 tablet by mouth every 8 (eight) hours as needed for Pain.   Discontinued Medications    IXEKIZUMAB (TALTZ AUTOINJECTOR, 3 PACK,) 80 MG/ML ATIN    Inject 160 mg (2 pens) into the skin at week 0 then inject 80 mg (1 pen) into the skin at week 2      CollectedUpdatedProcedure  11/16/2023 3596301/16/2023 1548  Urinalysis, Reflex to Urine Culture Urine, Clean Catch [[id number]]    (Abnormal)   Urine   Component Value   Specimen UA Urine, Clean Catch   Color, UA Yellow   Appearance, UA Clear   pH, UA 6.5   Specific Gravity, UA 1.010   Protein, UA Negative    Glucose, UA Negative   Ketones, UA Negative   Bilirubin (UA) Negative   Occult Blood UA Trace Abnormal    Nitrite, UA Negative   Leukocytes, UA 1+ Abnormal    11/16/2023 9918851/16/2023 1548  Urinalysis Microscopic [[id number]]    (Abnormal)   Component Value Units   RBC, UA 1 /hpf   WBC, UA 15 High  /hpf   Bacteria Moderate Abnormal  /hpf   Squam Epithel, UA 10 /hpf   Microscopic Comment SEE COMMENT     08/02/2023 16811108/02/2023 1801  T4, FREE [[id number]]   Blood   Component Value Units   Free T4 1.07 ng/dL   08/02/2023 47659208/02/2023 1824  Hemoglobin A1C [[id number]]   Blood   Component Value Units   Hemoglobin A1C 5.0  %   Estimated Avg Glucose 97 mg/dL   08/02/2023 44112808/02/2023 1736  Basic Metabolic Panel [[id number]]   Blood   Component Value Units   Sodium 139 mmol/L   Potassium 4.0 mmol/L   Chloride 105 mmol/L   CO2 25 mmol/L   Glucose 83 mg/dL   BUN 9 mg/dL   Creatinine 0.6 mg/dL   Calcium 9.7 mg/dL   Anion Gap 9 mmol/L   eGFR >60.0 mL/min/1.73 m^2   08/02/2023 05730208/02/2023 1801  TSH [[id number]]   Blood   Component Value Units   TSH 0.765 uIU/mL   07/21/2023 3778857/21/2023 2058  Hepatitis C Antibody [[id number]]    Blood   Component Value   Hepatitis C Ab Non-reactive   07/21/2023 7691117/21/2023 2059  Hepatitis B Core Antibody, Total [[id number]]    Blood   Component Value   Hep B Core Total Ab Non-reactive   07/21/2023 1926247/21/2023  2058  Hepatitis B Surface Antigen [[id number]]    Blood   Component Value   Hepatitis B Surface Ag Non-reactive   07/21/2023 315109907/21/2023 2058  HEPATITIS B SURFACE ANTIBODY [[id number]]    Blood   Component Value Units   Hep B S Ab <3.00 mIU/mL   Hep B S Ab Non-reactive     07/21/2023 130907/21/2023 2055  Sedimentation rate [[id number]]    Blood   Component Value Units   Sed Rate 9 mm/Hr   07/21/2023 293892907/21/2023 1959  C-Reactive Protein [[id number]]    Blood   Component Value Units   CRP 2.9 mg/L   07/21/2023 812863907/21/2023 2000  Comprehensive Metabolic Panel [[id number]]    Blood   Component Value Units   Sodium 137 mmol/L   Potassium 4.2 mmol/L   Chloride 102 mmol/L   CO2 27 mmol/L   Glucose 81 mg/dL   BUN 10 mg/dL   Creatinine 0.6 mg/dL   Calcium 9.5 mg/dL   Total Protein 7.1 g/dL   Albumin 3.8 g/dL   Total Bilirubin 0.3  mg/dL   Alkaline Phosphatase 76 U/L   AST 12 U/L   ALT 10 U/L   eGFR >60.0 mL/min/1.73 m^2   Anion Gap 8 mmol/L   07/21/2023 130907/21/2023 1929  CBC Auto Differential [[id number]]    Blood   Component Value Units   WBC 10.52 K/uL   RBC 4.58 M/uL   Hemoglobin 13.9 g/dL   Hematocrit 43.0 %   MCV 94 fL   MCH 30.3 pg   MCHC 32.3 g/dL   RDW 14.2 %   Platelets 421 K/uL   MPV 10.3 fL   Immature Granulocytes 0.3 %   Gran # (ANC) 5.3 K/uL   Immature Grans (Abs) 0.03  K/uL   Lymph # 4.2 K/uL   Mono # 0.7 K/uL   Eos # 0.1 K/uL   Baso # 0.11 K/uL   nRBC 0 /100 WBC   Gran % 50.8 %   Lymph % 40.1 %   Mono % 6.8 %   Eosinophil % 1.0 %   Basophil % 1.0 %   Differential Method Automated    07/21/2023 357493907/24/2023 0933  Quantiferon Gold TB [[id number]]   Blood   Component Value Units   NIL 0.38312  IU/mL   TB1 - Nil 0.014 IU/mL   TB2 - Nil 0.039 IU/mL   Mitogen - Nil 9.971 IU/mL   TB Gold Plus Negative       Assessment:     Pt is a 43 y.o. female with ankylosing spondylitis, psoriatic arthritis, and psoriasis. The patient has not achieved clinical remission. Plan is to continue ixekizumab (TALTZ) and continue       Encounter Diagnoses   Name Primary?    Pustular psoriasis Yes    History of Chiari malformation     Psoriasis     Immunocompromised state due to drug therapy     Factor 5 Leiden mutation, heterozygous     Pulmonary nodule     Ankylosing spondylitis, unspecified site of spine     Chronic left SI joint pain     Chronic pain syndrome     Neck pain     Cervical arthritis     High risk medication use        Plan:      There are no diagnoses linked to this encounter.     Sparkle was seen today for disease management.    Diagnoses and all orders for this visit:    Pustular psoriasis  -     roflumilast (ZORYVE) 0.3 % Crea; Apply 1 Application topically once daily.  -     mupirocin (BACTROBAN) 2 % ointment; Apply topically 3 (three) times daily. for 10 days  -     Cancel: X-Ray Cervical Spine AP And Lateral; Future  -     X-Ray Thoracolumbar Spine AP Lateral; Future    History of Chiari malformation  -     Cancel: X-Ray Cervical Spine AP And Lateral; Future  -     X-Ray Thoracolumbar Spine AP Lateral; Future    Psoriasis  -     ketorolac injection 60 mg  -     methylPREDNISolone acetate injection 160 mg  -     X-Ray Cervical Spine AP And Lateral; Future  -     X-Ray Thoracic Spine AP Lateral; Future  -     HME - OTHER  -     cyanocobalamin injection 1,000 mcg  -     Cancel: X-Ray Cervical Spine AP And Lateral; Future  -     X-Ray Thoracolumbar Spine AP Lateral; Future    Immunocompromised state due to drug therapy  -     Cancel: X-Ray Cervical Spine AP And Lateral; Future  -     X-Ray Thoracolumbar Spine AP Lateral; Future    Factor 5 Leiden mutation, heterozygous  -     Cancel: X-Ray Cervical Spine AP And Lateral; Future  -     X-Ray Thoracolumbar Spine AP Lateral; Future    Pulmonary nodule  -     ketorolac injection 60 mg  -     methylPREDNISolone acetate injection 160 mg  -     X-Ray Cervical Spine AP And Lateral; Future  -     X-Ray Thoracic Spine AP Lateral; Future  -     HME - OTHER  -     cyanocobalamin injection  1,000 mcg  -     Cancel: X-Ray Cervical Spine AP And Lateral; Future  -     X-Ray Thoracolumbar Spine AP Lateral; Future    Ankylosing spondylitis, unspecified site of spine  -     oxyCODONE-acetaminophen (PERCOCET)  mg per tablet; Take 1 tablet by mouth every 8 (eight) hours as needed for Pain.  -     oxyCODONE-acetaminophen (PERCOCET)  mg per tablet; Take 1 tablet by mouth every 8 (eight) hours as needed for Pain.  -     oxyCODONE-acetaminophen (PERCOCET)  mg per tablet; Take 1 tablet by mouth every 8 (eight) hours as needed for Pain.  -     ketorolac injection 60 mg  -     methylPREDNISolone acetate injection 160 mg  -     X-Ray Cervical Spine AP And Lateral; Future  -     X-Ray Thoracic Spine AP Lateral; Future  -     HME - OTHER  -     cyanocobalamin injection 1,000 mcg  -     Cancel: X-Ray Cervical Spine AP And Lateral; Future  -     X-Ray Thoracolumbar Spine AP Lateral; Future    Chronic left SI joint pain  -     oxyCODONE-acetaminophen (PERCOCET)  mg per tablet; Take 1 tablet by mouth every 8 (eight) hours as needed for Pain.  -     oxyCODONE-acetaminophen (PERCOCET)  mg per tablet; Take 1 tablet by mouth every 8 (eight) hours as needed for Pain.  -     oxyCODONE-acetaminophen (PERCOCET)  mg per tablet; Take 1 tablet by mouth every 8 (eight) hours as needed for Pain.  -     Cancel: X-Ray Cervical Spine AP And Lateral; Future  -     X-Ray Thoracolumbar Spine AP Lateral; Future    Chronic pain syndrome  -     oxyCODONE-acetaminophen (PERCOCET)  mg per tablet; Take 1 tablet by mouth every 8 (eight) hours as needed for Pain.  -     oxyCODONE-acetaminophen (PERCOCET)  mg per tablet; Take 1 tablet by mouth every 8 (eight) hours as needed for Pain.  -     oxyCODONE-acetaminophen (PERCOCET)  mg per tablet; Take 1 tablet by mouth every 8 (eight) hours as needed for Pain.  -     Cancel: X-Ray Cervical Spine AP And Lateral; Future  -     X-Ray Thoracolumbar Spine AP  Lateral; Future    Neck pain  -     ketorolac injection 60 mg  -     methylPREDNISolone acetate injection 160 mg  -     X-Ray Cervical Spine AP And Lateral; Future  -     X-Ray Thoracic Spine AP Lateral; Future  -     HME - OTHER  -     Cancel: X-Ray Cervical Spine AP And Lateral; Future  -     X-Ray Thoracolumbar Spine AP Lateral; Future    Cervical arthritis  -     ketorolac injection 60 mg  -     methylPREDNISolone acetate injection 160 mg  -     X-Ray Cervical Spine AP And Lateral; Future  -     X-Ray Thoracic Spine AP Lateral; Future  -     HME - OTHER  -     Cancel: X-Ray Cervical Spine AP And Lateral; Future  -     X-Ray Thoracolumbar Spine AP Lateral; Future    High risk medication use  -     Cancel: X-Ray Cervical Spine AP And Lateral; Future  -     X-Ray Thoracolumbar Spine AP Lateral; Future      1.refill perocet  2.nurse injections  3. Xray ordered  4. I have  check [place] prescription monitoring program site and no unusual or abnormal behavior has occurred pt understand the risk and benefits of taking opioid medications and has decided to continue the  medication     More than 50% of the  40 minute encounter was spent face to face counseling the patient regarding current status and future plan of care as well as side effects  of the medications. All questions were answered to patient's satisfaction also includes  non-face to face time preparing to see the patient (eg, review of tests), Obtaining and/or reviewing separately obtained history, Documenting clinical information in the electronic or other health record, Independently interpreting results

## 2023-12-26 ENCOUNTER — HOSPITAL ENCOUNTER (OUTPATIENT)
Dept: RADIOLOGY | Facility: HOSPITAL | Age: 43
Discharge: HOME | End: 2023-12-26
Attending: INTERNAL MEDICINE
Payer: COMMERCIAL

## 2023-12-26 ENCOUNTER — PATIENT MESSAGE (OUTPATIENT)
Dept: RHEUMATOLOGY | Facility: CLINIC | Age: 43
End: 2023-12-26
Payer: MEDICARE

## 2023-12-26 DIAGNOSIS — L40.9 PSORIASIS: ICD-10-CM

## 2023-12-26 DIAGNOSIS — Z79.899 IMMUNOCOMPROMISED STATE DUE TO DRUG THERAPY: ICD-10-CM

## 2023-12-26 DIAGNOSIS — Z86.69 HISTORY OF CHIARI MALFORMATION: ICD-10-CM

## 2023-12-26 DIAGNOSIS — D84.821 IMMUNOCOMPROMISED STATE DUE TO DRUG THERAPY: ICD-10-CM

## 2023-12-26 DIAGNOSIS — D68.51 FACTOR 5 LEIDEN MUTATION, HETEROZYGOUS: ICD-10-CM

## 2023-12-26 DIAGNOSIS — M54.2 NECK PAIN: ICD-10-CM

## 2023-12-26 DIAGNOSIS — G89.4 CHRONIC PAIN SYNDROME: ICD-10-CM

## 2023-12-26 DIAGNOSIS — M45.9 ANKYLOSING SPONDYLITIS, UNSPECIFIED SITE OF SPINE: ICD-10-CM

## 2023-12-26 DIAGNOSIS — R91.1 PULMONARY NODULE: ICD-10-CM

## 2023-12-26 DIAGNOSIS — G89.29 CHRONIC LEFT SI JOINT PAIN: ICD-10-CM

## 2023-12-26 DIAGNOSIS — M47.812 CERVICAL ARTHRITIS: ICD-10-CM

## 2023-12-26 DIAGNOSIS — Z79.899 HIGH RISK MEDICATION USE: ICD-10-CM

## 2023-12-26 DIAGNOSIS — L40.1 PUSTULAR PSORIASIS: ICD-10-CM

## 2023-12-26 DIAGNOSIS — M53.3 CHRONIC LEFT SI JOINT PAIN: ICD-10-CM

## 2023-12-26 PROCEDURE — 72080 XR THORACOLUMBAR SPINE AP LATERAL: ICD-10-PCS | Mod: 26,,, | Performed by: RADIOLOGY

## 2023-12-26 PROCEDURE — 72070 X-RAY EXAM THORAC SPINE 2VWS: CPT | Mod: 26,,, | Performed by: RADIOLOGY

## 2023-12-26 PROCEDURE — 72040 X-RAY EXAM NECK SPINE 2-3 VW: CPT | Mod: TC,PO

## 2023-12-26 PROCEDURE — 72080 X-RAY EXAM THORACOLMB 2/> VW: CPT | Mod: TC,PO

## 2023-12-26 PROCEDURE — 72070 X-RAY EXAM THORAC SPINE 2VWS: CPT | Mod: TC,PO

## 2023-12-26 PROCEDURE — 72070 XR THORACIC SPINE AP LATERAL: ICD-10-PCS | Mod: 26,,, | Performed by: RADIOLOGY

## 2023-12-26 PROCEDURE — 72040 XR CERVICAL SPINE AP LATERAL: ICD-10-PCS | Mod: 26,,, | Performed by: RADIOLOGY

## 2023-12-26 PROCEDURE — 72080 X-RAY EXAM THORACOLMB 2/> VW: CPT | Mod: 26,,, | Performed by: RADIOLOGY

## 2023-12-26 PROCEDURE — 72040 X-RAY EXAM NECK SPINE 2-3 VW: CPT | Mod: 26,,, | Performed by: RADIOLOGY

## 2023-12-28 ENCOUNTER — PATIENT MESSAGE (OUTPATIENT)
Dept: RHEUMATOLOGY | Facility: CLINIC | Age: 43
End: 2023-12-28
Payer: MEDICARE

## 2024-01-03 ENCOUNTER — PATIENT MESSAGE (OUTPATIENT)
Dept: FAMILY MEDICINE | Facility: CLINIC | Age: 44
End: 2024-01-03
Payer: MEDICARE

## 2024-01-03 ENCOUNTER — TELEPHONE (OUTPATIENT)
Dept: FAMILY MEDICINE | Facility: CLINIC | Age: 44
End: 2024-01-03
Payer: MEDICARE

## 2024-01-03 DIAGNOSIS — R73.03 PREDIABETES: ICD-10-CM

## 2024-01-03 DIAGNOSIS — E88.810 METABOLIC SYNDROME: ICD-10-CM

## 2024-01-03 RX ORDER — TIRZEPATIDE 5 MG/.5ML
5 INJECTION, SOLUTION SUBCUTANEOUS
Qty: 1 PEN | Refills: 11 | Status: CANCELLED | OUTPATIENT
Start: 2024-01-03

## 2024-01-03 NOTE — TELEPHONE ENCOUNTER
No care due was identified.  [facility] Embedded Care Due Messages. Reference number: [id number].   1/03/2024 10:36:57 AM CST

## 2024-01-03 NOTE — TELEPHONE ENCOUNTER
----- Message from Minerva Guzman sent at 1/3/2024 10:22 AM CST -----  Contact: candance Candance stated that [facility] is requesting fax number so that Monjaro 15 mg can be filled. Please call pharmacy  drs line at  [phone].  please call back or send a message on my chart once done [phone].          Thanks  DD

## 2024-01-03 NOTE — TELEPHONE ENCOUNTER
I spoke with the patient about this. Pharmacy updated. Please review and sign pended orders if you agree

## 2024-01-03 NOTE — TELEPHONE ENCOUNTER
No care due was identified.  [facility] Embedded Care Due Messages. Reference number: [id number].   1/03/2024 10:30:52 AM CST

## 2024-02-07 ENCOUNTER — TELEPHONE (OUTPATIENT)
Dept: RHEUMATOLOGY | Facility: CLINIC | Age: 44
End: 2024-02-07
Payer: COMMERCIAL

## 2024-02-07 DIAGNOSIS — M47.812 CERVICAL SPINE ARTHRITIS: ICD-10-CM

## 2024-02-07 DIAGNOSIS — M43.22 CERVICAL SPINE ANKYLOSIS: Primary | ICD-10-CM

## 2024-02-11 ENCOUNTER — PATIENT MESSAGE (OUTPATIENT)
Dept: RHEUMATOLOGY | Facility: CLINIC | Age: 44
End: 2024-02-11
Payer: COMMERCIAL

## 2024-02-11 DIAGNOSIS — M45.9 ANKYLOSING SPONDYLITIS, UNSPECIFIED SITE OF SPINE: ICD-10-CM

## 2024-02-11 DIAGNOSIS — M53.3 CHRONIC LEFT SI JOINT PAIN: ICD-10-CM

## 2024-02-11 DIAGNOSIS — G89.4 CHRONIC PAIN SYNDROME: ICD-10-CM

## 2024-02-11 DIAGNOSIS — G89.29 CHRONIC LEFT SI JOINT PAIN: ICD-10-CM

## 2024-02-12 ENCOUNTER — PATIENT MESSAGE (OUTPATIENT)
Dept: RHEUMATOLOGY | Facility: CLINIC | Age: 44
End: 2024-02-12
Payer: COMMERCIAL

## 2024-02-12 DIAGNOSIS — L40.0 PLAQUE PSORIASIS: ICD-10-CM

## 2024-02-12 DIAGNOSIS — E11.69 TYPE 2 DIABETES MELLITUS WITH OTHER SPECIFIED COMPLICATION, UNSPECIFIED WHETHER LONG TERM INSULIN USE: Primary | ICD-10-CM

## 2024-02-12 DIAGNOSIS — M45.9 ANKYLOSING SPONDYLITIS, UNSPECIFIED SITE OF SPINE: ICD-10-CM

## 2024-02-12 DIAGNOSIS — L40.50 PSORIATIC ARTHRITIS: ICD-10-CM

## 2024-02-15 RX ORDER — TIRZEPATIDE 12.5 MG/.5ML
12.5 INJECTION, SOLUTION SUBCUTANEOUS
Qty: 12 PEN | Refills: 1 | Status: SHIPPED | OUTPATIENT
Start: 2024-02-15 | End: 2024-03-21

## 2024-02-15 RX ORDER — IXEKIZUMAB 80 MG/ML
80 INJECTION, SOLUTION SUBCUTANEOUS
Qty: 1 ML | Refills: 11 | Status: ACTIVE | OUTPATIENT
Start: 2024-02-15 | End: 2024-04-18 | Stop reason: SDUPTHER

## 2024-02-17 RX ORDER — OXYCODONE AND ACETAMINOPHEN 10; 325 MG/1; MG/1
1 TABLET ORAL EVERY 8 HOURS PRN
Qty: 90 TABLET | Refills: 0 | Status: SHIPPED | OUTPATIENT
Start: 2024-02-17 | End: 2024-02-20 | Stop reason: SDUPTHER

## 2024-02-19 ENCOUNTER — PATIENT MESSAGE (OUTPATIENT)
Dept: RHEUMATOLOGY | Facility: CLINIC | Age: 44
End: 2024-02-19
Payer: COMMERCIAL

## 2024-02-19 ENCOUNTER — PATIENT MESSAGE (OUTPATIENT)
Dept: FAMILY MEDICINE | Facility: CLINIC | Age: 44
End: 2024-02-19
Payer: COMMERCIAL

## 2024-02-19 DIAGNOSIS — K21.9 GASTROESOPHAGEAL REFLUX DISEASE, UNSPECIFIED WHETHER ESOPHAGITIS PRESENT: ICD-10-CM

## 2024-02-20 DIAGNOSIS — M45.9 ANKYLOSING SPONDYLITIS, UNSPECIFIED SITE OF SPINE: ICD-10-CM

## 2024-02-20 DIAGNOSIS — G89.4 CHRONIC PAIN SYNDROME: ICD-10-CM

## 2024-02-20 DIAGNOSIS — M53.3 CHRONIC LEFT SI JOINT PAIN: ICD-10-CM

## 2024-02-20 DIAGNOSIS — G89.29 CHRONIC LEFT SI JOINT PAIN: ICD-10-CM

## 2024-02-20 RX ORDER — OXYCODONE AND ACETAMINOPHEN 10; 325 MG/1; MG/1
1 TABLET ORAL EVERY 8 HOURS PRN
Qty: 90 TABLET | Refills: 0 | Status: SHIPPED | OUTPATIENT
Start: 2024-02-20 | End: 2024-03-21

## 2024-02-20 RX ORDER — FAMOTIDINE 40 MG/1
40 TABLET, FILM COATED ORAL DAILY
Qty: 30 TABLET | Refills: 1 | Status: SHIPPED | OUTPATIENT
Start: 2024-02-20 | End: 2025-02-19

## 2024-02-21 ENCOUNTER — TELEPHONE (OUTPATIENT)
Dept: RHEUMATOLOGY | Facility: CLINIC | Age: 44
End: 2024-02-21
Payer: COMMERCIAL

## 2024-02-21 NOTE — TELEPHONE ENCOUNTER
----- Message from Jenny Rock sent at 2/21/2024  9:08 AM CST -----  Type:  Needs Medical Advice    Who Called: foreign/ jerel Gonzalez Call Back Number: [phone]  Additional Information:  Requesting call back don't have rx in stock oxyCODONE-acetaminophen (PERCOCET)  mg per tablet    please advise thank you

## 2024-02-21 NOTE — TELEPHONE ENCOUNTER
Pt will call to see what local pharmacy has her medication in stock and call us back with that information.

## 2024-02-29 ENCOUNTER — PATIENT MESSAGE (OUTPATIENT)
Dept: RHEUMATOLOGY | Facility: CLINIC | Age: 44
End: 2024-02-29
Payer: COMMERCIAL

## 2024-03-07 ENCOUNTER — PATIENT MESSAGE (OUTPATIENT)
Dept: RHEUMATOLOGY | Facility: CLINIC | Age: 44
End: 2024-03-07
Payer: COMMERCIAL

## 2024-03-07 ENCOUNTER — OFFICE VISIT (OUTPATIENT)
Dept: RHEUMATOLOGY | Facility: CLINIC | Age: 44
End: 2024-03-07
Payer: MEDICARE

## 2024-03-07 DIAGNOSIS — E11.69 TYPE 2 DIABETES MELLITUS WITH OTHER SPECIFIED COMPLICATION, UNSPECIFIED WHETHER LONG TERM INSULIN USE: ICD-10-CM

## 2024-03-07 DIAGNOSIS — G89.29 CHRONIC LEFT SI JOINT PAIN: ICD-10-CM

## 2024-03-07 DIAGNOSIS — M53.3 CHRONIC LEFT SI JOINT PAIN: ICD-10-CM

## 2024-03-07 DIAGNOSIS — L40.50 PSORIATIC ARTHRITIS: Primary | ICD-10-CM

## 2024-03-07 DIAGNOSIS — G89.4 CHRONIC PAIN SYNDROME: ICD-10-CM

## 2024-03-07 DIAGNOSIS — L40.1 PUSTULAR PSORIASIS: ICD-10-CM

## 2024-03-07 DIAGNOSIS — M45.9 ANKYLOSING SPONDYLITIS, UNSPECIFIED SITE OF SPINE: ICD-10-CM

## 2024-03-07 DIAGNOSIS — E55.9 VITAMIN D DEFICIENCY: ICD-10-CM

## 2024-03-07 PROCEDURE — 99214 OFFICE O/P EST MOD 30 MIN: CPT | Mod: 95,,, | Performed by: PHYSICIAN ASSISTANT

## 2024-03-07 RX ORDER — ROFLUMILAST 3 MG/G
1 CREAM TOPICAL DAILY
Qty: 60 G | Refills: 3 | Status: SHIPPED | OUTPATIENT
Start: 2024-03-07

## 2024-03-07 NOTE — Clinical Note
Please send staff message to bharath britton to find out why mounjaro denied and have her message me back please

## 2024-03-07 NOTE — PROGRESS NOTES
The patient location is: home  The chief complaint leading to consultation is: PSA    Visit type: audiovisual    Face to Face time with patient: 20 minutes  30 minutes of total time spent on the encounter, which includes face to face time and non-face to face time preparing to see the patient (eg, review of tests), Obtaining and/or reviewing separately obtained history, Documenting clinical information in the electronic or other health record, Independently interpreting results (not separately reported) and communicating results to the patient/family/caregiver, or Care coordination (not separately reported).   Each patient to whom he or she provides medical services by telemedicine is:  (1) informed of the relationship between the physician and patient and the respective role of any other health care provider with respect to management of the patient; and (2) notified that he or she may decline to receive medical services by telemedicine and may withdraw from such care at any time.    Notes:     Subjective:       Patient ID: Sparkle Jose is a 43 y.o. female.    Chief Complaint: Disease Management    Mrs. Jose is a 43 year old female who presents to telemedicine for follow up on AS. She is doing fair on Taltz--has noticed an improvement in her skin overall and her joints have remained stable. Taltz maintenance dose was recently approved by new insurance. Taltz induction dosing was denied so she plans to resume monthly dosing. She denies side effects. Psoriasis remains active on her feet and she never received zoryve cream.    She is taking percocet as needed for severe pain, but pharmacy is out of 10/325 dosage so she has not filled this in 2 months.     Mounjaro was denied by her new insurance.    We reviewed recent labs--last A1c 5.2%.    Pt has GI bug and did not want to come into clinic today.    Current tx:  1. taltz      Prior tx:  1. Humira  2. Remicade (drug induced lupus)  3. Cimzia  4. Simponi  5.  Cosentyx      Review of Systems   Constitutional:  Positive for activity change. Negative for appetite change, chills, fatigue, fever and unexpected weight change.   HENT:  Negative for mouth sores and trouble swallowing.    Eyes:  Negative for redness and visual disturbance.   Respiratory:  Negative for cough and shortness of breath.    Cardiovascular:  Negative for chest pain, palpitations and leg swelling.   Gastrointestinal:  Negative for abdominal pain, constipation, diarrhea, nausea and vomiting.   Genitourinary:  Negative for dysuria and genital sores.   Musculoskeletal:  Positive for arthralgias, back pain, joint swelling and myalgias.   Skin:  Positive for rash.   Allergic/Immunologic: Positive for immunocompromised state.   Neurological:  Negative for dizziness, weakness, light-headedness and headaches.   Hematological:  Does not bruise/bleed easily.         Objective:     There were no vitals filed for this visit.        Past Medical History:   Diagnosis Date    Ankylosing spondylitis     Arthritis     Celiac disease     Depression     Family history of DVT     MGM DVT, mother spleenic    Family history of factor V Leiden mutation     mother and sister    GERD (gastroesophageal reflux disease)     Migraine     Osteoarthritis     Psoriasis     Psoriatic arthritis mutilans      Past Surgical History:   Procedure Laterality Date    brain decompression   2006    BRAIN SURGERY      TUBAL LIGATION            Physical Exam   Constitutional: She is oriented to person, place, and time.   Neurological: She is oriented to person, place, and time.       Labs reviewed:  Component      Latest Ref Rng 7/21/2023 12/26/2023   NIL      IU/mL 0.35558     TB1 - Nil      IU/mL 0.014     TB2 - Nil      IU/mL 0.039     Mitogen - Nil      IU/mL 9.971     TB Gold Plus      Negative  Negative     Hep B S Ab      mIU/mL <3.00     Hep B S Ab       Non-reactive     Hemoglobin A1C External      4.0 - 5.6 %  5.2    Estimated Avg  Glucose      68 - 131 mg/dL  103    Hepatitis B Surface Ag      Non-reactive  Non-reactive     Hep B Core Total Ab      Non-reactive  Non-reactive     Hepatitis C Ab      Non-reactive  Non-reactive           Assessment:       1. Psoriatic arthritis    2. Pustular psoriasis    3. Vitamin D deficiency    4. Type 2 diabetes mellitus with other specified complication, unspecified whether long term insulin use                Plan:       Psoriatic arthritis  -     CBC Auto Differential; Future; Expected date: 03/07/2024  -     Comprehensive Metabolic Panel; Future; Expected date: 03/07/2024  -     C-Reactive Protein; Future; Expected date: 03/07/2024  -     Sedimentation rate; Future; Expected date: 03/07/2024    Pustular psoriasis  -     roflumilast (ZORYVE) 0.3 % Crea; Apply 1 Application topically once daily.  Dispense: 60 g; Refill: 3    Vitamin D deficiency  -     Vitamin D; Future; Expected date: 03/07/2024    Type 2 diabetes mellitus with other specified complication, unspecified whether long term insulin use  -     Lipid Panel; Future; Expected date: 03/07/2024  -     Lipid Panel; Future; Expected date: 03/07/2024            43 year old female with  Psoriatic arthritis, Ankylosing spondylitis  Comorbid conditions:  --chronic insomnia on trazodone  --anxiety on prozac  --hx of brain surgery  --persistent leukocytosis and thrombocytosis, hematology work up unrevealing  --thyroid nodule  --pulmonary nodule  --chronic pain syndrome followed by pain management    Plan:  1. Cont Taltz 80 mg every 28 days for PSA  2.Zoryve cream for psoriasis sent to [facility] for [facility]  3. Follow up on CLAY albarran for mounjaro  4. D/C percocet 10/325, start 7.5 mg every 6 hours prn for severe pain. Pended Dr. Shea.  I have checked [place] prescription monitoring program site and no unusual or abnormal behavior has occurred pt understand the risk and benefits of taking opioid medications and has decided to continue the  medication.    Follow up:  4 mo Dr. Monse nava/fasting labs prior

## 2024-03-10 RX ORDER — OXYCODONE AND ACETAMINOPHEN 7.5; 325 MG/1; MG/1
1 TABLET ORAL EVERY 6 HOURS PRN
Qty: 120 TABLET | Refills: 0 | Status: SHIPPED | OUTPATIENT
Start: 2024-03-10 | End: 2024-03-21

## 2024-03-13 DIAGNOSIS — E11.9 TYPE 2 DIABETES MELLITUS WITHOUT COMPLICATION: ICD-10-CM

## 2024-03-17 ENCOUNTER — PATIENT MESSAGE (OUTPATIENT)
Dept: RHEUMATOLOGY | Facility: CLINIC | Age: 44
End: 2024-03-17
Payer: COMMERCIAL

## 2024-03-19 ENCOUNTER — PATIENT MESSAGE (OUTPATIENT)
Dept: RHEUMATOLOGY | Facility: CLINIC | Age: 44
End: 2024-03-19
Payer: COMMERCIAL

## 2024-03-21 ENCOUNTER — OFFICE VISIT (OUTPATIENT)
Dept: FAMILY MEDICINE | Facility: CLINIC | Age: 44
End: 2024-03-21
Payer: COMMERCIAL

## 2024-03-21 VITALS
SYSTOLIC BLOOD PRESSURE: 109 MMHG | WEIGHT: 111 LBS | DIASTOLIC BLOOD PRESSURE: 75 MMHG | HEIGHT: 60 IN | TEMPERATURE: 98 F | OXYGEN SATURATION: 97 % | RESPIRATION RATE: 18 BRPM | BODY MASS INDEX: 21.79 KG/M2 | HEART RATE: 84 BPM

## 2024-03-21 DIAGNOSIS — F98.8 ADD (ATTENTION DEFICIT DISORDER) WITHOUT HYPERACTIVITY: ICD-10-CM

## 2024-03-21 DIAGNOSIS — R73.03 PREDIABETES: ICD-10-CM

## 2024-03-21 DIAGNOSIS — Z79.899 IMMUNOCOMPROMISED STATE DUE TO DRUG THERAPY: ICD-10-CM

## 2024-03-21 DIAGNOSIS — Z12.31 ENCOUNTER FOR SCREENING MAMMOGRAM FOR MALIGNANT NEOPLASM OF BREAST: ICD-10-CM

## 2024-03-21 DIAGNOSIS — D68.51 FACTOR 5 LEIDEN MUTATION, HETEROZYGOUS: ICD-10-CM

## 2024-03-21 DIAGNOSIS — E88.810 METABOLIC SYNDROME: Primary | ICD-10-CM

## 2024-03-21 DIAGNOSIS — D84.821 IMMUNOCOMPROMISED STATE DUE TO DRUG THERAPY: ICD-10-CM

## 2024-03-21 DIAGNOSIS — L40.50 PSORIATIC ARTHRITIS: ICD-10-CM

## 2024-03-21 DIAGNOSIS — Z12.11 COLON CANCER SCREENING: ICD-10-CM

## 2024-03-21 PROBLEM — E11.69 TYPE 2 DIABETES MELLITUS WITH OTHER SPECIFIED COMPLICATION: Status: RESOLVED | Noted: 2023-03-07 | Resolved: 2024-03-21

## 2024-03-21 PROCEDURE — 99215 OFFICE O/P EST HI 40 MIN: CPT | Mod: PBBFAC,PO | Performed by: STUDENT IN AN ORGANIZED HEALTH CARE EDUCATION/TRAINING PROGRAM

## 2024-03-21 PROCEDURE — 99214 OFFICE O/P EST MOD 30 MIN: CPT | Mod: S$PBB,,, | Performed by: STUDENT IN AN ORGANIZED HEALTH CARE EDUCATION/TRAINING PROGRAM

## 2024-03-21 PROCEDURE — 99999 PR PBB SHADOW E&M-EST. PATIENT-LVL V: CPT | Mod: PBBFAC,,, | Performed by: STUDENT IN AN ORGANIZED HEALTH CARE EDUCATION/TRAINING PROGRAM

## 2024-03-21 RX ORDER — LISDEXAMFETAMINE DIMESYLATE 30 MG/1
30 CAPSULE ORAL EVERY MORNING
Qty: 30 CAPSULE | Refills: 0 | Status: SHIPPED | OUTPATIENT
Start: 2024-05-20 | End: 2024-06-19

## 2024-03-21 RX ORDER — TIRZEPATIDE 12.5 MG/.5ML
12.5 INJECTION, SOLUTION SUBCUTANEOUS
Qty: 12 PEN | Refills: 1 | Status: SHIPPED | OUTPATIENT
Start: 2024-03-21

## 2024-03-21 RX ORDER — OXYCODONE AND ACETAMINOPHEN 10; 325 MG/1; MG/1
1 TABLET ORAL EVERY 8 HOURS PRN
Qty: 21 TABLET | Refills: 0 | Status: SHIPPED | OUTPATIENT
Start: 2024-03-21 | End: 2024-03-28

## 2024-03-21 RX ORDER — LISDEXAMFETAMINE DIMESYLATE 30 MG/1
30 CAPSULE ORAL EVERY MORNING
Qty: 30 CAPSULE | Refills: 0 | Status: SHIPPED | OUTPATIENT
Start: 2024-04-20 | End: 2024-05-20

## 2024-03-21 RX ORDER — LISDEXAMFETAMINE DIMESYLATE 30 MG/1
30 CAPSULE ORAL EVERY MORNING
Qty: 30 CAPSULE | Refills: 0 | Status: SHIPPED | OUTPATIENT
Start: 2024-03-21 | End: 2024-04-20

## 2024-03-21 NOTE — PATIENT INSTRUCTIONS
Silvestre Villagran,     If you are due for any health screening(s) below please notify me so we can arrange them to be ordered and scheduled. Most healthy patients at your age complete them, but you are free to accept or refuse.     If you can't do it, I'll definitely understand. If you can, I'd certainly appreciate it!    Tests to Keep You Healthy    Mammogram: Met on 3/28/2023  Colon Cancer Screening: DUE  Cervical Cancer Screening: Met on 9/23/2022  Last HbA1c < 8 (12/26/2023): Yes      Its time for your colon cancer screening     Colorectal cancer is one of the leading causes of cancer death for men and women but it doesnt have to be. Screenings can prevent colorectal cancer or find it early enough to treat and cure the disease.     Our records indicate that you may be overdue for colon cancer screening. A colonoscopy or stool screening test can help identify patients at risk for developing colon cancer. Cancer screenings save lives, so schedule yours today to stay healthy.     A colonoscopy is the preferred test for detecting colon cancer. It is needed only once every 10 years if results are negative. While you are sedated, a flexible, lighted tube with a tiny camera is inserted into the rectum and advanced through the colon to look for cancers.     An alternative screening test that is used at home and returned to the lab may also be used. It detects hidden blood in bowel movements which could indicate cancer in the colon. If results are positive, you will need a colonoscopy to determine if the blood is a sign of cancer. This type of follow up (diagnostic) colonoscopy usually requires additional copays as required by your insurance provider.     If you recently had your colon cancer screening performed outside of [facility], please let your Health care team know so that they can update your health record. Please contact your PCP if you have any questions.    Were here to help you quit smoking     Our  records indicated that you are still smoking. One of the best things you can do for your health is to stop smoking and we are here to help.     Talk with your provider about our Smoking Cessation Program and how we can support you on your journey.

## 2024-03-21 NOTE — PROGRESS NOTES
Problem List Items Addressed This Visit          Psychiatric    ADD (attention deficit disorder) without hyperactivity    Overview     Chronic hx; increase to vyvanse 30mg   Pt is demonstrating no behaviors to suggest inappropriate use of prescribed medications.    [place] Board of Pharmacy Controlled Prescription Drug Monitoring database was queried and showed no activity to suggest abuse, diversion, or other inappropriate use of prescription medications.  #30 tabs, 2 refills   3m VV    Adderall - palpitations  Strattera - no improvement with add             Relevant Medications    lisdexamfetamine (VYVANSE) 30 MG capsule    lisdexamfetamine (VYVANSE) 30 MG capsule (Start on 4/20/2024)    lisdexamfetamine (VYVANSE) 30 MG capsule (Start on 5/20/2024)       Immunology/Multi System    Immunocompromised state due to drug therapy    Overview     Follows with rheum, switched from cosentyx to talz, joint and psoriasis improved            Hematology    Factor 5 Leiden mutation, heterozygous    Overview     Leiden factor 5 deficiency - no previous hx of DVT, PE   Avoid estrogen therapy            Endocrine    Prediabetes    Overview     Lab Results   Component Value Date    HGBA1C 5.2 12/26/2023   Doing well; follows with endocrine   On GLP1 RA     Diabetes Medications               tirzepatide (MOUNJARO) 12.5 mg/0.5 mL PnIj Inject 12.5 mg into the skin every 7 days.                      Relevant Medications    tirzepatide (MOUNJARO) 12.5 mg/0.5 mL PnIj    Metabolic syndrome - Primary    Overview     On glp1         Relevant Medications    tirzepatide (MOUNJARO) 12.5 mg/0.5 mL PnIj       Orthopedic    Psoriatic arthritis    Overview     Chronic hx; follows with rheumatology Dr. Almonte  - prn ketoconazole shampoo and topical steroid  - follows q3-4m with rheum         Relevant Medications    oxyCODONE-acetaminophen (PERCOCET)  mg per tablet     Other Visit Diagnoses       Encounter for screening mammogram for  malignant neoplasm of breast        Relevant Orders    Mammo Digital Screening Bilat w/ Miguelangel    Colon cancer screening        Relevant Orders    Cologuard Screening (Multitarget Stool DNA)            Follow up in about 3 months (around 6/21/2024).    Chrissie Lancaster MD  _________________________________________________________________________      Patient ID: Sparkle Jose is a 43 y.o. female.    Chief Complaint: add    Pt diagnosed with ADD/ADHD several years ago. Denies mood instability, irritability, aggression, chest pain, decreased appetite, disordered sleep. Vyvanse 20mg initially helped focus for few months, now starting to struggle with focus/concentration.    Adderall 15 XR caused palpitations. Strattera 40mg (no palpitations) no improvement.       Medications Ordered This Encounter   Medications    lisdexamfetamine (VYVANSE) 30 MG capsule     Sig: Take 1 capsule (30 mg total) by mouth every morning.     Dispense:  30 capsule     Refill:  0    lisdexamfetamine (VYVANSE) 30 MG capsule     Sig: Take 1 capsule (30 mg total) by mouth every morning.     Dispense:  30 capsule     Refill:  0    lisdexamfetamine (VYVANSE) 30 MG capsule     Sig: Take 1 capsule (30 mg total) by mouth every morning.     Dispense:  30 capsule     Refill:  0    oxyCODONE-acetaminophen (PERCOCET)  mg per tablet     Sig: Take 1 tablet by mouth every 8 (eight) hours as needed for Pain.     Dispense:  21 tablet     Refill:  0     Quantity prescribed more than 7 day supply? Yes, quantity medically necessary     Order Specific Question:   I have reviewed the Prescription Drug Monitoring Program (PDMP) database for this patient prior to prescribing the above opioid medication     Answer:   Yes    tirzepatide (MOUNJARO) 12.5 mg/0.5 mL PnIj     Sig: Inject 12.5 mg into the skin every 7 days.     Dispense:  12 Pen     Refill:  1         Orders Placed This Encounter   Procedures    Cologuard Screening (Multitarget Stool DNA)      Standing Status:   Future     Number of Occurrences:   1     Standing Expiration Date:   5/20/2025    Mammo Digital Screening Bilat w/ Miguelangel     Standing Status:   Future     Standing Expiration Date:   5/21/2025     Order Specific Question:   May the Radiologist modify the order per protocol to meet the clinical needs of the patient?     Answer:   Yes         Past medical histories reviewed, including past medical, surgical, family and social histories.      Current Outpatient Medications on File Prior to Visit   Medication Sig Dispense Refill    clobetasol 0.05% (TEMOVATE) 0.05 % Oint 1 application daily as needed.       clobetasol 0.05% (TEMOVATE) 0.05 % Oint Apply topically 2 (two) times daily. 30 g 1    eletriptan (RELPAX) 40 MG tablet Take 1 tablet (40 mg total) by mouth as needed. may repeat in 2 hours if necessary 9 tablet 0    famotidine (PEPCID) 40 MG tablet Take 1 tablet (40 mg total) by mouth once daily. 30 tablet 1    ixekizumab (TALTZ AUTOINJECTOR) 80 mg/mL AtIn Inject 80 mg into the skin every 28 days. 1 mL 11    oxyCODONE-acetaminophen (PERCOCET)  mg per tablet Take 1 tablet by mouth every 8 (eight) hours as needed for Pain. 90 tablet 0    promethazine (PHENERGAN) 25 MG tablet Take 1 tablet (25 mg total) by mouth every 6 (six) hours as needed for Nausea. 45 tablet 5    tiZANidine (ZANAFLEX) 4 MG tablet Take 1 tablet (4 mg total) by mouth nightly as needed (muscle spasm). 90 tablet 1    [DISCONTINUED] oxyCODONE-acetaminophen (PERCOCET) 7.5-325 mg per tablet Take 1 tablet by mouth every 6 (six) hours as needed for Pain. 120 tablet 0    [DISCONTINUED] tirzepatide (MOUNJARO) 12.5 mg/0.5 mL PnIj Inject 12.5 mg into the skin every 7 days. 12 Pen 1    [DISCONTINUED] tirzepatide 15 mg/0.5 mL PnIj Inject 15 mg into the skin every 7 days. 12 pen 0    atomoxetine (STRATTERA) 40 MG capsule Take 1 capsule (40 mg total) by mouth once daily. 30 capsule 0    ixekizumab (TALTZ AUTOINJECTOR, 2 PACK,) 80 mg/mL  AtIn Inject 80 mg into the skin every 14 (fourteen) days. For induction dose week 4-10 (Patient not taking: Reported on 8/18/2023) 2 mL 1    roflumilast (ZORYVE) 0.3 % Crea Apply 1 Application topically once daily. 60 g 3    traZODone (DESYREL) 100 MG tablet Take 1 tablet (100 mg total) by mouth every evening. 90 tablet 1    [DISCONTINUED] lisdexamfetamine (VYVANSE) 20 MG capsule Take 1 capsule (20 mg total) by mouth every morning. 90 capsule 0    [DISCONTINUED] tamsulosin (FLOMAX) 0.4 mg Cap Take 1 capsule (0.4 mg total) by mouth once daily. (Patient not taking: Reported on 3/21/2024) 30 capsule 11     No current facility-administered medications on file prior to visit.       Review of Systems   12 point review of systems negative except for listed in HPI.     Objective:    Nursing note and vitals reviewed.  Vitals:    03/21/24 0802   BP: 109/75   Pulse: 84   Resp: 18   Temp: 98.1 °F (36.7 °C)         Body mass index is 21.68 kg/m².     Physical Exam   Constitutional: oriented to person, place, and time. well-developed and well-nourished. No distress.   HENT: WNL  Head: Normocephalic and atraumatic.   Eyes: EOM are normal.   Neck: Normal range of motion. Neck supple.   Cardiovascular: Normal rate  Pulmonary/Chest: Effort normal. No respiratory distress.   GI: soft, non distended, no ttp, no rebound/guarding  Musculoskeletal: Normal range of motion. no edema.   Neurological: CN II-XII intact  Skin: warm and dry.   Psychiatric: normal mood and affect. behavior is normal.           We Offer Telehealth & Same Day Appointments!   Book your Telehealth appointment with me through my nurse or   Clinic appointments on [employer]!  [phone]     To Schedule appointments online, go to [employer]: [email or web address]     \

## 2024-03-25 ENCOUNTER — PATIENT MESSAGE (OUTPATIENT)
Dept: FAMILY MEDICINE | Facility: CLINIC | Age: 44
End: 2024-03-25
Payer: COMMERCIAL

## 2024-04-01 ENCOUNTER — PATIENT MESSAGE (OUTPATIENT)
Dept: FAMILY MEDICINE | Facility: CLINIC | Age: 44
End: 2024-04-01
Payer: COMMERCIAL

## 2024-04-03 ENCOUNTER — PATIENT MESSAGE (OUTPATIENT)
Dept: FAMILY MEDICINE | Facility: CLINIC | Age: 44
End: 2024-04-03
Payer: COMMERCIAL

## 2024-04-04 ENCOUNTER — PATIENT MESSAGE (OUTPATIENT)
Dept: FAMILY MEDICINE | Facility: CLINIC | Age: 44
End: 2024-04-04
Payer: COMMERCIAL

## 2024-04-04 ENCOUNTER — HOSPITAL ENCOUNTER (OUTPATIENT)
Dept: RADIOLOGY | Facility: HOSPITAL | Age: 44
Discharge: HOME | End: 2024-04-04
Attending: STUDENT IN AN ORGANIZED HEALTH CARE EDUCATION/TRAINING PROGRAM
Payer: COMMERCIAL

## 2024-04-04 DIAGNOSIS — R19.5 POSITIVE COLORECTAL CANCER SCREENING USING COLOGUARD TEST: ICD-10-CM

## 2024-04-04 DIAGNOSIS — Z12.11 COLON CANCER SCREENING: Primary | ICD-10-CM

## 2024-04-04 DIAGNOSIS — Z12.31 ENCOUNTER FOR SCREENING MAMMOGRAM FOR MALIGNANT NEOPLASM OF BREAST: ICD-10-CM

## 2024-04-04 LAB — NONINV COLON CA DNA+OCC BLD SCRN STL QL: POSITIVE

## 2024-04-04 PROCEDURE — 77067 SCR MAMMO BI INCL CAD: CPT | Mod: 26,,, | Performed by: RADIOLOGY

## 2024-04-04 PROCEDURE — 77067 SCR MAMMO BI INCL CAD: CPT | Mod: TC,PO

## 2024-04-04 PROCEDURE — 77063 BREAST TOMOSYNTHESIS BI: CPT | Mod: 26,,, | Performed by: RADIOLOGY

## 2024-04-04 NOTE — PROGRESS NOTES
I have sent a msg to patient with the following interpretation (see below):    + cologuard. Will send for colonoscopy    Please do not hesitate to call or message with any questions or concerns    Chrissie Lancaster MD

## 2024-04-05 ENCOUNTER — PATIENT MESSAGE (OUTPATIENT)
Dept: FAMILY MEDICINE | Facility: CLINIC | Age: 44
End: 2024-04-05
Payer: COMMERCIAL

## 2024-04-05 DIAGNOSIS — R92.8 ABNORMAL MAMMOGRAM: Primary | ICD-10-CM

## 2024-04-05 NOTE — PROGRESS NOTES
1st avail Diagnostic MMG and L breast US.     I have sent a msg to patient with the following interpretation (see below):    L breast with mass at outer region  R breast normal     Diagnostic MMG and L breast US. My team will get this scheduled.      Please do not hesitate to call or message with any questions or concerns    Chrissie Lancaster MD

## 2024-04-08 ENCOUNTER — PATIENT MESSAGE (OUTPATIENT)
Dept: FAMILY MEDICINE | Facility: CLINIC | Age: 44
End: 2024-04-08
Payer: COMMERCIAL

## 2024-04-08 ENCOUNTER — PATIENT MESSAGE (OUTPATIENT)
Dept: RHEUMATOLOGY | Facility: CLINIC | Age: 44
End: 2024-04-08
Payer: COMMERCIAL

## 2024-04-08 ENCOUNTER — HOSPITAL ENCOUNTER (OUTPATIENT)
Dept: PREADMISSION TESTING | Facility: HOSPITAL | Age: 44
Discharge: HOME | End: 2024-04-08
Attending: FAMILY MEDICINE
Payer: MEDICARE

## 2024-04-08 DIAGNOSIS — Z12.11 COLON CANCER SCREENING: Primary | ICD-10-CM

## 2024-04-08 DIAGNOSIS — G89.4 CHRONIC PAIN SYNDROME: ICD-10-CM

## 2024-04-08 DIAGNOSIS — R19.5 POSITIVE COLORECTAL CANCER SCREENING USING COLOGUARD TEST: ICD-10-CM

## 2024-04-08 DIAGNOSIS — L40.50 PSORIATIC ARTHRITIS: Primary | ICD-10-CM

## 2024-04-08 RX ORDER — SODIUM, POTASSIUM,MAG SULFATES 17.5-3.13G
1 SOLUTION, RECONSTITUTED, ORAL ORAL DAILY
Qty: 1 KIT | Refills: 0 | Status: SHIPPED | OUTPATIENT
Start: 2024-04-08 | End: 2024-04-10

## 2024-04-08 RX ORDER — OXYCODONE AND ACETAMINOPHEN 10; 325 MG/1; MG/1
1 TABLET ORAL EVERY 8 HOURS PRN
COMMUNITY
End: 2024-04-08 | Stop reason: SDUPTHER

## 2024-04-09 ENCOUNTER — HOSPITAL ENCOUNTER (OUTPATIENT)
Dept: RADIOLOGY | Facility: HOSPITAL | Age: 44
Discharge: HOME | End: 2024-04-09
Attending: STUDENT IN AN ORGANIZED HEALTH CARE EDUCATION/TRAINING PROGRAM
Payer: COMMERCIAL

## 2024-04-09 DIAGNOSIS — R92.8 ABNORMAL MAMMOGRAM: ICD-10-CM

## 2024-04-09 PROCEDURE — 77065 DX MAMMO INCL CAD UNI: CPT | Mod: 26,LT,, | Performed by: RADIOLOGY

## 2024-04-09 PROCEDURE — 76642 ULTRASOUND BREAST LIMITED: CPT | Mod: TC,LT

## 2024-04-09 PROCEDURE — 76642 ULTRASOUND BREAST LIMITED: CPT | Mod: 26,LT,, | Performed by: RADIOLOGY

## 2024-04-09 PROCEDURE — 77061 BREAST TOMOSYNTHESIS UNI: CPT | Mod: TC,LT

## 2024-04-09 PROCEDURE — 77061 BREAST TOMOSYNTHESIS UNI: CPT | Mod: 26,LT,, | Performed by: RADIOLOGY

## 2024-04-09 PROCEDURE — 77065 DX MAMMO INCL CAD UNI: CPT | Mod: TC,LT

## 2024-04-10 ENCOUNTER — PATIENT MESSAGE (OUTPATIENT)
Dept: FAMILY MEDICINE | Facility: CLINIC | Age: 44
End: 2024-04-10
Payer: COMMERCIAL

## 2024-04-10 DIAGNOSIS — R92.8 ABNORMAL MAMMOGRAM: Primary | ICD-10-CM

## 2024-04-10 NOTE — PROGRESS NOTES
6m  l diag mmg and us    I have sent a msg to patient with the following interpretation (see below):    L breast with 8mm (<1cm) asymmetry. We will repeat imaging in 6m. My team will get this scheduled.      Please do not hesitate to call or message with any questions or concerns    Chrissie Lancaster MD

## 2024-04-11 ENCOUNTER — PATIENT MESSAGE (OUTPATIENT)
Dept: FAMILY MEDICINE | Facility: CLINIC | Age: 44
End: 2024-04-11
Payer: COMMERCIAL

## 2024-04-11 NOTE — LETTER
April 16, 2024      [facility]  [address] [facility] [place] [address]  Phone: [phone]  Fax: [phone]       Patient: Sparkle Jose   YOB: 1980  Date of Visit: 04/16/2024    To Whom It May Concern:    I am submitting this letter of appeal to the insurance company concerning that you must approve tirzepatide (MOUNJARO) 12.5 mg/0.5 mL PnIj for my patient Sparkle Jose. This patient has tried and/or failed multiple formulary alternatives to treat  metabolic syndrome and Prediabetes.  I feel that is it medically necessary for my patient to be on tirzepatide (MOUNJARO) 12.5 mg/0.5 mL PnIj. Patient has a family history of diabetes with recent increase in glucose readings. Patient currently has metabolic syndrome that will worsen into diabetes, cardiac event,  and/or other health conditions if patient does not get approved for the requested medication.     If you have any further questions or need any additional information, please do not hesitate to contact me or my office directly.       Sincerely,         Chrissie Lancaster MD

## 2024-04-14 RX ORDER — OXYCODONE AND ACETAMINOPHEN 10; 325 MG/1; MG/1
1 TABLET ORAL EVERY 8 HOURS PRN
Qty: 90 TABLET | Refills: 0 | Status: SHIPPED | OUTPATIENT
Start: 2024-04-14 | End: 2024-05-14

## 2024-04-16 ENCOUNTER — PATIENT MESSAGE (OUTPATIENT)
Dept: RHEUMATOLOGY | Facility: CLINIC | Age: 44
End: 2024-04-16
Payer: COMMERCIAL

## 2024-04-17 ENCOUNTER — TELEPHONE (OUTPATIENT)
Dept: RHEUMATOLOGY | Facility: CLINIC | Age: 44
End: 2024-04-17
Payer: COMMERCIAL

## 2024-04-17 NOTE — TELEPHONE ENCOUNTER
Returned call, [facility] had a lot of questions pertaining to vyanse and percocet. Wanted to know what other medications pt has tried for pain. Asked for DX for vyvanse and if pt was seeing psychiatry this nurse answered all questions and information was given to review board..

## 2024-04-17 NOTE — TELEPHONE ENCOUNTER
----- Message from Roman Mahoney sent at 4/15/2024  1:59 PM CDT -----  Type: Needs Medical Advice  Who Called:  Dylan from [facility]  Symptoms (please be specific):  said he need to speak to the office about pt's oxyCODONE-acetaminophen (PERCOCET)  mg per tablet--please call and advise    Pharmacy name and phone #:    [facility] - [address] [address]  Phone: [phone] Fax: [phone]      Best Call Back Number: [phone] ref # [id number]  Additional Information: thank you

## 2024-04-18 ENCOUNTER — PATIENT MESSAGE (OUTPATIENT)
Dept: FAMILY MEDICINE | Facility: CLINIC | Age: 44
End: 2024-04-18
Payer: COMMERCIAL

## 2024-04-18 DIAGNOSIS — L40.0 PLAQUE PSORIASIS: ICD-10-CM

## 2024-04-18 DIAGNOSIS — L40.50 PSORIATIC ARTHRITIS: ICD-10-CM

## 2024-04-18 DIAGNOSIS — M45.9 ANKYLOSING SPONDYLITIS, UNSPECIFIED SITE OF SPINE: ICD-10-CM

## 2024-04-18 RX ORDER — IXEKIZUMAB 80 MG/ML
80 INJECTION, SOLUTION SUBCUTANEOUS
Qty: 1 ML | Refills: 11 | Status: ACTIVE | OUTPATIENT
Start: 2024-04-18

## 2024-04-23 ENCOUNTER — ANESTHESIA EVENT (OUTPATIENT)
Dept: ENDOSCOPY | Facility: HOSPITAL | Age: 44
End: 2024-04-23
Payer: COMMERCIAL

## 2024-04-23 ENCOUNTER — ANESTHESIA (OUTPATIENT)
Dept: ENDOSCOPY | Facility: HOSPITAL | Age: 44
End: 2024-04-23
Payer: COMMERCIAL

## 2024-04-23 ENCOUNTER — PATIENT MESSAGE (OUTPATIENT)
Dept: FAMILY MEDICINE | Facility: CLINIC | Age: 44
End: 2024-04-23

## 2024-04-23 ENCOUNTER — HOSPITAL ENCOUNTER (OUTPATIENT)
Facility: HOSPITAL | Age: 44
Discharge: HOME | End: 2024-04-23
Attending: FAMILY MEDICINE | Admitting: FAMILY MEDICINE
Payer: COMMERCIAL

## 2024-04-23 DIAGNOSIS — K63.5 POLYP OF COLON, UNSPECIFIED PART OF COLON, UNSPECIFIED TYPE: ICD-10-CM

## 2024-04-23 DIAGNOSIS — R19.5 POSITIVE COLORECTAL CANCER SCREENING USING COLOGUARD TEST: Primary | ICD-10-CM

## 2024-04-23 PROCEDURE — 25000003 PHARM REV CODE 250: Performed by: FAMILY MEDICINE

## 2024-04-23 PROCEDURE — 37000008 HC ANESTHESIA 1ST 15 MINUTES: Performed by: FAMILY MEDICINE

## 2024-04-23 PROCEDURE — 27201089 HC SNARE, DISP (ANY): Performed by: FAMILY MEDICINE

## 2024-04-23 PROCEDURE — 63600175 PHARM REV CODE 636 W HCPCS: Performed by: NURSE ANESTHETIST, CERTIFIED REGISTERED

## 2024-04-23 PROCEDURE — 25000003 PHARM REV CODE 250: Performed by: NURSE ANESTHETIST, CERTIFIED REGISTERED

## 2024-04-23 PROCEDURE — 88305 TISSUE EXAM BY PATHOLOGIST: CPT | Performed by: STUDENT IN AN ORGANIZED HEALTH CARE EDUCATION/TRAINING PROGRAM

## 2024-04-23 PROCEDURE — 88305 TISSUE EXAM BY PATHOLOGIST: CPT | Mod: 26,,, | Performed by: STUDENT IN AN ORGANIZED HEALTH CARE EDUCATION/TRAINING PROGRAM

## 2024-04-23 PROCEDURE — 45385 COLONOSCOPY W/LESION REMOVAL: CPT | Mod: PT,,, | Performed by: FAMILY MEDICINE

## 2024-04-23 PROCEDURE — 45385 COLONOSCOPY W/LESION REMOVAL: CPT | Mod: PT | Performed by: FAMILY MEDICINE

## 2024-04-23 PROCEDURE — 37000009 HC ANESTHESIA EA ADD 15 MINS: Performed by: FAMILY MEDICINE

## 2024-04-23 RX ORDER — LIDOCAINE HYDROCHLORIDE 10 MG/ML
INJECTION, SOLUTION EPIDURAL; INFILTRATION; INTRACAUDAL; PERINEURAL
Status: DISCONTINUED | OUTPATIENT
Start: 2024-04-23 | End: 2024-04-23

## 2024-04-23 RX ORDER — DEXTROMETHORPHAN/PSEUDOEPHED 2.5-7.5/.8
DROPS ORAL
Status: DISCONTINUED | OUTPATIENT
Start: 2024-04-23 | End: 2024-04-23 | Stop reason: HOSPADM

## 2024-04-23 RX ORDER — PROPOFOL 10 MG/ML
VIAL (ML) INTRAVENOUS
Status: DISCONTINUED | OUTPATIENT
Start: 2024-04-23 | End: 2024-04-23

## 2024-04-23 RX ADMIN — PROPOFOL 40 MG: 10 INJECTION, EMULSION INTRAVENOUS at 10:04

## 2024-04-23 RX ADMIN — PROPOFOL 50 MG: 10 INJECTION, EMULSION INTRAVENOUS at 10:04

## 2024-04-23 RX ADMIN — PROPOFOL 30 MG: 10 INJECTION, EMULSION INTRAVENOUS at 10:04

## 2024-04-23 RX ADMIN — PROPOFOL 80 MG: 10 INJECTION, EMULSION INTRAVENOUS at 09:04

## 2024-04-23 RX ADMIN — SODIUM CHLORIDE, POTASSIUM CHLORIDE, SODIUM LACTATE AND CALCIUM CHLORIDE: 600; 310; 30; 20 INJECTION, SOLUTION INTRAVENOUS at 09:04

## 2024-04-23 RX ADMIN — LIDOCAINE HYDROCHLORIDE 50 MG: 10 SOLUTION INTRAVENOUS at 09:04

## 2024-04-23 NOTE — TRANSFER OF CARE
Anesthesia Transfer of Care Note    Patient: Sparkle Jose    Procedure(s) Performed: Procedure(s) (LRB):  COLONOSCOPY (N/A)    Patient location: GI    Anesthesia Type: MAC    Transport from OR: Transported from OR on room air with adequate spontaneous ventilation    Post pain: adequate analgesia    Post assessment: no apparent anesthetic complications    Post vital signs: stable    Level of consciousness: responds to stimulation    Nausea/Vomiting: no nausea/vomiting    Complications: none    Transfer of care protocol was followed      Last vitals: Visit Vitals  BP 99/60   Pulse 94   Temp 36.6 °C (97.9 °F)   Resp 18   Ht 5' (1.524 m)   Wt 47.6 kg (105 lb)   LMP  (Within Years)   SpO2 100%   Breastfeeding No   BMI 20.51 kg/m²

## 2024-04-23 NOTE — H&P
Short Stay Endoscopy History and Physical    PCP - Chrissie Lancaster MD    Procedure - Colonoscopy  ASA - 2  Mallampati - per anesthesia  History of Anesthesia problems - no  Family history Anesthesia problems -  no     HPI:  This is a 43 y.o. female here for evaluation of :   Active Hospital Problems    Diagnosis  POA    *Positive colorectal cancer screening using Cologuard test [R19.5]  Yes      Resolved Hospital Problems   No resolved problems to display.         Health Maintenance         Date Due Completion Date    Diabetes Urine Screening Never done ---    TETANUS VACCINE Never done ---    Influenza Vaccine (1) Never done ---    Lipid Panel 04/13/2024 4/13/2023    Pneumococcal Vaccines (Age 0-64) (1 of 2 - PCV) 08/18/2024 (Originally 12/6/1984) ---    Hemoglobin A1c 06/26/2024 12/26/2023    Mammogram 04/09/2025 4/9/2024    Colorectal Cancer Screening 03/27/2027 3/27/2024    Cervical Cancer Screening 09/23/2027 9/23/2022            Screening - No  History of polyps - No     Diarrhea - no  Anemia - no  Blood in stools - no  Abdominal pain - no  Other -POS COLOGUARD    ROS:  CONSTITUTIONAL: Denies weight change,  fatigue, fevers, chills, night sweats.  CARDIOVASCULAR: Denies chest pain, shortness of breath, orthopnea and edema.  RESPIRATORY: Denies cough, hemoptysis, dyspnea, and wheezing.  GI: See HPI.    Medical History:   Past Medical History:   Diagnosis Date    Ankylosing spondylitis     Arthritis     Celiac disease     Depression     Family history of DVT     MGM DVT, mother spleenic    Family history of factor V Leiden mutation     mother and sister    GERD (gastroesophageal reflux disease)     Migraine     Osteoarthritis     Psoriasis     Psoriatic arthritis mutilans        Surgical History:   Past Surgical History:   Procedure Laterality Date    brain decompression   2006    BRAIN SURGERY      TUBAL LIGATION         Family History:   Family History   Problem Relation Name Age of Onset    Hypertension  Mother Mother     Clotting disorder Mother Mother     Depression Sister Sister     Asthma Brother Brother     Learning disabilities Brother Brother     Clotting disorder Sister      Breast cancer Neg Hx      Ovarian cancer Neg Hx         Social History:   Social History     Tobacco Use    Smoking status: Every Day     Current packs/day: 0.50     Average packs/day: 0.5 packs/day for 31.3 years (15.7 ttl pk-yrs)     Types: Cigarettes     Start date: 1/1/1993    Smokeless tobacco: Never    Tobacco comments:     5-6 CIGS DAILY last quit attempt 8/14/2019   Substance Use Topics    Alcohol use: No    Drug use: Never       Allergies:   Review of patient's allergies indicates:  No Known Allergies    Medications:   No current facility-administered medications on file prior to encounter.     Current Outpatient Medications on File Prior to Encounter   Medication Sig Dispense Refill    famotidine (PEPCID) 40 MG tablet Take 1 tablet (40 mg total) by mouth once daily. 30 tablet 1    promethazine (PHENERGAN) 25 MG tablet Take 1 tablet (25 mg total) by mouth every 6 (six) hours as needed for Nausea. 45 tablet 5    atomoxetine (STRATTERA) 40 MG capsule Take 1 capsule (40 mg total) by mouth once daily. 30 capsule 0    clobetasol 0.05% (TEMOVATE) 0.05 % Oint 1 application daily as needed.       clobetasol 0.05% (TEMOVATE) 0.05 % Oint Apply topically 2 (two) times daily. 30 g 1    eletriptan (RELPAX) 40 MG tablet Take 1 tablet (40 mg total) by mouth as needed. may repeat in 2 hours if necessary 9 tablet 0    ixekizumab (TALTZ AUTOINJECTOR, 2 PACK,) 80 mg/mL AtIn Inject 80 mg into the skin every 14 (fourteen) days. For induction dose week 4-10 (Patient not taking: Reported on 8/18/2023) 2 mL 1    lisdexamfetamine (VYVANSE) 30 MG capsule Take 1 capsule (30 mg total) by mouth every morning. 30 capsule 0    [START ON 5/20/2024] lisdexamfetamine (VYVANSE) 30 MG capsule Take 1 capsule (30 mg total) by mouth every morning. 30 capsule 0     roflumilast (ZORYVE) 0.3 % Crea Apply 1 Application topically once daily. 60 g 3    tirzepatide (MOUNJARO) 12.5 mg/0.5 mL PnIj Inject 12.5 mg into the skin every 7 days. 12 Pen 1    tiZANidine (ZANAFLEX) 4 MG tablet Take 1 tablet (4 mg total) by mouth nightly as needed (muscle spasm). 90 tablet 1    traZODone (DESYREL) 100 MG tablet Take 1 tablet (100 mg total) by mouth every evening. 90 tablet 1       Physical Exam:  Vital Signs:   Vitals:    04/23/24 0908   BP: 99/60   Pulse: 94   Resp: 18   Temp: 97.9 °F (36.6 °C)     General Appearance: Well appearing in no acute distress  ENT: OP clear  Chest: CTA B  CV: RRR, no m/r/g  Abd: s/nt/nd/nabs  Ext: no edema    Labs:Reviewed    IMP:  Active Hospital Problems    Diagnosis  POA    *Positive colorectal cancer screening using Cologuard test [R19.5]  Yes      Resolved Hospital Problems   No resolved problems to display.         Plan:   I have explained the risks and benefits of colonoscopy to the patient including but not limited to bleeding, perforation, infection, and death. The patient wishes to proceed.

## 2024-04-23 NOTE — ANESTHESIA POSTPROCEDURE EVALUATION
Anesthesia Post Evaluation    Patient: Sparkle Jose    Procedure(s) Performed: Procedure(s) (LRB):  COLONOSCOPY (N/A)    Final Anesthesia Type: MAC      Patient location during evaluation: GI PACU  Patient participation: Yes- Able to Participate  Level of consciousness: awake and alert  Post-procedure vital signs: reviewed and stable  Pain management: adequate  Airway patency: patent    PONV status at discharge: No PONV  Anesthetic complications: no      Cardiovascular status: hemodynamically stable  Respiratory status: unassisted, room air and spontaneous ventilation  Hydration status: euvolemic  Follow-up not needed.              Vitals Value Taken Time   /68 04/23/24 1028   Temp 36.1 °C (97 °F) 04/23/24 1018   Pulse 66 04/23/24 1028   Resp 18 04/23/24 1028   SpO2 100 % 04/23/24 1028         Event Time   Out of Recovery 10:34:03         Pain/Iveth Score: Iveth Score: 10 (4/23/2024 10:28 AM)

## 2024-04-23 NOTE — ANESTHESIA PREPROCEDURE EVALUATION
04/23/2024  Sparkle Jose is a 43 y.o., female.      Pre-op Assessment    I have reviewed the Patient Summary Reports.     I have reviewed the Nursing Notes. I have reviewed the NPO Status.   I have reviewed the Medications.     Review of Systems  Anesthesia Hx:  No problems with previous Anesthesia                Social:  Smoker       Hematology/Oncology:                   Hematology Comments: Factor 5 Leiden mutation, heterozygous                    Cardiovascular:  Cardiovascular Normal Exercise tolerance: good                                           Pulmonary:  Pulmonary Normal                       Renal/:  Renal/ Normal                 Hepatic/GI:  Bowel Prep.   GERD   Celiac disease           Musculoskeletal:  Arthritis   Ankylosing spondylitis            Neurological:      Headaches     History of Chiari malformation                            Endocrine:  Endocrine Normal            Psych:  Psychiatric History  depression                Physical Exam  General: Well nourished, Cooperative, Alert and Oriented    Airway:  Mallampati: II   Mouth Opening: Normal  TM Distance: Normal  Tongue: Normal  Neck ROM: Normal ROM    Dental:  Intact        Anesthesia Plan  Type of Anesthesia, risks & benefits discussed:    Anesthesia Type: Gen Natural Airway, MAC  Intra-op Monitoring Plan: Standard ASA Monitors  Post Op Pain Control Plan: multimodal analgesia  Induction:  IV  Informed Consent: Informed consent signed with the Patient and all parties understand the risks and agree with anesthesia plan.  All questions answered.   ASA Score: 2  Day of Surgery Review of History & Physical: H&P Update referred to the surgeon/provider.I have interviewed and examined the patient. I have reviewed the patient's H&P dated: There are no significant changes.     Ready For Surgery From Anesthesia Perspective.      .

## 2024-04-23 NOTE — PROVATION PATIENT INSTRUCTIONS
Discharge Summary/Instructions after an Endoscopic Procedure  Patient Name: Sparkle Jose  Patient MRN: [id number]  Patient YOB: 1980 Tuesday, April 23, 2024 Khari Merchant MD  Dear patient,  As a result of recent federal legislation (The Federal Cures Act), you may   receive lab or pathology results from your procedure in your [facility]   account before your physician is able to contact you. Your physician or   their representative will relay the results to you with their   recommendations at their soonest availability.  Thank you,  RESTRICTIONS:  During your procedure today, you received medications for sedation.  These   medications may affect your judgment, balance and coordination.  Therefore,   for 24 hours, you have the following restrictions:   - DO NOT drive a car, operate machinery, make legal/financial decisions,   sign important papers or drink alcohol.    ACTIVITY:  Today: no heavy lifting, straining or running due to procedural   sedation/anesthesia.  The following day: return to full activity including work.  DIET:  Eat and drink normally unless instructed otherwise.     TREATMENT FOR COMMON SIDE EFFECTS:  - Mild abdominal pain, nausea, belching, bloating or excessive gas:  rest,   eat lightly and use a heating pad.  - Sore Throat: treat with throat lozenges and/or gargle with warm salt   water.  - Because air was used during the procedure, expelling large amounts of air   from your rectum or belching is normal.  - If a bowel prep was taken, you may not have a bowel movement for 1-3 days.    This is normal.  SYMPTOMS TO WATCH FOR AND REPORT TO YOUR PHYSICIAN:  1. Abdominal pain or bloating, other than gas cramps.  2. Chest pain.  3. Back pain.  4. Signs of infection such as: chills or fever occurring within 24 hours   after the procedure.  5. Rectal bleeding, which would show as bright red, maroon, or black stools.   (A tablespoon of blood from the rectum is not serious, especially if    hemorrhoids are present.)  6. Vomiting.  7. Weakness or dizziness.  GO DIRECTLY TO THE NEAREST EMERGENCY ROOM IF YOU HAVE ANY OF THE FOLLOWING:      Difficulty breathing              Chills and/or fever over 101 F   Persistent vomiting and/or vomiting blood   Severe abdominal pain   Severe chest pain   Black, tarry stools   Bleeding- more than one tablespoon   Any other symptom or condition that you feel may need urgent attention  Your doctor recommends these additional instructions:  If any biopsies were taken, your doctors clinic will contact you in 1 to 2   weeks with any results.  - Patient has a contact number available for emergencies.  The signs and   symptoms of potential delayed complications were discussed with the   patient.  Return to normal activities tomorrow.  Written discharge   instructions were provided to the patient.   - Resume previous diet.   - Continue present medications.   - Await pathology results.   - Repeat colonoscopy in 5 years for surveillance.   - Telephone my office for pathology results in 2 weeks.   - Discharge patient to home (via wheelchair).  For questions, problems or results please call your physician Khari Merchant MD at Work:  [phone]  If you have any questions about the above instructions, call the GI   department at [phone] or call the endoscopy unit at [phone]   from 7am until 3 pm.  [facility] [place], EMERGENCY ROOM PHONE NUMBER:   [phone]  IF A COMPLICATION OR EMERGENCY SITUATION ARISES AND YOU ARE UNABLE TO REACH   YOUR PHYSICIAN - GO DIRECTLY TO THE EMERGENCY ROOM.  I have read or have had read to me these discharge instructions for my   procedure and have received a written copy.  I understand these   instructions and will follow-up with my physician if I have any questions.     __________________________________       _____________________________________  Nurse Signature                                           Patient/Designated   Responsible Party Signature  Khari Merchant MD  4/23/2024 10:18:21 AM  This report has been verified and signed electronically.  Dear patient,  As a result of recent federal legislation (The Federal Cures Act), you may   receive lab or pathology results from your procedure in your [facility]   account before your physician is able to contact you. Your physician or   their representative will relay the results to you with their   recommendations at their soonest availability.  Thank you,  PROVATION

## 2024-04-24 VITALS
OXYGEN SATURATION: 100 % | HEIGHT: 60 IN | TEMPERATURE: 97 F | BODY MASS INDEX: 20.62 KG/M2 | WEIGHT: 105 LBS | HEART RATE: 66 BPM | DIASTOLIC BLOOD PRESSURE: 68 MMHG | RESPIRATION RATE: 18 BRPM | SYSTOLIC BLOOD PRESSURE: 109 MMHG

## 2024-04-24 NOTE — PROGRESS NOTES
I have received your recent colonoscopy which shows:      + polyp(s) awaiting pathology result.    Repeat colonoscopy in 5 years.     In 2 weeks, please call 's office for pathology results.     Please do not hesitate to call or message with any questions or concerns    Chrissie Lancaster MD

## 2024-04-26 LAB
FINAL PATHOLOGIC DIAGNOSIS: NORMAL
GROSS: NORMAL
Lab: NORMAL

## 2024-04-30 ENCOUNTER — PATIENT MESSAGE (OUTPATIENT)
Dept: RHEUMATOLOGY | Facility: CLINIC | Age: 44
End: 2024-04-30
Payer: COMMERCIAL

## 2024-04-30 DIAGNOSIS — Z00.00 ENCOUNTER FOR MEDICARE ANNUAL WELLNESS EXAM: ICD-10-CM

## 2024-05-02 NOTE — PROGRESS NOTES
Dear Chrissie Lancaster MD,    I recently cared for Sparkle Jose and performed an endoscopy.  Tissue was sent for pathology evaluation and I will have a letter written to ask the patient to repeat the colonoscopy in 5 years.  The pathology showed that there was adenomatous tissue present.  Thank you for allowing me to participate in the care of your patient.  Please call me for any questions that you might have.      Dr. Khari Merchant  [phone] cell  [phone] office      NURSING STAFF:Please  inform the patient that I reviewed the recent pathology obtained at the time of colonoscopy.    The results showed that there was adenomatous tissue present which is benign and based on that, I recommend that the patient have a repeat colonoscopy performed in 5 years.     If the patient has MyChart, this message has been sent to them.  Confirm that they read the note.  If not, copy the information and print a letter to send to the patient at this time.  Confirm that a notation to the PCP was done.      Dear Sparkle Jose,    This is to inform you that I have reviewed your recent colonoscopy pathology.  The results showed that you had adenomatous tissue present which is benign and based on that, I recommend that you have a repeat colonoscopy performed in 5 years.      Dr. Khari Merchant  [phone]

## 2024-05-08 ENCOUNTER — PATIENT MESSAGE (OUTPATIENT)
Dept: RHEUMATOLOGY | Facility: CLINIC | Age: 44
End: 2024-05-08
Payer: COMMERCIAL

## 2024-05-08 ENCOUNTER — PATIENT MESSAGE (OUTPATIENT)
Dept: FAMILY MEDICINE | Facility: CLINIC | Age: 44
End: 2024-05-08
Payer: COMMERCIAL

## 2024-05-09 ENCOUNTER — PATIENT MESSAGE (OUTPATIENT)
Dept: RHEUMATOLOGY | Facility: CLINIC | Age: 44
End: 2024-05-09
Payer: COMMERCIAL

## 2024-05-09 ENCOUNTER — PATIENT MESSAGE (OUTPATIENT)
Dept: FAMILY MEDICINE | Facility: CLINIC | Age: 44
End: 2024-05-09
Payer: COMMERCIAL

## 2024-05-13 ENCOUNTER — PATIENT MESSAGE (OUTPATIENT)
Dept: FAMILY MEDICINE | Facility: CLINIC | Age: 44
End: 2024-05-13
Payer: COMMERCIAL

## 2024-06-14 ENCOUNTER — OFFICE VISIT (OUTPATIENT)
Dept: FAMILY MEDICINE | Facility: CLINIC | Age: 44
End: 2024-06-14
Payer: MEDICARE

## 2024-06-14 VITALS — HEIGHT: 60 IN | BODY MASS INDEX: 21.6 KG/M2 | WEIGHT: 110 LBS

## 2024-06-14 DIAGNOSIS — E88.810 METABOLIC SYNDROME: ICD-10-CM

## 2024-06-14 DIAGNOSIS — F98.8 ADD (ATTENTION DEFICIT DISORDER) WITHOUT HYPERACTIVITY: Primary | ICD-10-CM

## 2024-06-14 PROCEDURE — 99214 OFFICE O/P EST MOD 30 MIN: CPT | Mod: 95,,, | Performed by: STUDENT IN AN ORGANIZED HEALTH CARE EDUCATION/TRAINING PROGRAM

## 2024-06-14 RX ORDER — OXYCODONE AND ACETAMINOPHEN 7.5; 325 MG/1; MG/1
1 TABLET ORAL EVERY 6 HOURS PRN
COMMUNITY
Start: 2024-06-05

## 2024-06-14 RX ORDER — LISDEXAMFETAMINE DIMESYLATE 30 MG/1
30 CAPSULE ORAL EVERY MORNING
Qty: 30 CAPSULE | Refills: 0 | Status: SHIPPED | OUTPATIENT
Start: 2024-08-13 | End: 2024-09-12

## 2024-06-14 RX ORDER — LISDEXAMFETAMINE DIMESYLATE 30 MG/1
30 CAPSULE ORAL EVERY MORNING
Qty: 30 CAPSULE | Refills: 0 | Status: SHIPPED | OUTPATIENT
Start: 2024-07-14 | End: 2024-08-13

## 2024-06-14 RX ORDER — LISDEXAMFETAMINE DIMESYLATE 30 MG/1
30 CAPSULE ORAL EVERY MORNING
Qty: 30 CAPSULE | Refills: 0 | Status: SHIPPED | OUTPATIENT
Start: 2024-06-14 | End: 2024-07-14

## 2024-06-14 NOTE — PROGRESS NOTES
Problem List Items Addressed This Visit          Psychiatric    ADD (attention deficit disorder) without hyperactivity - Primary    Overview     Chronic hx; cont vyvanse 30mg   Pt is demonstrating no behaviors to suggest inappropriate use of prescribed medications.    [place] Board of Pharmacy Controlled Prescription Drug Monitoring database was queried and showed no activity to suggest abuse, diversion, or other inappropriate use of prescription medications.  #30 tabs, 2 refills   3m VV    Adderall - palpitations  Strattera - no improvement with add             Relevant Medications    lisdexamfetamine (VYVANSE) 30 MG capsule    lisdexamfetamine (VYVANSE) 30 MG capsule (Start on 7/14/2024)    lisdexamfetamine (VYVANSE) 30 MG capsule (Start on 8/13/2024)       Endocrine    Metabolic syndrome    Overview     On glp1.   Insurance has denied appeal for mounjaro. She will consider going to weight loss clinic. She states she is no longer actively losing weight, using glp1 for inflammation/arthralgia control.               Follow up in about 3 months (around 9/14/2024) for Virtual Visit.    Chrissie Lancaster MD  _________________________________________________________________________      Patient ID: Sparkle Jose is a 43 y.o. female.    Chief Complaint: add    Pt diagnosed with ADD/ADHD several years ago. Denies mood instability, irritability, aggression, chest pain, decreased appetite, disordered sleep. Vyvanse 30mg helping lack of focus.    Adderall 15 XR caused palpitations. Strattera 40mg (no palpitations) no improvement.       Medications Ordered This Encounter   Medications    lisdexamfetamine (VYVANSE) 30 MG capsule     Sig: Take 1 capsule (30 mg total) by mouth every morning.     Dispense:  30 capsule     Refill:  0    lisdexamfetamine (VYVANSE) 30 MG capsule     Sig: Take 1 capsule (30 mg total) by mouth every morning.     Dispense:  30 capsule     Refill:  0    lisdexamfetamine (VYVANSE) 30 MG  capsule     Sig: Take 1 capsule (30 mg total) by mouth every morning.     Dispense:  30 capsule     Refill:  0   Insurance has denied appeal for mounjaro. She will consider going to weight loss clinic. She states she is no longer actively losing weight, using glp1 for inflammation/arthralgia control.         No orders of the defined types were placed in this encounter.        Past medical histories reviewed, including past medical, surgical, family and social histories.      Current Outpatient Medications on File Prior to Visit   Medication Sig Dispense Refill    clobetasol 0.05% (TEMOVATE) 0.05 % Oint 1 application daily as needed.       clobetasol 0.05% (TEMOVATE) 0.05 % Oint Apply topically 2 (two) times daily. 30 g 1    eletriptan (RELPAX) 40 MG tablet Take 1 tablet (40 mg total) by mouth as needed. may repeat in 2 hours if necessary 9 tablet 0    famotidine (PEPCID) 40 MG tablet Take 1 tablet (40 mg total) by mouth once daily. 30 tablet 1    ixekizumab (TALTZ AUTOINJECTOR) 80 mg/mL AtIn Inject 80 mg into the skin every 28 days. 1 mL 11    lisdexamfetamine (VYVANSE) 30 MG capsule Take 1 capsule (30 mg total) by mouth every morning. 30 capsule 0    oxyCODONE-acetaminophen (PERCOCET) 7.5-325 mg per tablet Take 1 tablet by mouth every 6 (six) hours as needed.      promethazine (PHENERGAN) 25 MG tablet Take 1 tablet (25 mg total) by mouth every 6 (six) hours as needed for Nausea. 45 tablet 5    roflumilast (ZORYVE) 0.3 % Crea Apply 1 Application topically once daily. 60 g 3    tirzepatide (MOUNJARO) 12.5 mg/0.5 mL PnIj Inject 12.5 mg into the skin every 7 days. 12 Pen 1    tiZANidine (ZANAFLEX) 4 MG tablet Take 1 tablet (4 mg total) by mouth nightly as needed (muscle spasm). 90 tablet 1    traZODone (DESYREL) 100 MG tablet Take 1 tablet (100 mg total) by mouth every evening. 90 tablet 1    [DISCONTINUED] ixekizumab (TALTZ AUTOINJECTOR, 2 PACK,) 80 mg/mL AtIn Inject 80 mg into the skin every 14 (fourteen) days. For  induction dose week 4-10 (Patient not taking: Reported on 8/18/2023) 2 mL 1     No current facility-administered medications on file prior to visit.       Review of Systems   12 point review of systems negative except for listed in HPI.     Objective:    Nursing note and vitals reviewed.  There were no vitals filed for this visit.        Body mass index is 21.48 kg/m².   No vitals or full physical exam obtained as this is a virtual visit  Gen: no distress, comfortable          We Offer Telehealth & Same Day Appointments!   Book your Telehealth appointment with me through my nurse or   Clinic appointments on [facility]!  [facility]hzek[facility]-[phone]     To Schedule appointments online, go to [facility]: [email or web address]     Answers submitted by the patient for this visit:  Review of Systems Questionnaire (Submitted on 6/14/2024)  activity change: No  unexpected weight change: No  neck pain: Yes  hearing loss: No  rhinorrhea: No  trouble swallowing: No  eye discharge: No  visual disturbance: No  chest tightness: No  wheezing: No  chest pain: No  palpitations: No  blood in stool: No  constipation: No  vomiting: No  diarrhea: No  polydipsia: No  polyuria: No  difficulty urinating: No  hematuria: No  menstrual problem: No  dysuria: No  joint swelling: Yes  arthralgias: Yes  headaches: Yes  weakness: No  confusion: No  dysphoric mood: No

## 2024-06-24 ENCOUNTER — PATIENT MESSAGE (OUTPATIENT)
Dept: RHEUMATOLOGY | Facility: CLINIC | Age: 44
End: 2024-06-24
Payer: COMMERCIAL

## 2024-07-07 ENCOUNTER — PATIENT MESSAGE (OUTPATIENT)
Dept: RHEUMATOLOGY | Facility: CLINIC | Age: 44
End: 2024-07-07
Payer: COMMERCIAL

## 2024-07-10 DIAGNOSIS — E11.9 TYPE 2 DIABETES MELLITUS WITHOUT COMPLICATION: ICD-10-CM

## 2024-07-12 ENCOUNTER — E-VISIT (OUTPATIENT)
Dept: FAMILY MEDICINE | Facility: CLINIC | Age: 44
End: 2024-07-12
Payer: MEDICARE

## 2024-07-12 DIAGNOSIS — R21 RASH: Primary | ICD-10-CM

## 2024-07-12 DIAGNOSIS — L29.9 PRURITUS: ICD-10-CM

## 2024-07-12 RX ORDER — VALACYCLOVIR HYDROCHLORIDE 1 G/1
TABLET, FILM COATED ORAL
Qty: 21 TABLET | Refills: 0 | Status: SHIPPED | OUTPATIENT
Start: 2024-07-12

## 2024-07-12 RX ORDER — CETIRIZINE HYDROCHLORIDE 10 MG/1
10 TABLET ORAL DAILY
Qty: 7 TABLET | Refills: 0 | Status: SHIPPED | OUTPATIENT
Start: 2024-07-12 | End: 2024-07-19

## 2024-07-12 RX ORDER — METHYLPREDNISOLONE 4 MG/1
TABLET ORAL
Qty: 21 TABLET | Refills: 0 | Status: SHIPPED | OUTPATIENT
Start: 2024-07-12

## 2024-07-12 NOTE — PROGRESS NOTES
Patient ID: Sparkle Jose is a 43 y.o. female.    Chief Complaint: Rash (Entered automatically based on patient selection in [employer].)          274}  The patient initiated a request through [employer] on 7/12/2024 for evaluation and management with a chief complaint of Rash (Entered automatically based on patient selection in [employer].)     I evaluated the questionnaire submission on 07/12/2024 .    Total Time (in minutes): 11     Ohs Peq Evisit Rash    7/12/2024  8:59 AM CDT - Filed by Patient   Do you agree to participate in an E-Visit? Yes   If you have any of the following symptoms, please present to your local emergency room or call 911:  I acknowledge   Are you pregnant, could you be pregnant, or are you breast feeding? None of the above   What is the main issue you would like addressed today? Extremely itchy rash. I have had shingles before so im not sure if it could be shingles again or something else. It is located below my breast on my chest. It is not in the crease under my breast.   How would you describe your skin problem? Rash   When did your symptoms first appear? 7/10/2024   Where is it located?  Chest   Does it itch? Yes   Does it hurt? No   Is there discharge or drainage? No   Is there bleeding? No   Describe the character Raised;  Closed;  Clear fluid filled   Describe the color Pink   Has it changed over time? Grown in size   Frequency of skin problem Fluctuates at random   Duration of the skin problem (how long does it stay when it is present) Weeks   I have had a new exposure to No new exposures   I have had a new exposure to No new exposures   What have you used to treat the skin problem? Prescription steriod cream, keeping clean and dry   If you have used anything for treatment, has it helped the symptoms? Yes   Other generalized symptoms that you associate with the rash Headache;  Muscle ache   Provide any additional information you feel is important.    At least one photo is required for  treatment to be provided. You can upload a maximum of three photos of the affected area.     Are you able to take your vital signs? No          Active Problem List with Overview Notes    Diagnosis Date Noted    Positive colorectal cancer screening using Cologuard test 04/23/2024    Colon polyp 04/23/2024    Hematuria 11/16/2023     Likely kidney stones  - ct stone protocol  - flomax, cipro  - [facility]       Metabolic syndrome 08/18/2023     On glp1.   Insurance has denied appeal for mounjaro. She will consider going to weight loss clinic. She states she is no longer actively losing weight, using glp1 for inflammation/arthralgia control.       Immunocompromised state due to drug therapy 03/07/2023     Follows with rheum, switched from cosentyx to talz, joint and psoriasis improved      ADD (attention deficit disorder) without hyperactivity 03/07/2023     Chronic hx; cont vyvanse 30mg   Pt is demonstrating no behaviors to suggest inappropriate use of prescribed medications.    [place] Board of Pharmacy Controlled Prescription Drug Monitoring database was queried and showed no activity to suggest abuse, diversion, or other inappropriate use of prescription medications.  #30 tabs, 2 refills   3m VV    Adderall - palpitations  Strattera - no improvement with add          Prediabetes 06/15/2022     Lab Results   Component Value Date    HGBA1C 5.2 12/26/2023     Doing well; follows with endocrine   On GLP1 RA     Diabetes Medications               tirzepatide (MOUNJARO) 12.5 mg/0.5 mL PnIj Inject 12.5 mg into the skin every 7 days.                   Anxiety and depression 03/15/2022     Chronic hx; no longer on wellbutrin. Reports anxiety improved with ADD treatment   Denies SI/HI; no hallucinations     -patient was educated, advised of side effects, and all questions were answered.  Patient voiced understanding  -patient will follow up routinely and notify us if having any side effects or worsening or persistent symptoms.   ER precautions were given.        Primary insomnia 03/15/2022     -is on prn trazodone chronically (rxd by Dr. Almonte)  -denies adverse effects of medication  -has tried multiple OTC medication with minimal benefit  -discussed importance of good sleep hygiene practices        History of Chiari malformation 03/15/2022     Hx of chiari malformation dxd 2004 s/p brain decompression, follows with neurology q3-4 yrs. Doing well, no concerns      Thyroid nodule 11/26/2021     Thyroid Nodules; incidental finding via CT 2019 s/p bx (Nov 2021) neg for malignancy   Reports previously taking Cytomel 5 mg daily due to fatigue and weight    Lab Results   Component Value Date    TSH 2.829 10/26/2021       - repeat US Thyroid scheduled for May 2022   - follows with endocrinology Dr. Wagner        Cigarette nicotine dependence without complication 08/11/2019     Reports smoking about 1/2 ppd 25 years. Family smokes. Previously on chantix and quit smoking for about 1 month; however, sister was sick and restarted smoking.    Has cut down to about 5 cigarettes daily     Assistance with smoking cessation was offered, including:  [x]  Medications  [x]  Counseling  []  Printed Information on Smoking Cessation  [x]  Referral to a Smoking Cessation Program    Patient was counseled regarding smoking for 3-10 minutes.          Pulmonary nodule 08/11/2019 8/31/2020 1 year follow up CT chest Unchanged scattered bilateral pulmonary nodules.  No new nodules.  Follow up CT in 1 year, this will be 2 year follow up, August 2021.   Per Fleischner guidelines: For multiple solid nodules with any 6 mm or greater, Fleischner Society guidelines recommend follow up with non-contrast chest CT at 3-6 months (this exam) and 18-24 months after discovery (approximately August 2021).      Thrombocytosis 08/22/2018    Leukocytosis 08/22/2018    Factor 5 Leiden mutation, heterozygous 08/22/2018     Leiden factor 5 deficiency - no previous hx of DVT, PE    Avoid estrogen therapy      Psoriatic arthritis 02/19/2015     Chronic hx; follows with rheumatology Dr. Almonte  - prn ketoconazole shampoo and topical steroid  - follows q3-4m with rheum      Ankylosing spondylitis 11/12/2014     Chronic hx; follows with rheumatology Dr. Almonte  Stable on cosentyx 300mg q30d and prn percocet, prednisone, and chlorzoxazone (msk relaxer)  - follows q3-4m with rheum        Recent Labs Obtained:  Lab Results   Component Value Date    WBC 10.52 07/21/2023    HGB 13.9 07/21/2023    HCT 43.0 07/21/2023    MCV 94 07/21/2023     07/21/2023     08/02/2023    K 4.0 08/02/2023    GLU 83 08/02/2023    CREATININE 0.6 08/02/2023    EGFRNORACEVR >60.0 08/02/2023    HGBA1C 5.2 12/26/2023      Review of patient's allergies indicates:  No Known Allergies    Encounter Diagnoses   Name Primary?    Rash Yes    Pruritus         No orders of the defined types were placed in this encounter.     Medications Ordered This Encounter   Medications    cetirizine (ZYRTEC) 10 MG tablet     Sig: Take 1 tablet (10 mg total) by mouth once daily. for 7 days     Dispense:  7 tablet     Refill:  0    methylPREDNISolone (MEDROL DOSEPACK) 4 mg tablet     Sig: follow package directions     Dispense:  21 tablet     Refill:  0    valACYclovir (VALTREX) 1000 MG tablet     Sig: One tab PO Q8hrs x 7 days     Dispense:  21 tablet     Refill:  0        E-Visit Time Tracking:    Day 1 Time (in minutes): 11    Total Time (in minutes): 11      274}

## 2024-07-15 ENCOUNTER — LAB VISIT (OUTPATIENT)
Dept: LAB | Facility: HOSPITAL | Age: 44
End: 2024-07-15
Attending: INTERNAL MEDICINE
Payer: COMMERCIAL

## 2024-07-15 ENCOUNTER — PATIENT OUTREACH (OUTPATIENT)
Dept: ADMINISTRATIVE | Facility: HOSPITAL | Age: 44
End: 2024-07-15
Payer: COMMERCIAL

## 2024-07-15 DIAGNOSIS — E55.9 VITAMIN D DEFICIENCY: ICD-10-CM

## 2024-07-15 DIAGNOSIS — E11.69 TYPE 2 DIABETES MELLITUS WITH OTHER SPECIFIED COMPLICATION, UNSPECIFIED WHETHER LONG TERM INSULIN USE: ICD-10-CM

## 2024-07-15 DIAGNOSIS — L40.50 PSORIATIC ARTHRITIS: ICD-10-CM

## 2024-07-15 DIAGNOSIS — E11.9 TYPE 2 DIABETES MELLITUS WITHOUT COMPLICATION: ICD-10-CM

## 2024-07-15 LAB
25(OH)D3+25(OH)D2 SERPL-MCNC: 36 NG/ML (ref 30–96)
ALBUMIN SERPL BCP-MCNC: 4.3 G/DL (ref 3.5–5.2)
ALBUMIN/CREAT UR: 8 UG/MG (ref 0–30)
ALP SERPL-CCNC: 81 U/L (ref 55–135)
ALT SERPL W/O P-5'-P-CCNC: 10 U/L (ref 10–44)
ANION GAP SERPL CALC-SCNC: 12 MMOL/L (ref 8–16)
AST SERPL-CCNC: 14 U/L (ref 10–40)
BASOPHILS # BLD AUTO: 0.12 K/UL (ref 0–0.2)
BASOPHILS NFR BLD: 0.8 % (ref 0–1.9)
BILIRUB SERPL-MCNC: 0.2 MG/DL (ref 0.1–1)
BUN SERPL-MCNC: 17 MG/DL (ref 6–20)
CALCIUM SERPL-MCNC: 10.8 MG/DL (ref 8.7–10.5)
CHLORIDE SERPL-SCNC: 103 MMOL/L (ref 95–110)
CHOLEST SERPL-MCNC: 207 MG/DL (ref 120–199)
CHOLEST/HDLC SERPL: 3 {RATIO} (ref 2–5)
CO2 SERPL-SCNC: 22 MMOL/L (ref 23–29)
CREAT SERPL-MCNC: 0.7 MG/DL (ref 0.5–1.4)
CREAT UR-MCNC: 87 MG/DL (ref 15–325)
CRP SERPL-MCNC: 2 MG/L (ref 0–8.2)
DIFFERENTIAL METHOD BLD: ABNORMAL
EOSINOPHIL # BLD AUTO: 0.1 K/UL (ref 0–0.5)
EOSINOPHIL NFR BLD: 0.5 % (ref 0–8)
ERYTHROCYTE [DISTWIDTH] IN BLOOD BY AUTOMATED COUNT: 14.3 % (ref 11.5–14.5)
ERYTHROCYTE [SEDIMENTATION RATE] IN BLOOD BY WESTERGREN METHOD: 8 MM/HR (ref 0–20)
EST. GFR  (NO RACE VARIABLE): >60 ML/MIN/1.73 M^2
ESTIMATED AVG GLUCOSE: 100 MG/DL (ref 68–131)
GLUCOSE SERPL-MCNC: 92 MG/DL (ref 70–110)
HBA1C MFR BLD: 5.1 % (ref 4–5.6)
HCT VFR BLD AUTO: 46.6 % (ref 37–48.5)
HDLC SERPL-MCNC: 68 MG/DL (ref 40–75)
HDLC SERPL: 32.9 % (ref 20–50)
HGB BLD-MCNC: 14.6 G/DL (ref 12–16)
IMM GRANULOCYTES # BLD AUTO: 0.05 K/UL (ref 0–0.04)
IMM GRANULOCYTES NFR BLD AUTO: 0.3 % (ref 0–0.5)
LDLC SERPL CALC-MCNC: 107.2 MG/DL (ref 63–159)
LYMPHOCYTES # BLD AUTO: 4.9 K/UL (ref 1–4.8)
LYMPHOCYTES NFR BLD: 33.6 % (ref 18–48)
MCH RBC QN AUTO: 30 PG (ref 27–31)
MCHC RBC AUTO-ENTMCNC: 31.3 G/DL (ref 32–36)
MCV RBC AUTO: 96 FL (ref 82–98)
MICROALBUMIN UR DL<=1MG/L-MCNC: 7 UG/ML
MONOCYTES # BLD AUTO: 0.9 K/UL (ref 0.3–1)
MONOCYTES NFR BLD: 6.3 % (ref 4–15)
NEUTROPHILS # BLD AUTO: 8.4 K/UL (ref 1.8–7.7)
NEUTROPHILS NFR BLD: 58.5 % (ref 38–73)
NONHDLC SERPL-MCNC: 139 MG/DL
NRBC BLD-RTO: 0 /100 WBC
PLATELET # BLD AUTO: 451 K/UL (ref 150–450)
PMV BLD AUTO: 10.5 FL (ref 9.2–12.9)
POTASSIUM SERPL-SCNC: 3.6 MMOL/L (ref 3.5–5.1)
PROT SERPL-MCNC: 8 G/DL (ref 6–8.4)
RBC # BLD AUTO: 4.87 M/UL (ref 4–5.4)
SODIUM SERPL-SCNC: 137 MMOL/L (ref 136–145)
TRIGL SERPL-MCNC: 159 MG/DL (ref 30–150)
WBC # BLD AUTO: 14.45 K/UL (ref 3.9–12.7)

## 2024-07-15 PROCEDURE — 85651 RBC SED RATE NONAUTOMATED: CPT | Mod: PO | Performed by: PHYSICIAN ASSISTANT

## 2024-07-15 PROCEDURE — 36415 COLL VENOUS BLD VENIPUNCTURE: CPT | Mod: PO | Performed by: PHYSICIAN ASSISTANT

## 2024-07-15 PROCEDURE — 86140 C-REACTIVE PROTEIN: CPT | Performed by: PHYSICIAN ASSISTANT

## 2024-07-15 PROCEDURE — 83036 HEMOGLOBIN GLYCOSYLATED A1C: CPT | Performed by: STUDENT IN AN ORGANIZED HEALTH CARE EDUCATION/TRAINING PROGRAM

## 2024-07-15 PROCEDURE — 80061 LIPID PANEL: CPT | Performed by: PHYSICIAN ASSISTANT

## 2024-07-15 PROCEDURE — 80053 COMPREHEN METABOLIC PANEL: CPT | Performed by: PHYSICIAN ASSISTANT

## 2024-07-15 PROCEDURE — 82306 VITAMIN D 25 HYDROXY: CPT | Performed by: PHYSICIAN ASSISTANT

## 2024-07-15 PROCEDURE — 85025 COMPLETE CBC W/AUTO DIFF WBC: CPT | Performed by: PHYSICIAN ASSISTANT

## 2024-07-15 PROCEDURE — 82043 UR ALBUMIN QUANTITATIVE: CPT | Performed by: STUDENT IN AN ORGANIZED HEALTH CARE EDUCATION/TRAINING PROGRAM

## 2024-07-15 NOTE — PROGRESS NOTES
DM LABS: per chart review pt is overdue for HA1C, CMP, Lipid and Microalbumin, I linked HA1C/Microalbumin to lab appt 7.16.24

## 2024-07-16 NOTE — TELEPHONE ENCOUNTER
----- Message from Sandi Yancey sent at 7/16/2024  9:03 AM CDT -----  Contact: Patient  Type:  Needs Medical Advice    Who Called: Patient     Would the patient rather a call back or a response via [facility]? Call    Best Call Back Number: [phone] (home)     Additional Information: Patient states that fady spoke about updated orders for imaging at the last appt. Patient still has not received orders. Patient states that she has been trying to get these orders for a while now

## 2024-07-16 NOTE — PROGRESS NOTES
I have sent a msg to patient with the following interpretation (see below):    A1c and urine protein levels are normal     Please do not hesitate to call or message with any questions or concerns    Chrissie Lancaster MD

## 2024-07-23 ENCOUNTER — PATIENT MESSAGE (OUTPATIENT)
Dept: RHEUMATOLOGY | Facility: CLINIC | Age: 44
End: 2024-07-23
Payer: COMMERCIAL

## 2024-07-23 ENCOUNTER — HOSPITAL ENCOUNTER (OUTPATIENT)
Dept: RADIOLOGY | Facility: HOSPITAL | Age: 44
Discharge: HOME | End: 2024-07-23
Attending: INTERNAL MEDICINE
Payer: COMMERCIAL

## 2024-07-23 ENCOUNTER — OFFICE VISIT (OUTPATIENT)
Dept: RHEUMATOLOGY | Facility: CLINIC | Age: 44
End: 2024-07-23
Payer: COMMERCIAL

## 2024-07-23 VITALS
DIASTOLIC BLOOD PRESSURE: 72 MMHG | BODY MASS INDEX: 21.56 KG/M2 | WEIGHT: 109.81 LBS | HEART RATE: 81 BPM | HEIGHT: 60 IN | SYSTOLIC BLOOD PRESSURE: 109 MMHG

## 2024-07-23 DIAGNOSIS — D84.821 IMMUNOCOMPROMISED STATE DUE TO DRUG THERAPY: ICD-10-CM

## 2024-07-23 DIAGNOSIS — L30.9 ECZEMA, UNSPECIFIED TYPE: ICD-10-CM

## 2024-07-23 DIAGNOSIS — M45.9 ANKYLOSING SPONDYLITIS, UNSPECIFIED SITE OF SPINE: ICD-10-CM

## 2024-07-23 DIAGNOSIS — M25.511 BILATERAL SHOULDER PAIN, UNSPECIFIED CHRONICITY: ICD-10-CM

## 2024-07-23 DIAGNOSIS — M25.512 BILATERAL SHOULDER PAIN, UNSPECIFIED CHRONICITY: ICD-10-CM

## 2024-07-23 DIAGNOSIS — G89.4 CHRONIC PAIN SYNDROME: ICD-10-CM

## 2024-07-23 DIAGNOSIS — M54.2 CERVICALGIA: ICD-10-CM

## 2024-07-23 DIAGNOSIS — D68.51 FACTOR 5 LEIDEN MUTATION, HETEROZYGOUS: ICD-10-CM

## 2024-07-23 DIAGNOSIS — Z79.899 IMMUNOCOMPROMISED STATE DUE TO DRUG THERAPY: ICD-10-CM

## 2024-07-23 PROCEDURE — 72070 X-RAY EXAM THORAC SPINE 2VWS: CPT | Mod: TC,FY,PO

## 2024-07-23 PROCEDURE — 72070 X-RAY EXAM THORAC SPINE 2VWS: CPT | Mod: 26,,, | Performed by: RADIOLOGY

## 2024-07-23 PROCEDURE — 1160F RVW MEDS BY RX/DR IN RCRD: CPT | Mod: CPTII,S$GLB,, | Performed by: INTERNAL MEDICINE

## 2024-07-23 PROCEDURE — 72040 X-RAY EXAM NECK SPINE 2-3 VW: CPT | Mod: 26,,, | Performed by: RADIOLOGY

## 2024-07-23 PROCEDURE — 72202 X-RAY EXAM SI JOINTS 3/> VWS: CPT | Mod: 26,,, | Performed by: RADIOLOGY

## 2024-07-23 PROCEDURE — 99999 PR PBB SHADOW E&M-EST. PATIENT-LVL V: CPT | Mod: PBBFAC,,, | Performed by: INTERNAL MEDICINE

## 2024-07-23 PROCEDURE — 3061F NEG MICROALBUMINURIA REV: CPT | Mod: CPTII,S$GLB,, | Performed by: INTERNAL MEDICINE

## 2024-07-23 PROCEDURE — 3074F SYST BP LT 130 MM HG: CPT | Mod: CPTII,S$GLB,, | Performed by: INTERNAL MEDICINE

## 2024-07-23 PROCEDURE — 99215 OFFICE O/P EST HI 40 MIN: CPT | Mod: 25,S$GLB,, | Performed by: INTERNAL MEDICINE

## 2024-07-23 PROCEDURE — 73030 X-RAY EXAM OF SHOULDER: CPT | Mod: TC,50,FY,PO

## 2024-07-23 PROCEDURE — 1159F MED LIST DOCD IN RCRD: CPT | Mod: CPTII,S$GLB,, | Performed by: INTERNAL MEDICINE

## 2024-07-23 PROCEDURE — 72100 X-RAY EXAM L-S SPINE 2/3 VWS: CPT | Mod: 26,,, | Performed by: RADIOLOGY

## 2024-07-23 PROCEDURE — 72202 X-RAY EXAM SI JOINTS 3/> VWS: CPT | Mod: TC,FY,PO

## 2024-07-23 PROCEDURE — 3066F NEPHROPATHY DOC TX: CPT | Mod: CPTII,S$GLB,, | Performed by: INTERNAL MEDICINE

## 2024-07-23 PROCEDURE — 3044F HG A1C LEVEL LT 7.0%: CPT | Mod: CPTII,S$GLB,, | Performed by: INTERNAL MEDICINE

## 2024-07-23 PROCEDURE — 3078F DIAST BP <80 MM HG: CPT | Mod: CPTII,S$GLB,, | Performed by: INTERNAL MEDICINE

## 2024-07-23 PROCEDURE — 72040 X-RAY EXAM NECK SPINE 2-3 VW: CPT | Mod: TC,FY,PO

## 2024-07-23 PROCEDURE — 73030 X-RAY EXAM OF SHOULDER: CPT | Mod: 26,,, | Performed by: RADIOLOGY

## 2024-07-23 PROCEDURE — 72100 X-RAY EXAM L-S SPINE 2/3 VWS: CPT | Mod: TC,FY,PO

## 2024-07-23 PROCEDURE — 3008F BODY MASS INDEX DOCD: CPT | Mod: CPTII,S$GLB,, | Performed by: INTERNAL MEDICINE

## 2024-07-23 PROCEDURE — 96372 THER/PROPH/DIAG INJ SC/IM: CPT | Mod: S$GLB,,, | Performed by: INTERNAL MEDICINE

## 2024-07-23 RX ORDER — KETOROLAC TROMETHAMINE 30 MG/ML
60 INJECTION, SOLUTION INTRAMUSCULAR; INTRAVENOUS
Status: COMPLETED | OUTPATIENT
Start: 2024-07-23 | End: 2024-07-23

## 2024-07-23 RX ORDER — OXYCODONE AND ACETAMINOPHEN 10; 325 MG/1; MG/1
1 TABLET ORAL EVERY 8 HOURS PRN
Qty: 90 TABLET | Refills: 0 | Status: SHIPPED | OUTPATIENT
Start: 2024-08-20 | End: 2024-09-19

## 2024-07-23 RX ORDER — OXYCODONE AND ACETAMINOPHEN 10; 325 MG/1; MG/1
1 TABLET ORAL EVERY 8 HOURS PRN
Qty: 90 TABLET | Refills: 0 | Status: SHIPPED | OUTPATIENT
Start: 2024-07-23 | End: 2024-08-22

## 2024-07-23 RX ORDER — OXYCODONE AND ACETAMINOPHEN 10; 325 MG/1; MG/1
1 TABLET ORAL EVERY 8 HOURS PRN
Qty: 90 TABLET | Refills: 0 | Status: SHIPPED | OUTPATIENT
Start: 2024-10-18 | End: 2024-11-17

## 2024-07-23 RX ORDER — METHYLPREDNISOLONE ACETATE 80 MG/ML
80 INJECTION, SUSPENSION INTRA-ARTICULAR; INTRALESIONAL; INTRAMUSCULAR; SOFT TISSUE
Status: COMPLETED | OUTPATIENT
Start: 2024-07-23 | End: 2024-07-23

## 2024-07-23 RX ORDER — TACROLIMUS 1 MG/G
OINTMENT TOPICAL 2 TIMES DAILY
Qty: 60 G | Refills: 2 | Status: SHIPPED | OUTPATIENT
Start: 2024-07-23 | End: 2025-01-19

## 2024-07-23 RX ADMIN — KETOROLAC TROMETHAMINE 60 MG: 30 INJECTION, SOLUTION INTRAMUSCULAR; INTRAVENOUS at 12:07

## 2024-07-23 RX ADMIN — METHYLPREDNISOLONE ACETATE 80 MG: 80 INJECTION, SUSPENSION INTRA-ARTICULAR; INTRALESIONAL; INTRAMUSCULAR; SOFT TISSUE at 12:07

## 2024-07-23 ASSESSMENT — ROUTINE ASSESSMENT OF PATIENT INDEX DATA (RAPID3)
PAIN SCORE: 6.5
PSYCHOLOGICAL DISTRESS SCORE: 2.2
PATIENT GLOBAL ASSESSMENT SCORE: 6.5
TOTAL RAPID3 SCORE: 6.44
MDHAQ FUNCTION SCORE: 1.9

## 2024-07-23 NOTE — PROGRESS NOTES
Subjective:     Patient ID:  Sparkle Jose    Chief Complaint:  Disease Management     History of Present Illness:  Pt is a 43 y.o. female AS. She is doing fair on Taltz--has noticed an improvement in her skin overall and her joints have remained stable. She denies side effects. Psoriasis remains active on her feet and she never received zoryve cream.she noticed her T spine is painful, lumbar spine is painful and pain in her cervical spine with neuropathy shocks.     She is taking percocet as needed for severe pain,    Mounjaro was denied by her new insurance.     We reviewed recent labs--last A1c 5.2%.     Pt has GI bug and did not want to come into clinic today.     Current tx:  1. taltz  Rheumatologic History:   - Diagnosis/es:  - Positive serologies:  - Infectious screening labs:  - Previous Treatments:  - Current Treatments:     Interval History:   Hospitalization since last office visit: No    Patient Active Problem List    Diagnosis Date Noted    Positive colorectal cancer screening using Cologuard test 04/23/2024    Colon polyp 04/23/2024    Hematuria 11/16/2023    Metabolic syndrome 08/18/2023    Immunocompromised state due to drug therapy 03/07/2023    ADD (attention deficit disorder) without hyperactivity 03/07/2023    Prediabetes 06/15/2022    Anxiety and depression 03/15/2022    Primary insomnia 03/15/2022    History of Chiari malformation 03/15/2022    Thyroid nodule 11/26/2021    Cigarette nicotine dependence without complication 08/11/2019    Pulmonary nodule 08/11/2019    Thrombocytosis 08/22/2018    Leukocytosis 08/22/2018    Factor 5 Leiden mutation, heterozygous 08/22/2018    Psoriatic arthritis 02/19/2015    Ankylosing spondylitis 11/12/2014     Past Surgical History:   Procedure Laterality Date    brain decompression   2006    BRAIN SURGERY      COLONOSCOPY N/A 4/23/2024    Procedure: COLONOSCOPY;  Surgeon: Khari Merchant MD;  Location: [facility];  Service: Endoscopy;  Laterality:  N/A;    TUBAL LIGATION       Social History     Tobacco Use    Smoking status: Every Day     Current packs/day: 0.50     Average packs/day: 0.5 packs/day for 31.6 years (15.8 ttl pk-yrs)     Types: Cigarettes     Start date: 1/1/1993    Smokeless tobacco: Never    Tobacco comments:     5-6 CIGS DAILY last quit attempt 8/14/2019   Substance Use Topics    Alcohol use: No    Drug use: Never     Family History   Problem Relation Name Age of Onset    Hypertension Mother Mother     Clotting disorder Mother Mother     Depression Sister Sister     Learning disabilities Sister Sister     Mental illness Sister Sister     Asthma Brother Brother     Learning disabilities Brother Brother     Clotting disorder Sister Madyson     Learning disabilities Sister Madyson     Diabetes Maternal Grandmother Carol Call     Diabetes Paternal Grandmother Carol Daniels     Breast cancer Neg Hx      Ovarian cancer Neg Hx       Review of patient's allergies indicates:  No Known Allergies    Review of Systems   Review of Systems     Current Medications:  Current Outpatient Medications   Medication Instructions    cetirizine (ZYRTEC) 10 mg, Oral, Daily    clobetasol 0.05% (TEMOVATE) 0.05 % Oint 1 application , Daily PRN    clobetasol 0.05% (TEMOVATE) 0.05 % Oint Topical (Top), 2 times daily    eletriptan (RELPAX) 40 mg, Oral, As needed (PRN), may repeat in 2 hours if necessary     famotidine (PEPCID) 40 mg, Oral, Daily    insulin syringes, disposable, 1 mL Syrg 1 each, Misc.(Non-Drug; Combo Route), Daily    lisdexamfetamine (VYVANSE) 30 mg, Oral, Every morning    [START ON 8/13/2024] lisdexamfetamine (VYVANSE) 30 mg, Oral, Every morning    methylPREDNISolone (MEDROL DOSEPACK) 4 mg tablet follow package directions    oxyCODONE-acetaminophen (PERCOCET)  mg per tablet 1 tablet, Oral, Every 8 hours PRN    [START ON 8/20/2024] oxyCODONE-acetaminophen (PERCOCET)  mg per tablet 1 tablet, Oral, Every 8 hours PRN    [START ON 10/18/2024]  oxyCODONE-acetaminophen (PERCOCET)  mg per tablet 1 tablet, Oral, Every 8 hours PRN    promethazine (PHENERGAN) 25 mg, Oral, Every 6 hours PRN    roflumilast (ZORYVE) 0.3 % Crea 1 Application, Topical (Top), Daily    tacrolimus (PROTOPIC) 0.1 % ointment Topical (Top), 2 times daily    TALTZ AUTOINJECTOR 80 mg, Subcutaneous, Every 28 days    tirzepatide 15 mg, Subcutaneous, Every 7 days    tiZANidine (ZANAFLEX) 4 mg, Oral, Nightly PRN    traZODone (DESYREL) 100 mg, Oral, Nightly    valACYclovir (VALTREX) 1000 MG tablet One tab PO Q8hrs x 7 days         Objective:     Vitals:    07/23/24 1042   BP: 109/72   Pulse: 81   Weight: 49.8 kg (109 lb 12.6 oz)   Height: 5' (1.524 m)   PainSc:   6      Body mass index is 21.44 kg/m².     Physical Examinations:  Physical Exam     Disease Assessment Scores:  Patient's Global Assessment of arthritis (0-10): 2  Physician's Global Assessment of arthritis (0-10): 2  Number of Tender Joints (0-28): 2  Number of Swollen Joints (0-28): 2        3/7/2024    12:26 PM   Rapid3 Question Responses and Scores   MDHAQ Score 1.5   Psychologic Score 2.2   Pain Score 5   When you awakened in the morning OVER THE LAST WEEK, did you feel stiff? Yes   If Yes, please indicate the number of hours until you are as limber as you will be for the day 2   Fatigue Score 6   Global Health Score 6.5   RAPID3 Score 5.5       Monitoring Lab Results:  Lab Results   Component Value Date    WBC 14.45 (H) 07/15/2024    RBC 4.87 07/15/2024    HGB 14.6 07/15/2024    HCT 46.6 07/15/2024    MCV 96 07/15/2024    MCH 30.0 07/15/2024    MCHC 31.3 (L) 07/15/2024    RDW 14.3 07/15/2024     (H) 07/15/2024        Lab Results   Component Value Date     07/15/2024    K 3.6 07/15/2024     07/15/2024    CO2 22 (L) 07/15/2024    GLU 92 07/15/2024    BUN 17 07/15/2024    CREATININE 0.7 07/15/2024    CALCIUM 10.8 (H) 07/15/2024    PROT 8.0 07/15/2024    ALBUMIN 4.3 07/15/2024    BILITOT 0.2 07/15/2024     "ALKPHOS 81 07/15/2024    AST 14 07/15/2024    ALT 10 07/15/2024    ANIONGAP 12 07/15/2024    EGFRNORACEVR >60.0 07/15/2024       Lab Results   Component Value Date    SEDRATE 8 07/15/2024    CRP 2.0 07/15/2024        Lab Results   Component Value Date    WWAZOVJV52YT 36 07/15/2024    ZAEZAQSZ83 387 01/03/2018        Lab Results   Component Value Date    CHOL 207 (H) 07/15/2024    HDL 68 07/15/2024    LDLCALC 107.2 07/15/2024    TRIG 159 (H) 07/15/2024       Lab Results   Component Value Date    CCPANTIBODIE 1.1 11/12/2014     Lab Results   Component Value Date    ANASCREEN Negative <1:160 03/09/2018    DSDNA Negative 1:10 09/05/2017     Lab Results   Component Value Date    HLABB27 Negative 11/12/2014       Infectious Disease Screening:  Lab Results   Component Value Date    HEPBSAG Non-reactive 07/21/2023    HEPBCAB Non-reactive 07/21/2023    HEPBSAB <3.00 07/21/2023    HEPBSAB Non-reactive 07/21/2023    HEPBIGM Negative 07/09/2019     Lab Results   Component Value Date    HEPCAB Non-reactive 07/21/2023     Lab Results   Component Value Date    TBGOLDPLUS Negative 07/21/2023     No results found for: "QUANTIFERON", "SVCMT", "QUANTAGVALUE", "QUANTNILVALU", "QUANTMITOGEN", "QFTTBAG", "QINT"     Imaging: DEXA, Xrays, MRIs, CTs, etc    Old & Outside Medical Records:  Reviewed old and all outside medical records available in Care Everywhere     Assessment:     Pt is a 43 y.o. female with ankylosing spondylitis. The patient has not achieved clinical remission. Plan is to continue ixekizumab (TALTZ)    Plan:      Encounter Diagnoses   Name Primary?    BMI 30.0-30.9,adult Yes    Ankylosing spondylitis, unspecified site of spine     Bilateral shoulder pain, unspecified chronicity     Factor 5 Leiden mutation, heterozygous     Immunocompromised state due to drug therapy     Eczema, unspecified type     Chronic pain syndrome     Cervicalgia    Sparkle was seen today for disease management.    Diagnoses and all orders for this " visit:    BMI 30.0-30.9,adult  -     Discontinue: tirzepatide 15 mg/0.5 mL PnIj; Inject 15 mg into the skin every 7 days.  -     Discontinue: insulin syringes, disposable, 1 mL Syrg; 1 each by Misc.(Non-Drug; Combo Route) route once daily.  -     insulin syringes, disposable, 1 mL Syrg; 1 each by Misc.(Non-Drug; Combo Route) route once daily.  -     tirzepatide 15 mg/0.5 mL PnIj; Inject 15 mg into the skin every 7 days.    Ankylosing spondylitis, unspecified site of spine  -     X-Ray Cervical Spine AP And Lateral; Future  -     X-Ray Thoracic Spine AP Lateral; Future  -     X-Ray Lumbar Spine AP And Lateral; Future  -     X-Ray Sacroiliac Joints More Than 3 View; Future  -     X-Ray Shoulder Complete Bilateral; Future  -     oxyCODONE-acetaminophen (PERCOCET)  mg per tablet; Take 1 tablet by mouth every 8 (eight) hours as needed for Pain.  -     oxyCODONE-acetaminophen (PERCOCET)  mg per tablet; Take 1 tablet by mouth every 8 (eight) hours as needed for Pain.  -     oxyCODONE-acetaminophen (PERCOCET)  mg per tablet; Take 1 tablet by mouth every 8 (eight) hours as needed for Pain.  -     ketorolac injection 60 mg  -     methylPREDNISolone acetate injection 80 mg  -     MRI Cervical Spine Without Contrast; Future    Bilateral shoulder pain, unspecified chronicity  -     X-Ray Cervical Spine AP And Lateral; Future  -     X-Ray Shoulder Complete Bilateral; Future  -     oxyCODONE-acetaminophen (PERCOCET)  mg per tablet; Take 1 tablet by mouth every 8 (eight) hours as needed for Pain.  -     oxyCODONE-acetaminophen (PERCOCET)  mg per tablet; Take 1 tablet by mouth every 8 (eight) hours as needed for Pain.  -     oxyCODONE-acetaminophen (PERCOCET)  mg per tablet; Take 1 tablet by mouth every 8 (eight) hours as needed for Pain.  -     ketorolac injection 60 mg  -     methylPREDNISolone acetate injection 80 mg    Factor 5 Leiden mutation, heterozygous  -     ketorolac injection 60 mg  -      methylPREDNISolone acetate injection 80 mg    Immunocompromised state due to drug therapy  -     ketorolac injection 60 mg  -     methylPREDNISolone acetate injection 80 mg    Eczema, unspecified type  -     Sedimentation rate; Future  -     C-Reactive Protein; Future  -     Comprehensive Metabolic Panel; Future  -     CBC Auto Differential; Future  -     tacrolimus (PROTOPIC) 0.1 % ointment; Apply topically 2 (two) times daily.  -     ketorolac injection 60 mg  -     methylPREDNISolone acetate injection 80 mg    Chronic pain syndrome  -     oxyCODONE-acetaminophen (PERCOCET)  mg per tablet; Take 1 tablet by mouth every 8 (eight) hours as needed for Pain.  -     oxyCODONE-acetaminophen (PERCOCET)  mg per tablet; Take 1 tablet by mouth every 8 (eight) hours as needed for Pain.  -     oxyCODONE-acetaminophen (PERCOCET)  mg per tablet; Take 1 tablet by mouth every 8 (eight) hours as needed for Pain.  -     ketorolac injection 60 mg  -     methylPREDNISolone acetate injection 80 mg    Cervicalgia  -     ketorolac injection 60 mg  -     methylPREDNISolone acetate injection 80 mg  -     MRI Cervical Spine Without Contrast; Future     Her wbc count was normaL WHEN ON MOUNJARO FOR PREDM    1. Restart mounjaro  2. Refill        Follow-up 4 months      More than 50% of the  40 minute encounter was spent face to face counseling the patient regarding current status and future plan of care as well as side effects  of the medications. All questions were answered to patient's satisfaction also includes  non-face to face time preparing to see the patient (eg, review of tests), Obtaining and/or reviewing separately obtained history, Documenting clinical information in the electronic or other health record, Independently interpreting results

## 2024-07-25 ENCOUNTER — PATIENT MESSAGE (OUTPATIENT)
Dept: RHEUMATOLOGY | Facility: CLINIC | Age: 44
End: 2024-07-25
Payer: COMMERCIAL

## 2024-07-26 ENCOUNTER — PATIENT MESSAGE (OUTPATIENT)
Dept: RHEUMATOLOGY | Facility: CLINIC | Age: 44
End: 2024-07-26
Payer: COMMERCIAL

## 2024-07-30 ENCOUNTER — PATIENT MESSAGE (OUTPATIENT)
Dept: RHEUMATOLOGY | Facility: CLINIC | Age: 44
End: 2024-07-30
Payer: COMMERCIAL

## 2024-08-07 ENCOUNTER — PATIENT MESSAGE (OUTPATIENT)
Dept: RHEUMATOLOGY | Facility: CLINIC | Age: 44
End: 2024-08-07
Payer: COMMERCIAL

## 2024-08-07 ENCOUNTER — OFFICE VISIT (OUTPATIENT)
Dept: FAMILY MEDICINE | Facility: CLINIC | Age: 44
End: 2024-08-07
Payer: COMMERCIAL

## 2024-08-07 ENCOUNTER — PATIENT MESSAGE (OUTPATIENT)
Dept: FAMILY MEDICINE | Facility: CLINIC | Age: 44
End: 2024-08-07

## 2024-08-07 VITALS
HEART RATE: 87 BPM | TEMPERATURE: 98 F | BODY MASS INDEX: 21.79 KG/M2 | SYSTOLIC BLOOD PRESSURE: 97 MMHG | DIASTOLIC BLOOD PRESSURE: 65 MMHG | OXYGEN SATURATION: 99 % | HEIGHT: 60 IN | WEIGHT: 111 LBS

## 2024-08-07 DIAGNOSIS — L08.9 SKIN INFECTION: Primary | ICD-10-CM

## 2024-08-07 PROCEDURE — 99999 PR PBB SHADOW E&M-EST. PATIENT-LVL V: CPT | Mod: PBBFAC,,, | Performed by: NURSE PRACTITIONER

## 2024-08-07 PROCEDURE — 1160F RVW MEDS BY RX/DR IN RCRD: CPT | Mod: CPTII,S$GLB,, | Performed by: NURSE PRACTITIONER

## 2024-08-07 PROCEDURE — 99213 OFFICE O/P EST LOW 20 MIN: CPT | Mod: S$GLB,,, | Performed by: NURSE PRACTITIONER

## 2024-08-07 PROCEDURE — 3066F NEPHROPATHY DOC TX: CPT | Mod: CPTII,S$GLB,, | Performed by: NURSE PRACTITIONER

## 2024-08-07 PROCEDURE — 3078F DIAST BP <80 MM HG: CPT | Mod: CPTII,S$GLB,, | Performed by: NURSE PRACTITIONER

## 2024-08-07 PROCEDURE — 3008F BODY MASS INDEX DOCD: CPT | Mod: CPTII,S$GLB,, | Performed by: NURSE PRACTITIONER

## 2024-08-07 PROCEDURE — 87070 CULTURE OTHR SPECIMN AEROBIC: CPT | Performed by: NURSE PRACTITIONER

## 2024-08-07 PROCEDURE — 3061F NEG MICROALBUMINURIA REV: CPT | Mod: CPTII,S$GLB,, | Performed by: NURSE PRACTITIONER

## 2024-08-07 PROCEDURE — 3074F SYST BP LT 130 MM HG: CPT | Mod: CPTII,S$GLB,, | Performed by: NURSE PRACTITIONER

## 2024-08-07 PROCEDURE — 3044F HG A1C LEVEL LT 7.0%: CPT | Mod: CPTII,S$GLB,, | Performed by: NURSE PRACTITIONER

## 2024-08-07 PROCEDURE — 1159F MED LIST DOCD IN RCRD: CPT | Mod: CPTII,S$GLB,, | Performed by: NURSE PRACTITIONER

## 2024-08-07 RX ORDER — MUPIROCIN 20 MG/G
OINTMENT TOPICAL 3 TIMES DAILY
Qty: 22 G | Refills: 0 | Status: SHIPPED | OUTPATIENT
Start: 2024-08-07 | End: 2024-08-17

## 2024-08-07 RX ORDER — DOXYCYCLINE HYCLATE 100 MG
100 TABLET ORAL 2 TIMES DAILY
Qty: 20 TABLET | Refills: 0 | Status: SHIPPED | OUTPATIENT
Start: 2024-08-07 | End: 2024-08-17

## 2024-08-08 ENCOUNTER — CLINICAL SUPPORT (OUTPATIENT)
Dept: FAMILY MEDICINE | Facility: CLINIC | Age: 44
End: 2024-08-08
Payer: COMMERCIAL

## 2024-08-08 DIAGNOSIS — Z23 IMMUNIZATION DUE: Primary | ICD-10-CM

## 2024-08-08 PROCEDURE — 99999 PR PBB SHADOW E&M-EST. PATIENT-LVL III: CPT | Mod: PBBFAC,,,

## 2024-08-09 LAB — BACTERIA SPEC AEROBE CULT: NORMAL

## 2024-08-23 ENCOUNTER — HOSPITAL ENCOUNTER (OUTPATIENT)
Dept: RADIOLOGY | Facility: HOSPITAL | Age: 44
Discharge: HOME | End: 2024-08-23
Attending: INTERNAL MEDICINE
Payer: COMMERCIAL

## 2024-08-23 DIAGNOSIS — M54.2 CERVICALGIA: ICD-10-CM

## 2024-08-23 DIAGNOSIS — M45.9 ANKYLOSING SPONDYLITIS, UNSPECIFIED SITE OF SPINE: ICD-10-CM

## 2024-08-23 PROCEDURE — 72141 MRI NECK SPINE W/O DYE: CPT | Mod: 26,,, | Performed by: RADIOLOGY

## 2024-08-23 PROCEDURE — 72141 MRI NECK SPINE W/O DYE: CPT | Mod: TC,PO

## 2024-09-07 ENCOUNTER — PATIENT MESSAGE (OUTPATIENT)
Dept: FAMILY MEDICINE | Facility: CLINIC | Age: 44
End: 2024-09-07
Payer: COMMERCIAL

## 2024-09-20 ENCOUNTER — OFFICE VISIT (OUTPATIENT)
Dept: FAMILY MEDICINE | Facility: CLINIC | Age: 44
End: 2024-09-20
Payer: MEDICARE

## 2024-09-20 VITALS
SYSTOLIC BLOOD PRESSURE: 95 MMHG | BODY MASS INDEX: 22.48 KG/M2 | WEIGHT: 114.5 LBS | OXYGEN SATURATION: 100 % | HEART RATE: 86 BPM | HEIGHT: 60 IN | TEMPERATURE: 98 F | DIASTOLIC BLOOD PRESSURE: 64 MMHG

## 2024-09-20 DIAGNOSIS — F51.01 PRIMARY INSOMNIA: ICD-10-CM

## 2024-09-20 DIAGNOSIS — F98.8 ADD (ATTENTION DEFICIT DISORDER) WITHOUT HYPERACTIVITY: ICD-10-CM

## 2024-09-20 DIAGNOSIS — F17.200 SMOKER: ICD-10-CM

## 2024-09-20 DIAGNOSIS — F41.9 ANXIETY AND DEPRESSION: ICD-10-CM

## 2024-09-20 DIAGNOSIS — F32.A ANXIETY AND DEPRESSION: ICD-10-CM

## 2024-09-20 DIAGNOSIS — K21.9 GASTROESOPHAGEAL REFLUX DISEASE, UNSPECIFIED WHETHER ESOPHAGITIS PRESENT: ICD-10-CM

## 2024-09-20 DIAGNOSIS — E88.810 METABOLIC SYNDROME: ICD-10-CM

## 2024-09-20 DIAGNOSIS — R73.03 PREDIABETES: ICD-10-CM

## 2024-09-20 DIAGNOSIS — R91.1 PULMONARY NODULE: ICD-10-CM

## 2024-09-20 DIAGNOSIS — M45.9 ANKYLOSING SPONDYLITIS, UNSPECIFIED SITE OF SPINE: ICD-10-CM

## 2024-09-20 DIAGNOSIS — Z00.00 ANNUAL PHYSICAL EXAM: Primary | ICD-10-CM

## 2024-09-20 DIAGNOSIS — D84.821 IMMUNOCOMPROMISED STATE DUE TO DRUG THERAPY: ICD-10-CM

## 2024-09-20 DIAGNOSIS — Z79.899 IMMUNOCOMPROMISED STATE DUE TO DRUG THERAPY: ICD-10-CM

## 2024-09-20 DIAGNOSIS — Z86.69 HISTORY OF CHIARI MALFORMATION: ICD-10-CM

## 2024-09-20 DIAGNOSIS — D68.51 FACTOR 5 LEIDEN MUTATION, HETEROZYGOUS: ICD-10-CM

## 2024-09-20 DIAGNOSIS — E04.1 THYROID NODULE: ICD-10-CM

## 2024-09-20 DIAGNOSIS — L40.50 PSORIATIC ARTHRITIS: ICD-10-CM

## 2024-09-20 PROCEDURE — 99999 PR PBB SHADOW E&M-EST. PATIENT-LVL V: CPT | Mod: PBBFAC,,, | Performed by: NURSE PRACTITIONER

## 2024-09-20 PROCEDURE — 99215 OFFICE O/P EST HI 40 MIN: CPT | Mod: PBBFAC,PO | Performed by: NURSE PRACTITIONER

## 2024-09-20 RX ORDER — FAMOTIDINE 40 MG/1
40 TABLET, FILM COATED ORAL DAILY
Qty: 90 TABLET | Refills: 3 | Status: SHIPPED | OUTPATIENT
Start: 2024-09-20 | End: 2025-09-20

## 2024-09-20 RX ORDER — LISDEXAMFETAMINE DIMESYLATE 30 MG/1
30 CAPSULE ORAL EVERY MORNING
Qty: 30 CAPSULE | Refills: 0 | Status: SHIPPED | OUTPATIENT
Start: 2024-09-20 | End: 2024-10-20

## 2024-09-20 NOTE — PROGRESS NOTES
Subjective     Patient ID: Sparkle Joes is a 43 y.o. female.    Chief Complaint: Annual Exam  Pt in today for annual exam. Pt sees rheumatology for management of psoriatic arthritis, ankylosing spondylitis; takes immunosuppressive medication. Facton 5 Leiden mutation monitored by PCP; no history of DVT, PE. Pt sees endocrinology for metabolic syndrome, pre-diabetes, monitoring of thyroid nodule; last thyroid US in 2021. ADD, anxiety, depression, insomnia managed with medication; denies SI/HI. Pt is a smoker; declines cessation at present. Stable pulmonary nodules noted on CT chest in 2021; has not f/u with pulmonology as advised. GERD controlled with medication. Labs UTD. Pt has no other complaints today.   Past Medical History:   Diagnosis Date    Ankylosing spondylitis     Arthritis     Celiac disease     Depression     Family history of DVT     MGM DVT, mother spleenic    Family history of factor V Leiden mutation     mother and sister    GERD (gastroesophageal reflux disease)     Migraine     Osteoarthritis     Psoriasis     Psoriatic arthritis mutilans      Social History     Socioeconomic History    Marital status:    Tobacco Use    Smoking status: Every Day     Current packs/day: 0.50     Average packs/day: 0.5 packs/day for 31.7 years (15.9 ttl pk-yrs)     Types: Cigarettes     Start date: 1/1/1993    Smokeless tobacco: Never    Tobacco comments:     5-6 CIGS DAILY last quit attempt 8/14/2019   Substance and Sexual Activity    Alcohol use: No    Drug use: Never    Sexual activity: Yes     Partners: Male     Birth control/protection: See Surgical Hx     Social Determinants of Health     Financial Resource Strain: Patient Declined (6/14/2024)    Overall Financial Resource Strain (CARDIA)     Difficulty of Paying Living Expenses: Patient declined   Food Insecurity: Patient Declined (6/14/2024)    Hunger Vital Sign     Worried About Running Out of Food in the Last Year: Patient declined     Ran  Out of Food in the Last Year: Patient declined   Transportation Needs: No Transportation Needs (4/13/2023)    PRAPARE - Transportation     Lack of Transportation (Medical): No     Lack of Transportation (Non-Medical): No   Physical Activity: Unknown (6/14/2024)    Exercise Vital Sign     Days of Exercise per Week: Patient declined   Stress: Patient Declined (6/14/2024)    [employer] - Occupational Stress Questionnaire     Feeling of Stress : Patient declined   Housing Stability: Unknown (12/18/2023)    Housing Stability Vital Sign     Unable to Pay for Housing in the Last Year: Patient declined     Number of Places Lived in the Last Year: 1     Unstable Housing in the Last Year: No     Past Surgical History:   Procedure Laterality Date    brain decompression   2006    BRAIN SURGERY      COLONOSCOPY N/A 4/23/2024    Procedure: COLONOSCOPY;  Surgeon: Khari Merchant MD;  Location: [facility];  Service: Endoscopy;  Laterality: N/A;    TUBAL LIGATION         HPI  Review of Systems   Constitutional: Negative.    HENT: Negative.     Eyes: Negative.    Respiratory: Negative.     Cardiovascular: Negative.    Gastrointestinal: Negative.    Endocrine: Negative.    Genitourinary: Negative.    Musculoskeletal: Negative.    Integumentary:  Negative.   Allergic/Immunologic: Negative.    Neurological: Negative.    Psychiatric/Behavioral: Negative.            Objective     Physical Exam  Vitals and nursing note reviewed.   Constitutional:       Appearance: Normal appearance.   HENT:      Head: Normocephalic.      Right Ear: Tympanic membrane, ear canal and external ear normal.      Left Ear: Tympanic membrane, ear canal and external ear normal.      Nose: Nose normal.      Mouth/Throat:      Mouth: Mucous membranes are moist.      Pharynx: Oropharynx is clear.   Eyes:      Conjunctiva/sclera: Conjunctivae normal.      Pupils: Pupils are equal, round, and reactive to light.   Cardiovascular:      Rate  and Rhythm: Normal rate and regular rhythm.      Pulses: Normal pulses.      Heart sounds: Normal heart sounds.   Pulmonary:      Effort: Pulmonary effort is normal.      Breath sounds: Normal breath sounds.   Abdominal:      General: Bowel sounds are normal.      Palpations: Abdomen is soft.   Musculoskeletal:         General: Normal range of motion.      Cervical back: Normal range of motion and neck supple.   Skin:     General: Skin is warm and dry.      Capillary Refill: Capillary refill takes 2 to 3 seconds.   Neurological:      Mental Status: She is alert and oriented to person, place, and time.   Psychiatric:         Mood and Affect: Mood normal.         Behavior: Behavior normal.         Thought Content: Thought content normal.         Judgment: Judgment normal.            Assessment and Plan     Sparkle was seen today for annual exam.    Diagnoses and all orders for this visit:    Annual physical exam  Factor 5 Leiden mutation, heterozygous  Immunocompromised state due to drug therapy  Metabolic syndrome  Prediabetes  Primary insomnia  Psoriatic arthritis  Ankylosing spondylitis, unspecified site of spine  Pulmonary nodule  Thyroid nodule  Gastroesophageal reflux disease, unspecified whether esophagitis present  ADD (attention deficit disorder) without hyperactivity  Anxiety and depression  Smoker  History of Chiari malformation  -     Ambulatory referral/consult to Endocrinology; Future  -     US Thyroid; Future  -     famotidine (PEPCID) 40 MG tablet; Take 1 tablet (40 mg total) by mouth once daily.  Pulmonology f/u recommended  Smoking cessation recommended  Labs UTD  Continue current plan of care  F/U with PCP, specialists as advised  RTC as needed  Vyvanse refill request sent to PCP to approve  Report to ER immediately if symptoms worsen or persist                       No follow-ups on file.

## 2024-09-20 NOTE — PATIENT INSTRUCTIONS
Continue current plan of care  F/U with PCP, specialists as advised  RTC as needed  Vyvanse refill request sent to PCP to approve  Report to ER immediately if symptoms worsen or persist    Silvestre Villagran,     If you are due for any health screening(s) below please notify me so we can arrange them to be ordered and scheduled. Most healthy patients at your age complete them, but you are free to accept or refuse.     If you can't do it, I'll definitely understand. If you can, I'd certainly appreciate it!    Tests to Keep You Healthy    Mammogram: Met on 4/9/2024  Colon Cancer Screening: Met on 4/23/2024  Cervical Cancer Screening: Met on 9/23/2022  Last HbA1c < 8 (07/15/2024): Yes  Tobacco Cessation: NO      Were here to help you quit smoking     Our records indicated that you are still smoking. One of the best things you can do for your health is to stop smoking and we are here to help.     Talk with your provider about our Smoking Cessation Program and how we can support you on your journey.

## 2024-09-24 ENCOUNTER — PATIENT MESSAGE (OUTPATIENT)
Dept: RHEUMATOLOGY | Facility: CLINIC | Age: 44
End: 2024-09-24
Payer: COMMERCIAL

## 2024-09-29 NOTE — PROGRESS NOTES
ENDOCRINOLOGY NEW PATIENT VISIT: 09/30/2024    The patient location is: Home  The chief complaint leading to consultation is: Thyroid nodule    Visit type: audiovisual    Face to Face time with patient: 27  45 minutes of total time spent on the encounter, which includes face to face time and non-face to face time preparing to see the patient (eg, review of tests), Obtaining and/or reviewing separately obtained history, Documenting clinical information in the electronic or other health record, Independently interpreting results (not separately reported) and communicating results to the patient/family/caregiver, or Care coordination (not separately reported).     Each patient to whom he or she provides medical services by telemedicine is:  (1) informed of the relationship between the physician and patient and the respective role of any other health care provider with respect to management of the patient; and (2) notified that he or she may decline to receive medical services by telemedicine and may withdraw from such care at any time.      Subjective:      Patient ID: Sparkle Jose is a 43 y.o. female.    Chief Complaint:  Thyroid Nodule and Diabetes    History of Present Illness  Sparkle Jose presents for evaluation of known thyroid nodules.  She previously followed with Dr. Isael Wagner in [place] for endocrine care but now is needing to establish with [facility].  Prior thyroid nodule biopsy in 2021 which was benign but has not had follow up ultrasound since 2022.  She also has a history of pre-diabetes with use of GLP-1 RA which was initially covered by insurance but coverage stopped earlier this year.  Now has supply of Mounjaro she has been using as well as prescription send by [facility] as her ankylosing spondylitis was also improved with use of GLP-1 RA therapy.  She does admit to having a functional medicine physician having her on thyroid medication in the past but this  was years ago and she was removed when seen by an endocrinologist.  Lastly she has symptoms of hot flashes regularly with known menopause for the past few years (early 40's).  She has Factor 5 Leiden which makes estrogen use not an option for her due to clot risk (also a smoker).  She is curious of other therapy options for vasomotor symptoms.        Regarding Thyroid Nodules:    - Thyroid nodules originally found on CT scan in 11/2019  - Preexisting thyroid disease: no    - Most recent thyroid ultrasound:  05/12/2022    FINDINGS:  Right lobe: 5.3 x 2.0 x 2.1 cm  Left lobe: 4.8 x 1.3 x 1.4 cm  Isthmus: 0.33 cm  Total thyroid volume: 14.6 cc  Echotexture: Unchanged  Nodules: Dominant nodule within the right lobe currently measures 1.5 x 1.5 x 1.5 cm and was previously sampled.  4 mm right lobe upper pole and 3 mm left lobe upper pole nodules.  No new nodule.     Impression:  Multinodular thyroid.    Lab Results   Component Value Date    TSH 0.765 08/02/2023    TSH 0.592 01/30/2023    TSH 2.829 10/26/2021    THGABSCRN <4.0 01/28/2016    THGABSCRN <4.0 11/12/2014       - Previous biopsy results:   - Left nodule:  NA  - Right nodule:   11/05/2021 nodule Bx *  - Isthmus nodules:  NA    *Path report:  Thyroid,  right lobe nodule ( 4 smears and thin prep):   - Features consistent with benign thyroid nodule; Brilliant Category 2: Benign (see comments)     - Risk Factors:  No   Yes    [x]    []   Radiation to head/neck for any reason  [x]    []   Personal history of colon or breast cancer  []    [x]   Tobacco use (1/2 pack a day)  [x]    []   Family history of thyroid cancer    - Compressive Symptoms:  No   Yes    [x]    []   Anterior neck pressure  [x]    []   Dysphagia  [x]    []   Voice changes    - Symptoms of hyperthyroidism:   No   Yes    []    [x]   Weight loss (with Mounjaro use)  []    [x]   Fatigue (sometimes yes)  [x]    []   Constipation  [x]    []   Diarrhea  [x]    []   Anxiety/Nervousness  [x]    []    Tremor  [x]    []   Heat intolerance/sweating  [x]    []   Palpitations  [x]    []   Muscle weakness  []    [x]   Hair thinning  [x]    []   Neck swelling, pain, pressure  [x]    []   Eye changes  [x]    []   Hoarseness  [x]    []   Shortness of breath  [x]    []   Lithium or Amiodarone use  [x]    []   Chemotherapy  [x]    []   Prior neck radiation  [x]    []   Recent severe illness   [x]    []   Exogenous thyroid medication  []    [x]   Infertility/abnormal menstruation    Had a tubal ligation about 5 years ago (around age 38 cycles stopped)    Progesterone being used, no estrogen due to factor 5 history    - Symptoms of hypothyroidism:   No   Yes  [x]    []   Weight gain  []    [x]   Fatigue  [x]    []   Constipation  []    [x]   Hair loss  []    [x]   Brittle nails  []    [x]   Mental fog  []    [x]   Cold intolerance  []    [x]   Memory impair  [x]    []   Muscle weakness  [x]    []   Neck swelling, pain, pressure  [x]    []   Hoarseness      Regarding Metabolic Syndrome:    Use of GLP-1 RA although with insurance coverage issues recently  No prior history of diabetes, only pre-diabetes  Elevated TG levels on lipid panel  Also with elevated fasting BG levels     Latest Reference Range & Units 02/26/21 13:20 06/25/21 09:49 12/03/21 08:48 03/17/22 09:55 05/26/22 12:35 06/06/22 08:56 01/30/23 14:13 04/13/23 11:36 06/26/23 09:25 07/21/23 13:09 08/02/23 09:59 12/26/23 08:37 07/15/24 13:50   Glucose 70 - 110 mg/dL 146 (H) 102 100  108  88   81 83  92   Cholesterol Total 120 - 199 mg/dL    217 (H)    176     207 (H)   HDL 40 - 75 mg/dL    52    55     68   Triglycerides 30 - 150 mg/dL    182 (H)    139     159 (H)   Hemoglobin A1C External 4.0 - 5.6 %      5.8 (H)   5.1  5.0 5.2 5.1   (H): Data is abnormally high      Regarding Vasomotor Symptoms:    Had cycles stop around age 38 and then after starting GLP-1 RA 2 years later she had cycles return in late 2022  Endometrial biopsy performed by her OBGYN (Dr. Mcnally) due  to having 2 years without cycles and then a return.  No issues on pathology    Now continues to have concerns for hot flashes despite use of Progesterone  Unable to take estrogen due to clot risk with concomitant Factor 5 Leiden history (also a smoking history)    Interests in other options for symptoms (non-hormonal options)    ROS:   As above    Objective:     LMP 08/02/2023 (Approximate)   BP Readings from Last 3 Encounters:   09/20/24 95/64   08/07/24 97/65   07/23/24 109/72     Wt Readings from Last 1 Encounters:   09/20/24 0718 51.9 kg (114 lb 8 oz)     There is no height or weight on file to calculate BMI.    Physical Exam  Constitutional:       Appearance: Normal appearance.   Neurological:      Mental Status: She is alert.   Psychiatric:         Mood and Affect: Mood normal.         Behavior: Behavior normal.        Lab Review:   Lab Results   Component Value Date    HGBA1C 5.1 07/15/2024     Lab Results   Component Value Date    CHOL 207 (H) 07/15/2024    HDL 68 07/15/2024    LDLCALC 107.2 07/15/2024    TRIG 159 (H) 07/15/2024    CHOLHDL 32.9 07/15/2024     Lab Results   Component Value Date     07/15/2024    K 3.6 07/15/2024     07/15/2024    CO2 22 (L) 07/15/2024    GLU 92 07/15/2024    BUN 17 07/15/2024    CREATININE 0.7 07/15/2024    CALCIUM 10.8 (H) 07/15/2024    PROT 8.0 07/15/2024    ALBUMIN 4.3 07/15/2024    BILITOT 0.2 07/15/2024    ALKPHOS 81 07/15/2024    AST 14 07/15/2024    ALT 10 07/15/2024    ANIONGAP 12 07/15/2024    ESTGFRAFRICA >60.0 05/26/2022    EGFRNONAA >60.0 05/26/2022    TSH 0.765 08/02/2023     Vit D, 25-Hydroxy   Date Value Ref Range Status   07/15/2024 36 30 - 96 ng/mL Final     Comment:     Vitamin D deficiency.........<10 ng/mL                              Vitamin D insufficiency......10-29 ng/mL       Vitamin D sufficiency........> or equal to 30 ng/mL  Vitamin D toxicity............>100 ng/mL         Assessment and Plan     Thyroid nodule  Reviewed prior  ultrasound imaging and biopsy reports.  Las ultrasound was 2022 and now due for repeat US.  Had nodules originally seen incidentally on CT imaging in 2019.  No local symptoms in the neck and thyroid function in the past had been normal.  Does have known risk factor of tobacco use and she was encouraged to purse cessation due to this risk.      Prior right side dominant nodule with benign biopsy result in 2021.      Plan:  - Repeat thyroid US soon  - Check TSH level given some vague symptoms of possible hypothyroidism    Prediabetes  Pre-diabetes on prior labs which improved with use of GLP-1 RA.  Now with A1c in normal range.  Has had to decrease frequency of GLP-1 RA use due to insurance no longer covering it.      Ankylosing spondylitis  Follows with rheumatology.  Has seen benefit with GLP-1 RA use.  Attempts to cover have been unsuccessful.      Metabolic syndrome  Weight stable but has had to decrease frequency of Mounjaro that she still has.  Has elevated TG's, elevated BG/A1c.      Factor 5 Leiden mutation, heterozygous  Needs to avoid estrogen therapy for symptoms of menopause.  May be able to consider SSRI vs Veozah.  Will review options and touch base again soon.      Hot flashes  Menopause in her early 40's.  Had return of bleeding 2 years later x 1 but endometrial biopsy normal.  Continued symptoms with use of progesterone only.  Cannot use estrogen.  Options to consider would be Veozah which is a non-hormonal therapy for hot flashes/vasomotor symptoms.  SSRI would be an option but with weight gain risk and her prior concerns for weight we can look at Veozah.  Will review with colleagues and other contraindications before prescribing.        RTC in 1 year      **Visit today included increased complexity associated with the care of the problems addressed and managing the longitudinal care of the patient due to the serious and/or complex managed problems      Vaibhav Chen DO     Disclaimer: This note  has been generated using voice-recognition software. There may be typographical errors that have been missed during proof-reading.     yes

## 2024-09-30 ENCOUNTER — OFFICE VISIT (OUTPATIENT)
Facility: CLINIC | Age: 44
End: 2024-09-30
Payer: MEDICARE

## 2024-09-30 DIAGNOSIS — D68.51 FACTOR 5 LEIDEN MUTATION, HETEROZYGOUS: ICD-10-CM

## 2024-09-30 DIAGNOSIS — R23.2 HOT FLASHES: Primary | ICD-10-CM

## 2024-09-30 DIAGNOSIS — R73.03 PREDIABETES: ICD-10-CM

## 2024-09-30 DIAGNOSIS — M45.9 ANKYLOSING SPONDYLITIS, UNSPECIFIED SITE OF SPINE: ICD-10-CM

## 2024-09-30 DIAGNOSIS — E04.1 THYROID NODULE: ICD-10-CM

## 2024-09-30 DIAGNOSIS — E88.810 METABOLIC SYNDROME: ICD-10-CM

## 2024-09-30 PROCEDURE — G2211 COMPLEX E/M VISIT ADD ON: HCPCS | Mod: 95,,, | Performed by: STUDENT IN AN ORGANIZED HEALTH CARE EDUCATION/TRAINING PROGRAM

## 2024-09-30 PROCEDURE — 99204 OFFICE O/P NEW MOD 45 MIN: CPT | Mod: 95,,, | Performed by: STUDENT IN AN ORGANIZED HEALTH CARE EDUCATION/TRAINING PROGRAM

## 2024-09-30 NOTE — Clinical Note
Please set up labs for this patient soon as well a thyroid ultrasound near where she lives.  Follow up in 1 year  Thanks

## 2024-09-30 NOTE — PATIENT INSTRUCTIONS
Thank you for visiting with me during today's virtual appointment.      As discussed we will place orders for a repeat thyroid ultrasound to monitor the known thyroid nodules.  Depending on the change in size and imaging characteristics of the right side nodule we may need to consider a repeat biopsy.  The other nodules in the past have not been concerning for size but if there are other changes in those we can pursue a biopsy.    As for lab testing we will also check your thyroid function results and see if there is any abnormalities noted.  If the TSH is normal this will not require any therapy.      Other labs to be checked will include your FSH level and your estradiol level.  We can consider potential therapy with a medication called Veozah.  This is the medication that works on the vasomotor symptoms of menopause such as hot flashes and it acts in the brain to control these symptoms without use of hormones.  As result this should not cause any increased risk for blood clots as estrogen would.  I will be reviewing this medication further with a colleague who I recall has used this before.  The alternative would be to contact your OBGYN as well to see if they have any familiarity with this.    I do have an alternate option as well which can be used for hot flashes in women which is non-hormonal in nature.  It is an antidepressant medication but has FDA approval for symptoms of menopause.  It is called Paroxetine.      Once I hear from my colleague I will touch base with you.  I know they have used Paroxetine as I have used this as well, the alternate of Veozah is newer though so I am specifically checking on that first.        Plan to hear from me soon for lab results and ultrasound results.

## 2024-09-30 NOTE — ASSESSMENT & PLAN NOTE
Weight stable but has had to decrease frequency of Mounjaro that she still has.  Has elevated TG's, elevated BG/A1c.

## 2024-09-30 NOTE — ASSESSMENT & PLAN NOTE
Pre-diabetes on prior labs which improved with use of GLP-1 RA.  Now with A1c in normal range.  Has had to decrease frequency of GLP-1 RA use due to insurance no longer covering it.

## 2024-09-30 NOTE — ASSESSMENT & PLAN NOTE
Reviewed prior ultrasound imaging and biopsy reports.  Las ultrasound was 2022 and now due for repeat US.  Had nodules originally seen incidentally on CT imaging in 2019.  No local symptoms in the neck and thyroid function in the past had been normal.  Does have known risk factor of tobacco use and she was encouraged to purse cessation due to this risk.      Prior right side dominant nodule with benign biopsy result in 2021.      Plan:  - Repeat thyroid US soon  - Check TSH level given some vague symptoms of possible hypothyroidism

## 2024-09-30 NOTE — ASSESSMENT & PLAN NOTE
Follows with rheumatology.  Has seen benefit with GLP-1 RA use.  Attempts to cover have been unsuccessful.

## 2024-09-30 NOTE — ASSESSMENT & PLAN NOTE
Needs to avoid estrogen therapy for symptoms of menopause.  May be able to consider SSRI vs Veozah.  Will review options and touch base again soon.

## 2024-09-30 NOTE — ASSESSMENT & PLAN NOTE
Menopause in her early 40's.  Had return of bleeding 2 years later x 1 but endometrial biopsy normal.  Continued symptoms with use of progesterone only.  Cannot use estrogen.  Options to consider would be Veozah which is a non-hormonal therapy for hot flashes/vasomotor symptoms.  SSRI would be an option but with weight gain risk and her prior concerns for weight we can look at Veozah.  Will review with colleagues and other contraindications before prescribing.

## 2024-10-05 ENCOUNTER — PATIENT MESSAGE (OUTPATIENT)
Facility: CLINIC | Age: 44
End: 2024-10-05
Payer: COMMERCIAL

## 2024-10-09 ENCOUNTER — HOSPITAL ENCOUNTER (OUTPATIENT)
Dept: RADIOLOGY | Facility: HOSPITAL | Age: 44
Discharge: HOME | End: 2024-10-09
Attending: STUDENT IN AN ORGANIZED HEALTH CARE EDUCATION/TRAINING PROGRAM
Payer: MEDICARE

## 2024-10-09 DIAGNOSIS — E04.1 THYROID NODULE: ICD-10-CM

## 2024-10-09 DIAGNOSIS — R92.8 ABNORMAL MAMMOGRAM: ICD-10-CM

## 2024-10-09 PROCEDURE — 76536 US EXAM OF HEAD AND NECK: CPT | Mod: 26,,, | Performed by: STUDENT IN AN ORGANIZED HEALTH CARE EDUCATION/TRAINING PROGRAM

## 2024-10-09 PROCEDURE — 76536 US EXAM OF HEAD AND NECK: CPT | Mod: TC

## 2024-10-09 PROCEDURE — 77065 DX MAMMO INCL CAD UNI: CPT | Mod: 26,LT,, | Performed by: RADIOLOGY

## 2024-10-09 PROCEDURE — 77061 BREAST TOMOSYNTHESIS UNI: CPT | Mod: TC,LT

## 2024-10-09 PROCEDURE — 77061 BREAST TOMOSYNTHESIS UNI: CPT | Mod: 26,LT,, | Performed by: RADIOLOGY

## 2024-10-15 ENCOUNTER — ON-DEMAND VIRTUAL (OUTPATIENT)
Dept: URGENT CARE | Facility: CLINIC | Age: 44
End: 2024-10-15
Payer: MEDICARE

## 2024-10-15 ENCOUNTER — PATIENT MESSAGE (OUTPATIENT)
Dept: RHEUMATOLOGY | Facility: CLINIC | Age: 44
End: 2024-10-15
Payer: COMMERCIAL

## 2024-10-15 ENCOUNTER — PATIENT MESSAGE (OUTPATIENT)
Dept: FAMILY MEDICINE | Facility: CLINIC | Age: 44
End: 2024-10-15
Payer: MEDICARE

## 2024-10-15 DIAGNOSIS — J06.9 UPPER RESPIRATORY TRACT INFECTION, UNSPECIFIED TYPE: Primary | ICD-10-CM

## 2024-10-15 RX ORDER — BENZONATATE 200 MG/1
200 CAPSULE ORAL 3 TIMES DAILY PRN
Qty: 30 CAPSULE | Refills: 0 | Status: SHIPPED | OUTPATIENT
Start: 2024-10-15 | End: 2024-10-25

## 2024-10-15 NOTE — PROGRESS NOTES
Subjective:      Patient ID: Sparkle Jose is a 43 y.o. female.    Vitals:  vitals were not taken for this visit.     Chief Complaint: Cough      Visit Type: TELE AUDIOVISUAL    Present with the patient at the time of consultation: TELEMED PRESENT WITH PATIENT: None    Past Medical History:   Diagnosis Date    Ankylosing spondylitis     Arthritis     Celiac disease     Depression     Family history of DVT     MGM DVT, mother spleenic    Family history of factor V Leiden mutation     mother and sister    GERD (gastroesophageal reflux disease)     Migraine     Osteoarthritis     Psoriasis     Psoriatic arthritis mutilans      Past Surgical History:   Procedure Laterality Date    brain decompression   2006    BRAIN SURGERY      COLONOSCOPY N/A 4/23/2024    Procedure: COLONOSCOPY;  Surgeon: Khari Merchant MD;  Location: [facility];  Service: Endoscopy;  Laterality: N/A;    TUBAL LIGATION       Review of patient's allergies indicates:  No Known Allergies  Current Outpatient Medications on File Prior to Visit   Medication Sig Dispense Refill    cetirizine (ZYRTEC) 10 MG tablet Take 1 tablet (10 mg total) by mouth once daily. for 7 days 7 tablet 0    clobetasol 0.05% (TEMOVATE) 0.05 % Oint 1 application daily as needed.       clobetasol 0.05% (TEMOVATE) 0.05 % Oint Apply topically 2 (two) times daily. 30 g 1    eletriptan (RELPAX) 40 MG tablet Take 1 tablet (40 mg total) by mouth as needed. may repeat in 2 hours if necessary (Patient not taking: Reported on 9/30/2024) 9 tablet 0    famotidine (PEPCID) 40 MG tablet Take 1 tablet (40 mg total) by mouth once daily. 90 tablet 3    insulin syringes, disposable, 1 mL Syrg 1 each by Misc.(Non-Drug; Combo Route) route once daily. (Patient not taking: Reported on 9/30/2024) 25 each 5    ixekizumab (TALTZ AUTOINJECTOR) 80 mg/mL AtIn Inject 80 mg into the skin every 28 days. 1 mL 11    lisdexamfetamine (VYVANSE) 30 MG capsule Take 1 capsule (30 mg total) by mouth every  morning. 30 capsule 0    methylPREDNISolone (MEDROL DOSEPACK) 4 mg tablet follow package directions (Patient not taking: Reported on 9/30/2024) 21 tablet 0    [START ON 10/18/2024] oxyCODONE-acetaminophen (PERCOCET)  mg per tablet Take 1 tablet by mouth every 8 (eight) hours as needed for Pain. 90 tablet 0    promethazine (PHENERGAN) 25 MG tablet Take 1 tablet (25 mg total) by mouth every 6 (six) hours as needed for Nausea. 45 tablet 5    roflumilast (ZORYVE) 0.3 % Crea Apply 1 Application topically once daily. 60 g 3    tacrolimus (PROTOPIC) 0.1 % ointment Apply topically 2 (two) times daily. 60 g 2    tirzepatide 15 mg/0.5 mL PnIj Inject 15 mg into the skin every 7 days. 2 mL 4    tiZANidine (ZANAFLEX) 4 MG tablet Take 1 tablet (4 mg total) by mouth nightly as needed (muscle spasm). 90 tablet 1    traZODone (DESYREL) 100 MG tablet Take 1 tablet (100 mg total) by mouth every evening. 90 tablet 1     No current facility-administered medications on file prior to visit.     Family History   Problem Relation Name Age of Onset    Hypertension Mother Mother     Clotting disorder Mother Mother     Depression Sister Sister     Learning disabilities Sister Sister     Mental illness Sister Sister     Asthma Brother Brother     Learning disabilities Brother Brother     Clotting disorder Sister Madyson     Learning disabilities Sister Madyson     Diabetes Maternal Grandmother Carol Call     Diabetes Paternal Grandmother Carol Daniels     Breast cancer Neg Hx      Ovarian cancer Neg Hx         Medications Ordered                [facility] [address] [place] [id number]    Telephone: [phone]   Fax: [phone]   Hours: Not open 24 hours                         Unspecified Transmission Method (1 of 1)              benzonatate (TESSALON) 200 MG capsule    Sig: Take 1 capsule (200 mg total) by mouth 3 (three) times daily as needed for Cough.       Start:  10/15/24     Quantity: 30 capsule Refills: 0                           Ohs Peq Odvv Intake    10/15/2024  2:53 PM CDT - Filed by Patient   What is your current physical address in the event of a medical emergency? [address] [address]   Are you able to take your vital signs? No   Please attach any relevant images or files    Is your employer contracted with [facility]? No         In [place] via video conference in her home.     42 y/o woman with a 2 day history of runny nose, congestion, and cough. No fever. The runny nose has improved but now she has a persistent cough. She has not taken a COVID test.     Cough  Pertinent negatives include no fever.       Constitution: Negative for fever.   HENT:  Positive for congestion.    Respiratory:  Positive for cough.         Objective:   The physical exam was conducted virtually.  Physical Exam   Constitutional: No distress.   HENT:   Nose: Congestion present.   Pulmonary/Chest: No stridor. No respiratory distress.         Comments: Normal verbal ronald without respiratory compromise.     Neurological: She is alert.   No other pertinent findings on examination.     Assessment:     1. Upper respiratory tract infection, unspecified type        Plan:       Upper respiratory tract infection, unspecified type    Other orders  -     benzonatate (TESSALON) 200 MG capsule; Take 1 capsule (200 mg total) by mouth 3 (three) times daily as needed for Cough.  Dispense: 30 capsule; Refill: 0    I recommend you also do a COVID test to be sure it is not that with you being on the biologic agent for your arthritis. If positive, contact your doctor to see if they want to put you on medication. If negative and you worsen go in for an in person visit or if you do not improve in a few days.

## 2024-10-23 ENCOUNTER — OFFICE VISIT (OUTPATIENT)
Dept: FAMILY MEDICINE | Facility: CLINIC | Age: 44
End: 2024-10-23
Payer: MEDICARE

## 2024-10-23 ENCOUNTER — PATIENT MESSAGE (OUTPATIENT)
Dept: FAMILY MEDICINE | Facility: CLINIC | Age: 44
End: 2024-10-23

## 2024-10-23 VITALS — BODY MASS INDEX: 22.85 KG/M2 | WEIGHT: 117 LBS

## 2024-10-23 DIAGNOSIS — J40 BRONCHITIS: ICD-10-CM

## 2024-10-23 DIAGNOSIS — F98.8 ADD (ATTENTION DEFICIT DISORDER) WITHOUT HYPERACTIVITY: Primary | ICD-10-CM

## 2024-10-23 PROCEDURE — 99214 OFFICE O/P EST MOD 30 MIN: CPT | Mod: 95,,, | Performed by: STUDENT IN AN ORGANIZED HEALTH CARE EDUCATION/TRAINING PROGRAM

## 2024-10-23 PROCEDURE — G2211 COMPLEX E/M VISIT ADD ON: HCPCS | Mod: 95,,, | Performed by: STUDENT IN AN ORGANIZED HEALTH CARE EDUCATION/TRAINING PROGRAM

## 2024-10-23 RX ORDER — LISDEXAMFETAMINE DIMESYLATE 30 MG/1
30 CAPSULE ORAL EVERY MORNING
Qty: 30 CAPSULE | Refills: 0 | Status: SHIPPED | OUTPATIENT
Start: 2024-10-23 | End: 2024-11-22

## 2024-10-23 RX ORDER — AZITHROMYCIN 250 MG/1
TABLET, FILM COATED ORAL
Qty: 6 TABLET | Refills: 0 | Status: SHIPPED | OUTPATIENT
Start: 2024-10-23

## 2024-10-23 RX ORDER — METHYLPREDNISOLONE 4 MG/1
TABLET ORAL
Qty: 21 TABLET | Refills: 0 | Status: SHIPPED | OUTPATIENT
Start: 2024-10-23

## 2024-10-23 RX ORDER — LISDEXAMFETAMINE DIMESYLATE 30 MG/1
30 CAPSULE ORAL EVERY MORNING
Qty: 30 CAPSULE | Refills: 0 | Status: SHIPPED | OUTPATIENT
Start: 2024-12-22 | End: 2025-01-21

## 2024-10-23 RX ORDER — LISDEXAMFETAMINE DIMESYLATE 30 MG/1
30 CAPSULE ORAL EVERY MORNING
Qty: 30 CAPSULE | Refills: 0 | Status: SHIPPED | OUTPATIENT
Start: 2024-11-22 | End: 2024-12-22

## 2024-10-23 NOTE — PROGRESS NOTES
The patient location is: [place]  The chief complaint leading to consultation is: add refill    Visit type: audiovisual    Time with patient: 10 minutes  15 minutes of total time spent on the encounter, which includes face to face time and non-face to face time preparing to see the patient (eg, review of tests), Obtaining and/or reviewing separately obtained history, Documenting clinical information in the electronic or other health record, Independently interpreting results (not separately reported) and communicating results to the patient/family/caregiver, or Care coordination (not separately reported).     Each patient to whom he or she provides medical services by telemedicine is:  (1) informed of the relationship between the physician and patient and the respective role of any other health care provider with respect to management of the patient; and (2) notified that he or she may decline to receive medical services by telemedicine and may withdraw from such care at any time.       Problem List Items Addressed This Visit          Psychiatric    ADD (attention deficit disorder) without hyperactivity - Primary    Overview     Chronic hx; cont vyvanse 30mg   Pt is demonstrating no behaviors to suggest inappropriate use of prescribed medications.    [place] Board of Pharmacy Controlled Prescription Drug Monitoring database was queried and showed no activity to suggest abuse, diversion, or other inappropriate use of prescription medications.  #30 tabs, 2 refills   3m VV    Adderall - palpitations  Strattera - no improvement with add             Relevant Medications    lisdexamfetamine (VYVANSE) 30 MG capsule    lisdexamfetamine (VYVANSE) 30 MG capsule (Start on 11/22/2024)    lisdexamfetamine (VYVANSE) 30 MG capsule (Start on 12/22/2024)     Other Visit Diagnoses       Bronchitis        Relevant Medications    methylPREDNISolone (MEDROL DOSEPACK) 4 mg tablet    azithromycin (Z-DEMARCUS) 250 MG tablet          UPPER  RESPIRATORY INFECTION:  - Evaluated upper respiratory symptoms present for about a week, likely related to patient's biologic therapy.  - Determined need for antibiotic and steroid treatment based on symptom duration and negative COVID tests.  - Considered chest XR if symptoms do not resolve with current treatment plan.  - Started Z-Navi (azithromycin) for upper respiratory symptoms.  - Started steroid for upper respiratory symptoms.    FOLLOW UP:  - Contact the office if respiratory symptoms do not clear up with current treatment.             Chrissie Lancaster MD  _________________________________________________________________________      Patient ID: Sparkle Jose is a 43 y.o. female.    History of Present Illness    CHIEF COMPLAINT:  Patient presents today for Vyvanse refill.    Pt diagnosed with ADD/ADHD.  Doing well on current medication. This medication helps to control lack of focus. Denies mood instability, irritability, aggression, palpitations, chest pain, weight loss, decreased appetite, disordered sleep.        UPPER RESPIRATORY INFECTION:  She reports upper respiratory symptoms that began approximately one week ago. Initially, she experienced nasal congestion, which has since resolved. Currently, she is experiencing chest congestion with productive cough. She denies fever or chills. She has taken two COVID tests, both of which were negative. Previous treatment with cough pearls has been ineffective in managing her symptoms.          Past medical histories reviewed, including past medical, surgical, family and social histories.      Current Outpatient Medications on File Prior to Visit   Medication Sig Dispense Refill    benzonatate (TESSALON) 200 MG capsule Take 1 capsule (200 mg total) by mouth 3 (three) times daily as needed for Cough. 30 capsule 0    cetirizine (ZYRTEC) 10 MG tablet Take 1 tablet (10 mg total) by mouth once daily. for 7 days 7 tablet 0    clobetasol 0.05% (TEMOVATE) 0.05 % Oint  1 application daily as needed.       clobetasol 0.05% (TEMOVATE) 0.05 % Oint Apply topically 2 (two) times daily. 30 g 1    famotidine (PEPCID) 40 MG tablet Take 1 tablet (40 mg total) by mouth once daily. 90 tablet 3    fezolinetant 45 mg Tab Take 45 mg by mouth once daily. 30 tablet 10    ixekizumab (TALTZ AUTOINJECTOR) 80 mg/mL AtIn Inject 80 mg into the skin every 28 days. 1 mL 11    oxyCODONE-acetaminophen (PERCOCET)  mg per tablet Take 1 tablet by mouth every 8 (eight) hours as needed for Pain. 90 tablet 0    promethazine (PHENERGAN) 25 MG tablet Take 1 tablet (25 mg total) by mouth every 6 (six) hours as needed for Nausea. 45 tablet 5    roflumilast (ZORYVE) 0.3 % Crea Apply 1 Application topically once daily. 60 g 3    tacrolimus (PROTOPIC) 0.1 % ointment Apply topically 2 (two) times daily. 60 g 2    tirzepatide 15 mg/0.5 mL PnIj Inject 15 mg into the skin every 7 days. 2 mL 4    tiZANidine (ZANAFLEX) 4 MG tablet Take 1 tablet (4 mg total) by mouth nightly as needed (muscle spasm). 90 tablet 1    traZODone (DESYREL) 100 MG tablet Take 1 tablet (100 mg total) by mouth every evening. 90 tablet 1    [DISCONTINUED] eletriptan (RELPAX) 40 MG tablet Take 1 tablet (40 mg total) by mouth as needed. may repeat in 2 hours if necessary (Patient not taking: Reported on 8/7/2024) 9 tablet 0    [DISCONTINUED] insulin syringes, disposable, 1 mL Syrg 1 each by Misc.(Non-Drug; Combo Route) route once daily. (Patient not taking: Reported on 8/7/2024) 25 each 5    [DISCONTINUED] methylPREDNISolone (MEDROL DOSEPACK) 4 mg tablet follow package directions (Patient not taking: Reported on 7/23/2024) 21 tablet 0     No current facility-administered medications on file prior to visit.       Review of Systems   12 point review of systems negative except for listed in HPI.     Objective:    Nursing note and vitals reviewed.  There were no vitals filed for this visit.  Body mass index is 22.85 kg/m².     Physical Exam   No  vitals or full physical exam obtained as this is a virtual visit  Gen: no distress, comfortable          We Offer Telehealth & Same Day Appointments!   Book your Telehealth appointment with me through my nurse or   Clinic appointments on [facility] [employer]!  Iuricj-[phone]     To Schedule appointments online, go to [employer]: [email or web address]       Visit today included increased complexity associated with the care of the episodic problem addressed and managing the longitudinal care of the patient due to the serious and/or complex managed problem(s) as per problem list.

## 2024-11-08 ENCOUNTER — OFFICE VISIT (OUTPATIENT)
Dept: RHEUMATOLOGY | Facility: CLINIC | Age: 44
End: 2024-11-08
Payer: MEDICARE

## 2024-11-08 VITALS
HEART RATE: 84 BPM | SYSTOLIC BLOOD PRESSURE: 110 MMHG | WEIGHT: 116 LBS | DIASTOLIC BLOOD PRESSURE: 78 MMHG | BODY MASS INDEX: 22.65 KG/M2

## 2024-11-08 DIAGNOSIS — M25.512 BILATERAL SHOULDER PAIN, UNSPECIFIED CHRONICITY: ICD-10-CM

## 2024-11-08 DIAGNOSIS — M45.9 ANKYLOSING SPONDYLITIS, UNSPECIFIED SITE OF SPINE: ICD-10-CM

## 2024-11-08 DIAGNOSIS — L40.50 PSORIATIC ARTHRITIS: ICD-10-CM

## 2024-11-08 DIAGNOSIS — G89.4 CHRONIC PAIN SYNDROME: ICD-10-CM

## 2024-11-08 DIAGNOSIS — M25.511 BILATERAL SHOULDER PAIN, UNSPECIFIED CHRONICITY: ICD-10-CM

## 2024-11-08 DIAGNOSIS — Z79.899 IMMUNOCOMPROMISED STATE DUE TO DRUG THERAPY: ICD-10-CM

## 2024-11-08 DIAGNOSIS — R11.0 NAUSEA: ICD-10-CM

## 2024-11-08 DIAGNOSIS — D84.821 IMMUNOCOMPROMISED STATE DUE TO DRUG THERAPY: ICD-10-CM

## 2024-11-08 DIAGNOSIS — J32.9 SINUSITIS, UNSPECIFIED CHRONICITY, UNSPECIFIED LOCATION: ICD-10-CM

## 2024-11-08 DIAGNOSIS — M45.9 ANKYLOSING SPONDYLITIS, UNSPECIFIED SITE OF SPINE: Primary | ICD-10-CM

## 2024-11-08 PROCEDURE — 99999 PR PBB SHADOW E&M-EST. PATIENT-LVL IV: CPT | Mod: PBBFAC,,, | Performed by: PHYSICIAN ASSISTANT

## 2024-11-08 PROCEDURE — 99214 OFFICE O/P EST MOD 30 MIN: CPT | Mod: PBBFAC,PO | Performed by: PHYSICIAN ASSISTANT

## 2024-11-08 RX ORDER — CYANOCOBALAMIN 1000 UG/ML
1000 INJECTION, SOLUTION INTRAMUSCULAR; SUBCUTANEOUS
Status: COMPLETED | OUTPATIENT
Start: 2024-11-08 | End: 2024-11-08

## 2024-11-08 RX ORDER — AZITHROMYCIN 250 MG/1
TABLET, FILM COATED ORAL
Qty: 6 TABLET | Refills: 0 | Status: SHIPPED | OUTPATIENT
Start: 2024-11-08

## 2024-11-08 RX ORDER — KETOROLAC TROMETHAMINE 30 MG/ML
60 INJECTION, SOLUTION INTRAMUSCULAR; INTRAVENOUS
Status: COMPLETED | OUTPATIENT
Start: 2024-11-08 | End: 2024-11-08

## 2024-11-08 RX ORDER — BIMEKIZUMAB 160 MG/ML
160 INJECTION, SOLUTION SUBCUTANEOUS
Qty: 1 ML | Refills: 11 | Status: SHIPPED | OUTPATIENT
Start: 2024-11-08

## 2024-11-08 RX ORDER — METHYLPREDNISOLONE ACETATE 80 MG/ML
80 INJECTION, SUSPENSION INTRA-ARTICULAR; INTRALESIONAL; INTRAMUSCULAR; SOFT TISSUE
Status: COMPLETED | OUTPATIENT
Start: 2024-11-08 | End: 2024-11-08

## 2024-11-08 RX ORDER — TIZANIDINE 4 MG/1
4 TABLET ORAL NIGHTLY PRN
Qty: 90 TABLET | Refills: 1 | Status: SHIPPED | OUTPATIENT
Start: 2024-11-08

## 2024-11-08 RX ORDER — PROMETHAZINE HYDROCHLORIDE 25 MG/1
25 TABLET ORAL EVERY 6 HOURS PRN
Qty: 45 TABLET | Refills: 0 | Status: SHIPPED | OUTPATIENT
Start: 2024-11-08

## 2024-11-08 RX ADMIN — KETOROLAC TROMETHAMINE 60 MG: 30 INJECTION, SOLUTION INTRAMUSCULAR; INTRAVENOUS at 12:11

## 2024-11-08 RX ADMIN — METHYLPREDNISOLONE ACETATE 80 MG: 80 INJECTION, SUSPENSION INTRA-ARTICULAR; INTRALESIONAL; INTRAMUSCULAR; SOFT TISSUE at 12:11

## 2024-11-08 RX ADMIN — CYANOCOBALAMIN 1000 MCG: 1000 INJECTION, SOLUTION INTRAMUSCULAR; SUBCUTANEOUS at 12:11

## 2024-11-08 ASSESSMENT — ROUTINE ASSESSMENT OF PATIENT INDEX DATA (RAPID3)
PATIENT GLOBAL ASSESSMENT SCORE: 6.5
TOTAL RAPID3 SCORE: 6.5
MDHAQ FUNCTION SCORE: 1.8
PSYCHOLOGICAL DISTRESS SCORE: 0
FATIGUE SCORE: 2.2
PAIN SCORE: 7

## 2024-11-08 NOTE — PROGRESS NOTES
Subjective:       Patient ID: Sparkle Jose is a 43 y.o. female.    Chief Complaint: Disease Management    Mrs. Jose is a 43 year old female who presents to clinic for follow up on AS. She started Taltz in March and since that time she reports more frequent sinus/respiratory infections. She has been sick for the last 2 months. She continues to have productive cough, PND, and congestion. She reports resolution of psoriasis while on steroids for infection, but once she stopped steroids psoriasis immediately worsens on her feet mainly, but also occasionally scalp and elbows. She is not sure if Taltz is helping with joint pain since she has not been able to take it consistently. She complains of increased pain in her neck/thoracic spine and shoulders. She did PT for 4-5 months last year with some improvement with specific modalities such as traction/dry needling. Not able to get traction device at home yet through insurance.   She is taking percocet prn for severe pain.     We reviewed her recent x-rays and labs.    Current tx:  1. taltz      Prior tx:  1. Humira  2. Remicade (drug induced lupus)  3. Cimzia  4. Simponi  5. Cosentyx      Review of Systems   Constitutional:  Positive for activity change. Negative for appetite change, chills, fatigue, fever and unexpected weight change.   HENT:  Negative for mouth sores and trouble swallowing.    Eyes:  Negative for redness and visual disturbance.   Respiratory:  Negative for cough and shortness of breath.    Cardiovascular:  Negative for chest pain, palpitations and leg swelling.   Gastrointestinal:  Negative for abdominal pain, constipation, diarrhea, nausea and vomiting.   Genitourinary:  Negative for dysuria and genital sores.   Musculoskeletal:  Positive for arthralgias, back pain, joint swelling and myalgias.   Skin:  Positive for rash.   Allergic/Immunologic: Positive for immunocompromised state.   Neurological:  Negative for dizziness, weakness,  light-headedness and headaches.   Hematological:  Does not bruise/bleed easily.         Objective:     Vitals:    11/08/24 1046   BP: 110/78   Pulse: 84           Past Medical History:   Diagnosis Date    Ankylosing spondylitis     Arthritis     Celiac disease     Depression     Family history of DVT     MGM DVT, mother spleenic    Family history of factor V Leiden mutation     mother and sister    GERD (gastroesophageal reflux disease)     Migraine     Osteoarthritis     Psoriasis     Psoriatic arthritis mutilans      Past Surgical History:   Procedure Laterality Date    brain decompression   2006    BRAIN SURGERY      COLONOSCOPY N/A 4/23/2024    Procedure: COLONOSCOPY;  Surgeon: Khari Merchant MD;  Location: [facility];  Service: Endoscopy;  Laterality: N/A;    TUBAL LIGATION            Physical Exam   Constitutional: She is oriented to person, place, and time.   Eyes: Right conjunctiva is not injected. Left conjunctiva is not injected.   Cardiovascular: Normal rate.   Pulmonary/Chest: Effort normal.   Musculoskeletal:      Right shoulder: Normal.      Left shoulder: Normal.      Right elbow: Normal.      Left elbow: Normal.      Right wrist: Normal.      Left wrist: Normal.      Right knee: Normal.      Left knee: Normal.   Neurological: She is alert and oriented to person, place, and time. Gait normal.   Skin: No rash noted.   Psychiatric: Mood and affect normal.       Right Side Rheumatological Exam     Examination finds the shoulder, elbow, wrist, knee, 1st PIP, 1st MCP, 2nd PIP, 2nd MCP, 3rd PIP, 3rd MCP, 4th PIP, 4th MCP, 5th PIP and 5th MCP normal.    Left Side Rheumatological Exam     Examination finds the shoulder, elbow, wrist, knee, 1st PIP, 1st MCP, 2nd PIP, 2nd MCP, 3rd PIP, 3rd MCP, 4th PIP, 4th MCP, 5th PIP and 5th MCP normal.      Back/Neck Exam   Tenderness Right paramedian tenderness of the Upper C-Spine, Lower C-Spine, Upper T-Spine and Lower T-Spine.Left paramedian tenderness of the Upper  C-Spine, Lower C-Spine, Upper T-Spine and Lower T-Spine.        Labs reviewed:  Component      Latest Ref Rn 7/15/2024   WBC      3.90 - 12.70 K/uL 14.45 (H)    RBC      4.00 - 5.40 M/uL 4.87    Hemoglobin      12.0 - 16.0 g/dL 14.6    Hematocrit      37.0 - 48.5 % 46.6    MCV      82 - 98 fL 96    MCH      27.0 - 31.0 pg 30.0    MCHC      32.0 - 36.0 g/dL 31.3 (L)    RDW      11.5 - 14.5 % 14.3    Platelet Count      150 - 450 K/uL 451 (H)    MPV      9.2 - 12.9 fL 10.5    Immature Granulocytes      0.0 - 0.5 % 0.3    Gran # (ANC)      1.8 - 7.7 K/uL 8.4 (H)    Immature Grans (Abs)      0.00 - 0.04 K/uL 0.05 (H)    Lymph #      1.0 - 4.8 K/uL 4.9 (H)    Mono #      0.3 - 1.0 K/uL 0.9    Eos #      0.0 - 0.5 K/uL 0.1    Baso #      0.00 - 0.20 K/uL 0.12    nRBC      0 /100 WBC 0    Gran %      38.0 - 73.0 % 58.5    Lymph %      18.0 - 48.0 % 33.6    Mono %      4.0 - 15.0 % 6.3    Eos %      0.0 - 8.0 % 0.5    Basophil %      0.0 - 1.9 % 0.8    Differential Method Automated    Sodium      136 - 145 mmol/L 137    Potassium      3.5 - 5.1 mmol/L 3.6    Chloride      95 - 110 mmol/L 103    CO2      23 - 29 mmol/L 22 (L)    Glucose      70 - 110 mg/dL 92    BUN      6 - 20 mg/dL 17    Creatinine      0.5 - 1.4 mg/dL 0.7    Calcium      8.7 - 10.5 mg/dL 10.8 (H)    PROTEIN TOTAL      6.0 - 8.4 g/dL 8.0    Albumin      3.5 - 5.2 g/dL 4.3    BILIRUBIN TOTAL      0.1 - 1.0 mg/dL 0.2    ALP      55 - 135 U/L 81    AST      10 - 40 U/L 14    ALT      10 - 44 U/L 10    eGFR      >60 mL/min/1.73 m^2 >60.0    Anion Gap      8 - 16 mmol/L 12    Cholesterol Total      120 - 199 mg/dL 207 (H)    Triglycerides      30 - 150 mg/dL 159 (H)    HDL      40 - 75 mg/dL 68    LDL Cholesterol      63.0 - 159.0 mg/dL 107.2    HDL/Cholesterol Ratio      20.0 - 50.0 % 32.9    Total Cholesterol/HDL Ratio      2.0 - 5.0  3.0    Non-HDL Cholesterol      mg/dL 139    Hemoglobin A1C External      4.0 - 5.6 % 5.1    Estimated Avg Glucose      68 -  131 mg/dL 100    CRP      0.0 - 8.2 mg/L 2.0    Sed Rate      0 - 20 mm/Hr 8    Vitamin D      30 - 96 ng/mL 36       Assessment:       1. Ankylosing spondylitis, unspecified site of spine    2. Psoriatic arthritis    3. Sinusitis, unspecified chronicity, unspecified location    4. Nausea    5. Immunocompromised state due to drug therapy    6. Chronic pain syndrome          Plan:       Ankylosing spondylitis, unspecified site of spine  -     tiZANidine (ZANAFLEX) 4 MG tablet; Take 1 tablet (4 mg total) by mouth nightly as needed (muscle spasm).  Dispense: 90 tablet; Refill: 1  -     bimekizumab-bkzx (BIMZELX AUTOINJECTOR) 160 mg/mL AtIn; Inject 160 mg into the skin every 28 days.  Dispense: 1 mL; Refill: 11  -     ketorolac injection 60 mg  -     methylPREDNISolone acetate injection 80 mg  -     cyanocobalamin injection 1,000 mcg    Psoriatic arthritis  -     tiZANidine (ZANAFLEX) 4 MG tablet; Take 1 tablet (4 mg total) by mouth nightly as needed (muscle spasm).  Dispense: 90 tablet; Refill: 1  -     bimekizumab-bkzx (BIMZELX AUTOINJECTOR) 160 mg/mL AtIn; Inject 160 mg into the skin every 28 days.  Dispense: 1 mL; Refill: 11  -     ketorolac injection 60 mg  -     methylPREDNISolone acetate injection 80 mg  -     cyanocobalamin injection 1,000 mcg    Sinusitis, unspecified chronicity, unspecified location  -     azithromycin (Z-DEMARCUS) 250 MG tablet; Take 2 tablets by mouth on day 1; Take 1 tablet by mouth on days 2-5  Dispense: 6 tablet; Refill: 0    Nausea  -     promethazine (PHENERGAN) 25 MG tablet; Take 1 tablet (25 mg total) by mouth every 6 (six) hours as needed for Nausea.  Dispense: 45 tablet; Refill: 0    Immunocompromised state due to drug therapy    Chronic pain syndrome          Psoriatic arthritis, Ankylosing spondylitis  Comorbid conditions:  --chronic insomnia on trazodone  --anxiety on prozac  --hx of brain surgery  --persistent leukocytosis and thrombocytosis, hematology work up unrevealing  --thyroid  nodule  --pulmonary nodule  --chronic pain syndrome followed by pain management    Plan:  1. D/c Taltz. Start Bimzelx 160 mg every 4 weeks  2. Cont tizanidine prn  3. Cont percocet prn.  I have checked [place] prescription monitoring program site and no unusual or abnormal behavior has occurred pt understand the risk and benefits of taking opioid medications and has decided to continue the medication. Pended Dr. Shea  4. Zpack for infection  5. Phenergan for nausea  6. Toradol 60, depo 80, b12 given today for acute pain  7. F/u on if medicare covers traction device or tens unit    Follow up:  4 mo Dr. Shea with labs prior as ordered

## 2024-11-08 NOTE — Clinical Note
Call [facility] pt and find out if they order tens units or traction device through medicare for patients or if they know how I can

## 2024-11-11 ENCOUNTER — TELEPHONE (OUTPATIENT)
Dept: RHEUMATOLOGY | Facility: CLINIC | Age: 44
End: 2024-11-11

## 2024-11-12 ENCOUNTER — PATIENT MESSAGE (OUTPATIENT)
Dept: RHEUMATOLOGY | Facility: CLINIC | Age: 44
End: 2024-11-12
Payer: MEDICARE

## 2024-11-12 RX ORDER — OXYCODONE AND ACETAMINOPHEN 10; 325 MG/1; MG/1
1 TABLET ORAL EVERY 8 HOURS PRN
Qty: 90 TABLET | Refills: 0 | Status: SHIPPED | OUTPATIENT
Start: 2024-12-08 | End: 2025-01-07

## 2024-11-12 RX ORDER — OXYCODONE AND ACETAMINOPHEN 10; 325 MG/1; MG/1
1 TABLET ORAL EVERY 8 HOURS PRN
Qty: 90 TABLET | Refills: 0 | Status: SHIPPED | OUTPATIENT
Start: 2025-01-07 | End: 2025-02-06

## 2024-11-12 RX ORDER — OXYCODONE AND ACETAMINOPHEN 10; 325 MG/1; MG/1
1 TABLET ORAL EVERY 8 HOURS PRN
Qty: 90 TABLET | Refills: 0 | Status: SHIPPED | OUTPATIENT
Start: 2024-11-12 | End: 2024-12-12

## 2024-11-13 ENCOUNTER — TELEPHONE (OUTPATIENT)
Dept: RHEUMATOLOGY | Facility: CLINIC | Age: 44
End: 2024-11-13
Payer: MEDICARE

## 2024-11-13 NOTE — TELEPHONE ENCOUNTER
----- Message from Ayden sent at 11/12/2024 11:31 AM CST -----  Regarding: return call  Contact: patient  Type:  Patient Returning Call    Who Called:patient  Who Left Message for Patient:Kendra  Does the patient know what this is regarding?:a appointment patient just had today/ questions  Would the patient rather a call back or a response via [facility]?   Best Call Back Number:[phone]  Additional Information:

## 2024-11-25 ENCOUNTER — PATIENT MESSAGE (OUTPATIENT)
Dept: RHEUMATOLOGY | Facility: CLINIC | Age: 44
End: 2024-11-25
Payer: MEDICARE

## 2024-12-09 ENCOUNTER — PATIENT MESSAGE (OUTPATIENT)
Dept: RHEUMATOLOGY | Facility: CLINIC | Age: 44
End: 2024-12-09
Payer: MEDICARE

## 2024-12-17 ENCOUNTER — TELEPHONE (OUTPATIENT)
Dept: RHEUMATOLOGY | Facility: CLINIC | Age: 44
End: 2024-12-17
Payer: MEDICARE

## 2024-12-17 NOTE — TELEPHONE ENCOUNTER
----- Message from Dianna sent at 12/17/2024 10:06 AM CST -----  Contact: [phone]  Type:  Patient Returning Call    Who Called:MARYLU NGUYEN [[id number]]  Who Left Message for Patient:  Does the patient know what this is regarding?:patient need a copy of her MRI   Would the patient rather a call back or a response via [facility]? Call  back  Best Call Back Number: [phone]  Additional Information: MRN [id number]

## 2024-12-31 ENCOUNTER — TELEPHONE (OUTPATIENT)
Dept: RHEUMATOLOGY | Facility: CLINIC | Age: 44
End: 2024-12-31
Payer: MEDICARE

## 2024-12-31 NOTE — TELEPHONE ENCOUNTER
Erin,  Please see message below and clarify    Michelle Jacquet, MA [facility]  12/31/2024

## 2024-12-31 NOTE — TELEPHONE ENCOUNTER
----- Message from Ynes sent at 12/31/2024  9:58 AM CST -----  Contact: [facility]  Type:  Pharmacy Calling to Clarify an RX    Name of Caller: Tamia   Pharmacy Name:  [facility] / phone [phone] Fax: [phone]  Prescription Name:   1. ixekizumab (TALTZ AUTOINJECTOR) 80 mg/mL AtIn   2. bimekizumab-bkzx (BIMZELX AUTOINJECTOR) 160 mg/mL AtIn  (is on pt record)  What do they need to clarify?: Need to know which Rx the pt is taking and need a Rx for the one that is being prescribed...  Please call to advise... Thank you...

## 2025-01-06 ENCOUNTER — TELEPHONE (OUTPATIENT)
Dept: RHEUMATOLOGY | Facility: CLINIC | Age: 45
End: 2025-01-06
Payer: MEDICARE

## 2025-01-06 ENCOUNTER — PATIENT MESSAGE (OUTPATIENT)
Dept: RHEUMATOLOGY | Facility: CLINIC | Age: 45
End: 2025-01-06
Payer: MEDICARE

## 2025-01-06 NOTE — TELEPHONE ENCOUNTER
----- Message from Tk sent at 1/6/2025 10:53 AM CST -----  Regarding: Rx issue  Type:  Needs Medical Advice    Who Called: Pt      Pharmacy name and phone #:      [facility] [place], [place] - [address] [address]  Phone: [phone] Fax: [phone]        Would the patient rather a call back or a response via [facility]? Call back    Best Call Back Number: [phone]      Additional Information: Sts she hasn't been able to get her RX ixekizumab (TALTZ AUTOINJECTOR) 80 mg/mL AtIn   [[id number]]  filled since it was changed by the office.  Said the pharmacy told her they have been trying to contact the office for Clarification but has not heard back.  Sts they said they called 3 times and haven't heard back.   Sts the clarification line number is [phone].  Please advise -- Thank you

## 2025-01-06 NOTE — TELEPHONE ENCOUNTER
Let pt know I spoke with the pharmacy and gave them the clarification that was needed for pt to get her taltz.

## 2025-01-07 ENCOUNTER — TELEPHONE (OUTPATIENT)
Dept: RHEUMATOLOGY | Facility: CLINIC | Age: 45
End: 2025-01-07
Payer: MEDICARE

## 2025-01-07 NOTE — TELEPHONE ENCOUNTER
Confirmed with [facility] the Taltz medication and was informed they will process her Taltz medication.  Adrienne HOYT

## 2025-01-09 ENCOUNTER — PATIENT MESSAGE (OUTPATIENT)
Dept: RHEUMATOLOGY | Facility: CLINIC | Age: 45
End: 2025-01-09
Payer: MEDICARE

## 2025-01-10 ENCOUNTER — TELEPHONE (OUTPATIENT)
Dept: RHEUMATOLOGY | Facility: CLINIC | Age: 45
End: 2025-01-10
Payer: MEDICARE

## 2025-01-10 ENCOUNTER — TELEPHONE (OUTPATIENT)
Dept: ADMINISTRATIVE | Facility: CLINIC | Age: 45
End: 2025-01-10
Payer: MEDICARE

## 2025-01-10 ENCOUNTER — OFFICE VISIT (OUTPATIENT)
Dept: INTERNAL MEDICINE | Facility: CLINIC | Age: 45
End: 2025-01-10
Payer: MEDICARE

## 2025-01-10 ENCOUNTER — PATIENT MESSAGE (OUTPATIENT)
Dept: RHEUMATOLOGY | Facility: CLINIC | Age: 45
End: 2025-01-10
Payer: MEDICARE

## 2025-01-10 DIAGNOSIS — Z00.00 ENCOUNTER FOR PREVENTIVE HEALTH EXAMINATION: Primary | ICD-10-CM

## 2025-01-10 DIAGNOSIS — Z79.899 IMMUNOCOMPROMISED STATE DUE TO DRUG THERAPY: ICD-10-CM

## 2025-01-10 DIAGNOSIS — Z87.440 HISTORY OF UTI: ICD-10-CM

## 2025-01-10 DIAGNOSIS — M45.9 ANKYLOSING SPONDYLITIS, UNSPECIFIED SITE OF SPINE: ICD-10-CM

## 2025-01-10 DIAGNOSIS — F51.01 PRIMARY INSOMNIA: ICD-10-CM

## 2025-01-10 DIAGNOSIS — R91.1 PULMONARY NODULE: ICD-10-CM

## 2025-01-10 DIAGNOSIS — L40.50 PSORIATIC ARTHRITIS: ICD-10-CM

## 2025-01-10 DIAGNOSIS — E88.810 METABOLIC SYNDROME: ICD-10-CM

## 2025-01-10 DIAGNOSIS — R73.03 PREDIABETES: ICD-10-CM

## 2025-01-10 DIAGNOSIS — D68.51 FACTOR 5 LEIDEN MUTATION, HETEROZYGOUS: ICD-10-CM

## 2025-01-10 DIAGNOSIS — Z86.69 HISTORY OF CHIARI MALFORMATION: ICD-10-CM

## 2025-01-10 DIAGNOSIS — F17.200 VAPING NICOTINE DEPENDENCE, NON-TOBACCO PRODUCT: ICD-10-CM

## 2025-01-10 DIAGNOSIS — F32.A ANXIETY AND DEPRESSION: ICD-10-CM

## 2025-01-10 DIAGNOSIS — F41.9 ANXIETY AND DEPRESSION: ICD-10-CM

## 2025-01-10 DIAGNOSIS — F98.8 ADD (ATTENTION DEFICIT DISORDER) WITHOUT HYPERACTIVITY: ICD-10-CM

## 2025-01-10 DIAGNOSIS — F11.90 OPIOID USE: ICD-10-CM

## 2025-01-10 DIAGNOSIS — D84.821 IMMUNOCOMPROMISED STATE DUE TO DRUG THERAPY: ICD-10-CM

## 2025-01-10 NOTE — PROGRESS NOTES
The patient location is: [place]  The chief complaint leading to consultation is: AWV follow up    Visit type: audiovisual    Face to Face time with patient: 40  60 minutes of total time spent on the encounter, which includes face to face time and non-face to face time preparing to see the patient (eg, review of tests), Obtaining and/or reviewing separately obtained history, Documenting clinical information in the electronic or other health record, Independently interpreting results (not separately reported) and communicating results to the patient/family/caregiver, or Care coordination (not separately reported).         Each patient to whom he or she provides medical services by telemedicine is:  (1) informed of the relationship between the physician and patient and the respective role of any other health care provider with respect to management of the patient; and (2) notified that he or she may decline to receive medical services by telemedicine and may withdraw from such care at any time.    Notes:       Sparkle Jose presented for a  Medicare AWV and comprehensive Health Risk Assessment today. The following components were reviewed and updated:    Medical history  Family History  Social history  Allergies and Current Medications  Health Risk Assessment  Health Maintenance  Care Team         ** See Completed Assessments for Annual Wellness Visit within the encounter summary.**         The following assessments were completed:  Living Situation  CAGE  Depression Screening  Fall Risk Assessment (MACH 10)  Hearing Assessment(HHI)  Cognitive Function Screening  Nutrition Screening  ADL Screening  PAQ Screening    Opioid documentation:      Patient does not have a current opioid prescription.   {Stop here if answer is 'does not'. (This text will automatically delete.) :33858}     Ht: 5' (1.524 m)  Last Wt: 52.6 kg>30 days  BP: 110/78>1 day  Pulse: 84>1 day      Physical Exam  Constitutional:       Appearance:  Normal appearance.   Pulmonary:      Effort: Pulmonary effort is normal.   Neurological:      Mental Status: She is alert and oriented to person, place, and time.   Psychiatric:         Mood and Affect: Mood normal.         Behavior: Behavior normal.         Thought Content: Thought content normal.         Judgment: Judgment normal.           Diagnoses and health risks identified today and associated recommendations/orders:    1. Psoriatic arthritis  ***    2. Immunocompromised state due to drug therapy  ***    3. Factor 5 Leiden mutation, heterozygous  ***      Provided Sparkle with a 5-10 year written screening schedule and personal prevention plan. Recommendations were developed using the USPSTF age appropriate recommendations. Education, counseling, and referrals were provided as needed. After Visit Summary printed and given to patient which includes a list of additional screenings\tests needed.    No follow-ups on file.    Tamia Eric NP

## 2025-01-10 NOTE — PATIENT INSTRUCTIONS
Counseling and Referral of Other Preventative  (Italic type indicates deductible and co-insurance are waived)    Patient Name: Sparkle Jose  Today's Date: 1/10/2025    Health Maintenance       Date Due Completion Date    Sign Pain Contract Never done ---    Complete Opioid Risk Tool Never done ---    Influenza Vaccine (1) Never done ---    Hemoglobin A1c 01/15/2025 7/15/2024    Pneumococcal Vaccines (Age 0-49) (1 of 2 - PCV) 01/10/2026 (Originally 12/6/1997) ---    Diabetes Urine Screening 07/15/2025 7/15/2024    Lipid Panel 07/15/2025 7/15/2024    Mammogram 10/09/2025 10/9/2024    Cervical Cancer Screening 09/23/2027 9/23/2022    Colorectal Cancer Screening 04/23/2029 4/23/2024    TETANUS VACCINE 08/08/2034 8/8/2024    RSV Vaccine (Age 60+ and Pregnant patients) (1 - 1-dose 75+ series) 12/06/2053 ---        No orders of the defined types were placed in this encounter.    The following information is provided to all patients.  This information is to help you find resources for any of the problems found today that may be affecting your health:                  Living healthy guide: www.[facility].[place].gov      Understanding Diabetes: www.diabetes.org      Eating healthy: www.cdc.gov/healthyweight      CDC home safety checklist: www.cdc.gov/steadi/patient.html      Agency on Aging: www.goea.[place].[facility]      Alcoholics anonymous (AA): www.aa.org      Physical Activity: www.sirisha.nih.gov/rm2gzxm      Tobacco use: www.quitwithus[place].org

## 2025-01-10 NOTE — PROGRESS NOTES
The patient location is: [place]  The chief complaint leading to consultation is: AWV follow up    Visit type: audiovisual    Face to Face time with patient: 40  60 minutes of total time spent on the encounter, which includes face to face time and non-face to face time preparing to see the patient (eg, review of tests), Obtaining and/or reviewing separately obtained history, Documenting clinical information in the electronic or other health record, Independently interpreting results (not separately reported) and communicating results to the patient/family/caregiver, or Care coordination (not separately reported).         Each patient to whom he or she provides medical services by telemedicine is:  (1) informed of the relationship between the physician and patient and the respective role of any other health care provider with respect to management of the patient; and (2) notified that he or she may decline to receive medical services by telemedicine and may withdraw from such care at any time.    Notes:       Sparkle Jose presented for a follow-up Medicare AWV today. The following components were reviewed and updated:    Medical history  Family History  Social history  Allergies and Current Medications  Health Risk Assessment  Health Maintenance  Care Team    **See Completed Assessments for Annual Wellness visit with in the encounter summary    The following assessments were completed:  Depression Screening  Cognitive function Screening  Timed Get Up Test  Whisper Test      Opioid documentation:      Patient does not have a current opioid prescription.   {Stop here if answer is 'does not'. (This text will automatically delete.) :84449}            Ht: 5' (1.524 m)  Last Wt: 52.6 kg>30 days  BP: 110/78>1 day  Pulse: 84>1 day  Physical Exam  Constitutional:       Appearance: Normal appearance.   Pulmonary:      Effort: Pulmonary effort is normal.   Neurological:      Mental Status: She is alert and oriented to  person, place, and time. Mental status is at baseline.   Psychiatric:         Mood and Affect: Mood normal.         Behavior: Behavior normal.         Thought Content: Thought content normal.         Judgment: Judgment normal.       Diagnoses and health risks identified today and associated recommendations/orders:  1. Encounter for preventive health examination  ***    2. Ankylosing spondylitis, unspecified site of spine  ***    3. Psoriatic arthritis  ***    4. Factor 5 Leiden mutation, heterozygous  ***    5. Immunocompromised state due to drug therapy  ***    6. Prediabetes  ***    7. History of Chiari malformation  ***    8. Metabolic syndrome  ***    9. Pulmonary nodule  ***    10. Primary insomnia  ***    11. ADD (attention deficit disorder) without hyperactivity  ***    12. Anxiety and depression  ***    13. History of UTI  ***      Provided Sparkle with a 5-10 year written screening schedule and personal prevention plan. Recommendations were developed using the USPSTF age appropriate recommendations. Education, counseling, and referrals were provided as needed.  After Visit Summary printed and given to patient which includes a list of additional screenings\tests needed.    Follow up in about 1 year (around 1/10/2026).      Tamia Eric NP

## 2025-01-10 NOTE — TELEPHONE ENCOUNTER
Called [facility] pharmacy and did not need any other info, I had answered their questions regarding to Zepbound info needed.  Adrienne HOYT

## 2025-01-10 NOTE — PROGRESS NOTES
The patient location is: [place]  The chief complaint leading to consultation is: awv    Visit type: audiovisual    Face to Face time with patient: 40  60 minutes of total time spent on the encounter, which includes face to face time and non-face to face time preparing to see the patient (eg, review of tests), Obtaining and/or reviewing separately obtained history, Documenting clinical information in the electronic or other health record, Independently interpreting results (not separately reported) and communicating results to the patient/family/caregiver, or Care coordination (not separately reported).         Each patient to whom he or she provides medical services by telemedicine is:  (1) informed of the relationship between the physician and patient and the respective role of any other health care provider with respect to management of the patient; and (2) notified that he or she may decline to receive medical services by telemedicine and may withdraw from such care at any time.    Notes:       Sparkle Jose presented for a follow-up Medicare AWV today. The following components were reviewed and updated:    Medical history  Family History  Social history  Allergies and Current Medications  Health Risk Assessment  Health Maintenance  Care Team    **See Completed Assessments for Annual Wellness visit with in the encounter summary    The following assessments were completed:  Depression Screening  Cognitive function Screening  Timed Get Up Test  Whisper Test      Opioid documentation:      Patient does have a current opioid prescription.      Patient accepted further discussion regarding opioid medication use.      Patient is currently taking oxycodone narcotic for generalized pain.        Pain level today is 5/10.       In addition to narcotic pain medications, patient is also using topical analgesic for pain control.       Patient is followed by a specialist currently for their pain and will not be referred  today.       Patient's opioid risk potential based on ORT-OUD tool:       Saroj each box that applies   No   Yes     Family history of substance abuse   Alcohol [x] []   Illegal drugs [x] []   Rx drugs [x] []     Personal history of substance abuse   Alcohol [x] []   Illegal drugs [x] []   Rx drugs [x] []     Age between 16-45 years   [x]   []     Patient with ADD, OCD, Bipolar disorder, schizophrenia   []   [x]     Patient with depression   []   [x]                         Scoring total                                                                 Non-opioid treatment options have been discussed today and added to the patient's after visit summary.      Ht: 5' (1.524 m)  Last Wt: 52.6 kg>30 days  BP: 110/78>1 day  Pulse: 84>1 day      Physical Exam  Constitutional:       Appearance: Normal appearance.   Pulmonary:      Effort: Pulmonary effort is normal.   Neurological:      Mental Status: She is alert and oriented to person, place, and time.   Psychiatric:         Mood and Affect: Mood normal.         Behavior: Behavior normal.         Thought Content: Thought content normal.         Judgment: Judgment normal.       Current Outpatient Medications   Medication Instructions    clobetasol 0.05% (TEMOVATE) 0.05 % Oint Topical (Top), 2 times daily    famotidine (PEPCID) 40 mg, Oral, Daily    lisdexamfetamine (VYVANSE) 30 mg, Oral, Every morning    oxyCODONE-acetaminophen (PERCOCET)  mg per tablet 1 tablet, Oral, Every 8 hours PRN    promethazine (PHENERGAN) 25 mg, Oral, Every 6 hours PRN    tacrolimus (PROTOPIC) 0.1 % ointment Topical (Top), 2 times daily    TALTZ AUTOINJECTOR 80 mg, Subcutaneous, Every 28 days    TENS unit and electrodes Cmpk 1 each, Misc.(Non-Drug; Combo Route), Daily PRN    tiZANidine (ZANAFLEX) 4 mg, Oral, Nightly PRN    traZODone (DESYREL) 100 mg, Oral, Nightly    ZEPBOUND 15 mg, Subcutaneous, Every 7 days         Diagnoses and health risks identified today and associated  recommendations/orders:  1. Encounter for preventive health examination  Decline vaccines    2. Ankylosing spondylitis, unspecified site of spine  Chronic and Stable on cosentyx 300 mg q30d and prn percocet, prednisone, and chlorzoxazone (msk relaxer)  Continue current treatment plan as previously prescribed - follows with rheumatology Dr. Almonte    3. Psoriatic arthritis  Chronic and Stable. See meds  Continue current treatment plan as previously prescribed with your rheumatologist    4. Factor 5 Leiden mutation, heterozygous  Chronic and Stable. Continue current treatment plan as previously prescribed with your  rheumatologist  5. Immunocompromised state due to drug therapy  Chronic and Stable.  See meds and labs Continue current treatment plan as previously prescribed with your rheumatologist    6. Prediabetes  Chronic and Stable.  Will started Zepbound die and exercise Continue current treatment plan as previously prescribed with your rheumatologist    7. History of Chiari malformation  Hx of chiari malformation dxd 2004 s/p brain decompression, follows with neurology q3-4 yrs.     8. Metabolic syndrome  Chronic and Stable.  Will started Zepbound die and exercise Continue current treatment plan as previously prescribed with your PCP    9. Pulmonary nodule  Chronic/stable moitered by PCP    10. Primary insomnia  Chronic and Stable on Trazodone. Continue current treatment plan as previously prescribed with your PCP    11. ADD (attention deficit disorder) without hyperactivity  Chronic and Stable on Vyvanase. Continue current treatment plan as previously prescribed with your PCP    12. Anxiety and depression  Chronic and Stable on Trazodone. Continue current treatment plan as previously prescribed with your PCP     13. History of UTI  Chronic and Stable. Monitered by PCP     14. Vaping nicotine dependence, non-tobacco product   Chronic and Ongoing.  No longer smokes cigarettes but vapes  Assistance with smoking  cessation was offered, including:  [x]  Medications  []  Counseling  [x]  Printed Information on Smoking Cessation  [x]  Referral to a Smoking Cessation Program  Decline    15. Opioid use   Chronic and Stable on  Percocet  as needed  Risk factors reviewed. Risk factors may include Sleep-disordered breathing, renal or hepatic insufficiency, increased risk for falls and fractures, risk of opioid misuse/overdose/opioid use disorder.  Pain evaluated during visit, documented under vitals.  Discussed nonpharmacologic treatments options,Followed by rheumatologist    I offered to discuss advanced care planning, including how to pick a person who would make decisions for you if you were unable to make them for yourself, called a health care power of , and what kind of decisions you might make such as use of life sustaining treatments such as ventilators and tube feeding when faced with a life limiting illness recorded on a living will that they will need to know. (How you want to be cared for as you near the end of your natural life)     X  Patient is unwilling to engage in a discussion regarding advance directives at this time.    Provided Sparkle with a 5-10 year written screening schedule and personal prevention plan. Recommendations were developed using the USPSTF age appropriate recommendations. Education, counseling, and referrals were provided as needed.  After Visit Summary printed and given to patient which includes a list of additional screenings\tests needed.    Follow up in about 1 year (around 1/10/2026).    Tamia Eric NP

## 2025-01-10 NOTE — TELEPHONE ENCOUNTER
----- Message from Sakina sent at 1/10/2025 10:20 AM CST -----  Type:  Needs Medical Advice    Who Called: melissa with [facility]    Pharmacy name and phone #:    [id number] - [place] - [address]  [address] [address]  Phone: [phone] Fax: [phone]    Would the patient rather a call back or a response via [facility]? Call back    Best Call Back Number: [phone]    Additional Information: wantsZEPBOUND  tirzepatide, weight loss, (ZEPBOUND) 15 mg/0.5 mL PnIj      to make sure she went through all the steps up to for rx    Please call back to advise. Thanks!

## 2025-01-29 DIAGNOSIS — E11.9 TYPE 2 DIABETES MELLITUS WITHOUT COMPLICATION: ICD-10-CM

## 2025-02-11 ENCOUNTER — PATIENT MESSAGE (OUTPATIENT)
Dept: FAMILY MEDICINE | Facility: CLINIC | Age: 45
End: 2025-02-11
Payer: MEDICARE

## 2025-02-12 ENCOUNTER — OFFICE VISIT (OUTPATIENT)
Dept: FAMILY MEDICINE | Facility: CLINIC | Age: 45
End: 2025-02-12
Payer: MEDICARE

## 2025-02-12 DIAGNOSIS — M45.5 ANKYLOSING SPONDYLITIS OF THORACOLUMBAR REGION: Primary | ICD-10-CM

## 2025-02-12 DIAGNOSIS — F98.8 ADD (ATTENTION DEFICIT DISORDER) WITHOUT HYPERACTIVITY: ICD-10-CM

## 2025-02-12 PROCEDURE — G2211 COMPLEX E/M VISIT ADD ON: HCPCS | Mod: 95,,, | Performed by: STUDENT IN AN ORGANIZED HEALTH CARE EDUCATION/TRAINING PROGRAM

## 2025-02-12 PROCEDURE — 98006 SYNCH AUDIO-VIDEO EST MOD 30: CPT | Mod: 95,,, | Performed by: STUDENT IN AN ORGANIZED HEALTH CARE EDUCATION/TRAINING PROGRAM

## 2025-02-12 RX ORDER — LISDEXAMFETAMINE DIMESYLATE 30 MG/1
30 CAPSULE ORAL EVERY MORNING
Qty: 30 CAPSULE | Refills: 0 | Status: SHIPPED | OUTPATIENT
Start: 2025-04-13 | End: 2025-05-13

## 2025-02-12 RX ORDER — TIRZEPATIDE 15 MG/.5ML
15 INJECTION, SOLUTION SUBCUTANEOUS
Start: 2025-02-12

## 2025-02-12 RX ORDER — LISDEXAMFETAMINE DIMESYLATE 30 MG/1
30 CAPSULE ORAL EVERY MORNING
Qty: 30 CAPSULE | Refills: 0 | Status: SHIPPED | OUTPATIENT
Start: 2025-03-14 | End: 2025-04-13

## 2025-02-12 RX ORDER — LISDEXAMFETAMINE DIMESYLATE 30 MG/1
30 CAPSULE ORAL EVERY MORNING
Qty: 30 CAPSULE | Refills: 0 | Status: SHIPPED | OUTPATIENT
Start: 2025-02-12 | End: 2025-03-14

## 2025-02-12 NOTE — PROGRESS NOTES
The patient location is: [place]  The chief complaint leading to consultation is: add    Visit type: audiovisual    Time with patient: 10 minutes  15 minutes of total time spent on the encounter, which includes face to face time and non-face to face time preparing to see the patient (eg, review of tests), Obtaining and/or reviewing separately obtained history, Documenting clinical information in the electronic or other health record, Independently interpreting results (not separately reported) and communicating results to the patient/family/caregiver, or Care coordination (not separately reported).     Each patient to whom he or she provides medical services by telemedicine is:  (1) informed of the relationship between the physician and patient and the respective role of any other health care provider with respect to management of the patient; and (2) notified that he or she may decline to receive medical services by telemedicine and may withdraw from such care at any time.       1. Ankylosing spondylitis of thoracolumbar region  Overview:  Chronic hx; follows with rheumatology Dr. Monse Pierson on talz and prn percocet, prednisone, and msk relaxer  - follows q3-4m with rheum  - chiropractor     Orders:  -     tirzepatide, weight loss, (ZEPBOUND) 15 mg/0.5 mL PnIj; Inject 15 mg into the skin every 7 days.    2. ADD (attention deficit disorder) without hyperactivity  Overview:  Chronic hx; cont vyvanse 30mg   Pt is demonstrating no behaviors to suggest inappropriate use of prescribed medications.    [place] Board of Pharmacy Controlled Prescription Drug Monitoring database was queried and showed no activity to suggest abuse, diversion, or other inappropriate use of prescription medications.  #30 tabs, 2 refills   3m VV    Adderall - palpitations  Strattera - no improvement with add        Orders:  -     lisdexamfetamine (VYVANSE) 30 MG capsule; Take 1 capsule (30 mg total) by mouth every morning.  Dispense: 30 capsule;  Refill: 0  -     lisdexamfetamine (VYVANSE) 30 MG capsule; Take 1 capsule (30 mg total) by mouth every morning.  Dispense: 30 capsule; Refill: 0  -     lisdexamfetamine (VYVANSE) 30 MG capsule; Take 1 capsule (30 mg total) by mouth every morning.  Dispense: 30 capsule; Refill: 0       SLEEP APNEA:  - Reviewed the patient's past sleep apnea diagnosis, previously determined to be positional.  - Noted that the sleep apnea was extremely severe when the patient slept on their back, as confirmed by a previous sleep study.  - Recalled that the patient was given the option of CPAP or positional therapy for treatment.      WEIGHT MANAGEMENT:  - Patient to continue exercise regimen, including walks.    FOLLOW UP:  - Follow up in 3 months for virtual appointment.             Chrissie Lancaster MD  _________________________________________________________________________      Patient ID: Sparkle Jose is a 44 y.o. female.    History of Present Illness    CHIEF COMPLAINT:  Patient presents today for follow up.    BACK PAIN:  She reports thoracic back pain, currently managed with Talz which provides relief.    SLEEP APNEA:  She has a history of positional sleep apnea that is significantly worse when sleeping supine. She was previously given options of CPAP or positional therapy for management.    MEDICATIONS:  She continues Vyvanse 30 mg with good tolerance and denies side effects including palpitations, sleep disturbances, irritability, or aggression. She is also taking GLP-1 medication at 15 mg.          Past medical histories reviewed, including past medical, surgical, family and social histories.      Current Outpatient Medications on File Prior to Visit   Medication Sig Dispense Refill    clobetasol 0.05% (TEMOVATE) 0.05 % Oint Apply topically 2 (two) times daily. 30 g 1    famotidine (PEPCID) 40 MG tablet Take 1 tablet (40 mg total) by mouth once daily. 90 tablet 3    ixekizumab (TALTZ AUTOINJECTOR) 80 mg/mL AtIn Inject  80 mg into the skin every 28 days. 1 mL 11    promethazine (PHENERGAN) 25 MG tablet Take 1 tablet (25 mg total) by mouth every 6 (six) hours as needed for Nausea. 45 tablet 0    TENS unit and electrodes Cmpk 1 each by Misc.(Non-Drug; Combo Route) route daily as needed (back pain).      tiZANidine (ZANAFLEX) 4 MG tablet Take 1 tablet (4 mg total) by mouth nightly as needed (muscle spasm). 90 tablet 1    traZODone (DESYREL) 100 MG tablet Take 1 tablet (100 mg total) by mouth every evening. 90 tablet 1    [DISCONTINUED] lisdexamfetamine (VYVANSE) 30 MG capsule Take 1 capsule (30 mg total) by mouth every morning. 30 capsule 0    [DISCONTINUED] tirzepatide, weight loss, (ZEPBOUND) 15 mg/0.5 mL PnIj Inject 15 mg into the skin every 7 days. (Patient not taking: Reported on 2/12/2025) 2 mL 3     No current facility-administered medications on file prior to visit.       Review of Systems   12 point review of systems negative except for listed in HPI.     Objective:    Nursing note and vitals reviewed.  There were no vitals filed for this visit.  There is no height or weight on file to calculate BMI.     Physical Exam   No vitals or full physical exam obtained as this is a virtual visit  Gen: no distress, comfortable          We Offer Telehealth & Same Day Appointments!   Book your Telehealth appointment with me through my nurse or   Clinic appointments on [facility]!  [phone]     To Schedule appointments online, go to [facility]: [email or web address]       Visit today included increased complexity associated with the care of the episodic problem addressed and managing the longitudinal care of the patient due to the serious and/or complex managed problem(s) as per problem list.     This note was generated with the assistance of ambient listening technology. Verbal consent was obtained by the patient and accompanying visitor(s) for the recording of patient appointment to facilitate this note. I  attest to having reviewed and edited the generated note for accuracy, though some syntax or spelling errors may persist. Please contact the author of this note for any clarification.

## 2025-03-03 ENCOUNTER — PATIENT MESSAGE (OUTPATIENT)
Dept: RHEUMATOLOGY | Facility: CLINIC | Age: 45
End: 2025-03-03
Payer: MEDICARE

## 2025-03-03 DIAGNOSIS — M45.5 ANKYLOSING SPONDYLITIS OF THORACOLUMBAR REGION: ICD-10-CM

## 2025-03-03 RX ORDER — TIRZEPATIDE 15 MG/.5ML
15 INJECTION, SOLUTION SUBCUTANEOUS
Start: 2025-03-03 | End: 2025-03-03 | Stop reason: SDUPTHER

## 2025-03-03 RX ORDER — TIRZEPATIDE 15 MG/.5ML
15 INJECTION, SOLUTION SUBCUTANEOUS
Qty: 6 ML | Refills: 3 | Status: SHIPPED | OUTPATIENT
Start: 2025-03-03

## 2025-03-03 NOTE — TELEPHONE ENCOUNTER
Care Due:                  Date            Visit Type   Department     Provider  --------------------------------------------------------------------------------                                ESTABLISHED                              PATIENT -    [facility]  Last Visit: 02-      [facility]       Chrissie Lancaster  Next Visit: None Scheduled  None         None Found                                                            Last  Test          Frequency    Reason                     Performed    Due Date  --------------------------------------------------------------------------------    HBA1C.......  6 months...  tirzepatide,.............  07- 01-    [facility] Embedded Care Due Messages. Reference number: [id number].   3/03/2025 9:34:22 AM CST

## 2025-03-03 NOTE — TELEPHONE ENCOUNTER
No orders of the defined types were placed in this encounter.      Medications Ordered This Encounter   Medications    tirzepatide, weight loss, (ZEPBOUND) 15 mg/0.5 mL PnIj     Sig: Inject 15 mg into the skin every 7 days.     Dispense:  6 mL     Refill:  3

## 2025-03-18 ENCOUNTER — LAB VISIT (OUTPATIENT)
Dept: LAB | Facility: HOSPITAL | Age: 45
End: 2025-03-18
Attending: INTERNAL MEDICINE
Payer: MEDICARE

## 2025-03-18 DIAGNOSIS — L30.9 ECZEMA, UNSPECIFIED TYPE: ICD-10-CM

## 2025-03-18 LAB — ERYTHROCYTE [SEDIMENTATION RATE] IN BLOOD BY WESTERGREN METHOD: 5 MM/HR (ref 0–20)

## 2025-03-18 PROCEDURE — 36415 COLL VENOUS BLD VENIPUNCTURE: CPT | Mod: PO | Performed by: INTERNAL MEDICINE

## 2025-03-18 PROCEDURE — 85025 COMPLETE CBC W/AUTO DIFF WBC: CPT | Performed by: INTERNAL MEDICINE

## 2025-03-18 PROCEDURE — 80053 COMPREHEN METABOLIC PANEL: CPT | Performed by: INTERNAL MEDICINE

## 2025-03-18 PROCEDURE — 85651 RBC SED RATE NONAUTOMATED: CPT | Mod: PO | Performed by: INTERNAL MEDICINE

## 2025-03-18 PROCEDURE — 86140 C-REACTIVE PROTEIN: CPT | Performed by: INTERNAL MEDICINE

## 2025-03-19 LAB
ALBUMIN SERPL BCP-MCNC: 3.3 G/DL (ref 3.5–5.2)
ALP SERPL-CCNC: 68 U/L (ref 40–150)
ALT SERPL W/O P-5'-P-CCNC: 8 U/L (ref 10–44)
ANION GAP SERPL CALC-SCNC: 8 MMOL/L (ref 8–16)
AST SERPL-CCNC: 15 U/L (ref 10–40)
BASOPHILS # BLD AUTO: 0.11 K/UL (ref 0–0.2)
BASOPHILS NFR BLD: 1.1 % (ref 0–1.9)
BILIRUB SERPL-MCNC: 0.2 MG/DL (ref 0.1–1)
BUN SERPL-MCNC: 7 MG/DL (ref 6–20)
CALCIUM SERPL-MCNC: 8.6 MG/DL (ref 8.7–10.5)
CHLORIDE SERPL-SCNC: 107 MMOL/L (ref 95–110)
CO2 SERPL-SCNC: 25 MMOL/L (ref 23–29)
CREAT SERPL-MCNC: 0.6 MG/DL (ref 0.5–1.4)
CRP SERPL-MCNC: 0.9 MG/L (ref 0–8.2)
DIFFERENTIAL METHOD BLD: ABNORMAL
EOSINOPHIL # BLD AUTO: 0.1 K/UL (ref 0–0.5)
EOSINOPHIL NFR BLD: 1.3 % (ref 0–8)
ERYTHROCYTE [DISTWIDTH] IN BLOOD BY AUTOMATED COUNT: 13.5 % (ref 11.5–14.5)
EST. GFR  (NO RACE VARIABLE): >60 ML/MIN/1.73 M^2
GLUCOSE SERPL-MCNC: 72 MG/DL (ref 70–110)
HCT VFR BLD AUTO: 38.9 % (ref 37–48.5)
HGB BLD-MCNC: 12.6 G/DL (ref 12–16)
IMM GRANULOCYTES # BLD AUTO: 0.03 K/UL (ref 0–0.04)
IMM GRANULOCYTES NFR BLD AUTO: 0.3 % (ref 0–0.5)
LYMPHOCYTES # BLD AUTO: 5 K/UL (ref 1–4.8)
LYMPHOCYTES NFR BLD: 47.9 % (ref 18–48)
MCH RBC QN AUTO: 31 PG (ref 27–31)
MCHC RBC AUTO-ENTMCNC: 32.4 G/DL (ref 32–36)
MCV RBC AUTO: 96 FL (ref 82–98)
MONOCYTES # BLD AUTO: 0.8 K/UL (ref 0.3–1)
MONOCYTES NFR BLD: 7.5 % (ref 4–15)
NEUTROPHILS # BLD AUTO: 4.4 K/UL (ref 1.8–7.7)
NEUTROPHILS NFR BLD: 41.9 % (ref 38–73)
NRBC BLD-RTO: 0 /100 WBC
PLATELET # BLD AUTO: 370 K/UL (ref 150–450)
PLATELET BLD QL SMEAR: ABNORMAL
PMV BLD AUTO: 10.2 FL (ref 9.2–12.9)
POTASSIUM SERPL-SCNC: 3.8 MMOL/L (ref 3.5–5.1)
PROT SERPL-MCNC: 6.1 G/DL (ref 6–8.4)
RBC # BLD AUTO: 4.07 M/UL (ref 4–5.4)
SODIUM SERPL-SCNC: 140 MMOL/L (ref 136–145)
WBC # BLD AUTO: 10.46 K/UL (ref 3.9–12.7)

## 2025-03-24 ENCOUNTER — OFFICE VISIT (OUTPATIENT)
Dept: RHEUMATOLOGY | Facility: CLINIC | Age: 45
End: 2025-03-24
Payer: MEDICARE

## 2025-03-24 VITALS
WEIGHT: 112 LBS | BODY MASS INDEX: 21.99 KG/M2 | SYSTOLIC BLOOD PRESSURE: 124 MMHG | DIASTOLIC BLOOD PRESSURE: 77 MMHG | HEART RATE: 93 BPM | HEIGHT: 60 IN

## 2025-03-24 DIAGNOSIS — Z79.899 IMMUNOCOMPROMISED STATE DUE TO DRUG THERAPY: ICD-10-CM

## 2025-03-24 DIAGNOSIS — F41.0 ANXIETY ATTACK: ICD-10-CM

## 2025-03-24 DIAGNOSIS — D84.821 IMMUNOCOMPROMISED STATE DUE TO DRUG THERAPY: ICD-10-CM

## 2025-03-24 DIAGNOSIS — M45.9 ANKYLOSING SPONDYLITIS, UNSPECIFIED SITE OF SPINE: ICD-10-CM

## 2025-03-24 DIAGNOSIS — G89.4 CHRONIC PAIN SYNDROME: Primary | ICD-10-CM

## 2025-03-24 PROCEDURE — 99213 OFFICE O/P EST LOW 20 MIN: CPT | Mod: PBBFAC,PO | Performed by: INTERNAL MEDICINE

## 2025-03-24 PROCEDURE — 99214 OFFICE O/P EST MOD 30 MIN: CPT | Mod: S$PBB,,, | Performed by: INTERNAL MEDICINE

## 2025-03-24 PROCEDURE — 99999 PR PBB SHADOW E&M-EST. PATIENT-LVL III: CPT | Mod: PBBFAC,,, | Performed by: INTERNAL MEDICINE

## 2025-03-24 RX ORDER — OXYCODONE AND ACETAMINOPHEN 10; 325 MG/1; MG/1
1 TABLET ORAL EVERY 8 HOURS PRN
Qty: 90 TABLET | Refills: 0 | Status: SHIPPED | OUTPATIENT
Start: 2025-04-23 | End: 2025-05-23

## 2025-03-24 RX ORDER — OXYCODONE AND ACETAMINOPHEN 10; 325 MG/1; MG/1
1 TABLET ORAL EVERY 8 HOURS PRN
Qty: 90 TABLET | Refills: 0 | Status: SHIPPED | OUTPATIENT
Start: 2025-03-24 | End: 2025-04-23

## 2025-03-24 RX ORDER — ALPRAZOLAM 0.25 MG/1
0.25 TABLET ORAL 3 TIMES DAILY
Qty: 21 TABLET | Refills: 0 | Status: SHIPPED | OUTPATIENT
Start: 2025-03-24 | End: 2025-03-31

## 2025-03-24 ASSESSMENT — ROUTINE ASSESSMENT OF PATIENT INDEX DATA (RAPID3)
MDHAQ FUNCTION SCORE: 1.7
PSYCHOLOGICAL DISTRESS SCORE: 4.4
PATIENT GLOBAL ASSESSMENT SCORE: 5
PAIN SCORE: 5

## 2025-03-24 NOTE — PROGRESS NOTES
Subjective:     Patient ID:  Sparkle Jose    Chief Complaint:  Disease Management     History of Present Illness: pt is a 43 yo  psa, pso on taltz  doing well and her zepbound is helping  her arthritis.she is get manipilation at the chiropractor and doing well. She is doing well on Taltz is helping with joint pain since she has not been able to take it consistently. She complains of increased pain in her neck/thoracic spine and shoulders.  Patient complains of arthralgias and myalgias for which has been present for a few years. Pain is located in multiple joints, both shoulder(s), both elbow(s), both wrist(s), both MCP(s): 1st, 2nd, 3rd, 4th and 5th, both PIP(s): 1st, 2nd, 3rd, 4th and 5th, both DIP(s): 1st and 2nd, both hip(s), both knee(s) and both MTP(s): 1st, 2nd, 3rd, 4th and 5th, is described as aching, pulsating, shooting and throbbing, and is constant, moderate .  Associated symptoms include: crepitation, decreased range of motion, edema, effusion, tenderness and warmth.  Her psoriasis is gone   She is taking percocet prn for severe pain. . She has notice wbc is normal on zepbound          Current tx:  1. Taltz  2. zepbound        Prior tx:  1. Humira  2. Remicade (drug induced lupus)  3. Cimzia  4. Simponi  5. Cosentyx        - Diagnosis/es:  - Positive serologies:  - Infectious screening labs:  - Previous Treatments:  - Current Treatments:     Interval History:   Hospitalization since last office visit: No    Patient Active Problem List    Diagnosis Date Noted    History of UTI 01/10/2025    Opioid use 01/10/2025    Hot flashes 09/30/2024    Positive colorectal cancer screening using Cologuard test 04/23/2024    Colon polyp 04/23/2024    Hematuria 11/16/2023    Metabolic syndrome 08/18/2023    Immunocompromised state due to drug therapy 03/07/2023    ADD (attention deficit disorder) without hyperactivity 03/07/2023    Prediabetes 06/15/2022    Anxiety and depression 03/15/2022    Primary insomnia  03/15/2022    History of Chiari malformation 03/15/2022    Thyroid nodule 11/26/2021    Cigarette nicotine dependence without complication 08/11/2019    Pulmonary nodule 08/11/2019    Thrombocytosis 08/22/2018    Leukocytosis 08/22/2018    Factor 5 Leiden mutation, heterozygous 08/22/2018    Psoriatic arthritis 02/19/2015    Ankylosing spondylitis 11/12/2014     Past Surgical History:   Procedure Laterality Date    BRAIN SURGERY      Decompression -approximate 2004    COLONOSCOPY N/A 04/23/2024    Procedure: COLONOSCOPY;  Surgeon: Khari Merchant MD;  Location: [facility];  Service: Endoscopy;  Laterality: N/A;    TUBAL LIGATION       Social History[1]  Family History   Problem Relation Name Age of Onset    Hypertension Mother Mother     Clotting disorder Mother Mother     Depression Sister Sister     Learning disabilities Sister Sister     Mental illness Sister Sister     Asthma Brother Brother     Learning disabilities Brother Brother     Clotting disorder Sister Madyson     Learning disabilities Sister Madyson     Diabetes Maternal Grandmother Carol Call     Diabetes Paternal Grandmother Carol Daniels     Breast cancer Neg Hx      Ovarian cancer Neg Hx       Review of patient's allergies indicates:  No Known Allergies    Review of Systems   Review of Systems     Current Medications:  Current Outpatient Medications   Medication Instructions    ALPRAZolam (XANAX) 0.25 mg, Oral, 3 times daily    clobetasol 0.05% (TEMOVATE) 0.05 % Oint Topical (Top), 2 times daily    famotidine (PEPCID) 40 mg, Oral, Daily    lisdexamfetamine (VYVANSE) 30 mg, Oral, Every morning    [START ON 4/13/2025] lisdexamfetamine (VYVANSE) 30 mg, Oral, Every morning    oxyCODONE-acetaminophen (PERCOCET)  mg per tablet 1 tablet, Oral, Every 8 hours PRN    [START ON 4/23/2025] oxyCODONE-acetaminophen (PERCOCET)  mg per tablet 1 tablet, Oral, Every 8 hours PRN    promethazine (PHENERGAN) 25 mg, Oral, Every 6 hours PRN    TALTZ AUTOINJECTOR  80 mg, Subcutaneous, Every 28 days    TENS unit and electrodes Cmpk 1 each, Misc.(Non-Drug; Combo Route), Daily PRN    tiZANidine (ZANAFLEX) 4 mg, Oral, Nightly PRN    traZODone (DESYREL) 100 mg, Oral, Nightly    ZEPBOUND 15 mg, Subcutaneous, Every 7 days         Objective:     Vitals:    03/24/25 1111   BP: 124/77   Pulse: 93   Weight: 50.8 kg (111 lb 15.9 oz)   Height: 5' (1.524 m)   PainSc:   4      Body mass index is 21.87 kg/m².     Physical Examinations:  Physical Exam     Disease Assessment Scores:  Patient's Global Assessment of arthritis (0-10): 1  Physician's Global Assessment of arthritis (0-10): 1  Number of Tender Joints (0-28): 1  Number of Swollen Joints (0-28): 1        3/7/2024    12:26 PM   Rapid3 Question Responses and Scores   MDHAQ Score 1.5   Psychologic Score 2.2   Pain Score 5   When you awakened in the morning OVER THE LAST WEEK, did you feel stiff? Yes   If Yes, please indicate the number of hours until you are as limber as you will be for the day 2   Fatigue Score 6   Global Health Score 6.5   RAPID3 Score 5.5       Monitoring Lab Results:  Lab Results   Component Value Date    WBC 10.46 03/18/2025    RBC 4.07 03/18/2025    HGB 12.6 03/18/2025    HCT 38.9 03/18/2025    MCV 96 03/18/2025    MCH 31.0 03/18/2025    MCHC 32.4 03/18/2025    RDW 13.5 03/18/2025     03/18/2025        Lab Results   Component Value Date     03/18/2025    K 3.8 03/18/2025     03/18/2025    CO2 25 03/18/2025    GLU 72 03/18/2025    BUN 7 03/18/2025    CREATININE 0.6 03/18/2025    CALCIUM 8.6 (L) 03/18/2025    PROT 6.1 03/18/2025    ALBUMIN 3.3 (L) 03/18/2025    BILITOT 0.2 03/18/2025    ALKPHOS 68 03/18/2025    AST 15 03/18/2025    ALT 8 (L) 03/18/2025    ANIONGAP 8 03/18/2025    EGFRNORACEVR >60.0 03/18/2025       Lab Results   Component Value Date    SEDRATE 5 03/18/2025    CRP 0.9 03/18/2025        Lab Results   Component Value Date    JUTYDOCD54HF 36 07/15/2024    QXKUBLNB63 387 01/03/2018     "    Lab Results   Component Value Date    CHOL 207 (H) 07/15/2024    HDL 68 07/15/2024    LDLCALC 107.2 07/15/2024    TRIG 159 (H) 07/15/2024       Lab Results   Component Value Date    CCPANTIBODIE 1.1 11/12/2014     Lab Results   Component Value Date    ANASCREEN Negative <1:160 03/09/2018    DSDNA Negative 1:10 09/05/2017     Lab Results   Component Value Date    HLABB27 Negative 11/12/2014       Infectious Disease Screening:  Lab Results   Component Value Date    HEPBSAG Non-reactive 07/21/2023    HEPBCAB Non-reactive 07/21/2023    HEPBSAB <3.00 07/21/2023    HEPBSAB Non-reactive 07/21/2023    HEPBIGM Negative 07/09/2019     Lab Results   Component Value Date    HEPCAB Non-reactive 07/21/2023     Lab Results   Component Value Date    TBGOLDPLUS Negative 07/21/2023     No results found for: "QUANTIFERON", "SVCMT", "QUANTAGVALUE", "QUANTNILVALU", "QUANTMITOGEN", "QFTTBAG", "QINT"     Imaging: DEXA, Xrays, MRIs, CTs, etc    Old & Outside Medical Records:  Reviewed old and all outside medical records available in Care Everywhere     Assessment:     Encounter Diagnoses   Name Primary?    Chronic pain syndrome Yes    Ankylosing spondylitis, unspecified site of spine     Immunocompromised state due to drug therapy     Anxiety attack         Plan:      Encounter Diagnoses   Name Primary?    Chronic pain syndrome Yes    Ankylosing spondylitis, unspecified site of spine     Immunocompromised state due to drug therapy     Anxiety attack      Chronic pain syndrome  -     oxyCODONE-acetaminophen (PERCOCET)  mg per tablet; Take 1 tablet by mouth every 8 (eight) hours as needed for Pain.  Dispense: 90 tablet; Refill: 0  -     oxyCODONE-acetaminophen (PERCOCET)  mg per tablet; Take 1 tablet by mouth every 8 (eight) hours as needed for Pain.  Dispense: 90 tablet; Refill: 0  -     Sedimentation rate; Future; Expected date: 03/24/2025  -     C-Reactive Protein; Future; Expected date: 03/24/2025  -     Comprehensive " Metabolic Panel; Future; Expected date: 03/24/2025  -     CBC Auto Differential; Future; Expected date: 03/24/2025    Ankylosing spondylitis, unspecified site of spine  -     oxyCODONE-acetaminophen (PERCOCET)  mg per tablet; Take 1 tablet by mouth every 8 (eight) hours as needed for Pain.  Dispense: 90 tablet; Refill: 0  -     oxyCODONE-acetaminophen (PERCOCET)  mg per tablet; Take 1 tablet by mouth every 8 (eight) hours as needed for Pain.  Dispense: 90 tablet; Refill: 0  -     Sedimentation rate; Future; Expected date: 03/24/2025  -     C-Reactive Protein; Future; Expected date: 03/24/2025  -     Comprehensive Metabolic Panel; Future; Expected date: 03/24/2025  -     CBC Auto Differential; Future; Expected date: 03/24/2025    Immunocompromised state due to drug therapy  -     oxyCODONE-acetaminophen (PERCOCET)  mg per tablet; Take 1 tablet by mouth every 8 (eight) hours as needed for Pain.  Dispense: 90 tablet; Refill: 0  -     oxyCODONE-acetaminophen (PERCOCET)  mg per tablet; Take 1 tablet by mouth every 8 (eight) hours as needed for Pain.  Dispense: 90 tablet; Refill: 0  -     Sedimentation rate; Future; Expected date: 03/24/2025  -     C-Reactive Protein; Future; Expected date: 03/24/2025  -     Comprehensive Metabolic Panel; Future; Expected date: 03/24/2025  -     CBC Auto Differential; Future; Expected date: 03/24/2025    Anxiety attack  -     ALPRAZolam (XANAX) 0.25 MG tablet; Take 1 tablet (0.25 mg total) by mouth 3 (three) times daily. for 7 days  Dispense: 21 tablet; Refill: 0        1. Continue taltz  2. Continue zepbound  3. Labs prior     Follow-up 6 months    More than 50% of the  30 minute encounter was spent face to face counseling the patient regarding current status and future plan of care as well as side effects  of the medications. All questions were answered to patient's satisfaction also includes  non-face to face time preparing to see the patient (eg, review of  tests), Obtaining and/or reviewing separately obtained history, Documenting clinical information in the electronic or other health record, Independently interpreting results           [1]   Social History  Tobacco Use    Smoking status: Every Day     Types: Vaping with nicotine    Smokeless tobacco: Never    Tobacco comments:     Started vaping  6/ 2024   Substance Use Topics    Alcohol use: No    Drug use: Never

## 2025-05-07 ENCOUNTER — TELEPHONE (OUTPATIENT)
Dept: FAMILY MEDICINE | Facility: CLINIC | Age: 45
End: 2025-05-07
Payer: COMMERCIAL

## 2025-05-07 ENCOUNTER — PATIENT MESSAGE (OUTPATIENT)
Dept: FAMILY MEDICINE | Facility: CLINIC | Age: 45
End: 2025-05-07
Payer: COMMERCIAL

## 2025-05-07 NOTE — TELEPHONE ENCOUNTER
----- Message from Silvana sent at 5/7/2025  3:08 PM CDT -----  Contact: [phone]  Would you like a call back, or a response through your [facility] portal?:   CallComments: Patient would like to ask you a private question.

## 2025-05-07 NOTE — TELEPHONE ENCOUNTER
Patient called and stated about 45 minutes ago she started having sharp pains in right leg right her sheen only while sitting. Patient stated no SOB wants to know should she go to the ER or come in to be seen.

## 2025-05-07 NOTE — TELEPHONE ENCOUNTER
Spoke to patient about her concerns per Dr Lancaster the patient is to go to the nearest ER. Patient stated and understood to go to the ER.

## 2025-06-05 ENCOUNTER — PATIENT MESSAGE (OUTPATIENT)
Dept: FAMILY MEDICINE | Facility: CLINIC | Age: 45
End: 2025-06-05
Payer: COMMERCIAL

## 2025-06-05 DIAGNOSIS — F98.8 ADD (ATTENTION DEFICIT DISORDER) WITHOUT HYPERACTIVITY: ICD-10-CM

## 2025-06-05 RX ORDER — LISDEXAMFETAMINE DIMESYLATE 30 MG/1
30 CAPSULE ORAL EVERY MORNING
Qty: 30 CAPSULE | Refills: 0 | OUTPATIENT
Start: 2025-06-05 | End: 2025-07-05

## 2025-06-06 ENCOUNTER — OFFICE VISIT (OUTPATIENT)
Dept: PRIMARY CARE CLINIC | Facility: CLINIC | Age: 45
End: 2025-06-06
Payer: MEDICARE

## 2025-06-06 DIAGNOSIS — Z76.0 ENCOUNTER FOR MEDICATION REFILL: ICD-10-CM

## 2025-06-06 DIAGNOSIS — F98.8 ADD (ATTENTION DEFICIT DISORDER) WITHOUT HYPERACTIVITY: Primary | ICD-10-CM

## 2025-06-06 RX ORDER — LISDEXAMFETAMINE DIMESYLATE 30 MG/1
30 CAPSULE ORAL EVERY MORNING
Qty: 30 CAPSULE | Refills: 0 | Status: SHIPPED | OUTPATIENT
Start: 2025-07-06 | End: 2025-08-05

## 2025-06-06 RX ORDER — LISDEXAMFETAMINE DIMESYLATE 30 MG/1
30 CAPSULE ORAL EVERY MORNING
Qty: 30 CAPSULE | Refills: 0 | Status: SHIPPED | OUTPATIENT
Start: 2025-06-06 | End: 2025-07-06

## 2025-06-06 RX ORDER — LISDEXAMFETAMINE DIMESYLATE 30 MG/1
30 CAPSULE ORAL EVERY MORNING
Qty: 30 CAPSULE | Refills: 0 | Status: SHIPPED | OUTPATIENT
Start: 2025-08-05 | End: 2025-09-04

## 2025-06-27 ENCOUNTER — RESULTS FOLLOW-UP (OUTPATIENT)
Dept: FAMILY MEDICINE | Facility: CLINIC | Age: 45
End: 2025-06-27

## 2025-06-27 ENCOUNTER — HOSPITAL ENCOUNTER (OUTPATIENT)
Dept: RADIOLOGY | Facility: HOSPITAL | Age: 45
Discharge: HOME | End: 2025-06-27
Attending: STUDENT IN AN ORGANIZED HEALTH CARE EDUCATION/TRAINING PROGRAM
Payer: MEDICARE

## 2025-06-27 ENCOUNTER — OFFICE VISIT (OUTPATIENT)
Dept: FAMILY MEDICINE | Facility: CLINIC | Age: 45
End: 2025-06-27
Payer: MEDICARE

## 2025-06-27 VITALS
RESPIRATION RATE: 18 BRPM | DIASTOLIC BLOOD PRESSURE: 81 MMHG | BODY MASS INDEX: 22.13 KG/M2 | TEMPERATURE: 99 F | WEIGHT: 112.69 LBS | HEIGHT: 60 IN | OXYGEN SATURATION: 100 % | HEART RATE: 97 BPM | SYSTOLIC BLOOD PRESSURE: 127 MMHG

## 2025-06-27 DIAGNOSIS — M25.552 LEFT HIP PAIN: ICD-10-CM

## 2025-06-27 DIAGNOSIS — M45.5 ANKYLOSING SPONDYLITIS OF THORACOLUMBAR REGION: Primary | ICD-10-CM

## 2025-06-27 LAB
BILIRUB UR QL STRIP.AUTO: NEGATIVE
CLARITY UR: CLEAR
COLOR UR AUTO: YELLOW
GLUCOSE UR QL STRIP: NEGATIVE
HGB UR QL STRIP: NEGATIVE
KETONES UR QL STRIP: NEGATIVE
LEUKOCYTE ESTERASE UR QL STRIP: NEGATIVE
NITRITE UR QL STRIP: NEGATIVE
PH UR STRIP: 6 [PH]
PROT UR QL STRIP: NEGATIVE
SP GR UR STRIP: 1.01

## 2025-06-27 PROCEDURE — 81002 URINALYSIS NONAUTO W/O SCOPE: CPT | Mod: PO | Performed by: STUDENT IN AN ORGANIZED HEALTH CARE EDUCATION/TRAINING PROGRAM

## 2025-06-27 PROCEDURE — 99215 OFFICE O/P EST HI 40 MIN: CPT | Mod: PBBFAC,25,PO | Performed by: STUDENT IN AN ORGANIZED HEALTH CARE EDUCATION/TRAINING PROGRAM

## 2025-06-27 PROCEDURE — 73502 X-RAY EXAM HIP UNI 2-3 VIEWS: CPT | Mod: 26,LT,, | Performed by: RADIOLOGY

## 2025-06-27 PROCEDURE — 99999 PR PBB SHADOW E&M-EST. PATIENT-LVL V: CPT | Mod: PBBFAC,,, | Performed by: STUDENT IN AN ORGANIZED HEALTH CARE EDUCATION/TRAINING PROGRAM

## 2025-06-27 PROCEDURE — 73502 X-RAY EXAM HIP UNI 2-3 VIEWS: CPT | Mod: TC,PO,LT

## 2025-06-27 RX ORDER — DEXAMETHASONE SODIUM PHOSPHATE 4 MG/ML
4 INJECTION, SOLUTION INTRA-ARTICULAR; INTRALESIONAL; INTRAMUSCULAR; INTRAVENOUS; SOFT TISSUE
Status: COMPLETED | OUTPATIENT
Start: 2025-06-27 | End: 2025-06-27

## 2025-06-27 RX ORDER — GABAPENTIN 300 MG/1
300 CAPSULE ORAL NIGHTLY
Qty: 30 CAPSULE | Refills: 2 | Status: SHIPPED | OUTPATIENT
Start: 2025-06-27

## 2025-06-27 RX ORDER — OXYCODONE AND ACETAMINOPHEN 10; 325 MG/1; MG/1
1 TABLET ORAL EVERY 8 HOURS PRN
COMMUNITY
Start: 2025-06-12

## 2025-06-27 RX ORDER — KETOROLAC TROMETHAMINE 30 MG/ML
30 INJECTION, SOLUTION INTRAMUSCULAR; INTRAVENOUS
Status: COMPLETED | OUTPATIENT
Start: 2025-06-27 | End: 2025-06-27

## 2025-06-27 RX ADMIN — KETOROLAC TROMETHAMINE 30 MG: 30 INJECTION, SOLUTION INTRAMUSCULAR; INTRAVENOUS at 11:06

## 2025-06-27 RX ADMIN — DEXAMETHASONE SODIUM PHOSPHATE 4 MG: 4 INJECTION, SOLUTION INTRA-ARTICULAR; INTRALESIONAL; INTRAMUSCULAR; INTRAVENOUS; SOFT TISSUE at 11:06

## 2025-06-27 NOTE — PROGRESS NOTES
1. Ankylosing spondylitis of thoracolumbar region  Overview:  Chronic hx; follows with rheumatology Dr. Shea  Stable on talz and prn percocet, prednisone, and msk relaxer  - follows q3-4m with rheum  - chiropractor     Orders:  -     gabapentin (NEURONTIN) 300 MG capsule; Take 1 capsule (300 mg total) by mouth every evening.  Dispense: 30 capsule; Refill: 2    2. Left hip pain  -     Urinalysis, Reflex to Urine Culture Urine, Clean Catch  -     X-Ray Hip 2 or 3 views Left with Pelvis when performed; Future; Expected date: 06/27/2025  -     gabapentin (NEURONTIN) 300 MG capsule; Take 1 capsule (300 mg total) by mouth every evening.  Dispense: 30 capsule; Refill: 2  -     dexAMETHasone injection 4 mg  -     ketorolac injection 30 mg  -     GREY TOP URINE HOLD         Assessment & Plan    ## ANKYLOSING SPONDYLITIS:  - Reviewed history of ankylosing spondylitis and previous imaging studies.  - Patient reports long-term lower back pain with worsening symptoms over the past few weeks.  - Imaging last year showed minimal disc space and minimal arthritis in the lower back, with a bone spur in the neck.  - Previous physical therapy for lower back pain has not been effective.  - Administered Toradol and steroid injection for acute pain relief.  - Current pain management includes Percocet and muscle relaxers.  - Discussed importance of alternating between heat and ice therapy for pain relief; patient to try using ice packs in addition to heat therapy.    ## SCOLIOSIS (THORACIC REGION):  - Patient has a mild rightward curve of the mid to lower back, confirmed by imaging.    ## LUMBAR DISC DISORDER:  - Assessed worsening lower back pain with radiation to hip, groin, and knee.  - Previous imaging showed minimal disc space in the lumbar region, indicating disc disorder.  - Ordered an x-ray of the hip to further evaluate the pain and assess for any new changes or abnormalities.    ## FOLLOW-UP AND REFERRALS:  - Recommend hip  exercises for ongoing management; patient to perform as instructed.            Chrissie Lancaster MD  _________________________________________________________________________      Patient ID: Sparkle Jose is a 44 y.o. female.    History of Present Illness    CHIEF COMPLAINT:  Patient presents today for worsening lower back pain with radiating symptoms    HISTORY OF PRESENT ILLNESS:  She reports chronic lower back pain historically located near the SI joint area, with recent exacerbation in the past few weeks. She describes new onset of shocking, radiating pain around hip, lower back, groin, and knee, which was particularly severe yesterday. The pain has transitioned from a previous nagging, aching sensation to a more intense, radiating shock-like pain that extends from lower back to top of hip and groin area. Pain significantly impacts mobility, causing her to avoid weight bearing on the affected side. She cannot stand, sit, or remain in one position for extended periods without discomfort.    PAIN MANAGEMENT:  Heat therapy provides minimal relief while sitting reclined on a heating pad until exhausted. She has not attempted ice therapy due to feeling consistently cold. Percocet was ineffective for pain management last night. She is reluctant to take pain medication unless absolutely necessary.    MEDICAL HISTORY:  She has a history of scoliosis in the thoracic spine with a mild rightward curve of the mid to lower back, and disc loss in lumbar spine. Previous imaging showed minimal disc space and mild cartilage breakdown consistent with early arthritis. SI joint imaging from July was normal. She has undergone physical therapy for lower back 3 times and had a nerve burning procedure in neck approximately 3-4 years ago. She used a TENS unit approximately 20 years ago prior to Chiari surgery, which was helpful at that time.            Past medical histories reviewed, including past medical, surgical, family and  social histories.      Medications Ordered Prior to Encounter[1]    Review of Systems   12 point review of systems negative except for listed in HPI.     Objective:    Nursing note and vitals reviewed.  Vitals:    06/27/25 1117   BP: 127/81   Pulse: 97   Resp: 18   Temp: 98.6 °F (37 °C)     Body mass index is 22.01 kg/m².     Physical Exam   Constitutional: oriented to person, place, and time. well-developed and well-nourished. No distress.   HENT: WNL  Head: Normocephalic and atraumatic.   Eyes: EOM are normal.   Neck: Normal range of motion. Neck supple.   Cardiovascular: Normal rate  Pulmonary/Chest: Effort normal. No respiratory distress.   GI: soft, non distended, no ttp, no rebound/guarding  Musculoskeletal: Normal range of motion. no edema. +lumbar paraspinal ttp, no step- offs, no point ttp, neg straight leg   L hip: no edema, erythema, or ecchymosis, full rom and strength, no ttp to greater trochanter. +talha fadir. Nv intact, antalgic gait    Neurological: CN II-XII intact  Skin: warm and dry.   Psychiatric: normal mood and affect. behavior is normal.             We Offer Telehealth & Same Day Appointments!   Book your Telehealth appointment with me through my nurse or   Clinic appointments on [employer]!  [phone]-[phone]     To Schedule appointments online, go to [employer]: [email or web address]       Visit today included increased complexity associated with the care of the episodic problem addressed and managing the longitudinal care of the patient due to the serious and/or complex managed problem(s) as per problem list.     This note was generated with the assistance of ambient listening technology. Verbal consent was obtained by the patient and accompanying visitor(s) for the recording of patient appointment to facilitate this note. I attest to having reviewed and edited the generated note for accuracy, though some syntax or spelling errors may persist. Please contact the  author of this note for any clarification.              [1]   Current Outpatient Medications on File Prior to Visit   Medication Sig Dispense Refill    clobetasol 0.05% (TEMOVATE) 0.05 % Oint Apply topically 2 (two) times daily. 30 g 1    famotidine (PEPCID) 40 MG tablet Take 1 tablet (40 mg total) by mouth once daily. 90 tablet 3    ixekizumab (TALTZ AUTOINJECTOR) 80 mg/mL AtIn Inject 80 mg into the skin every 28 days. 1 mL 11    lisdexamfetamine (VYVANSE) 30 MG capsule Take 1 capsule (30 mg total) by mouth every morning. 30 capsule 0    [START ON 7/6/2025] lisdexamfetamine (VYVANSE) 30 MG capsule Take 1 capsule (30 mg total) by mouth every morning. 30 capsule 0    [START ON 8/5/2025] lisdexamfetamine (VYVANSE) 30 MG capsule Take 1 capsule (30 mg total) by mouth every morning. 30 capsule 0    oxyCODONE-acetaminophen (PERCOCET)  mg per tablet Take 1 tablet by mouth every 8 (eight) hours as needed.      promethazine (PHENERGAN) 25 MG tablet Take 1 tablet (25 mg total) by mouth every 6 (six) hours as needed for Nausea. 45 tablet 0    TENS unit and electrodes Cmpk 1 each by Misc.(Non-Drug; Combo Route) route daily as needed (back pain).      tirzepatide, weight loss, (ZEPBOUND) 15 mg/0.5 mL PnIj Inject 15 mg into the skin every 7 days. 6 mL 3    tiZANidine (ZANAFLEX) 4 MG tablet Take 1 tablet (4 mg total) by mouth nightly as needed (muscle spasm). 90 tablet 1    traZODone (DESYREL) 100 MG tablet Take 1 tablet (100 mg total) by mouth every evening. 90 tablet 1     No current facility-administered medications on file prior to visit.

## 2025-06-27 NOTE — PATIENT INSTRUCTIONS
Silvestre Villagran,     If you are due for any health screening(s) below please notify me so we can arrange them to be ordered and scheduled. Most healthy patients at your age complete them, but you are free to accept or refuse.     If you can't do it, I'll definitely understand. If you can, I'd certainly appreciate it!    Tests to Keep You Healthy    Mammogram: Met on 10/9/2024  Colon Cancer Screening: Met on 4/23/2024  Cervical Cancer Screening: Met on 9/23/2022  Tobacco Cessation: NO      Were here to help you quit smoking     Our records indicated that you are still smoking. One of the best things you can do for your health is to stop smoking and we are here to help.     Talk with your provider about our Smoking Cessation Program and how we can support you on your journey.

## 2025-06-28 LAB — HOLD SPECIMEN: NORMAL

## 2025-07-22 ENCOUNTER — PATIENT MESSAGE (OUTPATIENT)
Dept: RHEUMATOLOGY | Facility: CLINIC | Age: 45
End: 2025-07-22
Payer: COMMERCIAL

## 2025-07-22 ENCOUNTER — PATIENT MESSAGE (OUTPATIENT)
Dept: FAMILY MEDICINE | Facility: CLINIC | Age: 45
End: 2025-07-22
Payer: COMMERCIAL

## 2025-07-22 DIAGNOSIS — M45.9 ANKYLOSING SPONDYLITIS, UNSPECIFIED SITE OF SPINE: ICD-10-CM

## 2025-07-22 DIAGNOSIS — M54.16 LUMBAR RADICULOPATHY, CHRONIC: Primary | ICD-10-CM

## 2025-07-22 DIAGNOSIS — M25.552 BILATERAL HIP PAIN: ICD-10-CM

## 2025-07-22 DIAGNOSIS — M25.551 BILATERAL HIP PAIN: ICD-10-CM

## 2025-07-23 ENCOUNTER — TELEPHONE (OUTPATIENT)
Dept: FAMILY MEDICINE | Facility: CLINIC | Age: 45
End: 2025-07-23
Payer: COMMERCIAL

## 2025-07-24 NOTE — TELEPHONE ENCOUNTER
1st avail mri l spine, hip, si joints    Orders Placed This Encounter   Procedures    MRI Lumbar Spine Without Contrast     Standing Status:   Future     Expected Date:   7/23/2025     Expiration Date:   7/23/2026     Scheduling Instructions:         <!--EPICS-->    MRI procedures requiring sedation at [facility] are only available between 8am and 4pm.   <!--EPICE-->  Does the patient have an aneurysm or surgical clip, pump, nerve/brain stimulator, middle/inner ear prosthesis, or other metal implant or foreign object (bullet, shrapnel)? If they have a card related to their implant, ask them to bring it. Issues related to the implant may cause the MRI to be delayed.:    No  Does the patient have or ever had a pacemaker or a defibrillator (Note: Some facilities may not be able to schedule an MRI for patients with pacemakers and defibrillators. You should contact your local radiology dept to determine if this is the case.)?:    No  Is the patient claustrophobic?:    No  Will the patient require po anxiolysis or sedation?:    No  Is the patient pregnant?:    No    MRI Sacroiliac Joints Without Contrast  Standing Status:    Future  Expected Date:    7/23/2025  Expiration Date:    7/23/2026  Does the patient have or ever had a pacemaker or a defibrillator (Note: Some facilities may not be able to schedule an MRI for patients with pacemakers and defibrillators. You should contact your local radiology dept to determine if this is the case.)?:    No  Does the patient have an aneurysm or surgical clip, pump, nerve/brain stimulator, middle/inner ear prosthesis, or other metal implant or foreign object (bullet, shrapnel)? If they have a card related to their implant, ask them to bring it. Issues related to the implant may cause the MRI to be delayed.:    No  Will the patient require po anxiolysis or sedation?:    No  Is the patient pregnant?:    No  May the Radiologist modify the order per protocol to meet the clinical needs  of the patient?:    Yes  Does the patient have on a skin patch for medication with aluminized backing?:    No    MRI Arthrogram Hip With Contrast Left  Standing Status:    Future  Expected Date:    7/24/2025  Expiration Date:    7/24/2026  Does the patient have or ever had a pacemaker or a defibrillator (Note: Some facilities may not be able to schedule an MRI for patients with pacemakers and defibrillators. You should contact your local radiology dept to determine if this is the case.)?:    No  Does the patient have an aneurysm or surgical clip, pump, nerve/brain stimulator, middle/inner ear prosthesis, or other metal implant or foreign object (bullet, shrapnel)? If they have a card related to their implant, ask them to bring it. Issues related to the implant may cause the MRI to be delayed.:    No  Will the patient require po anxiolysis or sedation?:    No  May the Radiologist modify the order per protocol to meet the clinical needs of the patient?:    Yes  Does the patient have on a skin patch for medication with aluminized backing?:    No  Is the patient allergic to iodine contrast?:    No

## 2025-07-30 ENCOUNTER — PATIENT MESSAGE (OUTPATIENT)
Dept: FAMILY MEDICINE | Facility: CLINIC | Age: 45
End: 2025-07-30
Payer: COMMERCIAL

## 2025-07-30 DIAGNOSIS — K21.9 GASTROESOPHAGEAL REFLUX DISEASE, UNSPECIFIED WHETHER ESOPHAGITIS PRESENT: Primary | ICD-10-CM

## 2025-07-31 RX ORDER — PANTOPRAZOLE SODIUM 40 MG/1
40 TABLET, DELAYED RELEASE ORAL DAILY
Qty: 90 TABLET | Refills: 3 | Status: SHIPPED | OUTPATIENT
Start: 2025-07-31

## 2025-07-31 RX ORDER — PANTOPRAZOLE SODIUM 40 MG/1
40 TABLET, DELAYED RELEASE ORAL
COMMUNITY
Start: 2025-07-06 | End: 2025-07-31 | Stop reason: SDUPTHER

## 2025-07-31 NOTE — TELEPHONE ENCOUNTER
No care due was identified.  [facility] Embedded Care Due Messages. Reference number: [id number].   7/31/2025 7:35:21 AM CDT

## 2025-08-05 ENCOUNTER — PATIENT MESSAGE (OUTPATIENT)
Dept: RHEUMATOLOGY | Facility: CLINIC | Age: 45
End: 2025-08-05
Payer: COMMERCIAL

## 2025-08-08 ENCOUNTER — HOSPITAL ENCOUNTER (OUTPATIENT)
Dept: RADIOLOGY | Facility: HOSPITAL | Age: 45
Discharge: HOME | End: 2025-08-08
Attending: STUDENT IN AN ORGANIZED HEALTH CARE EDUCATION/TRAINING PROGRAM
Payer: MEDICARE

## 2025-08-08 DIAGNOSIS — Z12.31 ENCOUNTER FOR SCREENING MAMMOGRAM FOR BREAST CANCER: ICD-10-CM

## 2025-08-08 PROCEDURE — 77063 BREAST TOMOSYNTHESIS BI: CPT | Mod: TC,PO

## 2025-08-08 PROCEDURE — 77067 SCR MAMMO BI INCL CAD: CPT | Mod: 26,,, | Performed by: STUDENT IN AN ORGANIZED HEALTH CARE EDUCATION/TRAINING PROGRAM

## 2025-08-08 PROCEDURE — 77063 BREAST TOMOSYNTHESIS BI: CPT | Mod: 26,,, | Performed by: STUDENT IN AN ORGANIZED HEALTH CARE EDUCATION/TRAINING PROGRAM

## 2025-08-11 ENCOUNTER — PATIENT MESSAGE (OUTPATIENT)
Dept: FAMILY MEDICINE | Facility: CLINIC | Age: 45
End: 2025-08-11
Payer: COMMERCIAL

## 2025-08-13 ENCOUNTER — OFFICE VISIT (OUTPATIENT)
Dept: NEUROSURGERY | Facility: CLINIC | Age: 45
End: 2025-08-13
Payer: MEDICARE

## 2025-08-13 VITALS
BODY MASS INDEX: 22.01 KG/M2 | SYSTOLIC BLOOD PRESSURE: 111 MMHG | DIASTOLIC BLOOD PRESSURE: 76 MMHG | HEART RATE: 91 BPM | HEIGHT: 60 IN

## 2025-08-13 DIAGNOSIS — M51.369 L4-L5 DISC BULGE: ICD-10-CM

## 2025-08-13 RX ORDER — GABAPENTIN 300 MG/1
300 CAPSULE ORAL 2 TIMES DAILY
Qty: 60 CAPSULE | Refills: 1 | Status: SHIPPED | OUTPATIENT
Start: 2025-08-13 | End: 2025-10-12

## 2025-08-27 DIAGNOSIS — M25.552 LEFT HIP PAIN: Primary | ICD-10-CM
